# Patient Record
Sex: MALE | Race: WHITE | Employment: FULL TIME | URBAN - METROPOLITAN AREA
[De-identification: names, ages, dates, MRNs, and addresses within clinical notes are randomized per-mention and may not be internally consistent; named-entity substitution may affect disease eponyms.]

---

## 2018-09-06 ENCOUNTER — HOSPITAL ENCOUNTER (INPATIENT)
Age: 38
LOS: 18 days | Discharge: ANOTHER HOSPITAL | DRG: 871 | End: 2018-09-25
Attending: EMERGENCY MEDICINE | Admitting: INTERNAL MEDICINE
Payer: SUBSIDIZED

## 2018-09-06 ENCOUNTER — APPOINTMENT (OUTPATIENT)
Dept: GENERAL RADIOLOGY | Age: 38
DRG: 871 | End: 2018-09-06
Attending: EMERGENCY MEDICINE
Payer: SUBSIDIZED

## 2018-09-06 DIAGNOSIS — F19.10 IV DRUG ABUSE (HCC): ICD-10-CM

## 2018-09-06 DIAGNOSIS — R53.81 DEBILITY: ICD-10-CM

## 2018-09-06 DIAGNOSIS — F17.200 SMOKER: ICD-10-CM

## 2018-09-06 DIAGNOSIS — M54.6 ACUTE RIGHT-SIDED THORACIC BACK PAIN: ICD-10-CM

## 2018-09-06 DIAGNOSIS — A41.9 SEPSIS, DUE TO UNSPECIFIED ORGANISM: Primary | ICD-10-CM

## 2018-09-06 DIAGNOSIS — F11.10 HEROIN ABUSE (HCC): ICD-10-CM

## 2018-09-06 DIAGNOSIS — M54.41 ACUTE RIGHT-SIDED LOW BACK PAIN WITH RIGHT-SIDED SCIATICA: ICD-10-CM

## 2018-09-06 DIAGNOSIS — I07.9 ENDOCARDITIS OF TRICUSPID VALVE: ICD-10-CM

## 2018-09-06 LAB
ALBUMIN SERPL-MCNC: 2.6 G/DL (ref 3.5–5)
ALBUMIN/GLOB SERPL: 0.4 {RATIO} (ref 1.1–2.2)
ALP SERPL-CCNC: 149 U/L (ref 45–117)
ALT SERPL-CCNC: 42 U/L (ref 12–78)
ANION GAP SERPL CALC-SCNC: 12 MMOL/L (ref 5–15)
APPEARANCE UR: CLEAR
AST SERPL-CCNC: 34 U/L (ref 15–37)
BACTERIA URNS QL MICRO: NEGATIVE /HPF
BASOPHILS # BLD: 0 K/UL (ref 0–0.1)
BASOPHILS NFR BLD: 0 % (ref 0–1)
BILIRUB SERPL-MCNC: 0.7 MG/DL (ref 0.2–1)
BILIRUB UR QL: NEGATIVE
BUN SERPL-MCNC: 12 MG/DL (ref 6–20)
BUN/CREAT SERPL: 12 (ref 12–20)
CALCIUM SERPL-MCNC: 8.8 MG/DL (ref 8.5–10.1)
CHLORIDE SERPL-SCNC: 102 MMOL/L (ref 97–108)
CO2 SERPL-SCNC: 20 MMOL/L (ref 21–32)
COLOR UR: ABNORMAL
CREAT SERPL-MCNC: 1.02 MG/DL (ref 0.7–1.3)
CRP SERPL-MCNC: 15.5 MG/DL (ref 0–0.6)
DIFFERENTIAL METHOD BLD: ABNORMAL
EOSINOPHIL # BLD: 0 K/UL (ref 0–0.4)
EOSINOPHIL NFR BLD: 0 % (ref 0–7)
EPITH CASTS URNS QL MICRO: ABNORMAL /LPF
ERYTHROCYTE [DISTWIDTH] IN BLOOD BY AUTOMATED COUNT: 14.5 % (ref 11.5–14.5)
ERYTHROCYTE [SEDIMENTATION RATE] IN BLOOD: 129 MM/HR (ref 0–15)
GLOBULIN SER CALC-MCNC: 6.4 G/DL (ref 2–4)
GLUCOSE SERPL-MCNC: 102 MG/DL (ref 65–100)
GLUCOSE UR STRIP.AUTO-MCNC: NEGATIVE MG/DL
HCT VFR BLD AUTO: 30 % (ref 36.6–50.3)
HGB BLD-MCNC: 9.9 G/DL (ref 12.1–17)
HGB UR QL STRIP: ABNORMAL
HYALINE CASTS URNS QL MICRO: ABNORMAL /LPF (ref 0–5)
IMM GRANULOCYTES # BLD: 0.1 K/UL (ref 0–0.04)
IMM GRANULOCYTES NFR BLD AUTO: 1 % (ref 0–0.5)
KETONES UR QL STRIP.AUTO: NEGATIVE MG/DL
LACTATE SERPL-SCNC: 0.9 MMOL/L (ref 0.4–2)
LEUKOCYTE ESTERASE UR QL STRIP.AUTO: NEGATIVE
LYMPHOCYTES # BLD: 1.7 K/UL (ref 0.8–3.5)
LYMPHOCYTES NFR BLD: 11 % (ref 12–49)
MCH RBC QN AUTO: 27.3 PG (ref 26–34)
MCHC RBC AUTO-ENTMCNC: 33 G/DL (ref 30–36.5)
MCV RBC AUTO: 82.9 FL (ref 80–99)
MONOCYTES # BLD: 0.6 K/UL (ref 0–1)
MONOCYTES NFR BLD: 4 % (ref 5–13)
NEUTS SEG # BLD: 13.4 K/UL (ref 1.8–8)
NEUTS SEG NFR BLD: 85 % (ref 32–75)
NITRITE UR QL STRIP.AUTO: NEGATIVE
NRBC # BLD: 0 K/UL (ref 0–0.01)
NRBC BLD-RTO: 0 PER 100 WBC
PH UR STRIP: 6 [PH] (ref 5–8)
PLATELET # BLD AUTO: 217 K/UL (ref 150–400)
PMV BLD AUTO: 9.6 FL (ref 8.9–12.9)
POTASSIUM SERPL-SCNC: 3.6 MMOL/L (ref 3.5–5.1)
PROT SERPL-MCNC: 9 G/DL (ref 6.4–8.2)
PROT UR STRIP-MCNC: 30 MG/DL
RBC # BLD AUTO: 3.62 M/UL (ref 4.1–5.7)
RBC #/AREA URNS HPF: ABNORMAL /HPF (ref 0–5)
SODIUM SERPL-SCNC: 134 MMOL/L (ref 136–145)
SP GR UR REFRACTOMETRY: 1.01 (ref 1–1.03)
UROBILINOGEN UR QL STRIP.AUTO: 1 EU/DL (ref 0.2–1)
WBC # BLD AUTO: 15.8 K/UL (ref 4.1–11.1)
WBC URNS QL MICRO: ABNORMAL /HPF (ref 0–4)

## 2018-09-06 PROCEDURE — 74011250636 HC RX REV CODE- 250/636: Performed by: EMERGENCY MEDICINE

## 2018-09-06 PROCEDURE — 80053 COMPREHEN METABOLIC PANEL: CPT | Performed by: EMERGENCY MEDICINE

## 2018-09-06 PROCEDURE — 36415 COLL VENOUS BLD VENIPUNCTURE: CPT | Performed by: EMERGENCY MEDICINE

## 2018-09-06 PROCEDURE — 81001 URINALYSIS AUTO W/SCOPE: CPT | Performed by: EMERGENCY MEDICINE

## 2018-09-06 PROCEDURE — 87186 SC STD MICRODIL/AGAR DIL: CPT | Performed by: EMERGENCY MEDICINE

## 2018-09-06 PROCEDURE — 93005 ELECTROCARDIOGRAM TRACING: CPT

## 2018-09-06 PROCEDURE — 83605 ASSAY OF LACTIC ACID: CPT | Performed by: EMERGENCY MEDICINE

## 2018-09-06 PROCEDURE — 71045 X-RAY EXAM CHEST 1 VIEW: CPT

## 2018-09-06 PROCEDURE — 80307 DRUG TEST PRSMV CHEM ANLYZR: CPT | Performed by: EMERGENCY MEDICINE

## 2018-09-06 PROCEDURE — 85025 COMPLETE CBC W/AUTO DIFF WBC: CPT | Performed by: EMERGENCY MEDICINE

## 2018-09-06 PROCEDURE — 96375 TX/PRO/DX INJ NEW DRUG ADDON: CPT

## 2018-09-06 PROCEDURE — 96361 HYDRATE IV INFUSION ADD-ON: CPT

## 2018-09-06 PROCEDURE — 87040 BLOOD CULTURE FOR BACTERIA: CPT | Performed by: EMERGENCY MEDICINE

## 2018-09-06 PROCEDURE — 87077 CULTURE AEROBIC IDENTIFY: CPT | Performed by: EMERGENCY MEDICINE

## 2018-09-06 PROCEDURE — 86140 C-REACTIVE PROTEIN: CPT | Performed by: EMERGENCY MEDICINE

## 2018-09-06 PROCEDURE — 99285 EMERGENCY DEPT VISIT HI MDM: CPT

## 2018-09-06 PROCEDURE — 85652 RBC SED RATE AUTOMATED: CPT | Performed by: EMERGENCY MEDICINE

## 2018-09-06 RX ORDER — SODIUM CHLORIDE 0.9 % (FLUSH) 0.9 %
5-10 SYRINGE (ML) INJECTION AS NEEDED
Status: DISCONTINUED | OUTPATIENT
Start: 2018-09-06 | End: 2018-09-12

## 2018-09-06 RX ORDER — MORPHINE SULFATE 2 MG/ML
4 INJECTION, SOLUTION INTRAMUSCULAR; INTRAVENOUS
Status: COMPLETED | OUTPATIENT
Start: 2018-09-06 | End: 2018-09-06

## 2018-09-06 RX ADMIN — MORPHINE SULFATE 4 MG: 2 INJECTION, SOLUTION INTRAMUSCULAR; INTRAVENOUS at 22:23

## 2018-09-06 RX ADMIN — SODIUM CHLORIDE 1000 ML: 900 INJECTION, SOLUTION INTRAVENOUS at 22:23

## 2018-09-06 NOTE — IP AVS SNAPSHOT
[address] [facility] 
[phone] Patient: Natalie Senior MRN: [id number] DTU:9/3/6562 A check willis indicates which time of day the medication should be taken. My Medications START taking these medications Instructions Each Dose to Equal  
 Morning Noon Evening Bedtime  
 albuterol-ipratropium 2.5 mg-0.5 mg/3 ml Nebu Commonly known as:  Dedrick Floyd Your last dose was: Your next dose is:    
   
   
 3 mL by Nebulization route every four (4) hours as needed. 3 mL  
    
   
   
   
  
 bisacodyl 10 mg suppository Commonly known as:  DULCOLAX Your last dose was: Your next dose is: Insert 10 mg into rectum daily. 10 mg  
    
   
   
   
  
 cefepime 2 gram 2 g, ADDaptor 1 Device IVPB Your last dose was: Your next dose is:    
   
   
 2 g by IntraVENous route every eight (8) hours for 33 days. 2 g  
    
   
   
   
  
 celecoxib 200 mg capsule Commonly known as:  CELEBREX Your last dose was: Your next dose is: Take 1 Cap by mouth two (2) times a day for 30 days. 200 mg  
    
   
   
   
  
 famotidine 20 mg tablet Commonly known as:  PEPCID Your last dose was: Your next dose is: Take 1 Tab by mouth two (2) times a day. 20 mg  
    
   
   
   
  
 levoFLOXacin 750 mg/150 mL Pgbk Commonly known as:  Popeye Ambrose Your last dose was: Your next dose is:    
   
   
 150 mL by IntraVENous route every twenty-four (24) hours for 2 days. 750 mg  
    
   
   
   
  
 lidocaine 4 % patch Your last dose was: Your next dose is: As indicated  
     
   
   
   
  
 polyethylene glycol 17 gram packet Commonly known as:  Luan Deck Your last dose was: Your next dose is: Take 1 Packet by mouth daily as needed. 17 g tobramycin 400 mg Start taking on:  9/26/2018 Your last dose was: Your next dose is:    
   
   
 400 mg by IntraVENous route every thirty-six (36) hours for 32 days. 400 mg Where to Get Your Medications Information on where to get these meds will be given to you by the nurse or doctor. !  Ask your nurse or doctor about these medications  
  albuterol-ipratropium 2.5 mg-0.5 mg/3 ml Nebu  
 bisacodyl 10 mg suppository  
 cefepime 2 gram 2 g, ADDaptor 1 Device IVPB  
 celecoxib 200 mg capsule  
 famotidine 20 mg tablet  
 levoFLOXacin 750 mg/150 mL Pgbk  
 lidocaine 4 % patch  
 polyethylene glycol 17 gram packet  
 tobramycin 400 mg

## 2018-09-06 NOTE — IP AVS SNAPSHOT
[address] [facility] [address] [facility] 
[phone] Patient: Brenda Eller MRN: [id number] WTK:4[birth year] About your hospitalization You were admitted on:  September 7, 2018 You last received care in the:  [facility] You were discharged on:  September 25, 2018 Why you were hospitalized Your primary diagnosis was:  Not on File Your diagnoses also included:  Sepsis (Hcc), Endocarditis Of Tricuspid Valve, Tricuspid Valve Regurgitation, Infectious, Acute Septic Pulmonary Embolism Without Acute Cor Pulmonale (Hcc), Acute Right-Sided Low Back Pain With Sciatica, Heroin Abuse, Debility, Iv Drug Abuse Follow-up Information Follow up With Details Comments Contact Info Lily Beck MD Go on 10/31/2018 For new patient appointment at [facility] [address] [facility] 
[phone] None   None (395) Patient stated that they have no PCP Your Scheduled Appointments Wednesday October 31, 2018 11:30 AM EDT New Patient with Lily Beck MD  
[facility] at ED [facility] [address]) [address] [facility] [address] [facility]  
[phone] Discharge Orders None A check willis indicates which time of day the medication should be taken. My Medications START taking these medications Instructions Each Dose to Equal  
 Morning Noon Evening Bedtime  
 albuterol-ipratropium 2.5 mg-0.5 mg/3 ml Nebu Commonly known as:  Rayshawn Bence Your last dose was: Your next dose is:    
   
   
 3 mL by Nebulization route every four (4) hours as needed. 3 mL  
    
   
   
   
  
 bisacodyl 10 mg suppository Commonly known as:  DULCOLAX Your last dose was: Your next dose is: Insert 10 mg into rectum daily. 10 mg  
    
   
   
   
  
 cefepime 2 gram 2 g, ADDaptor 1 Device IVPB Your last dose was: Your next dose is:    
   
   
 2 g by IntraVENous route every eight (8) hours for 33 days. 2 g  
    
   
   
   
  
 celecoxib 200 mg capsule Commonly known as:  CELEBREX Your last dose was: Your next dose is: Take 1 Cap by mouth two (2) times a day for 30 days. 200 mg  
    
   
   
   
  
 famotidine 20 mg tablet Commonly known as:  PEPCID Your last dose was: Your next dose is: Take 1 Tab by mouth two (2) times a day. 20 mg  
    
   
   
   
  
 levoFLOXacin 750 mg/150 mL Pgbk Commonly known as:  Lamount Chalet Your last dose was: Your next dose is:    
   
   
 150 mL by IntraVENous route every twenty-four (24) hours for 2 days. 750 mg  
    
   
   
   
  
 lidocaine 4 % patch Your last dose was: Your next dose is: As indicated  
     
   
   
   
  
 polyethylene glycol 17 gram packet Commonly known as:  Desmond Rios Your last dose was: Your next dose is: Take 1 Packet by mouth daily as needed. 17 g  
    
   
   
   
  
 tobramycin 400 mg Start taking on:  9/26/2018 Your last dose was: Your next dose is:    
   
   
 400 mg by IntraVENous route every thirty-six (36) hours for 32 days. 400 mg Where to Get Your Medications Information on where to get these meds will be given to you by the nurse or doctor. ! Ask your nurse or doctor about these medications  
  albuterol-ipratropium 2.5 mg-0.5 mg/3 ml Nebu  
 bisacodyl 10 mg suppository  
 cefepime 2 gram 2 g, ADDaptor 1 Device IVPB  
 celecoxib 200 mg capsule  
 famotidine 20 mg tablet  
 levoFLOXacin 750 mg/150 mL Pgbk  
 lidocaine 4 % patch  
 polyethylene glycol 17 gram packet  
 tobramycin 400 mg Discharge Instructions None Introducing [facility]! [employer] introduces Transmensiont patient portal. Now you can access parts of your medical record, email your doctor's office, and request medication refills online. 1. In your internet browser, go to [email or web address] 2. Click on the First Time User? Click Here link in the Sign In box. You will see the New Member Sign Up page. 3. Enter your Shutl Access Code exactly as it appears below. You will not need to use this code after youve completed the sign-up process. If you do not sign up before the expiration date, you must request a new code. · Shutl Access Code: [email or web address] Expires: 12/5/2018  7:31 PM 
 
4. Enter the last four digits of your Social Security Number (xxxx) and Date of Birth (mm/dd/yyyy) as indicated and click Submit. You will be taken to the next sign-up page. 5. Create a Shutl ID. This will be your Shutl login ID and cannot be changed, so think of one that is secure and easy to remember. 6. Create a Shutl password. You can change your password at any time. 7. Enter your Password Reset Question and Answer. This can be used at a later time if you forget your password. 8. Enter your e-mail address. You will receive e-mail notification when new information is available in [address] 9. Click Sign Up. You can now view and download portions of your medical record. 10. Click the Download Summary menu link to download a portable copy of your medical information. If you have questions, please visit the Frequently Asked Questions section of the Shutl website. Remember, Shutl is NOT to be used for urgent needs. For medical emergencies, dial 911. Now available from your iPhone and Android! Introducing Sam Sen As a [employer] patient, I wanted to make you aware of our electronic visit tool called Sam Sen. [employer] 24/7 allows you to connect within minutes with a medical provider 24 hours a day, seven days a week via a mobile device or tablet or logging into a secure website from your computer. You can access Fl[employer]iar from anywhere in the [place]. A virtual visit might be right for you when you have a simple condition and feel like you just dont want to get out of bed, or cant get away from work for an appointment, when your regular [employer] provider is not available (evenings, weekends or holidays), or when youre out of town and need minor care. Electronic visits cost only $49 and if the [employer] 24/7 provider determines a prescription is needed to treat your condition, one can be electronically transmitted to a nearby pharmacy*. Please take a moment to enroll today if you have not already done so. The enrollment process is free and takes just a few minutes. To enroll, please download the [employer] 24/7 song to your tablet or phone, or visit [email or web address] to enroll on your computer. And, as an [address] [place] [address] patient with a [employer] account, the results of your visits will be scanned into your electronic medical record and your primary care provider will be able to view the scanned results. We urge you to continue to see your regular [employer] provider for your ongoing medical care. And while your primary care provider may not be the one available when you seek a [employer] virtual visit, the peace of mind you get from getting a real diagnosis real time can be priceless. For more information on Flaviar, view our Frequently Asked Questions (FAQs) at [email or web address]. Sincerely, 
 
Aranza Tijerina MD 
Chief Medical Officer [address] *:  certain medications cannot be prescribed via Flaviar Providers Seen During Your Hospitalization Provider Specialty Primary office phone Porsha Jones MD Emergency Medicine [phone] Melinda Duffy MD Emergency Medicine [phone] Sera Barnett MD Internal Medicine [phone] Deidre Melvin MD Hospitalist [phone] Alisson Choe MD Internal Medicine [phone] America Halsted, MD Internal Medicine [phone] Preethi Hathaway MD Hospitalist [phone] Immunizations Administered for This Admission Name Date Influenza Vaccine (Quad) PF  Deferred () Your Primary Care Physician (PCP) Primary Care Physician Office Phone Office Fax NONE ** None ** ** None ** You are allergic to the following No active allergies Recent Documentation Height Weight BMI Smoking Status 1.829 m 76.6 kg 22.9 kg/m2 Current Every Day Smoker Emergency Contacts Name Discharge Info Relation Home Work Mobile u.sit Patient Belongings The following personal items are in your possession at time of discharge: 
  Dental Appliances: None  Visual Aid: None      Home Medications: None   Jewelry: None  Clothing: At bedside    Other Valuables: None Please provide this summary of care documentation to your next provider. Signatures-by signing, you are acknowledging that this After Visit Summary has been reviewed with you and you have received a copy. Patient Signature:  ____________________________________________________________ Date:  ____________________________________________________________  
  
Alex Larson Provider Signature:  ____________________________________________________________ Date:  ____________________________________________________________

## 2018-09-07 ENCOUNTER — APPOINTMENT (OUTPATIENT)
Dept: MRI IMAGING | Age: 38
DRG: 871 | End: 2018-09-07
Attending: EMERGENCY MEDICINE
Payer: SUBSIDIZED

## 2018-09-07 ENCOUNTER — APPOINTMENT (OUTPATIENT)
Dept: CT IMAGING | Age: 38
DRG: 871 | End: 2018-09-07
Attending: EMERGENCY MEDICINE
Payer: SUBSIDIZED

## 2018-09-07 PROBLEM — A41.9 SEPSIS (HCC): Status: ACTIVE | Noted: 2018-09-07

## 2018-09-07 LAB
AMPHET UR QL SCN: NEGATIVE
ANION GAP SERPL CALC-SCNC: 9 MMOL/L (ref 5–15)
ATRIAL RATE: 106 BPM
BARBITURATES UR QL SCN: NEGATIVE
BASOPHILS # BLD: 0 K/UL (ref 0–0.1)
BASOPHILS NFR BLD: 0 % (ref 0–1)
BENZODIAZ UR QL: NEGATIVE
BUN SERPL-MCNC: 14 MG/DL (ref 6–20)
BUN/CREAT SERPL: 16 (ref 12–20)
CALCIUM SERPL-MCNC: 8.1 MG/DL (ref 8.5–10.1)
CALCULATED P AXIS, ECG09: 61 DEGREES
CALCULATED R AXIS, ECG10: 73 DEGREES
CALCULATED T AXIS, ECG11: 44 DEGREES
CANNABINOIDS UR QL SCN: NEGATIVE
CHLORIDE SERPL-SCNC: 106 MMOL/L (ref 97–108)
CO2 SERPL-SCNC: 23 MMOL/L (ref 21–32)
COCAINE UR QL SCN: NEGATIVE
CREAT SERPL-MCNC: 0.85 MG/DL (ref 0.7–1.3)
CRP SERPL-MCNC: 12.4 MG/DL (ref 0–0.6)
DIAGNOSIS, 93000: NORMAL
DIFFERENTIAL METHOD BLD: ABNORMAL
DRUG SCRN COMMENT,DRGCM: ABNORMAL
EOSINOPHIL # BLD: 0.1 K/UL (ref 0–0.4)
EOSINOPHIL NFR BLD: 1 % (ref 0–7)
ERYTHROCYTE [DISTWIDTH] IN BLOOD BY AUTOMATED COUNT: 14.5 % (ref 11.5–14.5)
ERYTHROCYTE [SEDIMENTATION RATE] IN BLOOD: 128 MM/HR (ref 0–15)
GLUCOSE SERPL-MCNC: 126 MG/DL (ref 65–100)
HCT VFR BLD AUTO: 25.4 % (ref 36.6–50.3)
HGB BLD-MCNC: 8.3 G/DL (ref 12.1–17)
IMM GRANULOCYTES # BLD: 0.1 K/UL (ref 0–0.04)
IMM GRANULOCYTES NFR BLD AUTO: 1 % (ref 0–0.5)
LYMPHOCYTES # BLD: 1.5 K/UL (ref 0.8–3.5)
LYMPHOCYTES NFR BLD: 14 % (ref 12–49)
MCH RBC QN AUTO: 27.5 PG (ref 26–34)
MCHC RBC AUTO-ENTMCNC: 32.7 G/DL (ref 30–36.5)
MCV RBC AUTO: 84.1 FL (ref 80–99)
METHADONE UR QL: NEGATIVE
MONOCYTES # BLD: 0.4 K/UL (ref 0–1)
MONOCYTES NFR BLD: 4 % (ref 5–13)
NEUTS SEG # BLD: 8.3 K/UL (ref 1.8–8)
NEUTS SEG NFR BLD: 80 % (ref 32–75)
NRBC # BLD: 0 K/UL (ref 0–0.01)
NRBC BLD-RTO: 0 PER 100 WBC
OPIATES UR QL: POSITIVE
P-R INTERVAL, ECG05: 138 MS
PCP UR QL: NEGATIVE
PLATELET # BLD AUTO: 205 K/UL (ref 150–400)
PMV BLD AUTO: 9.7 FL (ref 8.9–12.9)
POTASSIUM SERPL-SCNC: 3.6 MMOL/L (ref 3.5–5.1)
Q-T INTERVAL, ECG07: 348 MS
QRS DURATION, ECG06: 88 MS
QTC CALCULATION (BEZET), ECG08: 462 MS
RBC # BLD AUTO: 3.02 M/UL (ref 4.1–5.7)
SODIUM SERPL-SCNC: 138 MMOL/L (ref 136–145)
VENTRICULAR RATE, ECG03: 106 BPM
WBC # BLD AUTO: 10.5 K/UL (ref 4.1–11.1)

## 2018-09-07 PROCEDURE — 96376 TX/PRO/DX INJ SAME DRUG ADON: CPT

## 2018-09-07 PROCEDURE — 74011250636 HC RX REV CODE- 250/636: Performed by: INTERNAL MEDICINE

## 2018-09-07 PROCEDURE — 96365 THER/PROPH/DIAG IV INF INIT: CPT

## 2018-09-07 PROCEDURE — 86803 HEPATITIS C AB TEST: CPT | Performed by: INTERNAL MEDICINE

## 2018-09-07 PROCEDURE — 96375 TX/PRO/DX INJ NEW DRUG ADDON: CPT

## 2018-09-07 PROCEDURE — 74011636320 HC RX REV CODE- 636/320: Performed by: EMERGENCY MEDICINE

## 2018-09-07 PROCEDURE — 72129 CT CHEST SPINE W/DYE: CPT

## 2018-09-07 PROCEDURE — 80048 BASIC METABOLIC PNL TOTAL CA: CPT | Performed by: INTERNAL MEDICINE

## 2018-09-07 PROCEDURE — 77030018786 HC NDL GD F/USND BARD -B

## 2018-09-07 PROCEDURE — 76937 US GUIDE VASCULAR ACCESS: CPT

## 2018-09-07 PROCEDURE — 86140 C-REACTIVE PROTEIN: CPT | Performed by: INTERNAL MEDICINE

## 2018-09-07 PROCEDURE — 74011000258 HC RX REV CODE- 258: Performed by: EMERGENCY MEDICINE

## 2018-09-07 PROCEDURE — 74011250636 HC RX REV CODE- 250/636: Performed by: EMERGENCY MEDICINE

## 2018-09-07 PROCEDURE — 74011250637 HC RX REV CODE- 250/637: Performed by: INTERNAL MEDICINE

## 2018-09-07 PROCEDURE — 72148 MRI LUMBAR SPINE W/O DYE: CPT

## 2018-09-07 PROCEDURE — C1751 CATH, INF, PER/CENT/MIDLINE: HCPCS

## 2018-09-07 PROCEDURE — 74011000258 HC RX REV CODE- 258: Performed by: INTERNAL MEDICINE

## 2018-09-07 PROCEDURE — 85652 RBC SED RATE AUTOMATED: CPT | Performed by: INTERNAL MEDICINE

## 2018-09-07 PROCEDURE — 96366 THER/PROPH/DIAG IV INF ADDON: CPT

## 2018-09-07 PROCEDURE — 87389 HIV-1 AG W/HIV-1&-2 AB AG IA: CPT | Performed by: INTERNAL MEDICINE

## 2018-09-07 PROCEDURE — 65270000029 HC RM PRIVATE

## 2018-09-07 PROCEDURE — 96367 TX/PROPH/DG ADDL SEQ IV INF: CPT

## 2018-09-07 PROCEDURE — 36415 COLL VENOUS BLD VENIPUNCTURE: CPT | Performed by: INTERNAL MEDICINE

## 2018-09-07 PROCEDURE — 72132 CT LUMBAR SPINE W/DYE: CPT

## 2018-09-07 PROCEDURE — 85025 COMPLETE CBC W/AUTO DIFF WBC: CPT | Performed by: INTERNAL MEDICINE

## 2018-09-07 PROCEDURE — 74011250636 HC RX REV CODE- 250/636

## 2018-09-07 RX ORDER — MORPHINE SULFATE 2 MG/ML
6 INJECTION, SOLUTION INTRAMUSCULAR; INTRAVENOUS
Status: COMPLETED | OUTPATIENT
Start: 2018-09-07 | End: 2018-09-07

## 2018-09-07 RX ORDER — VANCOMYCIN 1.75 GRAM/500 ML IN 0.9 % SODIUM CHLORIDE INTRAVENOUS
1750
Status: COMPLETED | OUTPATIENT
Start: 2018-09-07 | End: 2018-09-07

## 2018-09-07 RX ORDER — HYDROCODONE BITARTRATE AND ACETAMINOPHEN 5; 325 MG/1; MG/1
1 TABLET ORAL
Status: DISCONTINUED | OUTPATIENT
Start: 2018-09-07 | End: 2018-09-07

## 2018-09-07 RX ORDER — IPRATROPIUM BROMIDE AND ALBUTEROL SULFATE 2.5; .5 MG/3ML; MG/3ML
3 SOLUTION RESPIRATORY (INHALATION)
Status: DISCONTINUED | OUTPATIENT
Start: 2018-09-07 | End: 2018-09-25 | Stop reason: HOSPADM

## 2018-09-07 RX ORDER — NALOXONE HYDROCHLORIDE 0.4 MG/ML
0.4 INJECTION, SOLUTION INTRAMUSCULAR; INTRAVENOUS; SUBCUTANEOUS AS NEEDED
Status: DISCONTINUED | OUTPATIENT
Start: 2018-09-07 | End: 2018-09-25 | Stop reason: HOSPADM

## 2018-09-07 RX ORDER — HYDROCODONE BITARTRATE AND ACETAMINOPHEN 5; 325 MG/1; MG/1
1 TABLET ORAL
Status: DISCONTINUED | OUTPATIENT
Start: 2018-09-07 | End: 2018-09-09

## 2018-09-07 RX ORDER — MORPHINE SULFATE 2 MG/ML
1 INJECTION, SOLUTION INTRAMUSCULAR; INTRAVENOUS
Status: DISCONTINUED | OUTPATIENT
Start: 2018-09-07 | End: 2018-09-07

## 2018-09-07 RX ORDER — HYDRALAZINE HYDROCHLORIDE 20 MG/ML
10 INJECTION INTRAMUSCULAR; INTRAVENOUS
Status: DISCONTINUED | OUTPATIENT
Start: 2018-09-07 | End: 2018-09-25 | Stop reason: HOSPADM

## 2018-09-07 RX ORDER — HYDROMORPHONE HYDROCHLORIDE 1 MG/ML
1 INJECTION, SOLUTION INTRAMUSCULAR; INTRAVENOUS; SUBCUTANEOUS
Status: COMPLETED | OUTPATIENT
Start: 2018-09-07 | End: 2018-09-07

## 2018-09-07 RX ORDER — SODIUM CHLORIDE 9 MG/ML
150 INJECTION, SOLUTION INTRAVENOUS ONCE
Status: COMPLETED | OUTPATIENT
Start: 2018-09-07 | End: 2018-09-07

## 2018-09-07 RX ORDER — POLYETHYLENE GLYCOL 3350 17 G/17G
17 POWDER, FOR SOLUTION ORAL DAILY
Status: DISCONTINUED | OUTPATIENT
Start: 2018-09-08 | End: 2018-09-18

## 2018-09-07 RX ORDER — SODIUM CHLORIDE 0.9 % (FLUSH) 0.9 %
5-10 SYRINGE (ML) INJECTION EVERY 8 HOURS
Status: DISCONTINUED | OUTPATIENT
Start: 2018-09-07 | End: 2018-09-25 | Stop reason: HOSPADM

## 2018-09-07 RX ORDER — SODIUM CHLORIDE 9 MG/ML
50 INJECTION, SOLUTION INTRAVENOUS
Status: COMPLETED | OUTPATIENT
Start: 2018-09-07 | End: 2018-09-07

## 2018-09-07 RX ORDER — ONDANSETRON 2 MG/ML
4 INJECTION INTRAMUSCULAR; INTRAVENOUS
Status: DISCONTINUED | OUTPATIENT
Start: 2018-09-07 | End: 2018-09-25 | Stop reason: HOSPADM

## 2018-09-07 RX ORDER — LORAZEPAM 2 MG/ML
2 INJECTION INTRAMUSCULAR AS NEEDED
Status: COMPLETED | OUTPATIENT
Start: 2018-09-07 | End: 2018-09-07

## 2018-09-07 RX ORDER — LORAZEPAM 2 MG/ML
2 INJECTION INTRAMUSCULAR
Status: COMPLETED | OUTPATIENT
Start: 2018-09-07 | End: 2018-09-07

## 2018-09-07 RX ORDER — ACETAMINOPHEN 500 MG
500 TABLET ORAL
Status: DISCONTINUED | OUTPATIENT
Start: 2018-09-07 | End: 2018-09-14

## 2018-09-07 RX ORDER — SODIUM CHLORIDE 0.9 % (FLUSH) 0.9 %
5-10 SYRINGE (ML) INJECTION AS NEEDED
Status: DISCONTINUED | OUTPATIENT
Start: 2018-09-07 | End: 2018-09-25 | Stop reason: HOSPADM

## 2018-09-07 RX ORDER — FACIAL-BODY WIPES
10 EACH TOPICAL DAILY PRN
Status: DISCONTINUED | OUTPATIENT
Start: 2018-09-07 | End: 2018-09-25 | Stop reason: HOSPADM

## 2018-09-07 RX ORDER — GUAIFENESIN 600 MG/1
600 TABLET, EXTENDED RELEASE ORAL EVERY 12 HOURS
Status: DISCONTINUED | OUTPATIENT
Start: 2018-09-07 | End: 2018-09-18

## 2018-09-07 RX ORDER — ACETAMINOPHEN 500 MG
500 TABLET ORAL
Status: DISCONTINUED | OUTPATIENT
Start: 2018-09-07 | End: 2018-09-07

## 2018-09-07 RX ORDER — MORPHINE SULFATE 2 MG/ML
4 INJECTION, SOLUTION INTRAMUSCULAR; INTRAVENOUS
Status: COMPLETED | OUTPATIENT
Start: 2018-09-07 | End: 2018-09-07

## 2018-09-07 RX ORDER — SODIUM CHLORIDE 0.9 % (FLUSH) 0.9 %
10 SYRINGE (ML) INJECTION
Status: COMPLETED | OUTPATIENT
Start: 2018-09-07 | End: 2018-09-07

## 2018-09-07 RX ORDER — MORPHINE SULFATE 2 MG/ML
INJECTION, SOLUTION INTRAMUSCULAR; INTRAVENOUS
Status: COMPLETED
Start: 2018-09-07 | End: 2018-09-07

## 2018-09-07 RX ORDER — MIDAZOLAM HYDROCHLORIDE 1 MG/ML
4 INJECTION, SOLUTION INTRAMUSCULAR; INTRAVENOUS
Status: COMPLETED | OUTPATIENT
Start: 2018-09-07 | End: 2018-09-07

## 2018-09-07 RX ORDER — SODIUM CHLORIDE 9 MG/ML
125 INJECTION, SOLUTION INTRAVENOUS CONTINUOUS
Status: DISCONTINUED | OUTPATIENT
Start: 2018-09-07 | End: 2018-09-07

## 2018-09-07 RX ORDER — SODIUM CHLORIDE 0.9 % (FLUSH) 0.9 %
10 SYRINGE (ML) INJECTION AS NEEDED
Status: DISCONTINUED | OUTPATIENT
Start: 2018-09-07 | End: 2018-09-25 | Stop reason: HOSPADM

## 2018-09-07 RX ORDER — HYDROMORPHONE HYDROCHLORIDE 2 MG/ML
1 INJECTION, SOLUTION INTRAMUSCULAR; INTRAVENOUS; SUBCUTANEOUS
Status: DISCONTINUED | OUTPATIENT
Start: 2018-09-07 | End: 2018-09-08

## 2018-09-07 RX ORDER — SODIUM CHLORIDE 0.9 % (FLUSH) 0.9 %
10 SYRINGE (ML) INJECTION EVERY 8 HOURS
Status: DISCONTINUED | OUTPATIENT
Start: 2018-09-07 | End: 2018-09-25 | Stop reason: HOSPADM

## 2018-09-07 RX ORDER — SODIUM CHLORIDE 9 MG/ML
100 INJECTION, SOLUTION INTRAVENOUS CONTINUOUS
Status: DISCONTINUED | OUTPATIENT
Start: 2018-09-07 | End: 2018-09-12

## 2018-09-07 RX ORDER — HYDROMORPHONE HYDROCHLORIDE 2 MG/ML
1 INJECTION, SOLUTION INTRAMUSCULAR; INTRAVENOUS; SUBCUTANEOUS
Status: DISCONTINUED | OUTPATIENT
Start: 2018-09-07 | End: 2018-09-07

## 2018-09-07 RX ADMIN — LORAZEPAM 2 MG: 2 INJECTION INTRAMUSCULAR; INTRAVENOUS at 03:00

## 2018-09-07 RX ADMIN — VANCOMYCIN HYDROCHLORIDE 1000 MG: 1 INJECTION, POWDER, LYOPHILIZED, FOR SOLUTION INTRAVENOUS at 21:08

## 2018-09-07 RX ADMIN — GUAIFENESIN 600 MG: 600 TABLET, EXTENDED RELEASE ORAL at 22:30

## 2018-09-07 RX ADMIN — CEFTRIAXONE SODIUM 2 G: 2 INJECTION, POWDER, FOR SOLUTION INTRAMUSCULAR; INTRAVENOUS at 00:45

## 2018-09-07 RX ADMIN — SODIUM CHLORIDE 125 ML/HR: 900 INJECTION, SOLUTION INTRAVENOUS at 11:10

## 2018-09-07 RX ADMIN — VANCOMYCIN HYDROCHLORIDE 1750 MG: 10 INJECTION, POWDER, LYOPHILIZED, FOR SOLUTION INTRAVENOUS at 04:37

## 2018-09-07 RX ADMIN — MORPHINE SULFATE 6 MG: 2 INJECTION, SOLUTION INTRAMUSCULAR; INTRAVENOUS at 03:00

## 2018-09-07 RX ADMIN — Medication 10 ML: at 14:34

## 2018-09-07 RX ADMIN — HYDROMORPHONE HYDROCHLORIDE 1 MG: 2 INJECTION, SOLUTION INTRAMUSCULAR; INTRAVENOUS; SUBCUTANEOUS at 22:29

## 2018-09-07 RX ADMIN — Medication 10 ML: at 05:33

## 2018-09-07 RX ADMIN — HYDROMORPHONE HYDROCHLORIDE 1 MG: 2 INJECTION, SOLUTION INTRAMUSCULAR; INTRAVENOUS; SUBCUTANEOUS at 18:35

## 2018-09-07 RX ADMIN — VANCOMYCIN HYDROCHLORIDE 1000 MG: 1 INJECTION, POWDER, LYOPHILIZED, FOR SOLUTION INTRAVENOUS at 14:34

## 2018-09-07 RX ADMIN — Medication 10 ML: at 22:29

## 2018-09-07 RX ADMIN — MIDAZOLAM HYDROCHLORIDE 4 MG: 1 INJECTION, SOLUTION INTRAMUSCULAR; INTRAVENOUS at 04:14

## 2018-09-07 RX ADMIN — HYDROCODONE BITARTRATE AND ACETAMINOPHEN 1 TABLET: 5; 325 TABLET ORAL at 20:52

## 2018-09-07 RX ADMIN — HYDROMORPHONE HYDROCHLORIDE 1 MG: 1 INJECTION, SOLUTION INTRAMUSCULAR; INTRAVENOUS; SUBCUTANEOUS at 08:54

## 2018-09-07 RX ADMIN — SODIUM CHLORIDE 150 ML/HR: 900 INJECTION, SOLUTION INTRAVENOUS at 08:55

## 2018-09-07 RX ADMIN — CEFEPIME HYDROCHLORIDE 2 G: 2 INJECTION, POWDER, FOR SOLUTION INTRAVENOUS at 18:34

## 2018-09-07 RX ADMIN — CEFEPIME HYDROCHLORIDE 2 G: 2 INJECTION, POWDER, FOR SOLUTION INTRAVENOUS at 11:35

## 2018-09-07 RX ADMIN — Medication 10 ML: at 11:44

## 2018-09-07 RX ADMIN — MORPHINE SULFATE 4 MG: 2 INJECTION, SOLUTION INTRAMUSCULAR; INTRAVENOUS at 00:46

## 2018-09-07 RX ADMIN — HYDROMORPHONE HYDROCHLORIDE 1 MG: 2 INJECTION, SOLUTION INTRAMUSCULAR; INTRAVENOUS; SUBCUTANEOUS at 14:41

## 2018-09-07 RX ADMIN — LORAZEPAM 2 MG: 2 INJECTION INTRAMUSCULAR; INTRAVENOUS at 02:35

## 2018-09-07 RX ADMIN — HYDROCODONE BITARTRATE AND ACETAMINOPHEN 1 TABLET: 5; 325 TABLET ORAL at 11:50

## 2018-09-07 RX ADMIN — IOPAMIDOL 100 ML: 755 INJECTION, SOLUTION INTRAVENOUS at 05:33

## 2018-09-07 RX ADMIN — SODIUM CHLORIDE 50 ML/HR: 900 INJECTION, SOLUTION INTRAVENOUS at 05:33

## 2018-09-07 RX ADMIN — SODIUM CHLORIDE 1000 ML: 900 INJECTION, SOLUTION INTRAVENOUS at 00:46

## 2018-09-07 NOTE — ED NOTES
TRANSFER - IN REPORT: 
 
Verbal report received from Devora Aguilar on [facility]. Report consisted of patients Situation, Background, Assessment and  
Recommendations(SBAR). Information from the following report(s) SBAR and ED Summary was reviewed with the receiving nurse. Opportunity for questions and clarification was provided. Pt resting on stretcher in POC with call bell. Pt on monitor x 2. VSS at this time. Pt requesting pain medication.   Per Daphne Mcnally, hospitalist and ER MD have been made aware of request.

## 2018-09-07 NOTE — PROGRESS NOTES
Problem: Falls - Risk of 
Goal: *Absence of Falls Document [place] Fall Risk and appropriate interventions in the flowsheet. Outcome: Progressing Towards Goal 
Fall Risk Interventions: 
Mobility Interventions: Patient to call before getting OOB Medication Interventions: Evaluate medications/consider consulting pharmacy, Patient to call before getting OOB

## 2018-09-07 NOTE — ED NOTES
TRANSFER - OUT REPORT: 
 
Verbal report given to Leon Quiroz on Mati Osier  being transferred to [facility] for routine progression of care Report consisted of patients Situation, Background, Assessment and  
Recommendations(SBAR). Information from the following report(s) SBAR, ED Summary and Recent Results was reviewed with the receiving nurse. Lines:  
Peripheral IV 09/06/18 Right Antecubital (Active) Site Assessment Clean, dry, & intact 9/7/2018  1:45 AM  
Phlebitis Assessment 0 9/7/2018  1:45 AM  
Infiltration Assessment 0 9/7/2018  1:45 AM  
Dressing Status Clean, dry, & intact 9/7/2018  1:45 AM  
Dressing Type Transparent 9/7/2018  1:45 AM  
Hub Color/Line Status Blue 9/7/2018  1:45 AM  
  
 
Opportunity for questions and clarification was provided.

## 2018-09-07 NOTE — ED PROVIDER NOTES
EMERGENCY DEPARTMENT HISTORY AND PHYSICAL EXAM 
 
 
Date: 9/6/2018 Patient Name: Lydia Naidu History of Presenting Illness Chief Complaint Patient presents with  Back Pain History Provided By: Patient HPI: Lydia Naidu, 43 y.o. male with PMHx significant for IV drug use (denies sharing needles), presents via EMS to the ED with cc of constant right sided back pain for the past 2-3 days similar to past MRSA sxs. Patient states 3 days ago he woke up with CP that subsided then 2 days ago started having back pain. He states pain worsens with movement. He also reports fever with tmax 101.3 degrees. Patient reports 3 months ago he had MRSA in his spine and was discharged on July 10th. He reports using IV heroin this AM with no relief. He denies SOB, weakness, numbness, or tingling. There are no other complaints, changes, or physical findings at this time. PCP: None Current Facility-Administered Medications Medication Dose Route Frequency Provider Last Rate Last Dose  vancomycin (VANCOCIN) 1750 mg in  ml infusion  1,750 mg IntraVENous NOW Priscilla Regalado  mL/hr at 09/07/18 0437 1,750 mg at 09/07/18 6354  sodium chloride (NS) flush 5-10 mL  5-10 mL IntraVENous PRN Suad Cabello MD      
 
 
Past History Past Medical History: 
Past Medical History:  
Diagnosis Date  Back pain, chronic Past Surgical History: 
Past Surgical History:  
Procedure Laterality Date  HX CERVICAL DISKECTOMY  HX FEMUR FRACTURE TX    
 HX ORTHOPAEDIC Back Surgery Family History: 
History reviewed. No pertinent family history. Social History: 
Social History Substance Use Topics  Smoking status: Current Every Day Smoker Packs/day: 1.00  Smokeless tobacco: Never Used  Alcohol use Yes Allergies: 
No Known Allergies Review of Systems Review of Systems Constitutional: Positive for fever. Negative for chills. HENT: Negative. Negative for congestion, rhinorrhea, sneezing and sore throat. Eyes: Negative. Negative for redness and visual disturbance. Respiratory: Negative. Negative for cough, shortness of breath and wheezing. Cardiovascular: Negative. Negative for leg swelling. Gastrointestinal: Negative. Negative for abdominal pain, diarrhea, nausea and vomiting. Genitourinary: Negative. Negative for difficulty urinating, discharge and frequency. Musculoskeletal: Positive for back pain. Negative for arthralgias, myalgias and neck stiffness. Skin: Negative. Negative for color change and rash. Neurological: Negative. Negative for dizziness, syncope, weakness, numbness and headaches. Hematological: Negative for adenopathy. Psychiatric/Behavioral: Negative. All other systems reviewed and are negative. Physical Exam  
Physical Exam  
Constitutional: He is oriented to person, place, and time. HENT:  
Head: Atraumatic. Eyes: EOM are normal.  
Cardiovascular: Normal rate, regular rhythm, normal heart sounds and intact distal pulses. Exam reveals no gallop and no friction rub. No murmur heard. Pulmonary/Chest: Effort normal and breath sounds normal. No respiratory distress. He has no wheezes. He has no rales. He exhibits no tenderness. Abdominal: Soft. Bowel sounds are normal. He exhibits no distension and no mass. There is no tenderness. There is no rebound and no guarding. Musculoskeletal: Normal range of motion. He exhibits no edema or tenderness. Neurological: He is alert and oriented to person, place, and time. Psychiatric: He has a normal mood and affect. Nursing note and vitals reviewed. Diagnostic Study Results Labs - Recent Results (from the past 12 hour(s)) LACTIC ACID Collection Time: 09/06/18  8:50 PM  
Result Value Ref Range Lactic acid 0.9 0.4 - 2.0 MMOL/L  
METABOLIC PANEL, COMPREHENSIVE  Collection Time: 09/06/18  8:50 PM  
 Result Value Ref Range Sodium 134 (L) 136 - 145 mmol/L Potassium 3.6 3.5 - 5.1 mmol/L Chloride 102 97 - 108 mmol/L  
 CO2 20 (L) 21 - 32 mmol/L Anion gap 12 5 - 15 mmol/L Glucose 102 (H) 65 - 100 mg/dL BUN 12 6 - 20 MG/DL Creatinine 1.02 0.70 - 1.30 MG/DL  
 BUN/Creatinine ratio 12 12 - 20 GFR est AA >60 >60 ml/min/1.73m2 GFR est non-AA >60 >60 ml/min/1.73m2 Calcium 8.8 8.5 - 10.1 MG/DL Bilirubin, total 0.7 0.2 - 1.0 MG/DL  
 ALT (SGPT) 42 12 - 78 U/L  
 AST (SGOT) 34 15 - 37 U/L Alk. phosphatase 149 (H) 45 - 117 U/L Protein, total 9.0 (H) 6.4 - 8.2 g/dL Albumin 2.6 (L) 3.5 - 5.0 g/dL Globulin 6.4 (H) 2.0 - 4.0 g/dL A-G Ratio 0.4 (L) 1.1 - 2.2    
CBC WITH AUTOMATED DIFF Collection Time: 09/06/18  8:50 PM  
Result Value Ref Range WBC 15.8 (H) 4.1 - 11.1 K/uL  
 RBC 3.62 (L) 4.10 - 5.70 M/uL HGB 9.9 (L) 12.1 - 17.0 g/dL HCT 30.0 (L) 36.6 - 50.3 % MCV 82.9 80.0 - 99.0 FL  
 MCH 27.3 26.0 - 34.0 PG  
 MCHC 33.0 30.0 - 36.5 g/dL  
 RDW 14.5 11.5 - 14.5 % PLATELET 287 869 - 633 K/uL MPV 9.6 8.9 - 12.9 FL  
 NRBC 0.0 0  WBC ABSOLUTE NRBC 0.00 0.00 - 0.01 K/uL NEUTROPHILS 85 (H) 32 - 75 % LYMPHOCYTES 11 (L) 12 - 49 % MONOCYTES 4 (L) 5 - 13 % EOSINOPHILS 0 0 - 7 % BASOPHILS 0 0 - 1 % IMMATURE GRANULOCYTES 1 (H) 0.0 - 0.5 % ABS. NEUTROPHILS 13.4 (H) 1.8 - 8.0 K/UL  
 ABS. LYMPHOCYTES 1.7 0.8 - 3.5 K/UL  
 ABS. MONOCYTES 0.6 0.0 - 1.0 K/UL  
 ABS. EOSINOPHILS 0.0 0.0 - 0.4 K/UL  
 ABS. BASOPHILS 0.0 0.0 - 0.1 K/UL  
 ABS. IMM. GRANS. 0.1 (H) 0.00 - 0.04 K/UL  
 DF AUTOMATED    
SED RATE (ESR) Collection Time: 09/06/18  8:50 PM  
Result Value Ref Range Sed rate, automated 129 (H) 0 - 15 mm/hr C REACTIVE PROTEIN, QT Collection Time: 09/06/18  8:50 PM  
Result Value Ref Range C-Reactive protein 15.50 (H) 0.00 - 0.60 mg/dL URINALYSIS W/ RFLX MICROSCOPIC  Collection Time: 09/06/18 10:00 PM  
 Result Value Ref Range Color YELLOW/STRAW Appearance CLEAR CLEAR Specific gravity 1.014 1.003 - 1.030    
 pH (UA) 6.0 5.0 - 8.0 Protein 30 (A) NEG mg/dL Glucose NEGATIVE  NEG mg/dL Ketone NEGATIVE  NEG mg/dL Bilirubin NEGATIVE  NEG Blood LARGE (A) NEG Urobilinogen 1.0 0.2 - 1.0 EU/dL Nitrites NEGATIVE  NEG Leukocyte Esterase NEGATIVE  NEG    
 WBC 0-4 0 - 4 /hpf  
 RBC 10-20 0 - 5 /hpf Epithelial cells FEW FEW /lpf Bacteria NEGATIVE  NEG /hpf Hyaline cast 0-2 0 - 5 /lpf DRUG SCREEN, URINE Collection Time: 09/06/18 10:00 PM  
Result Value Ref Range AMPHETAMINES NEGATIVE  NEG    
 BARBITURATES NEGATIVE  NEG BENZODIAZEPINES NEGATIVE  NEG    
 COCAINE NEGATIVE  NEG METHADONE NEGATIVE  NEG    
 OPIATES POSITIVE (A) NEG    
 PCP(PHENCYCLIDINE) NEGATIVE  NEG    
 THC (TH-CANNABINOL) NEGATIVE  NEG Drug screen comment (NOTE) Radiologic Studies -  
CT SPINE LUMB W CONT  
  
  
CT SPINE [facility] W CONT MRI LUMB SPINE WO CONT  
  
  
XR CHEST SNGL V Final Result MRI CERV SPINE W WO CONT    (Results Pending) MRI [facility] SPINE W WO CONT    (Results Pending) CT Results  (Last 48 hours) 09/07/18 0533  CT SPINE LUMB W CONT Preliminary result Narrative:  *PRELIMINARY REPORT* 1. Scattered bilateral lung opacities, some with cavitation, are concerning for  
infection/septic emboli. 2. There is no acute fracture. No gross intrathecal abnormality is demonstrated. Preliminary report was provided by Dr. Radha Potts, the on-call radiologist, on  
9/7/2018 at  hours. Final report to follow. *END PRELIMINARY REPORT*   
  
 09/07/18 0533  CT SPINE [facility] W CONT Preliminary result Narrative:  *PRELIMINARY REPORT* 1. Scattered bilateral lung opacities, some with cavitation, are concerning for  
infection/septic emboli. 2. There is no acute fracture.  No gross intrathecal abnormality is demonstrated. Preliminary report was provided by Dr. Ladonna Salazar, the on-call radiologist, on  
9/7/2018 at  hours. Final report to follow. *END PRELIMINARY REPOR* CXR Results  (Last 48 hours) 09/06/18 2313  XR CHEST SNGL V Final result Impression:  IMPRESSION:  
   
No acute process. Narrative:  EXAM:  XR CHEST SNGL V  
   
INDICATION:  Right posterior chest wall pain. COMPARISON: None TECHNIQUE: Frontal chest view FINDINGS: The cardiomediastinal and hilar contours are within normal limits. The  
pulmonary vasculature is within normal limits. The lungs and pleural spaces are clear. There is no pneumothorax. The visualized  
bones and upper abdomen are age-appropriate. Medical Decision Making I am the first provider for this patient. I reviewed the vital signs, available nursing notes, past medical history, past surgical history, family history and social history. Vital Signs-Reviewed the patient's vital signs. Patient Vitals for the past 12 hrs: 
 Temp Pulse Resp BP SpO2  
09/07/18 0530 - 94 18 133/71 98 % 09/07/18 0500 - (!) 101 17 133/76 96 % 09/07/18 0445 100.3 °F (37.9 °C) 98 18 139/82 98 % 09/06/18 1950 99.8 °F (37.7 °C) (!) 115 18 136/77 98 % Pulse Oximetry Analysis - 98% on RA Cardiac Monitor:  
Rate: 115 bpm 
Rhythm: Sinus Tachycardia EKG interpretation: (Preliminary) 2102 Rhythm: sinus tachycardia; and regular . Rate (approx.): 106; Axis: normal; WV interval: normal; QRS interval: normal ; ST/T wave: normal; Other findings: . Written by KVNG Rivera, as dictated by Chris Olvera MD. Records Reviewed: Nursing Notes and Old Medical Records Provider Notes (Medical Decision Making):  
Pt has complicated history including IVDA with resultant epidural abscess requiring prolonged hospitalization for IV antibiotics at OSH June-July 2018. Will obtain records. In interim, will work up for sepsis possibly due to recurrence. Will attempt to get MRI for further evaluation given severe back pain. ED Course:  
Initial assessment performed. The patients presenting problems have been discussed, and they are in agreement with the care plan formulated and outlined with them. I have encouraged them to ask questions as they arise throughout their visit. 12:00 AM 
Spoke with CT 
 
CONSULT NOTE:  
1:50 AM 
Tavo Martinez MD spoke with Jerardo Gomez MD  
Specialty: Hospitalist 
Discussed pt's hx, disposition, and available diagnostic and imaging results. Reviewed care plans. Consultant will evaluate pt for admission. Written by Carolyn Le ED Scribe, as dictated by Tavo Martinez MD. 
 
3:53 AM 
Patient not participating well in MRI 
 
4:00 AM 
Spoke with hospitalist. Will try giving patient 4 Versed and attempt to get thoracic non contrast MRI. If that fails resort to CT 
 
4:23 AM 
Unable to obtain MRI Outside [place] [id number]. Review 4:59 AM 
Obtained medical records from [facility]. Patient was admitted from 5/20/18 to 6/29/18 HPI per admitting MD: Rich Joshua is a 45 y/o male hx IVDA, cervical spine, surgery, MI, MITZI, smoker who presented to [facility] 5/15 with chest pain and abdominal painful paresthesias. He awakened suddenly with pleuritic chest pain 5/13. He also had painful paresthesias in a band in his bilateral upper quadrants but not his lower quadrants. B.ood cultures grew MSSA. He was admitted and Dr. Evan Kincaid (ID) started him on cefazolin 5/16. CT chest 5/16 showed left lower lobe opacity ?pneumonia. CT abdomen/pelvis 5/17 showed mild stranding near the appendix, unchanged left lower lobe opacity, and bilateral adrenal nodules (left 18mm, right 14mm). He had a murmur but KHANH showed no vegetations.  He noted chronic cervical and acute on chronic thoracic back pain so limited MRI C/T-spine 5/19 (he could not complete the exam) showed posterior T4-T7 epidural abscess. [facility] hospitalist called Dr. Akhil Salomon [facility] neurosurgery) who recommended transfer to [facility]. He denies pain radiating down his legs or leg weakness. These symptoms are sudden onset, moderate intensity, without alleviating factors. He wants to make it clear that if he wanted to get high then he would leave against medical advice but he wants to get this treated. He tells me that both his parents had cancer\" Hospital Course: #T spine epidural abscess 
-s/p T3-6 laminectomies for abscess and phlegmon evacuation on 5/20. 
 -5/20 OR Wound Cx MSSA (inducible clindamycin resistance) 
 -5/20 OR Path: thoracic phlegmon, unremarkable bone spicule and blood clot 
 -CT T-spine 5/20 (intra-op) laminectomy defects at T3, T4, T5. Prior ACDF C5-C7 with intact hardware #MSSA bacteremia 
-Blood Cx 5/13 4/4 MSSA 
 -5/18 repeat Blood Cx NGTD 
 -KHANH 5/18 negative Received 8D nafcillin through 5/21/18 Plan for 6 weeks of abx #Pain management 
-Continue PO Methadone 30 mg Daily 
-Continue PO Tylenol 
-Continue PO Dilaudid PRN. Decrease the dose to 4 mg 
-Continue Flexeril 
-Management per pain management #HTN - BP controlled with current regimen 
-On Clonidine 0.1 mg three times a day 
-Continue Hydralazine 100 mg three times a day 
-Continue Amlodipine 10 mg daily Imaging MRI Cervical spine w/o contrast  5/19/18 History: back pain with positive culture Impression: Due to patient condition there is degradation by motion MRI thoracic spine w/o contrast  5/19/18 A posterior epidural collection noted extending from the top of the T4 level through the T7 level. The collection appears slightly heterogenous. STIR sequence also demonstrates increased posterior soft tissue signal at the level of the epidural collection.  
The thoracic cord at the level of the epidural collection between T4 and T6 is mildly compress and demonstrates some increased signal on the STIR sequence most likely cord edema. Impression: there is a posterior epidural collection between T4 and T7 as well as a soft tissue edema and abnormal signal posterior to this. .. This is compatible with an epidural abscess. US Venous Duplex Doppler Leg Bilateral  5/25/18 Clinical history: leg swelling Impression: no evidence of DVT involving either lower extremity US Venous Duplex Doppler Arm Bilateral complete  5/25/18 Clinical information: arm swelling, concern for possible DVT Impression: No evidence of DVT. Thrombus within superficial veins bilaterally as described above. CT Angiogram Chest 6/6/18 Findings: no detectable pulmonary emboli. MRI Cervical Spine W WO Contrast  6/16/18 Comparison: Prior MRI dated 5/19/18 Findings: There is fluid and inflammatory changes within the widened left T5-T6 facet joint space and to a lesser extent present within the facets of T4-T5 and T5-T7 with extensive surrounding soft tissue infiltration and enhancement. Impression: Post surgical changes status post thoracic decompression There are findings suggestive of left-sided septic facet arthritis with marked surrounding inflammatory change centered at T5-T6. No abscess identified Additionally, there is right paravertebral inflammatory change measuring up to 19 mm at the T4 level extending into the epidural space. There is no epidural abscess formation nor is there mass effect upon the underlying thecal sac. Edema and enhancement involving the right sided ribs at the costovertebral junction involving T4 and T5 as well as the right transverse of T4 and T5. Discharge summary: 
Problem Lists: 
Diagnosis -Left lower lobe pneumonia 
-bacteremia due to Staphylococcus aureus 
-Heroin abuse 
-Abdominal pain 
-T4-T7 posterior epidural abscess 
-Edema of spinal cord -Shortness of breath 
-Hyperkalemia Federico Gosselin, MD 6/16/18 1:16 PM 
 MRI Thoracic spine W WO contrast 
Final Result 1. Post surgical changes s/p thoracic decompression 2. There are findings suggestive of left-sided septic face arthritis with marked surrounding inflammatory change centered at T5-T6. No abscess is identified. There is edema and enhancement involving the left transverse process and left-sided ribs at the costovertebral junction involving the left T6 and T5 levels. 3. Additionally, there is right paravertebral inflammatory change measuring up to 19 mm at the T4 level extending into the epidural space. There is no epidural abscess formation nor is there mass effect upon the underlying thecal sac. Edema and enhancement involving the right sided ribs at the costovertebral junction involving T4 and T5 as well as the right transverse of T4 and T5. 
 
CT Angiogram Chest 
Final result 1. No detectable pulmonary emboli 2. Decreased peripheral consolidation in the lower lobes. However there are multiple new nodular opacities in the right lung which are likely infectious in etiology Discharge medications 
-Amlodipine 10mg tablet 
-Clonidine 0.1 mg tablet 
-cyclobenzaprine 10 mg tablet 
-gabapentin 300 mg capsule 
-hydralazine 100 mg tablet 
-hydrochlorothiazide 12.5 mg tablet 
-hydromorphone 4 mg tablet 
-lactobacillus/streptococcus caps 
-methadone 10 mg tablet 
-naloxone 4 mg/0.1 mL nasal spray 
-nicotine 14mg/24 hour 
-ondansetron 4 mg disintegrating tablet 
-polyethylene glycol packet 
-senna-docusate 8.6-50 mg per tablet PROGRESS NOTE: 
8:12 AM 
Radiologist called, who is over-reading the night reads and thinks pt has septic arthritis in the L3-4 joint causes psoas muscle inflammation. Written by KVNG Hall, as dictated by Naveed Sal MD. 
 
PROGRESS NOTE: 
9:50 AM 
Dr. Gracia Nyhan, IR, has reviewed pt's imaging. He states he does not believe there is drainable fluid in the joint but rather muscle inflammation. Written by Pepper Willis ED Scribe, as dictated by Andrea Adams MD. 
 
CRITICAL CARE NOTE : 
 
3:32 AM 
 
 
IMPENDING DETERIORATION -Cardiovascular and Metabolic ASSOCIATED RISK FACTORS - Hypotension, Dehydration and CNS Decompensation MANAGEMENT- Bedside Assessment and Supervision of Care INTERPRETATION -  Xrays, CT Scan, ECG and Blood Pressure INTERVENTIONS - hemodynamic mngmt CASE REVIEW - Hospitalist and Nursing TREATMENT RESPONSE -Stable PERFORMED BY - Self NOTES   : 
 
 
I have spent 130 minutes of critical care time involved in lab review, consultations with specialist, family decision- making, bedside attention and documentation. During this entire length of time I was immediately available to the patient . Janice López MD 
 
Disposition: 
Admit Note: 
1:50 AM 
Pt is being admitted by Jonna Feldman MD. The results of their tests and reason(s) for their admission have been discussed with pt and/or available family. They convey agreement and understanding for the need to be admitted and for admission diagnosis. PLAN: 
1. Admit to hospitalist  
 
Diagnosis Clinical Impression: 1. Sepsis, due to unspecified organism ([address] 2. IV drug abuse 3. Smoker 4. Acute right-sided thoracic back pain Attestations: This note is prepared by Say Alex, acting as Scribe for MD Janice Truong MD: The scribe's documentation has been prepared under my direction and personally reviewed by me in its entirety. I confirm that the note above accurately reflects all work, treatment, procedures, and medical decision making performed by me.

## 2018-09-07 NOTE — CONSULTS
Infectious Disease Consult  Myron Lemus MD [facility]    Date of Consultation:  September 7, 2018    Referring Physician: Dr Dallas Giles:     Patient is a 43 y.o. male who is being seen for \" IVDU , septic joint\"    IMPRESSION:   · History of MRSA bacteremia & \"OM of spine \"per pt ( pt very drowsy , unable to answer questions)  · Evidence of cervical fusion C5-7 & T4/5 laminectomy on CT scan  · MRI shows- fluid / edema L3/4 posterior joint facet suspicious for septic arthritis  · Pulmonary nodulescavitary ,ground glass opacities, ground glass opacities on  CT thorax suggestive of septic emboli  · IVDU on going  · UDS + for opiates  · Smoker , alcohol consumption +       PLAN:      · BC x 2 with fever   · ECHO cardiogram / KHANH  · Reports from [facility]- MRI, ECHO , culture reports    · If MRSA + pt would need to resume Vancomycin/ Daptomycin ( depending on KG ) & continue IV treatment for septic arthritis/ discitis. If different organism on current [facility] SYSTEM , treat with appropriate antbiotic. Pt cannot recall what antibiotic he was on .   · HIV, Hep B, C testing  · Plan of care d/w pt . Pt appears very drowsy, not interacting at this [address] is a  43 y.o. male with PMH significant for IV drug use ( as per ED note  denies sharing needles),who  Presented to the ED with complaints  of  right sided back pain for the past 2-3 days. Pt had stated that the pain was   similar to symptoms he had when he had  MRSA infection. . As per ED note patient stated 3 days ago he woke up with  Chest pain. Chest pain had  Subsided,  then 2 days back pain started . Pain worse with movement. Pt also reported fever with tmax 101.3 degrees. Patient reports 3 months ago he had MRSA in his\" spine \"and was on IV antibiotics . Pt does not recall name of antibiotic . He had taken it for 7 weeks. Pt was discharged on July 10. In the ED he reported using IV heroin on morning prior to admission with no relief.  Pt had gone to ED as he had no improvement in symptoms. temporarily in ED had been 99.8, thereafter 100.3 . WBC count was 15.8  Pt had CT of spine. Report is as follows:  CT Thoracic lumbar spine  IMPRESSION:  1. Inflammation surrounds the right L3-4 posterior facet joint on the prior MRI  suspicious for infection. There is no abnormality on CT however. 2. Multiple pulmonary nodules, several of which are cavitary and several  groundglass opacities and areas of consolidation. The overall extent is  relatively mild, but worrisome for infectious disease. Portions of the lungs are  incompletely seen and significant respiratory motion is present. Recommend  dedicated chest CT versus follow-up in several weeks after therapy. 3. Status post anterior cervical fusion from C5 to C7.  4. Status post T4 and T5 laminectomies      MRI lumbar spine was also done   IMPRESSION:    1. Increased fluid in the right L3-4 posterior facet joint with surrounding  edema extending into the paraspinous musculature. Findings are suspicious for  septic arthritis. 2. No significant spinal stenosis or neural foraminal narrowing.     BC also were done . So far no growth . UA unremarkable. Pt has been admitted and started on Vancomycin , Cefepime. Pt seen today . He appears very drowsy & is falling asleep while talking . Pt has no fever. He complains of back pain & neck pain . \"When is my next pain shot? \"    Patient Active Problem List   Diagnosis Code    Sepsis ([facility] [address] A41.9     Past Medical History:   Diagnosis Date    Back pain, chronic       History reviewed. No pertinent family history.    Social History   Substance Use Topics    Smoking status: Current Every Day Smoker     Packs/day: 1.00    Smokeless tobacco: Never Used    Alcohol use Yes     Past Surgical History:   Procedure Laterality Date    HX CERVICAL DISKECTOMY      HX FEMUR FRACTURE TX      HX ORTHOPAEDIC      Back Surgery      Prior to Admission medications    Not on File     No Known Allergies     Review of Systems:  A comprehensive review of systems was negative except for that written in the History of Present Illness. Objective:   Blood pressure 116/72, pulse 81, temperature 97.8 °F (36.6 °C), resp. rate 18, height 6' (1.829 m), weight 190 lb (86.2 kg), SpO2 99 %. Temp (24hrs), Av.2 °F (37.3 °C), Min:97.8 °F (36.6 °C), Max:100.4 °F (38 °C)    Current Facility-Administered Medications   Medication Dose Route Frequency    cefepime (MAXIPIME) 2 g in 0.9% sodium chloride (MBP/ADV) 100 mL  2 g IntraVENous Q8H    sodium chloride (NS) flush 5-10 mL  5-10 mL IntraVENous Q8H    sodium chloride (NS) flush 5-10 mL  5-10 mL IntraVENous PRN    naloxone (NARCAN) injection 0.4 mg  0.4 mg IntraVENous PRN    ondansetron (ZOFRAN) injection 4 mg  4 mg IntraVENous Q4H PRN    bisacodyl (DULCOLAX) suppository 10 mg  10 mg Rectal DAILY PRN    acetaminophen (TYLENOL) tablet 500 mg  500 mg Oral Q6H PRN    vancomycin (VANCOCIN) 1,000 mg in 0.9% sodium chloride (MBP/ADV) 250 mL  1,000 mg IntraVENous Q8H    hydrALAZINE (APRESOLINE) 20 mg/mL injection 10 mg  10 mg IntraVENous Q6H PRN    albuterol-ipratropium (DUO-NEB) 2.5 MG-0.5 MG/3 ML  3 mL Nebulization Q4H PRN    guaiFENesin ER (MUCINEX) tablet 600 mg  600 mg Oral Q12H    0.9% sodium chloride infusion  100 mL/hr IntraVENous CONTINUOUS    HYDROmorphone (PF) (DILAUDID) injection 1 mg  1 mg IntraVENous Q4H PRN    HYDROcodone-acetaminophen (NORCO) 5-325 mg per tablet 1 Tab  1 Tab Oral Q8H PRN    [START ON 2018] VANCOMYCIN TROUGH LEVEL REMINDER   Other Rx Dosing/Monitoring    [START ON 2018] polyethylene glycol (MIRALAX) packet 17 g  17 g Oral DAILY    sodium chloride (NS) flush 5-10 mL  5-10 mL IntraVENous PRN        Exam:    General:  Drowsy , arousable   Eyes:  Sclera anicteric. Pupils equally round and reactive to light.    Mouth/Throat: Mucous membranes normal, oral pharynx clear   Neck: Stiff , ROM limited   Lungs:   Reduced auscultation basest   CV:  Regular rate and rhythm,no murmur,    Abdomen:   Soft, non-tender. bowel sounds +. non-distended   Extremities: No  edema   Skin: Skin color, texture, turgor normal. no acute rash , multiple tattos   Lymph nodes: Cervical and supraclavicular normal   Lines/Devices:  Intact, no erythema, drainage or tenderness       Data Review:   CBC:   Recent Labs      09/06/18 2050   WBC  15.8*   RBC  3.62*   HGB  9.9*   HCT  30.0*   PLT  217   GRANS  85*   LYMPH  11*   EOS  0     CMP:   Recent Labs      09/06/18 2050   GLU  102*   NA  134*   K  3.6   CL  102   CO2  20*   BUN  12   CREA  1.02   CA  8.8   AGAP  12   BUCR  12   AP  149*   TP  9.0*   ALB  2.6*   GLOB  6.4*   AGRAT  0.4*       Lab Results   Component Value Date/Time    Culture result:  09/06/2018 08:50 PM     SINGLE BOTTLE DRAWN HAS BEEN FLAGGED POSITIVE BY INSTRUMENTATION. BOTTLE HAS BEEN SENT TO [facility]ORATORY FOR DETERMINATION OF POSSIBLE GROWTH    Culture result:  09/06/2018 08:35 PM     SINGLE BOTTLE DRAWN HAS BEEN FLAGGED POSITIVE BY INSTRUMENTATION. BOTTLE HAS BEEN SENT TO [facility]ORATORY FOR DETERMINATION OF POSSIBLE GROWTH          XR Results (most recent):    Results from Hospital Encounter encounter on 09/06/18   XR CHEST SNGL V   Narrative EXAM:  XR CHEST SNGL V    INDICATION:  Right posterior chest wall pain. COMPARISON: None    TECHNIQUE: Frontal chest view    FINDINGS: The cardiomediastinal and hilar contours are within normal limits. The  pulmonary vasculature is within normal limits. The lungs and pleural spaces are clear. There is no pneumothorax. The visualized  bones and upper abdomen are age-appropriate. Impression IMPRESSION:    No acute process. I have discussed the diagnosis with the patient and the intended plan as seen in the above orders. I have discussed medication side effects and warnings with the patient as well.     Reviewed test results  with patient    Signed By: Jesús Maharaj Staci Terry MD FACP    September 7, 2018           Hector Sierra MD FACP

## 2018-09-07 NOTE — PROGRESS NOTES
TRANSFER - IN REPORT: 
 
Verbal report received from [address] (name) on Yina Barrow  being received from ER (unit) for routine progression of care Report consisted of patients Situation, Background, Assessment and  
Recommendations(SBAR). Information from the following report(s) SBAR, Kardex, [facility] and Recent Results was reviewed with the receiving nurse. Opportunity for questions and clarification was provided. Assessment completed upon patients arrival to unit and care assumed.

## 2018-09-07 NOTE — ROUTINE PROCESS
Called and spoke to Nurse Haseeb Swift to see if they still wanted to do MRI of C/T Spine. Patient was unable to complete scan due to pain and to much motion. Nurse said to put on hold till Sat

## 2018-09-07 NOTE — ED TRIAGE NOTES
Pt states he has had low right sided back pain that radiates into his buttock for the past week. States the pain has gotten progressively worse and now he is unable to stand because of the pain. Pt admits to being an IV heroin user, states his last time using was this AM. 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

## 2018-09-07 NOTE — CONSULTS
Called to determine if patient needs any neurosurgical intervention. Briefly, 43yo with history of IV drug use and past MRSA infections,  completed 3 months treatment for MRSA in his spine July 10th. He  presented to ED with back pain. MRI, poor quality and was performed without IV contrast, however it does suggest inflammation in paraspinal muscle and adjacent facet joint likely representing persistent infection. No evidence of epidural abscess or neural impingement. No role for surgical intervention. Recommend IR if you want biopsy. Thank you.

## 2018-09-07 NOTE — ROUTINE PROCESS
Bedside, Verbal and Written shift change report given to Remedios Irizarry RN  (oncoming nurse) by Meredith Infante RN  (offgoing nurse). Report included the following information SBAR, Kardex, MAR and Recent Results.

## 2018-09-07 NOTE — ED NOTES
Pt received into room 22 and is sleeping but aroused with light shaking. Per report, pt could not tolerate MRI.

## 2018-09-07 NOTE — ROUTINE PROCESS
Bedside report provided by Meg Boyer RN. SBAR report, lab results, Kardex, MAR, and pt general condition reviewed. MRI technician called to see if pt needs to complete MRI studies; per report it may not be necessary, but will follow up with on call MD. Pt noted pain and claustrophobia limiting tolerance of MRI--requested study be done in sectioned/limited time intervals to improve tolerance. Pt in the middle of having Midline placed.

## 2018-09-07 NOTE — PROGRESS NOTES
Pharmacy Automatic Renal Dosing Protocol - Antimicrobials Indication for Antimicrobials: Septic Arthritis Current Regimen of Each Antimicrobial: 
Vancomycin consult - started  days 1 Cefepime 1gm IV q8h - started  day 1 Previous Antimicrobial Therapy: - 
Vancomycin trough goal level:  15-20 Significant Cultures:  
 BCx - ng - pending  BCx - ng - pending Radiology / Imaging results: (X-ray, CT scan or MRI):  
 CT spine and lumb - 1. Inflammation surrounds the right L3-4 posterior facet joint on the prior MRI 
suspicious for infection. There is no abnormality on CT however. 2. Multiple pulmonary nodules, several of which are cavitary and several 
groundglass opacities and areas of consolidation. The overall extent is 
relatively mild, but worrisome for infectious disease. Portions of the lungs are 
incompletely seen and significant respiratory motion is present  MRI lumb spine - Findings are suspicious for 
septic arthritis. Paralysis, amputations, malnutrition: none Labs: 
Recent Labs  
   18 CREA  1.02  
BUN  12 WBC  15.8* Temp (24hrs), Av.8 °F (37.7 °C), Min:98.5 °F (36.9 °C), Max:100.4 °F (38 °C) Creatinine Clearance (mL/min) or Dialysis:  
Estimated Creatinine Clearance: 106.7 mL/min (based on Cr of 1.02). Estimated Creatinine Clearance (using IBW):106.7 mL/min Impression/Plan: · Vancomycin 1750mg IV previously given  @4am, 1000mg IV q8h for a projected trough at Css of 16.1 · Adjusted Cefepime to 2gm IV q8h for severe infection given septic arthritis indication (SSIT uncomplicated dosing noted as Cefepime 2gm IV q12h) ·  
· Antimicrobial stop date - pending Pharmacy will follow daily and adjust medications as appropriate for renal function and/or serum levels.  
 
Thank you, 
Miky Hawkins, [facility]

## 2018-09-07 NOTE — PROGRESS NOTES
Pharmacy Medication Reconciliation Pharmacy was consulted to clarify PTA methadone dose. The patient was interviewed regarding current PTA medication list, use and drug allergies; no visitors were present in the room. The patient was questioned regarding use of any other inhalers, topical products, over the counter medications, herbal medications, vitamin products or ophthalmic/nasal/otic medication use. Allergy Update: none Recommendations/Findings: The following amendments were made to the patient's active medication list on file at [facility]:  
 
1) Additions: none 2) Deletions: none 3) Changes: none 
 
-Clarified PTA med list with patient and patient wasn't on any medications (prescription or over-the-counter) PTA. Patient reported that he is an IV heroin user. During his last hospital admission in July, patient reported receiving methadone in hospital to prevent withdrawal.  
 
Thank you, Saul Murguia, PHARMD

## 2018-09-07 NOTE — ED NOTES
Pt requesting pain medication. Notified Dr Lui Side. Also requested Dr Joey Villarreal to come see pt.

## 2018-09-07 NOTE — CONSULTS
Ortho -      Pt hx and test results reviewed prior to exam, reviewed with Dr. Olu Delarosa and he is deferring to neurosurgery. ELENA Duarte

## 2018-09-07 NOTE — PROGRESS NOTES
MIDLINE Insertion Procedure  Note:  
 
Procedure explained to  pt  along with risks and benefits. Procedure teaching completed. Pre procedure assessment done. Maximum sterile barrier precautions observed throughout procedure. Lidocaine 1 %  3.0   ml sc injected to site prior to access the vein . Cannulated   brachial   vein using ultrasound guidance. Inserted  4   Fr. Single   lumen midline to   left    arm. Blood return verified and  flushed with 20ml normal saline  to  port. Sterile dressing applied with biopatch, statLock and occlusive dressing as per protocol. Curos cap applied to port. Patient tolerated procedure well with minimal blood loss. Reason for access : Reliable IV Access / limited vascular access Complications related to insertion : None This midline to be removed on or before  10/6/2018 See nursing message  for midline reminders Inserted by : Enio Eid RN. MAGALY. SHERI. PICC Nurse Assisted by : Ofelia Hudson RN PICC Nurse Total Length : 12  cm External Length :  1     cm Arm circumference :    26   cm Catheter occupies   12   % of vein. Type of Midline:  B[employer] Midline Ref#:  [id number] Lot#:   [id number] Expiration Date:    2019-12-31 Enio Eid RN. MAGALY. SHERI. PICC nurse.  Vascular Access Team

## 2018-09-07 NOTE — PROGRESS NOTES
Dr. Subhash Chow called because MRI technician inquiring if pt could or even needed to return to MRI to complete testing; pt was unable to stay still for an extended period of time 2/2 pain and claustrophobia. AM RN noted difficulty arousing pt after patient provided medications in E. D. with first attempt. AM RN also noted primary team and neurosurgery aware of limited study and yet may have seen was they needed. Dr. Subhash Chow will defer to primary team to follow up. MRI technicians informed.

## 2018-09-08 LAB
APPEARANCE UR: CLEAR
BACTERIA URNS QL MICRO: NEGATIVE /HPF
BASOPHILS # BLD: 0 K/UL (ref 0–0.1)
BASOPHILS NFR BLD: 0 % (ref 0–1)
BILIRUB UR QL: NEGATIVE
COLOR UR: ABNORMAL
DATE LAST DOSE: ABNORMAL
DIFFERENTIAL METHOD BLD: ABNORMAL
EOSINOPHIL # BLD: 0.1 K/UL (ref 0–0.4)
EOSINOPHIL NFR BLD: 1 % (ref 0–7)
EPITH CASTS URNS QL MICRO: ABNORMAL /LPF
ERYTHROCYTE [DISTWIDTH] IN BLOOD BY AUTOMATED COUNT: 14.3 % (ref 11.5–14.5)
GLUCOSE UR STRIP.AUTO-MCNC: NEGATIVE MG/DL
HCT VFR BLD AUTO: 23.9 % (ref 36.6–50.3)
HCV AB SERPL QL IA: NONREACTIVE
HCV COMMENT,HCGAC: NORMAL
HGB BLD-MCNC: 7.7 G/DL (ref 12.1–17)
HGB UR QL STRIP: ABNORMAL
HIV 1+2 AB+HIV1 P24 AG SERPL QL IA: NONREACTIVE
HIV12 RESULT COMMENT, HHIVC: NORMAL
HYALINE CASTS URNS QL MICRO: ABNORMAL /LPF (ref 0–5)
IMM GRANULOCYTES # BLD: 0.1 K/UL (ref 0–0.04)
IMM GRANULOCYTES NFR BLD AUTO: 1 % (ref 0–0.5)
KETONES UR QL STRIP.AUTO: NEGATIVE MG/DL
LEUKOCYTE ESTERASE UR QL STRIP.AUTO: NEGATIVE
LYMPHOCYTES # BLD: 1.6 K/UL (ref 0.8–3.5)
LYMPHOCYTES NFR BLD: 13 % (ref 12–49)
MCH RBC QN AUTO: 26.8 PG (ref 26–34)
MCHC RBC AUTO-ENTMCNC: 32.2 G/DL (ref 30–36.5)
MCV RBC AUTO: 83.3 FL (ref 80–99)
MONOCYTES # BLD: 0.6 K/UL (ref 0–1)
MONOCYTES NFR BLD: 5 % (ref 5–13)
NEUTS SEG # BLD: 9.7 K/UL (ref 1.8–8)
NEUTS SEG NFR BLD: 80 % (ref 32–75)
NITRITE UR QL STRIP.AUTO: NEGATIVE
NRBC # BLD: 0 K/UL (ref 0–0.01)
NRBC BLD-RTO: 0 PER 100 WBC
PH UR STRIP: 6.5 [PH] (ref 5–8)
PLATELET # BLD AUTO: 205 K/UL (ref 150–400)
PMV BLD AUTO: 9.7 FL (ref 8.9–12.9)
PROT UR STRIP-MCNC: NEGATIVE MG/DL
RBC # BLD AUTO: 2.87 M/UL (ref 4.1–5.7)
RBC #/AREA URNS HPF: ABNORMAL /HPF (ref 0–5)
REPORTED DOSE,DOSE: ABNORMAL UNITS
REPORTED DOSE/TIME,TMG: 600
SP GR UR REFRACTOMETRY: <1.005 (ref 1–1.03)
UA: UC IF INDICATED,UAUC: ABNORMAL
UROBILINOGEN UR QL STRIP.AUTO: 0.2 EU/DL (ref 0.2–1)
VANCOMYCIN TROUGH SERPL-MCNC: 15.7 UG/ML (ref 5–10)
WBC # BLD AUTO: 12.1 K/UL (ref 4.1–11.1)
WBC URNS QL MICRO: ABNORMAL /HPF (ref 0–4)

## 2018-09-08 PROCEDURE — 36415 COLL VENOUS BLD VENIPUNCTURE: CPT | Performed by: INTERNAL MEDICINE

## 2018-09-08 PROCEDURE — 74011250637 HC RX REV CODE- 250/637: Performed by: INTERNAL MEDICINE

## 2018-09-08 PROCEDURE — 81001 URINALYSIS AUTO W/SCOPE: CPT | Performed by: EMERGENCY MEDICINE

## 2018-09-08 PROCEDURE — 74011250636 HC RX REV CODE- 250/636: Performed by: INTERNAL MEDICINE

## 2018-09-08 PROCEDURE — 87040 BLOOD CULTURE FOR BACTERIA: CPT | Performed by: INTERNAL MEDICINE

## 2018-09-08 PROCEDURE — 80202 ASSAY OF VANCOMYCIN: CPT | Performed by: INTERNAL MEDICINE

## 2018-09-08 PROCEDURE — 74011250636 HC RX REV CODE- 250/636: Performed by: EMERGENCY MEDICINE

## 2018-09-08 PROCEDURE — 65270000029 HC RM PRIVATE

## 2018-09-08 PROCEDURE — 93306 TTE W/DOPPLER COMPLETE: CPT

## 2018-09-08 PROCEDURE — 77030032490 HC SLV COMPR SCD KNE COVD -B

## 2018-09-08 PROCEDURE — 74011000258 HC RX REV CODE- 258: Performed by: INTERNAL MEDICINE

## 2018-09-08 PROCEDURE — 74011000250 HC RX REV CODE- 250: Performed by: INTERNAL MEDICINE

## 2018-09-08 PROCEDURE — 85025 COMPLETE CBC W/AUTO DIFF WBC: CPT | Performed by: INTERNAL MEDICINE

## 2018-09-08 RX ORDER — HYDROMORPHONE HYDROCHLORIDE 2 MG/ML
1 INJECTION, SOLUTION INTRAMUSCULAR; INTRAVENOUS; SUBCUTANEOUS
Status: DISCONTINUED | OUTPATIENT
Start: 2018-09-08 | End: 2018-09-12

## 2018-09-08 RX ADMIN — CEFEPIME HYDROCHLORIDE 2 G: 2 INJECTION, POWDER, FOR SOLUTION INTRAVENOUS at 10:45

## 2018-09-08 RX ADMIN — Medication 10 ML: at 14:00

## 2018-09-08 RX ADMIN — HYDROCODONE BITARTRATE AND ACETAMINOPHEN 1 TABLET: 5; 325 TABLET ORAL at 16:52

## 2018-09-08 RX ADMIN — VANCOMYCIN HYDROCHLORIDE 1000 MG: 1 INJECTION, POWDER, LYOPHILIZED, FOR SOLUTION INTRAVENOUS at 23:06

## 2018-09-08 RX ADMIN — CEFEPIME HYDROCHLORIDE 2 G: 2 INJECTION, POWDER, FOR SOLUTION INTRAVENOUS at 01:56

## 2018-09-08 RX ADMIN — GUAIFENESIN 600 MG: 600 TABLET, EXTENDED RELEASE ORAL at 21:29

## 2018-09-08 RX ADMIN — ACETAMINOPHEN 500 MG: 500 TABLET ORAL at 08:01

## 2018-09-08 RX ADMIN — HYDROCODONE BITARTRATE AND ACETAMINOPHEN 1 TABLET: 5; 325 TABLET ORAL at 05:24

## 2018-09-08 RX ADMIN — Medication 10 ML: at 18:06

## 2018-09-08 RX ADMIN — HYDROMORPHONE HYDROCHLORIDE 1 MG: 2 INJECTION, SOLUTION INTRAMUSCULAR; INTRAVENOUS; SUBCUTANEOUS at 17:52

## 2018-09-08 RX ADMIN — HYDROMORPHONE HYDROCHLORIDE 1 MG: 2 INJECTION, SOLUTION INTRAMUSCULAR; INTRAVENOUS; SUBCUTANEOUS at 21:29

## 2018-09-08 RX ADMIN — HYDROMORPHONE HYDROCHLORIDE 1 MG: 2 INJECTION, SOLUTION INTRAMUSCULAR; INTRAVENOUS; SUBCUTANEOUS at 03:00

## 2018-09-08 RX ADMIN — HYDROMORPHONE HYDROCHLORIDE 1 MG: 2 INJECTION, SOLUTION INTRAMUSCULAR; INTRAVENOUS; SUBCUTANEOUS at 10:25

## 2018-09-08 RX ADMIN — Medication 10 ML: at 21:29

## 2018-09-08 RX ADMIN — CEFEPIME HYDROCHLORIDE 2 G: 2 INJECTION, POWDER, FOR SOLUTION INTRAVENOUS at 17:48

## 2018-09-08 RX ADMIN — VANCOMYCIN HYDROCHLORIDE 1000 MG: 1 INJECTION, POWDER, LYOPHILIZED, FOR SOLUTION INTRAVENOUS at 05:24

## 2018-09-08 RX ADMIN — HYDROMORPHONE HYDROCHLORIDE 1 MG: 2 INJECTION, SOLUTION INTRAMUSCULAR; INTRAVENOUS; SUBCUTANEOUS at 06:36

## 2018-09-08 RX ADMIN — GUAIFENESIN 600 MG: 600 TABLET, EXTENDED RELEASE ORAL at 10:46

## 2018-09-08 RX ADMIN — Medication 10 ML: at 06:38

## 2018-09-08 RX ADMIN — HYDROMORPHONE HYDROCHLORIDE 1 MG: 2 INJECTION, SOLUTION INTRAMUSCULAR; INTRAVENOUS; SUBCUTANEOUS at 14:06

## 2018-09-08 RX ADMIN — VANCOMYCIN HYDROCHLORIDE 1000 MG: 1 INJECTION, POWDER, LYOPHILIZED, FOR SOLUTION INTRAVENOUS at 12:38

## 2018-09-08 RX ADMIN — POLYETHYLENE GLYCOL 3350 17 G: 17 POWDER, FOR SOLUTION ORAL at 10:46

## 2018-09-08 RX ADMIN — Medication 10 ML: at 14:07

## 2018-09-08 NOTE — PROGRESS NOTES
Hospitalist Progress Note NAME: Tita Delgado :  1980 MRN:  [id number] Assessment / Plan: 
Back spine concerning for septic arthritis/ spine abscess Hx of MSSA Current IVDU Sepsis, POA Pseudomonas bacteremia 
-MRI L spine showed increased fluid in the right L3-4 posterior facet joint with surrounding edema extending into the paraspinous musculature. Findings are suspicious for septic arthritis. No significant spinal stenosis or neural foraminal narrowing. 
-he had a KHANH on  that was negative. Received nafcillin through 18. Completed Cefazolin on  inpt. -ESR 12, CRP 15.5, lactate 0.9, UA neg 
-follow Bcx 
-received IVF per sepsis protocol. stopped IVF  
-cont cefepime and vancomycin 
-consulted ortho, IR. -Appreciate ID consult  
  
HTN 
-BP stable now. Will need med rec 
-hydralazine prn 
  
Chronic anemia -no evidence of bleeding 
-monitor CBC 
  
PNA 
-CT showed multiple pulmonary nodules, several of which are cavitary and several groundglass opacities and areas of consolidation 
- He is currently not hypoxic. Has intermittent nonproductive cough 
-start nebs prn. on abx as above 
-needs f/u with CT outpt after abx therapy 
  
Code Status: Full Surrogate Decision Maker: he did not wish to list anyone DVT Prophylaxis: SCDs for now GI Prophylaxis: not indicated Baseline: indedepent Subjective: Chief Complaint / Reason for Physician Visit \"fever\". Discussed with RN events overnight. Review of Systems: 
Symptom Y/N Comments  Symptom Y/N Comments Fever/Chills y   Chest Pain n   
Poor Appetite    Edema n   
Cough y   Abdominal Pain n   
Sputum n   Joint Pain SOB/CUEVA n   Pruritis/Rash Nausea/vomit n   Tolerating PT/OT Diarrhea n   Tolerating Diet Constipation    Other Could NOT obtain due to:   
 
Objective: VITALS:  
Last 24hrs VS reviewed since prior progress note. Most recent are: 
Patient Vitals for the past 24 hrs: Temp Pulse Resp BP SpO2  
09/08/18 0731 100.2 °F (37.9 °C) (!) 101 20 131/80 100 % 09/08/18 0322 (!) 100.6 °F (38.1 °C) 97 20 (!) 119/93 98 % 09/07/18 2306 98.6 °F (37 °C) 95 20 118/81 97 % 09/07/18 2006 98.2 °F (36.8 °C) 93 20 142/82 -  
09/07/18 1520 97.8 °F (36.6 °C) 81 18 116/72 99 % 09/07/18 1058 98.4 °F (36.9 °C) 86 16 129/83 98 % Intake/Output Summary (Last 24 hours) at 09/08/18 9366 Last data filed at 09/08/18 8323 Gross per 24 hour Intake             2040 ml Output             1550 ml Net              490 ml PHYSICAL EXAM: 
General: WD, WN. Alert, cooperative, no acute distress   
EENT:  EOMI. Anicteric sclerae. MMM Resp:  CTA bilaterally, no wheezing or rales. No accessory muscle use CV:  Regular  rhythm,  No edema GI:  Soft, Non distended, Non tender.  +Bowel sounds Neurologic:  Alert and oriented X 3, normal speech, no numbness/weakness, no incontinence Psych:   Good insight. Not anxious nor agitated Skin:  No rashes. No jaundice Reviewed most current lab test results and cultures  YES Reviewed most current radiology test results   YES Review and summation of old records today    NO Reviewed patient's current orders and MAR    YES 
PMH/ reviewed - no change compared to H&P 
________________________________________________________________________ Care Plan discussed with: 
  Comments Patient x Family RN x Care Manager Consultant Multidiciplinary team rounds were held today with , nursing, pharmacist and clinical coordinator. Patient's plan of care was discussed; medications were reviewed and discharge planning was addressed. ________________________________________________________________________ Total NON critical care TIME:  25   Minutes Total CRITICAL CARE TIME Spent:   Minutes non procedure based Comments >50% of visit spent in counseling and coordination of care ________________________________________________________________________ Rosales Soler MD  
 
Procedures: see electronic medical records for all procedures/Xrays and details which were not copied into this note but were reviewed prior to creation of Plan. LABS: 
I reviewed today's most current labs and imaging studies. Pertinent labs include: 
Recent Labs  
   09/08/18 
 0401  09/07/18 1940 09/06/18 2050 WBC  12.1*  10.5  15.8* HGB  7.7*  8.3*  9.9*  
HCT  23.9*  25.4*  30.0*  
PLT  205  205  217 Recent Labs  
   09/07/18 1940 09/06/18 2050 NA  138  134* K  3.6  3.6 CL  106  102 CO2  23  20* GLU  126*  102* BUN  14  12 CREA  0.85  1.02  
CA  8.1*  8.8 ALB   --   2.6* TBILI   --   0.7 SGOT   --   34 ALT   --   42 Signed: Rosales Soler MD

## 2018-09-08 NOTE — PROGRESS NOTES
Problem: Falls - Risk of 
Goal: *Absence of Falls Document Danni Getting Fall Risk and appropriate interventions in the flowsheet. Outcome: Progressing Towards Goal 
Fall Risk Interventions: 
Mobility Interventions: Communicate number of staff needed for ambulation/transfer, Patient to call before getting OOB Medication Interventions: Evaluate medications/consider consulting pharmacy Problem: Pain Goal: *Control of Pain Outcome: Progressing Towards Goal 
Patient able to have periods of sleep/restfulness, but reports short lived relief from pain. \"I want to talk to the doctor. . I'd rather have a bullet to my head rather than go through this. \"  
 
Problem: Risk for Spread of Infection Goal: Prevent transmission of infectious organism to others Prevent the transmission of infectious organisms to other patients, staff members, and visitors. Outcome: Progressing Towards Goal 
Contact isolation for MRSA maintained.  MRSA nares swab sent to lab this am.

## 2018-09-08 NOTE — PROGRESS NOTES
ID on call Chart review IVDA Past h/o MRSA bacteremia and Spine infection Admitted with fever and chest pain Pseudomonas in blood culture-already on cefepime await susceptibility Continue vanc until [facility] finalizes completely As CT showed b/l pulmonary lesions with cavitation- will need to r/o septic emboli to the lungs from heart- will need 2 d echo/KHANH as recommended by

## 2018-09-08 NOTE — PROGRESS NOTES
RN Agustin Meeks at bedside for assessment. Pt c/o pain from lumbar jacob up through right side of cervical spine exacerbated by movement, coughing, sneezing, etc. Tylenol administered for temp of 100.2.

## 2018-09-08 NOTE — PROGRESS NOTES
Pharmacy Automatic Renal Dosing Protocol - Antimicrobials Indication for Antimicrobials: Septic Arthritis Current Regimen of Each Antimicrobial: 
Vancomycin consult - started  days 2 Cefepime 1gm IV q8h - started  day 2 Previous Antimicrobial Therapy: 
Ceftriaxone x1  am 
 
Vancomycin trough goal level:  15-20 Date Dose & Interval Measured (mcg/mL) Extrapolated (mcg/mL)  
18 1g IV q8h 15.7 13.2 Significant Cultures:  
 PBC - ng - pseudomonas 1/2- prelim  BCx- NG- pending  MRSA Radiology / Imaging results: (X-ray, CT scan or MRI):  
 CT spine and lumb - 1. Inflammation surrounds the right L3-4 posterior facet joint on the prior MRI 
suspicious for infection. There is no abnormality on CT however. 2. Multiple pulmonary nodules, several of which are cavitary and several 
groundglass opacities and areas of consolidation. The overall extent is 
relatively mild, but worrisome for infectious disease. Portions of the lungs are 
incompletely seen and significant respiratory motion is present  MRI lumb spine - Findings are suspicious for 
septic arthritis. Paralysis, amputations, malnutrition: none Labs: 
Recent Labs  
   18 
 0401  18 
 1940  18 CREA   --   0.85  1.02  
BUN   --   14  12 WBC  12.1*  10.5  15.8* Temp (24hrs), Av.8 °F (37.1 °C), Min:97.9 °F (36.6 °C), Max:100.6 °F (38.1 °C) Creatinine Clearance (mL/min) or Dialysis:  
Estimated Creatinine Clearance: 128.1 mL/min (based on Cr of 0.85). Estimated Creatinine Clearance (using IBW):128.1 mL/min Impression/Plan: · Vancomycin 1750mg IV previously given  @4am, 1000mg IV q8h for a projected trough at Css of 16.1 · Adjusted Cefepime to 2gm IV q8h for severe infection given septic arthritis indication (SSIT uncomplicated dosing noted as Cefepime 2gm IV q12h) · Received requested trough= 15.7 (true trough 13.2), has only received #4 doses prior to level- will expect accumulation over course of therapy · Follow- up level after another 2-3 days of therapy · Antimicrobial stop date - pending Pharmacy will follow daily and adjust medications as appropriate for renal function and/or serum levels. Recommended duration of therapy 
http://[facility]/[place]/[facility]/Pharmacy/Clinical%20Companion/Duration%20of%[email or web address]. docx Renal Dosing 
[email or web address]/[place]/[facility]/Pharmacy/Clinical%20Companion/Renal%[id number]. pdf

## 2018-09-09 LAB
ANION GAP SERPL CALC-SCNC: 8 MMOL/L (ref 5–15)
BACTERIA SPEC CULT: ABNORMAL
BACTERIA SPEC CULT: ABNORMAL
BACTERIA SPEC CULT: NORMAL
BACTERIA SPEC CULT: NORMAL
BUN SERPL-MCNC: 11 MG/DL (ref 6–20)
BUN/CREAT SERPL: 14 (ref 12–20)
CALCIUM SERPL-MCNC: 8.5 MG/DL (ref 8.5–10.1)
CHLORIDE SERPL-SCNC: 105 MMOL/L (ref 97–108)
CO2 SERPL-SCNC: 23 MMOL/L (ref 21–32)
CREAT SERPL-MCNC: 0.78 MG/DL (ref 0.7–1.3)
ERYTHROCYTE [DISTWIDTH] IN BLOOD BY AUTOMATED COUNT: 14.6 % (ref 11.5–14.5)
GLUCOSE SERPL-MCNC: 86 MG/DL (ref 65–100)
HCT VFR BLD AUTO: 32.2 % (ref 36.6–50.3)
HGB BLD-MCNC: 10.3 G/DL (ref 12.1–17)
MCH RBC QN AUTO: 27.4 PG (ref 26–34)
MCHC RBC AUTO-ENTMCNC: 32 G/DL (ref 30–36.5)
MCV RBC AUTO: 85.6 FL (ref 80–99)
NRBC # BLD: 0 K/UL (ref 0–0.01)
NRBC BLD-RTO: 0 PER 100 WBC
PLATELET # BLD AUTO: 166 K/UL (ref 150–400)
PMV BLD AUTO: 9.7 FL (ref 8.9–12.9)
POTASSIUM SERPL-SCNC: 4.1 MMOL/L (ref 3.5–5.1)
RBC # BLD AUTO: 3.76 M/UL (ref 4.1–5.7)
SERVICE CMNT-IMP: ABNORMAL
SERVICE CMNT-IMP: NORMAL
SODIUM SERPL-SCNC: 136 MMOL/L (ref 136–145)
WBC # BLD AUTO: 11.6 K/UL (ref 4.1–11.1)

## 2018-09-09 PROCEDURE — 74011250636 HC RX REV CODE- 250/636: Performed by: INTERNAL MEDICINE

## 2018-09-09 PROCEDURE — 74011000250 HC RX REV CODE- 250: Performed by: INTERNAL MEDICINE

## 2018-09-09 PROCEDURE — 77030018719 HC DRSG PTCH ANTIMIC J&J -A

## 2018-09-09 PROCEDURE — 85027 COMPLETE CBC AUTOMATED: CPT | Performed by: INTERNAL MEDICINE

## 2018-09-09 PROCEDURE — 65270000029 HC RM PRIVATE

## 2018-09-09 PROCEDURE — 74011250637 HC RX REV CODE- 250/637: Performed by: INTERNAL MEDICINE

## 2018-09-09 PROCEDURE — 36415 COLL VENOUS BLD VENIPUNCTURE: CPT | Performed by: INTERNAL MEDICINE

## 2018-09-09 PROCEDURE — 74011000258 HC RX REV CODE- 258: Performed by: INTERNAL MEDICINE

## 2018-09-09 PROCEDURE — 74011250636 HC RX REV CODE- 250/636: Performed by: EMERGENCY MEDICINE

## 2018-09-09 PROCEDURE — 80048 BASIC METABOLIC PNL TOTAL CA: CPT | Performed by: INTERNAL MEDICINE

## 2018-09-09 RX ORDER — GABAPENTIN 100 MG/1
200 CAPSULE ORAL 2 TIMES DAILY
Status: DISCONTINUED | OUTPATIENT
Start: 2018-09-09 | End: 2018-09-18

## 2018-09-09 RX ORDER — HYDROCODONE BITARTRATE AND ACETAMINOPHEN 5; 325 MG/1; MG/1
1 TABLET ORAL
Status: DISCONTINUED | OUTPATIENT
Start: 2018-09-09 | End: 2018-09-10

## 2018-09-09 RX ADMIN — CEFEPIME HYDROCHLORIDE 2 G: 2 INJECTION, POWDER, FOR SOLUTION INTRAVENOUS at 01:06

## 2018-09-09 RX ADMIN — CEFEPIME HYDROCHLORIDE 2 G: 2 INJECTION, POWDER, FOR SOLUTION INTRAVENOUS at 12:29

## 2018-09-09 RX ADMIN — Medication 10 ML: at 21:57

## 2018-09-09 RX ADMIN — POLYETHYLENE GLYCOL 3350 17 G: 17 POWDER, FOR SOLUTION ORAL at 12:30

## 2018-09-09 RX ADMIN — HYDROMORPHONE HYDROCHLORIDE 1 MG: 2 INJECTION, SOLUTION INTRAMUSCULAR; INTRAVENOUS; SUBCUTANEOUS at 04:47

## 2018-09-09 RX ADMIN — HYDROMORPHONE HYDROCHLORIDE 1 MG: 2 INJECTION, SOLUTION INTRAMUSCULAR; INTRAVENOUS; SUBCUTANEOUS at 12:39

## 2018-09-09 RX ADMIN — GUAIFENESIN 600 MG: 600 TABLET, EXTENDED RELEASE ORAL at 21:55

## 2018-09-09 RX ADMIN — Medication 10 ML: at 03:49

## 2018-09-09 RX ADMIN — HYDROMORPHONE HYDROCHLORIDE 1 MG: 2 INJECTION, SOLUTION INTRAMUSCULAR; INTRAVENOUS; SUBCUTANEOUS at 09:01

## 2018-09-09 RX ADMIN — Medication 10 ML: at 18:29

## 2018-09-09 RX ADMIN — HYDROMORPHONE HYDROCHLORIDE 1 MG: 2 INJECTION, SOLUTION INTRAMUSCULAR; INTRAVENOUS; SUBCUTANEOUS at 01:00

## 2018-09-09 RX ADMIN — HYDROCODONE BITARTRATE AND ACETAMINOPHEN 1 TABLET: 5; 325 TABLET ORAL at 10:16

## 2018-09-09 RX ADMIN — HYDROCODONE BITARTRATE AND ACETAMINOPHEN 1 TABLET: 5; 325 TABLET ORAL at 21:55

## 2018-09-09 RX ADMIN — VANCOMYCIN HYDROCHLORIDE 1000 MG: 1 INJECTION, POWDER, LYOPHILIZED, FOR SOLUTION INTRAVENOUS at 20:24

## 2018-09-09 RX ADMIN — GABAPENTIN 200 MG: 100 CAPSULE ORAL at 15:03

## 2018-09-09 RX ADMIN — VANCOMYCIN HYDROCHLORIDE 1000 MG: 1 INJECTION, POWDER, LYOPHILIZED, FOR SOLUTION INTRAVENOUS at 13:50

## 2018-09-09 RX ADMIN — HYDROMORPHONE HYDROCHLORIDE 1 MG: 2 INJECTION, SOLUTION INTRAMUSCULAR; INTRAVENOUS; SUBCUTANEOUS at 20:50

## 2018-09-09 RX ADMIN — Medication 10 ML: at 01:07

## 2018-09-09 RX ADMIN — GUAIFENESIN 600 MG: 600 TABLET, EXTENDED RELEASE ORAL at 12:29

## 2018-09-09 RX ADMIN — HYDROCODONE BITARTRATE AND ACETAMINOPHEN 1 TABLET: 5; 325 TABLET ORAL at 03:46

## 2018-09-09 RX ADMIN — VANCOMYCIN HYDROCHLORIDE 1000 MG: 1 INJECTION, POWDER, LYOPHILIZED, FOR SOLUTION INTRAVENOUS at 03:46

## 2018-09-09 RX ADMIN — HYDROMORPHONE HYDROCHLORIDE 1 MG: 2 INJECTION, SOLUTION INTRAMUSCULAR; INTRAVENOUS; SUBCUTANEOUS at 01:07

## 2018-09-09 RX ADMIN — CEFEPIME HYDROCHLORIDE 2 G: 2 INJECTION, POWDER, FOR SOLUTION INTRAVENOUS at 18:28

## 2018-09-09 NOTE — PROGRESS NOTES
Flushed the mid line and pt stated \" it was painful\". There was no blood return. Removed the midline. ED tech came and put in new IV. 20 guage Right AC.

## 2018-09-09 NOTE — PROGRESS NOTES
Problem: Falls - Risk of 
Goal: *Absence of Falls Document Jey Rahman Fall Risk and appropriate interventions in the flowsheet. Outcome: Progressing Towards Goal 
Fall Risk Interventions: 
Mobility Interventions: Communicate number of staff needed for ambulation/transfer, Patient to call before getting OOB, PT Consult for mobility concerns, PT Consult for assist device competence Medication Interventions: Teach patient to arise slowly, Patient to call before getting OOB

## 2018-09-09 NOTE — ROUTINE PROCESS
Bedside shift change report given to Doctor Jack Davis (oncoming nurse) by Sangita Hill RN (offgoing nurse). Report included the following information SBAR, Kardex and Recent Results.

## 2018-09-09 NOTE — ROUTINE PROCESS
Bedside shift change report given to Dolly RN (oncoming nurse) by Des Rdz RN (offgoing nurse). Report included the following information SBAR, Kardex and Cardiac Rhythm NSR.

## 2018-09-09 NOTE — PROGRESS NOTES
Hospitalist Progress Note NAME: Boris Casey :  1980 MRN:  [id number] Assessment / Plan: 
Back spine concerning for septic arthritis/ spine abscess Hx of MSSA Current IVDU Sepsis, POA Pseudomonas bacteremia 
-MRI L spine showed increased fluid in the right L3-4 posterior facet joint with surrounding edema extending into the paraspinous musculature. Findings are suspicious for septic arthritis. No significant spinal stenosis or neural foraminal narrowing. 
-he had a KHANH on  that was negative. Received nafcillin through 18. Completed Cefazolin on  inpt. -ESR 12, CRP 15.5, lactate 0.9, UA neg 
-follow Bcx, + psuedomonas 
-received IVF per sepsis protocol. stopped IVF  
-cont cefepime and vancomycin 
-consulted ortho, IR. -Appreciate ID consult  
-Pain meds adjusted 
- consult cardiology for KHANH 
 
 
HTN 
-BP stable now. Will need med rec 
-hydralazine prn 
  
Chronic anemia -no evidence of bleeding 
-monitor CBC 
  
PNA 
-CT showed multiple pulmonary nodules, several of which are cavitary and several groundglass opacities and areas of consolidation 
- He is currently not hypoxic. Has intermittent nonproductive cough 
-start nebs prn. on abx as above 
-needs f/u with CT outpt after abx therapy 
  
Code Status: Full Surrogate Decision Maker: he did not wish to list anyone DVT Prophylaxis: SCDs for now GI Prophylaxis: not indicated Baseline: indedepent Subjective: Chief Complaint / Reason for Physician Visit \"fever\". Discussed with RN events overnight. Review of Systems: 
Symptom Y/N Comments  Symptom Y/N Comments Fever/Chills n   Chest Pain n   
Poor Appetite    Edema n   
Cough n   Abdominal Pain n   
Sputum n   Joint Pain SOB/CUEVA n   Pruritis/Rash Nausea/vomit n   Tolerating PT/OT Diarrhea n   Tolerating Diet Constipation    Other Could NOT obtain due to:   
 
Objective: VITALS:  
 Last 24hrs VS reviewed since prior progress note. Most recent are: 
Patient Vitals for the past 24 hrs: 
 Temp Pulse Resp BP SpO2  
09/09/18 0743 97.9 °F (36.6 °C) 87 16 (!) 138/95 98 % 09/09/18 0331 97.8 °F (36.6 °C) 98 20 143/87 98 % 09/08/18 2352 98.8 °F (37.1 °C) 84 18 133/90 98 % 09/08/18 2104 97.6 °F (36.4 °C) 84 18 131/90 100 % 09/08/18 1557 98 °F (36.7 °C) 88 18 133/85 99 % 09/08/18 1134 98 °F (36.7 °C) 90 18 132/78 99 % Intake/Output Summary (Last 24 hours) at 09/09/18 1125 Last data filed at 09/09/18 1393 Gross per 24 hour Intake             1920 ml Output             3350 ml Net            -1430 ml PHYSICAL EXAM: 
General: WD, WN. Alert, cooperative, no acute distress   
EENT:  EOMI. Anicteric sclerae. MMM Resp:  CTA bilaterally, no wheezing or rales. No accessory muscle use CV:  Regular  rhythm,  No edema GI:  Soft, Non distended, Non tender.  +Bowel sounds Neurologic:  Alert and oriented X 3, normal speech, no numbness/weakness, no incontinence Psych:   Good insight. Not anxious nor agitated Skin:  No rashes. No jaundice Reviewed most current lab test results and cultures  YES Reviewed most current radiology test results   YES Review and summation of old records today    NO Reviewed patient's current orders and MAR    YES 
PMH/SH reviewed - no change compared to H&P 
________________________________________________________________________ Care Plan discussed with: 
  Comments Patient x Family RN x Care Manager Consultant Multidiciplinary team rounds were held today with , nursing, pharmacist and clinical coordinator. Patient's plan of care was discussed; medications were reviewed and discharge planning was addressed. ________________________________________________________________________ Total NON critical care TIME:  25   Minutes Total CRITICAL CARE TIME Spent:   Minutes non procedure based Comments >50% of visit spent in counseling and coordination of care    
________________________________________________________________________ Dalton Castaneda MD  
 
Procedures: see electronic medical records for all procedures/Xrays and details which were not copied into this note but were reviewed prior to creation of Plan. LABS: 
I reviewed today's most current labs and imaging studies. Pertinent labs include: 
Recent Labs  
   09/09/18 
 0114  09/08/18 
 0401  09/07/18 1940 WBC  11.6*  12.1*  10.5 HGB  10.3*  7.7*  8.3* HCT  32.2*  23.9*  25.4*  
PLT  166  205  205 Recent Labs  
   09/09/18 
 0114  09/07/18 
 1940 09/06/18 2050 NA  136  138  134* K  4.1  3.6  3.6 CL  105  106  102 CO2  23  23  20* GLU  86  126*  102* BUN  11  14  12 CREA  0.78  0.85  1.02  
CA  8.5  8.1*  8.8 ALB   --    --   2.6* TBILI   --    --   0.7 SGOT   --    --   34 ALT   --    --   42 Signed: Dalton Castaneda MD

## 2018-09-10 LAB
ANION GAP SERPL CALC-SCNC: 7 MMOL/L (ref 5–15)
BUN SERPL-MCNC: 10 MG/DL (ref 6–20)
BUN/CREAT SERPL: 15 (ref 12–20)
CALCIUM SERPL-MCNC: 8.5 MG/DL (ref 8.5–10.1)
CHLORIDE SERPL-SCNC: 109 MMOL/L (ref 97–108)
CO2 SERPL-SCNC: 22 MMOL/L (ref 21–32)
CREAT SERPL-MCNC: 0.68 MG/DL (ref 0.7–1.3)
ERYTHROCYTE [DISTWIDTH] IN BLOOD BY AUTOMATED COUNT: 14.5 % (ref 11.5–14.5)
ERYTHROCYTE [DISTWIDTH] IN BLOOD BY AUTOMATED COUNT: 14.6 % (ref 11.5–14.5)
GLUCOSE SERPL-MCNC: 88 MG/DL (ref 65–100)
HCT VFR BLD AUTO: 26.4 % (ref 36.6–50.3)
HCT VFR BLD AUTO: 30 % (ref 36.6–50.3)
HGB BLD-MCNC: 8.5 G/DL (ref 12.1–17)
HGB BLD-MCNC: 9.6 G/DL (ref 12.1–17)
MCH RBC QN AUTO: 27.1 PG (ref 26–34)
MCH RBC QN AUTO: 27.2 PG (ref 26–34)
MCHC RBC AUTO-ENTMCNC: 32 G/DL (ref 30–36.5)
MCHC RBC AUTO-ENTMCNC: 32.2 G/DL (ref 30–36.5)
MCV RBC AUTO: 84.3 FL (ref 80–99)
MCV RBC AUTO: 84.7 FL (ref 80–99)
NRBC # BLD: 0 K/UL (ref 0–0.01)
NRBC # BLD: 0 K/UL (ref 0–0.01)
NRBC BLD-RTO: 0 PER 100 WBC
NRBC BLD-RTO: 0 PER 100 WBC
PLATELET # BLD AUTO: 297 K/UL (ref 150–400)
PLATELET # BLD AUTO: 356 K/UL (ref 150–400)
PMV BLD AUTO: 9 FL (ref 8.9–12.9)
PMV BLD AUTO: 9.2 FL (ref 8.9–12.9)
POTASSIUM SERPL-SCNC: 4.6 MMOL/L (ref 3.5–5.1)
RBC # BLD AUTO: 3.13 M/UL (ref 4.1–5.7)
RBC # BLD AUTO: 3.54 M/UL (ref 4.1–5.7)
SODIUM SERPL-SCNC: 138 MMOL/L (ref 136–145)
WBC # BLD AUTO: 10.6 K/UL (ref 4.1–11.1)
WBC # BLD AUTO: 11.7 K/UL (ref 4.1–11.1)

## 2018-09-10 PROCEDURE — 74011250636 HC RX REV CODE- 250/636: Performed by: EMERGENCY MEDICINE

## 2018-09-10 PROCEDURE — 74011250637 HC RX REV CODE- 250/637: Performed by: INTERNAL MEDICINE

## 2018-09-10 PROCEDURE — 74011250636 HC RX REV CODE- 250/636: Performed by: INTERNAL MEDICINE

## 2018-09-10 PROCEDURE — 65270000029 HC RM PRIVATE

## 2018-09-10 PROCEDURE — 36415 COLL VENOUS BLD VENIPUNCTURE: CPT | Performed by: INTERNAL MEDICINE

## 2018-09-10 PROCEDURE — 85027 COMPLETE CBC AUTOMATED: CPT | Performed by: INTERNAL MEDICINE

## 2018-09-10 PROCEDURE — 80048 BASIC METABOLIC PNL TOTAL CA: CPT | Performed by: INTERNAL MEDICINE

## 2018-09-10 PROCEDURE — 74011000258 HC RX REV CODE- 258: Performed by: INTERNAL MEDICINE

## 2018-09-10 RX ORDER — HYDROCODONE BITARTRATE AND ACETAMINOPHEN 5; 325 MG/1; MG/1
2 TABLET ORAL
Status: DISCONTINUED | OUTPATIENT
Start: 2018-09-10 | End: 2018-09-13

## 2018-09-10 RX ORDER — SODIUM CHLORIDE 9 MG/ML
100 INJECTION, SOLUTION INTRAVENOUS CONTINUOUS
Status: DISCONTINUED | OUTPATIENT
Start: 2018-09-11 | End: 2018-09-10

## 2018-09-10 RX ADMIN — HYDROMORPHONE HYDROCHLORIDE 1 MG: 2 INJECTION, SOLUTION INTRAMUSCULAR; INTRAVENOUS; SUBCUTANEOUS at 01:25

## 2018-09-10 RX ADMIN — HYDROMORPHONE HYDROCHLORIDE 1 MG: 2 INJECTION, SOLUTION INTRAMUSCULAR; INTRAVENOUS; SUBCUTANEOUS at 20:30

## 2018-09-10 RX ADMIN — GABAPENTIN 200 MG: 100 CAPSULE ORAL at 18:54

## 2018-09-10 RX ADMIN — HYDROMORPHONE HYDROCHLORIDE 1 MG: 2 INJECTION, SOLUTION INTRAMUSCULAR; INTRAVENOUS; SUBCUTANEOUS at 09:09

## 2018-09-10 RX ADMIN — GUAIFENESIN 600 MG: 600 TABLET, EXTENDED RELEASE ORAL at 07:29

## 2018-09-10 RX ADMIN — HYDROMORPHONE HYDROCHLORIDE 1 MG: 2 INJECTION, SOLUTION INTRAMUSCULAR; INTRAVENOUS; SUBCUTANEOUS at 16:50

## 2018-09-10 RX ADMIN — HYDROMORPHONE HYDROCHLORIDE 1 MG: 2 INJECTION, SOLUTION INTRAMUSCULAR; INTRAVENOUS; SUBCUTANEOUS at 12:46

## 2018-09-10 RX ADMIN — HYDROMORPHONE HYDROCHLORIDE 1 MG: 2 INJECTION, SOLUTION INTRAMUSCULAR; INTRAVENOUS; SUBCUTANEOUS at 16:42

## 2018-09-10 RX ADMIN — CEFEPIME HYDROCHLORIDE 2 G: 2 INJECTION, POWDER, FOR SOLUTION INTRAVENOUS at 09:10

## 2018-09-10 RX ADMIN — CEFEPIME HYDROCHLORIDE 2 G: 2 INJECTION, POWDER, FOR SOLUTION INTRAVENOUS at 18:54

## 2018-09-10 RX ADMIN — HYDROMORPHONE HYDROCHLORIDE 1 MG: 2 INJECTION, SOLUTION INTRAMUSCULAR; INTRAVENOUS; SUBCUTANEOUS at 05:02

## 2018-09-10 RX ADMIN — HYDROCODONE BITARTRATE AND ACETAMINOPHEN 2 TABLET: 5; 325 TABLET ORAL at 15:53

## 2018-09-10 RX ADMIN — HYDROCODONE BITARTRATE AND ACETAMINOPHEN 1 TABLET: 5; 325 TABLET ORAL at 07:28

## 2018-09-10 RX ADMIN — VANCOMYCIN HYDROCHLORIDE 1000 MG: 1 INJECTION, POWDER, LYOPHILIZED, FOR SOLUTION INTRAVENOUS at 04:38

## 2018-09-10 RX ADMIN — VANCOMYCIN HYDROCHLORIDE 1000 MG: 1 INJECTION, POWDER, LYOPHILIZED, FOR SOLUTION INTRAVENOUS at 20:32

## 2018-09-10 RX ADMIN — VANCOMYCIN HYDROCHLORIDE 1000 MG: 1 INJECTION, POWDER, LYOPHILIZED, FOR SOLUTION INTRAVENOUS at 13:28

## 2018-09-10 RX ADMIN — CEFEPIME HYDROCHLORIDE 2 G: 2 INJECTION, POWDER, FOR SOLUTION INTRAVENOUS at 01:27

## 2018-09-10 RX ADMIN — GABAPENTIN 200 MG: 100 CAPSULE ORAL at 07:28

## 2018-09-10 NOTE — ROUTINE PROCESS
Bedside shift change report given to Liana/Manjeet RNs (oncoming nurse) by John Hernandez RN (offgoing nurse). Report included the following information SBAR, Kardex, Intake/Output and Recent Results. Patient has not gotten out of bed since admit date due to excruciating pain in the back and weakness in legs. Blood cultures pending from 9/8/2018 have gram negatives in one bottle Dr Мария Worthy is aware.

## 2018-09-10 NOTE — PROGRESS NOTES
Reason for Admission:   sepsis RRAT Score:   4 Plan for utilizing home health:    Possible home health at d/c. Likelihood of Readmission:  LOW Transition of Care Plan:                   
Pt is an 43 y.o.  male, admitted for sepsis. Pt was alert and oriented, upon CM room visit. Pt reported that he currently rents a room from family (one story-5 steps into main entrance). Pt reported that he is independent with ADLs, and he drives. Pt reported that he does not have a PCP: Pt disclosed that he is not interested in [facility] PCP list.  Pt reported that that he uses [facility]. Pt reported no DME, HH,SNF. Pt denies needing additional assistance at d/c.  Pt disclosed that he has received Care Card application. Pt plans to complete application at d/c Care Management Interventions PCP Verified by CM: Yes Mode of Transport at Discharge: Other (see comment) Transition of Care Consult (CM Consult): Discharge Planning Discharge Durable Medical Equipment: No 
Physical Therapy Consult: No 
Occupational Therapy Consult: No 
Speech Therapy Consult: No 
Current Support Network: Other, Relative's Home Confirm Follow Up Transport: Family Plan discussed with Pt/Family/Caregiver: Yes Discharge Location Discharge Placement: Home [facility]  
[phone]

## 2018-09-10 NOTE — PROGRESS NOTES
Hospitalist Progress Note NAME: Danielle Velásquez :  1980 MRN:  [id number] Assessment / Plan: 
Back spine concerning for septic arthritis/ spine abscess Hx of MSSA Current IVDU Sepsis, POA Pseudomonas bacteremia 
-MRI L spine showed increased fluid in the right L3-4 posterior facet joint with surrounding edema extending into the paraspinous musculature. Findings are suspicious for septic arthritis. No significant spinal stenosis or neural foraminal narrowing. 
-he had a KHANH on  that was negative. Received nafcillin through 18. Completed Cefazolin on  inpt. -ESR 12, CRP 15.5, lactate 0.9, UA neg 
-follow Bcx, + psuedomonas 
-received IVF per sepsis protocol. stopped IVF  
-cont cefepime and vancomycin 
-consulted ortho, IR. -Appreciate ID consult  
-Pain meds adjusted 9/10 
- Appreciate cardiology consult for KHANH  HTN 
-BP stable now. Will need med rec 
-hydralazine prn 
  
Chronic anemia -no evidence of bleeding 
-monitor CBC 
  
PNA, septic emboli 
-CT showed multiple pulmonary nodules, several of which are cavitary and several groundglass opacities and areas of consolidation 
-not hypoxic. Has intermittent nonproductive cough 
- nebs prn. on abx as above 
-needs f/u with CT outpt after abx therapy For KHANH 
 
  
Code Status: Full Surrogate Decision Maker: he did not wish to list anyone DVT Prophylaxis: SCDs for now GI Prophylaxis: not indicated Baseline: indedepent Subjective: Chief Complaint / Reason for Physician Visit \"pain\". Discussed with RN events overnight. Review of Systems: 
Symptom Y/N Comments  Symptom Y/N Comments Fever/Chills n   Chest Pain n   
Poor Appetite    Edema n   
Cough n   Abdominal Pain n   
Sputum n   Joint Pain y   
SOB/CUEVA n   Pruritis/Rash Nausea/vomit n   Tolerating PT/OT Diarrhea n   Tolerating Diet Constipation    Other Could NOT obtain due to:   
 
Objective: VITALS:  
 Last 24hrs VS reviewed since prior progress note. Most recent are: 
Patient Vitals for the past 24 hrs: 
 Temp Pulse Resp BP SpO2  
09/10/18 0723 98 °F (36.7 °C) 77 18 133/84 99 % 09/10/18 0500 - 80 18 140/88 98 % 09/10/18 0122 98.8 °F (37.1 °C) 81 19 127/74 98 % 09/09/18 1919 97.4 °F (36.3 °C) 85 18 141/80 97 % 09/09/18 1516 98.9 °F (37.2 °C) 77 18 133/85 100 % 09/09/18 1146 97.6 °F (36.4 °C) 81 18 135/90 100 % Intake/Output Summary (Last 24 hours) at 09/10/18 1045 Last data filed at 09/10/18 3905 Gross per 24 hour Intake             1670 ml Output             2450 ml Net             -780 ml PHYSICAL EXAM: 
General: WD, WN. Alert, cooperative, no acute distress   
EENT:  EOMI. Anicteric sclerae. MMM Resp:  CTA bilaterally, no wheezing or rales. No accessory muscle use CV:  Regular  rhythm,  No edema GI:  Soft, Non distended, Non tender.  +Bowel sounds Neurologic:  Alert and oriented X 3, normal speech, no numbness/weakness, no incontinence Psych:   Good insight. Not anxious nor agitated Skin:  No rashes. No jaundice Reviewed most current lab test results and cultures  YES Reviewed most current radiology test results   YES Review and summation of old records today    NO Reviewed patient's current orders and MAR    YES 
PMH/ reviewed - no change compared to H&P 
________________________________________________________________________ Care Plan discussed with: 
  Comments Patient x Family RN x Care Manager Consultant Multidiciplinary team rounds were held today with , nursing, pharmacist and clinical coordinator. Patient's plan of care was discussed; medications were reviewed and discharge planning was addressed. ________________________________________________________________________ Total NON critical care TIME:  25   Minutes Total CRITICAL CARE TIME Spent:   Minutes non procedure based Comments >50% of visit spent in counseling and coordination of care    
________________________________________________________________________ Rebel Jacobsen MD  
 
Procedures: see electronic medical records for all procedures/Xrays and details which were not copied into this note but were reviewed prior to creation of Plan. LABS: 
I reviewed today's most current labs and imaging studies. Pertinent labs include: 
Recent Labs  
   09/10/18 
 0514  09/09/18 
 0114  09/08/18 
 0401 WBC  10.6  11.6*  12.1* HGB  8.5*  10.3*  7.7* HCT  26.4*  32.2*  23.9*  
PLT  297  166  205 Recent Labs  
   09/10/18 
 0514  09/09/18 
 0114  09/07/18 
 1940 NA  138  136  138  
K  4.6  4.1  3.6 CL  109*  105  106 CO2  22  23  23 GLU  88  86  126* BUN  10  11  14 CREA  0.68*  0.78  0.85 CA  8.5  8.5  8.1* Signed: Rebel Jacobsen MD

## 2018-09-10 NOTE — CONSULTS
Consult    NAME: Yina Barrow   :  1980   MRN:  [id number]     Date/Time:  9/10/2018 10:26 AM    Patient PCP: None  ________________________________________________________________________     Assessment:     1. Pseudomonas Bacteremia in patient with IVDU, request for KHANH  2. Echo 2018 EF 55%, no valve disease  3. 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 Sinus  5. Anemia  6. Active IV drug use including Heroin  7. Active tobacco, Etoh          Plan:     Blood cultures positive for pseudomonas. TTE negative. Request for KHANH by ID. No issues with prior KHANH. Plan for KHANH, , all risk/benefits/alternatives discussed and agrees to proceed. Hydration while NPO tomorrow. [x]        High complexity decision making was performed        Subjective:   CHIEF COMPLAINT: Fever    HISTORY OF PRESENT ILLNESS:       Yina Barrow is a  43 y. o. male with PMH significant for IV drug use ( as per ED note  denies sharing needles),who  Presented to the ED with complaints  of  right sided back pain for the past 2-3 days. Pt had stated that the pain was   similar to symptoms he had when he had  MRSA infection. . As per ED note patient stated 3 days ago he woke up with  Chest pain. Chest pain had  Subsided,  then 2 days back pain started . Pain worse with movement. Pt also reported fever with tmax 101.3 degrees. Patient reports 3 months ago he had MRSA in his\" spine \"and was on IV antibiotics . Pt does not recall name of antibiotic . He had taken it for 7 weeks. Pt was discharged on July 10. In the ED he reported using IV heroin on morning prior to admission with no relief. Pt had gone to ED as he had no improvement in symptoms. temporarily in ED had been 99.8, thereafter 100.3 . WBC count was 15.8  Pt had CT of spine. Report is as follows:  CT Thoracic lumbar spine  IMPRESSION:  1.  Inflammation surrounds the right L3-4 posterior facet joint on the prior MRI  suspicious for infection. There is no abnormality on CT however. 2. Multiple pulmonary nodules, several of which are cavitary and several  groundglass opacities and areas of consolidation. The overall extent is  relatively mild, but worrisome for infectious disease. Portions of the lungs are  incompletely seen and significant respiratory motion is present. Recommend  dedicated chest CT versus follow-up in several weeks after therapy. 3. Status post anterior cervical fusion from C5 to C7.  4. Status post T4 and T5 laminectomies        MRI lumbar spine was also done   IMPRESSION:    1. Increased fluid in the right L3-4 posterior facet joint with surrounding  edema extending into the paraspinous musculature. Findings are suspicious for  septic arthritis. 2. No significant spinal stenosis or neural foraminal narrowing.      BC also were done . So far no growth . UA unremarkable. Pt has been admitted and started on Vancomycin , Cefepime. Pt seen today . He appears very drowsy & is falling asleep while talking . Pt has no fever. He complains of back pain & neck pain . \"When is my next pain shot? \"    Currently feeling well, without complaint. No chest pain, no dyspnea. We were asked to consult for work up and evaluation of the above problems. Past Medical History:   Diagnosis Date    Back pain, chronic       Past Surgical History:   Procedure Laterality Date    HX CERVICAL DISKECTOMY      HX FEMUR FRACTURE TX      HX ORTHOPAEDIC      Back Surgery     No Known Allergies   Meds:  See below  Social History   Substance Use Topics    Smoking status: Current Every Day Smoker     Packs/day: 1.00    Smokeless tobacco: Never Used    Alcohol use Yes      History reviewed. No pertinent family history. REVIEW OF SYSTEMS:     []         Unable to obtain  ROS due to ---   [x]         Total of 12 systems reviewed as follows:     Total of 12 systems reviewed as follows:       POSITIVE= Bold text  Negative = normal text  General:  fever, chills, sweats, generalized weakness, weight loss/gain,      loss of appetite   Eyes:    blurred vision, eye pain, loss of vision, double vision  ENT:    rhinorrhea, pharyngitis   Respiratory:   cough, sputum production, SOB, CUEVA, wheezing, pleuritic pain   Cardiology:   chest pain, palpitations, orthopnea, PND, edema, syncope   Gastrointestinal:  abdominal pain , N/V, diarrhea, dysphagia, constipation, bleeding   Genitourinary:  frequency, urgency, dysuria, hematuria, incontinence   Muskuloskeletal :  arthralgia, myalgia, back pain  Hematology:  easy bruising, nose or gum bleeding, lymphadenopathy   Dermatological: rash, ulceration, pruritis, color change / jaundice  Endocrine:   hot flashes or polydipsia   Neurological:  headache, dizziness, confusion, focal weakness, paresthesia,     Speech difficulties, memory loss, gait difficulty  Psychological: Feelings of anxiety, depression, agitation    Objective:      Physical Exam:    Last 24hrs VS reviewed since prior progress note. Most recent are:    Visit Vitals    /84 (BP 1 Location: Left arm, BP Patient Position: At rest)    Pulse 77    Temp 98 °F (36.7 °C)    Resp 18    Ht 6' (1.829 m)    Wt 86.2 kg (190 lb)    SpO2 99%    BMI 25.77 kg/m2       Intake/Output Summary (Last 24 hours) at 09/10/18 1026  Last data filed at 09/10/18 0831   Gross per 24 hour   Intake             1670 ml   Output             2450 ml   Net             -780 ml        General Appearance: Well developed, well nourished, alert & oriented x 3,    no acute distress. Ears/Nose/Mouth/Throat: Pupils equal and round, Hearing grossly normal.  Neck: Supple. JVP within normal limits. Carotids good upstrokes, with no bruit. Chest: Lungs clear to auscultation bilaterally. Cardiovascular: Regular rate and rhythm, S1S2 normal, no murmur, rubs, gallops. Abdomen: Soft, non-tender, bowel sounds are active. No organomegaly. Extremities: No edema bilaterally.  Femoral pulses +2, Distal Pulses +1. Skin: Warm and dry. Neuro: CN II-XII grossly intact, Strength and sensation grossly intact.     Data:      Prior to Admission medications    Not on File       Recent Results (from the past 24 hour(s))   CBC W/O DIFF    Collection Time: 09/10/18  5:14 AM   Result Value Ref Range    WBC 10.6 4.1 - 11.1 K/uL    RBC 3.13 (L) 4.10 - 5.70 M/uL    HGB 8.5 (L) 12.1 - 17.0 g/dL    HCT 26.4 (L) 36.6 - 50.3 %    MCV 84.3 80.0 - 99.0 FL    MCH 27.2 26.0 - 34.0 PG    MCHC 32.2 30.0 - 36.5 g/dL    RDW 14.5 11.5 - 14.5 %    PLATELET 654 404 - 970 K/uL    MPV 9.2 8.9 - 12.9 FL    NRBC 0.0 0  WBC    ABSOLUTE NRBC 0.00 0.00 - 0.69 K/uL   METABOLIC PANEL, BASIC    Collection Time: 09/10/18  5:14 AM   Result Value Ref Range    Sodium 138 136 - 145 mmol/L    Potassium 4.6 3.5 - 5.1 mmol/L    Chloride 109 (H) 97 - 108 mmol/L    CO2 22 21 - 32 mmol/L    Anion gap 7 5 - 15 mmol/L    Glucose 88 65 - 100 mg/dL    BUN 10 6 - 20 MG/DL    Creatinine 0.68 (L) 0.70 - 1.30 MG/DL    BUN/Creatinine ratio 15 12 - 20      GFR est AA >60 >60 ml/min/1.73m2    GFR est non-AA >60 >60 ml/min/1.73m2    Calcium 8.5 8.5 - 10.1 MG/DL

## 2018-09-10 NOTE — PROGRESS NOTES
CT staff notified of order from Dr. Kerri Gamez for IR biopsy. 1320 call back from CT an per Dr. Javier Huff, there is nothing that can be drained or biopsied.

## 2018-09-11 ENCOUNTER — ANESTHESIA (OUTPATIENT)
Dept: NON INVASIVE DIAGNOSTICS | Age: 38
DRG: 871 | End: 2018-09-11
Payer: SUBSIDIZED

## 2018-09-11 ENCOUNTER — ANESTHESIA EVENT (OUTPATIENT)
Dept: NON INVASIVE DIAGNOSTICS | Age: 38
DRG: 871 | End: 2018-09-11
Payer: SUBSIDIZED

## 2018-09-11 LAB
ANION GAP SERPL CALC-SCNC: 7 MMOL/L (ref 5–15)
BACTERIA SPEC CULT: ABNORMAL
BACTERIA SPEC CULT: ABNORMAL
BUN SERPL-MCNC: 12 MG/DL (ref 6–20)
BUN/CREAT SERPL: 16 (ref 12–20)
CALCIUM SERPL-MCNC: 8.8 MG/DL (ref 8.5–10.1)
CHLORIDE SERPL-SCNC: 105 MMOL/L (ref 97–108)
CO2 SERPL-SCNC: 27 MMOL/L (ref 21–32)
CREAT SERPL-MCNC: 0.77 MG/DL (ref 0.7–1.3)
GLUCOSE SERPL-MCNC: 102 MG/DL (ref 65–100)
POTASSIUM SERPL-SCNC: 3.9 MMOL/L (ref 3.5–5.1)
SERVICE CMNT-IMP: ABNORMAL
SODIUM SERPL-SCNC: 139 MMOL/L (ref 136–145)

## 2018-09-11 PROCEDURE — 74011250636 HC RX REV CODE- 250/636

## 2018-09-11 PROCEDURE — 65270000029 HC RM PRIVATE

## 2018-09-11 PROCEDURE — 93312 ECHO TRANSESOPHAGEAL: CPT

## 2018-09-11 PROCEDURE — 36415 COLL VENOUS BLD VENIPUNCTURE: CPT | Performed by: INTERNAL MEDICINE

## 2018-09-11 PROCEDURE — 74011000250 HC RX REV CODE- 250: Performed by: INTERNAL MEDICINE

## 2018-09-11 PROCEDURE — 87040 BLOOD CULTURE FOR BACTERIA: CPT | Performed by: INTERNAL MEDICINE

## 2018-09-11 PROCEDURE — 87077 CULTURE AEROBIC IDENTIFY: CPT | Performed by: INTERNAL MEDICINE

## 2018-09-11 PROCEDURE — 74011250637 HC RX REV CODE- 250/637: Performed by: INTERNAL MEDICINE

## 2018-09-11 PROCEDURE — 74011250636 HC RX REV CODE- 250/636: Performed by: INTERNAL MEDICINE

## 2018-09-11 PROCEDURE — B24BZZ4 ULTRASONOGRAPHY OF HEART WITH AORTA, TRANSESOPHAGEAL: ICD-10-PCS | Performed by: INTERNAL MEDICINE

## 2018-09-11 PROCEDURE — 74011250636 HC RX REV CODE- 250/636: Performed by: EMERGENCY MEDICINE

## 2018-09-11 PROCEDURE — 74011000258 HC RX REV CODE- 258: Performed by: INTERNAL MEDICINE

## 2018-09-11 PROCEDURE — 74011000250 HC RX REV CODE- 250

## 2018-09-11 PROCEDURE — 87186 SC STD MICRODIL/AGAR DIL: CPT | Performed by: INTERNAL MEDICINE

## 2018-09-11 RX ORDER — PROPOFOL 10 MG/ML
INJECTION, EMULSION INTRAVENOUS AS NEEDED
Status: DISCONTINUED | OUTPATIENT
Start: 2018-09-11 | End: 2018-09-11 | Stop reason: HOSPADM

## 2018-09-11 RX ORDER — FENTANYL CITRATE 50 UG/ML
25-50 INJECTION, SOLUTION INTRAMUSCULAR; INTRAVENOUS
Status: DISCONTINUED | OUTPATIENT
Start: 2018-09-11 | End: 2018-09-11 | Stop reason: HOSPADM

## 2018-09-11 RX ORDER — FENTANYL CITRATE 50 UG/ML
INJECTION, SOLUTION INTRAMUSCULAR; INTRAVENOUS
Status: COMPLETED
Start: 2018-09-11 | End: 2018-09-11

## 2018-09-11 RX ORDER — LIDOCAINE HYDROCHLORIDE 20 MG/ML
15 SOLUTION OROPHARYNGEAL ONCE
Status: COMPLETED | OUTPATIENT
Start: 2018-09-11 | End: 2018-09-11

## 2018-09-11 RX ORDER — SODIUM CHLORIDE 9 MG/ML
INJECTION, SOLUTION INTRAVENOUS
Status: DISCONTINUED | OUTPATIENT
Start: 2018-09-11 | End: 2018-09-11 | Stop reason: HOSPADM

## 2018-09-11 RX ORDER — MIDAZOLAM HYDROCHLORIDE 1 MG/ML
INJECTION, SOLUTION INTRAMUSCULAR; INTRAVENOUS
Status: COMPLETED
Start: 2018-09-11 | End: 2018-09-11

## 2018-09-11 RX ORDER — MIDAZOLAM HYDROCHLORIDE 1 MG/ML
.5-2 INJECTION, SOLUTION INTRAMUSCULAR; INTRAVENOUS
Status: DISCONTINUED | OUTPATIENT
Start: 2018-09-11 | End: 2018-09-11 | Stop reason: HOSPADM

## 2018-09-11 RX ORDER — LIDOCAINE HYDROCHLORIDE 20 MG/ML
SOLUTION OROPHARYNGEAL
Status: COMPLETED
Start: 2018-09-11 | End: 2018-09-11

## 2018-09-11 RX ADMIN — MIDAZOLAM HYDROCHLORIDE 2 MG: 1 INJECTION, SOLUTION INTRAMUSCULAR; INTRAVENOUS at 14:04

## 2018-09-11 RX ADMIN — MIDAZOLAM HYDROCHLORIDE 2 MG: 1 INJECTION, SOLUTION INTRAMUSCULAR; INTRAVENOUS at 14:16

## 2018-09-11 RX ADMIN — PROPOFOL 30 MG: 10 INJECTION, EMULSION INTRAVENOUS at 14:35

## 2018-09-11 RX ADMIN — CEFEPIME HYDROCHLORIDE 2 G: 2 INJECTION, POWDER, FOR SOLUTION INTRAVENOUS at 02:03

## 2018-09-11 RX ADMIN — HYDROMORPHONE HYDROCHLORIDE 1 MG: 2 INJECTION, SOLUTION INTRAMUSCULAR; INTRAVENOUS; SUBCUTANEOUS at 16:31

## 2018-09-11 RX ADMIN — MIDAZOLAM HYDROCHLORIDE 2 MG: 1 INJECTION, SOLUTION INTRAMUSCULAR; INTRAVENOUS at 14:06

## 2018-09-11 RX ADMIN — VANCOMYCIN HYDROCHLORIDE 1000 MG: 1 INJECTION, POWDER, LYOPHILIZED, FOR SOLUTION INTRAVENOUS at 15:36

## 2018-09-11 RX ADMIN — PROPOFOL 80 MG: 10 INJECTION, EMULSION INTRAVENOUS at 14:34

## 2018-09-11 RX ADMIN — Medication 10 ML: at 16:35

## 2018-09-11 RX ADMIN — PROPOFOL 30 MG: 10 INJECTION, EMULSION INTRAVENOUS at 14:41

## 2018-09-11 RX ADMIN — PROPOFOL 30 MG: 10 INJECTION, EMULSION INTRAVENOUS at 14:39

## 2018-09-11 RX ADMIN — PROPOFOL 30 MG: 10 INJECTION, EMULSION INTRAVENOUS at 14:37

## 2018-09-11 RX ADMIN — Medication 10 ML: at 09:04

## 2018-09-11 RX ADMIN — HYDROCODONE BITARTRATE AND ACETAMINOPHEN 2 TABLET: 5; 325 TABLET ORAL at 06:04

## 2018-09-11 RX ADMIN — HYDROMORPHONE HYDROCHLORIDE 1 MG: 2 INJECTION, SOLUTION INTRAMUSCULAR; INTRAVENOUS; SUBCUTANEOUS at 08:34

## 2018-09-11 RX ADMIN — FENTANYL CITRATE 50 MCG: 50 INJECTION, SOLUTION INTRAMUSCULAR; INTRAVENOUS at 14:03

## 2018-09-11 RX ADMIN — LIDOCAINE HYDROCHLORIDE 15 ML: 20 SOLUTION ORAL; TOPICAL at 14:07

## 2018-09-11 RX ADMIN — SODIUM CHLORIDE: 9 INJECTION, SOLUTION INTRAVENOUS at 14:31

## 2018-09-11 RX ADMIN — BENZOCAINE, BUTAMBEN, AND TETRACAINE HYDROCHLORIDE 1 SPRAY: .028; .004; .004 AEROSOL, SPRAY TOPICAL at 14:11

## 2018-09-11 RX ADMIN — FENTANYL CITRATE 50 MCG: 50 INJECTION, SOLUTION INTRAMUSCULAR; INTRAVENOUS at 14:11

## 2018-09-11 RX ADMIN — Medication 10 ML: at 16:34

## 2018-09-11 RX ADMIN — HYDROMORPHONE HYDROCHLORIDE 1 MG: 2 INJECTION, SOLUTION INTRAMUSCULAR; INTRAVENOUS; SUBCUTANEOUS at 12:52

## 2018-09-11 RX ADMIN — HYDROMORPHONE HYDROCHLORIDE 1 MG: 2 INJECTION, SOLUTION INTRAMUSCULAR; INTRAVENOUS; SUBCUTANEOUS at 04:05

## 2018-09-11 RX ADMIN — Medication 10 ML: at 02:04

## 2018-09-11 RX ADMIN — GABAPENTIN 200 MG: 100 CAPSULE ORAL at 18:27

## 2018-09-11 RX ADMIN — CEFEPIME HYDROCHLORIDE 2 G: 2 INJECTION, POWDER, FOR SOLUTION INTRAVENOUS at 10:36

## 2018-09-11 RX ADMIN — CEFEPIME HYDROCHLORIDE 2 G: 2 INJECTION, POWDER, FOR SOLUTION INTRAVENOUS at 18:26

## 2018-09-11 RX ADMIN — HYDROMORPHONE HYDROCHLORIDE 1 MG: 2 INJECTION, SOLUTION INTRAMUSCULAR; INTRAVENOUS; SUBCUTANEOUS at 20:17

## 2018-09-11 RX ADMIN — HYDROMORPHONE HYDROCHLORIDE 1 MG: 2 INJECTION, SOLUTION INTRAMUSCULAR; INTRAVENOUS; SUBCUTANEOUS at 00:11

## 2018-09-11 RX ADMIN — MIDAZOLAM HYDROCHLORIDE 2 MG: 1 INJECTION, SOLUTION INTRAMUSCULAR; INTRAVENOUS at 14:10

## 2018-09-11 RX ADMIN — LIDOCAINE HYDROCHLORIDE 15 ML: 20 SOLUTION OROPHARYNGEAL at 14:07

## 2018-09-11 RX ADMIN — Medication 10 ML: at 00:11

## 2018-09-11 NOTE — ANESTHESIA POSTPROCEDURE EVALUATION
Post-Anesthesia Evaluation and Assessment Patient: Milton Wong MRN: [id number]  SSN: [id number] YOB: 1980  Age: 43 y.o. Sex: male Cardiovascular Function/Vital Signs Visit Vitals  /78  Pulse 84  Temp 36.7 °C (98 °F)  Resp 18  Ht 6' (1.829 m)  Wt 86.2 kg (190 lb)  SpO2 97%  BMI 25.77 kg/m2 Patient is status post total IV anesthesia, MAC anesthesia for * No procedures listed *. Nausea/Vomiting: None Postoperative hydration reviewed and adequate. Pain: 
Pain Scale 1: Numeric (0 - 10) (09/11/18 1251) Pain Intensity 1: 7 (09/11/18 1251) Managed Neurological Status: At baseline Mental Status and Level of Consciousness: Arousable Pulmonary Status:  
O2 Device: Room air (09/11/18 1500) Adequate oxygenation and airway patent Complications related to anesthesia: None Post-anesthesia assessment completed. No concerns Signed By: Debi Ribeiro MD   
 September 11, 2018

## 2018-09-11 NOTE — PROGRESS NOTES
Spiritual Care Assessment/Progress Note [facility] 
 
 
NAME: Efrain Lizama      MRN: [id number] AGE: 43 y.o. SEX: male Yazidi Affiliation: No preference Language: [place] 9/11/2018     Total Time (in minutes): 29 Spiritual Assessment begun in [facility] 2 GENERAL SURGERY through conversation with: 
  
    [x]Patient        [] Family    [] Friend(s) Reason for Consult: Request by staff Spiritual beliefs: (Please include comment if needed) 
   [] Identifies with a shailesh tradition:     
   [] Supported by a shailesh community:        
   [x] Claims no spiritual orientation:       
   [] Seeking spiritual identity:            
   [] Adheres to an individual form of spirituality:       
   [] Not able to assess:                   
 
    
Identified resources for coping:  
   [] Prayer                           
   [] Music                  [] Guided Imagery [x] Family/friends                 [] Pet visits [] Devotional reading                         [] Unknown 
   [] Other:                                       
 
 
Interventions offered during this visit: (See comments for more details) Patient Interventions: Yazidi beliefs/image of God discussed, Life review/legacy, Initial/Spiritual assessment, patient floor, Coping skills reviewed/reinforced, Affirmation of emotions/emotional suffering, Bridging, Normalization of emotional/spiritual concerns, Reframing Plan of Care: 
 
 [] Support spiritual and/or cultural needs  
 [] Support AMD and/or advance care planning process    
 [] Support grieving process 
 [] Coordinate Rites and/or Rituals  
 [] Coordination with community clergy [] No spiritual needs identified at this time 
 [] Detailed Plan of Care below (See Comments)  [] Make referral to Music Therapy 
[] Make referral to Pet Therapy    
[] Make referral to Addiction services 
[] Make referral to [facility] [] Make referral to Spiritual Care Partner 
[] No future visits requested       
[x] Follow up visits as needed Responded to Spiritual Care Consult in 2121. Pt laying in bed awake and alert watching television.  introduced self and role. PT indicated that he wasn't interested in anything having to do with God or Faith.  proceeded to reassure pt that visit would not be attempt to prosyletize. Explored how pt was doing and coping. Pt opened up about reasons for hospitalization and off and on 20 year miles with addiction. Pt stated that his most recent relapse began as a response to grieving loss of wife which happened two years ago and pt is having a hard time dealing with. Provided empathic and active listening. Pt went on to share that his four children are his reason for living. Explored pt's sense of purpose and opportunities that pt could connect and reach out to others with similar struggles as his after pt indicated he didn't see a future for himself. Provided words of affirmation and motivation. Pt appeared to listen and expressed understanding of point  was making. Was able to have a pretty good and lengthy discussion before extending hand and thanking  for visit. Advised of availability before reaching out to RN as pt had stated he was in need of his pain meds. Will follow up as able/needed. LIZABETH Gil. Celia Henry

## 2018-09-11 NOTE — PROGRESS NOTES
Infectious Disease Progress Note IMPRESSION:  
· Persistent Pseudomonas aeruginosa bacteremia 2/2 ( 9/6), repeat BC also+ 1/1 (9/7) Pt admits to washing needles with water off faucet. Denies sharing needles · KHANH + for mobile mass on atrial side of tricuspid valve with possible perforation, concerning for endocarditis · History of MRSA bacteremia & \"OM of  Lumbar spine \"per pt · Evidence of cervical fusion C5-7 & T4/5 laminectomy on CT scan · MRI shows- fluid / edema L3/4 posterior joint facet suspicious for septic arthritis · Pulmonary nodules,cavitary ,ground glass opacities, on  CT thorax suggestive of septic emboli 
·  ECHO cardiogram negative for vegetations · IVDU on going upto time of admission · UDS + for opiates · Smoker , alcohol consumption + ·  S/p IR evaluation , fluid in spine not accessible for aspiration per IR 
·  HIV, Hep C negative  
  
  
PLAN:  
   
· BC x 2 until negative cultures obtained · Continue Cefepime IV · Reports from [address], ECHO , culture reports · Cardio thoracic surgery consult · Plan of care d/w pt . PSEUDOMONAS SPECIES GROWING IN THIS SINGLE BOTTLE DRAWN (L AC SITE) REFER TO [facility] [id number] FOR ID AND SENSITIVITIES (A) Culture result:    Final  
PRELIMINARY REPORT OF GRAM NEGATIVE RODS GROWING IN THIS SINGLE BOTTLE DRAWN CALLED TO AND READ BACK BY Haydee Camargo RN ON 9/7/18 AT [address] [facility], [facility] 9613). MS (A)  
 
 
KHANH (9/11) TRICUSPID VALVE: There was moderate thickening. There was normal leaflet 
separation. There was a mobile mass on the atrial side of the tricuspid 
valve with possible perforation concerning for endocarditis. DOPPLER: 
There was moderate to severe regurgitation. PSEUDOMONAS AERUGINOSA GROWING IN THIS SINGLE BOTTLE DRAWN SITE=LAC (A) Culture result:    Preliminary PRELIMINARY REPORT OF GRAM NEGATIVE RODS GROWING IN THIS SINGLE BOTTLE DRAWN CALLED TO AND READ BACK BY Haydee Camargo RN ON 9/17/18 AT 18[address] [facility], [facility] ). MS (A) Subjective:  
 
Pt seen . \" My back hurts \" Denies chest pain Review of Systems:  A comprehensive review of systems was negative except for that written in the History of Present Illness. Objective:  
 
Blood pressure 132/78, pulse 84, temperature 98 °F (36.7 °C), resp. rate 18, height 6' (1.829 m), weight 190 lb (86.2 kg), SpO2 97 %. Temp (24hrs), Av.1 °F (36.7 °C), Min:98 °F (36.7 °C), Max:98.3 °F (36.8 °C) Patient Vitals for the past 24 hrs: 
 Temp Pulse Resp BP SpO2  
18 1500 - 84 18 132/78 97 % 18 1452 - 88 24 117/75 100 % 18 1449 - 85 22 117/75 98 % 18 1445 - 85 16 126/74 97 % 18 1435 - 77 20 125/78 98 % 18 1412 - 88 16 137/81 95 % 18 1355 - 82 14 130/80 96 % 18 1051 98 °F (36.7 °C) 69 18 128/76 100 % 18 0721 98 °F (36.7 °C) 82 16 135/80 98 % 18 0233 98.3 °F (36.8 °C) 77 16 127/72 100 % 09/10/18 2251 98 °F (36.7 °C) 84 16 124/72 100 % 09/10/18 1922 98.1 °F (36.7 °C) 81 16 120/68 100 % Lines:  Peripheral IV:   
   
 
 
Physical Exam:  
General:  Alert, cooperative, Eyes:  Sclera anicteric. Pupils equally round and reactive to light. Mouth/Throat: Mucous membranes normal, oral pharynx clear Neck: Supple Lungs:   Clear to auscultation bilaterally, good effort CV:  Regular rate and rhythm,no murmur, Abdomen:   Soft, non-tender. bowel sounds normal. non-distended, Lumbartender in lower back Extremities: No  Edema, tattoo + Lines/Devices:  Intact, no erythema, drainage or tenderness Data Review: CBC:  
Recent Labs  
   09/10/18 
 2347  09/10/18 
 0514  18 
 0114 WBC  11.7*  10.6  11.6*  
RBC  3.54*  3.13*  3.76* HGB  9.6*  8.5*  10.3* HCT  30.0*  26.4*  32.2*  
PLT  356  297  166 CMP:  
Recent Labs  
   09/10/18 
 2347  09/10/18 
 0514  18 
 6704 GLU  102*  88  86 NA  139  138  136  
K  3.9  4.6  4.1 CL  105  109*  105 CO2  27  22  23 BUN  12  10  11 CREA  0.77  0.68*  0.78  
CA  8.8  8.5  8.5 AGAP  7  7  8 BUCR  16  15  14 Studies:     
Lab Results Component Value Date/Time Culture result: (A) 09/08/2018 04:01 AM  
  PSEUDOMONAS AERUGINOSA GROWING IN THIS SINGLE BOTTLE DRAWN (L MIDLINE SITE) PLEASE REFER TO PREVIOUS BLOOD CULTURE [id number], COLLECTED 9/6/18 FOR SENSITIVITIES Culture result: (A) 09/08/2018 04:01 AM  
  PRELIMINARY REPORT OF GRAM NEGATIVE RODS GROWING IN THIS SINGLE BOTTLE DRAWN CALLED TO AND READ BACK BY Gaby Finney, GISELLE ON 9/10/18 AT 18[address] [facility], [facility] 2121). MS  
 Culture result: MRSA NOT PRESENT 09/08/2018 04:01 AM  
 Culture result:  09/08/2018 04:01 AM  
      Screening of patient nares for MRSA is for surveillance purposes and, if positive, to facilitate isolation considerations in high risk settings. It is not intended for automatic decolonization interventions per se as regimens are not sufficiently effective to warrant routine use. Culture result: (A) 09/06/2018 08:50 PM  
  PSEUDOMONAS SPECIES GROWING IN THIS SINGLE BOTTLE DRAWN (L AC SITE) REFER TO [facility] [id number] FOR ID AND SENSITIVITIES Culture result: (A) 09/06/2018 08:50 PM  
  PRELIMINARY REPORT OF GRAM NEGATIVE RODS GROWING IN THIS SINGLE BOTTLE DRAWN CALLED TO AND READ BACK BY Mercy Chaparro RN ON 9/7/18 AT 1815 [facility], [facility] 2121). MS  
  
 
XR Results (most recent): 
 
Results from Hospital Encounter encounter on 09/06/18 XR CHEST SNGL V 
 Narrative EXAM:  XR CHEST SNGL V 
 
INDICATION:  Right posterior chest wall pain. COMPARISON: None TECHNIQUE: Frontal chest view FINDINGS: The cardiomediastinal and hilar contours are within normal limits. The 
pulmonary vasculature is within normal limits. The lungs and pleural spaces are clear. There is no pneumothorax. The visualized 
bones and upper abdomen are age-appropriate. Impression IMPRESSION: 
 
No acute process.  
   
 
 
Patient Active Problem List  
 Diagnosis Code  Sepsis ([facility] A41.9 I have discussed the diagnosis with the patient and the intended plan as seen in the above orders. I have discussed medication side effects and warnings with the patient as well. Reviewed test results at length with patient Anti-infectives:  
 
 Vancomycin iv- 9/7- 9/11- DC Cefepime iv- 9/7  Long Buitrago MD FACP

## 2018-09-11 NOTE — PROGRESS NOTES
Progress Note 9/11/2018 2:51 PM 
NAME: Dustin Haro MRN:  [id number] Admit Diagnosis: Sepsis ([address] Assessment:             
  
1. Pseudomonas Bacteremia in patient with IVDU, found to have tricuspid va 2. KHANH 9/2018 with mobile mass on tricuspid valve with possible perforation with moderate to severe tricuspid regurgitation. 3. Hx MRSA in spine  With Hx of KHANH approx 5/2018 reportedly ok 4. Sinus 5. Anemia 6. Active IV drug use including Heroin 7. Active tobacco, Etoh 
   
  
                      Plan:              
  
Blood cultures positive for pseudomonas. TTE negative. KHANH with mobile mass on tricuspid valve with likely perforation resulting in moderate to severe tricuspid regurgitation. Consistent with endocarditis. No clear surgical indication at this time. Abx and duration per ID specialist. 
 
Discussed with Hospitalist. 
  
  
 [x]        High complexity decision making was performed 
  
 
 
Subjective:  
 
Dustin Haro denies chest pain, dyspnea. Discussed with RN events overnight. Review of Systems: 
 
Symptom Y/N Comments  Symptom Y/N Comments Fever/Chills N   Chest Pain N Poor Appetite N   Edema N   
Cough N   Abdominal Pain N Sputum N   Joint Pain N   
SOB/CUEVA N   Pruritis/Rash N   
Nausea/vomit N   Tolerating PT/OT Diarrhea N   Tolerating Diet Y Constipation N   Other Could NOT obtain due to:   
 
Objective:  
  
Physical Exam: 
 
Last 24hrs VS reviewed since prior progress note. Most recent are: 
 
Visit Vitals  /75  Pulse 85  Temp 98 °F (36.7 °C)  Resp 22  
 Ht 6' (1.829 m)  Wt 86.2 kg (190 lb)  SpO2 98%  BMI 25.77 kg/m2 Intake/Output Summary (Last 24 hours) at 09/11/18 1451 Last data filed at 09/11/18 1448 Gross per 24 hour Intake              600 ml Output             1451 ml Net             -851 ml  
  
 
 General Appearance: Well developed, well nourished, alert & oriented x 3,  
 no acute distress. Ears/Nose/Mouth/Throat: Hearing grossly normal. 
Neck: Supple. Chest: Lungs clear to auscultation bilaterally. Cardiovascular: Regular rate and rhythm, S1S2 normal, no murmur. Abdomen: Soft, non-tender, bowel sounds are active. Extremities: No edema bilaterally. Skin: Warm and dry. PMH/SH reviewed - no change compared to H&P Data Review Telemetry: normal sinus rhythm Lab Data Personally Reviewed: 
 
Recent Labs  
   09/10/18 
 2347  09/10/18 
 6934 WBC  11.7*  10.6 HGB  9.6*  8.5* HCT  30.0*  26.4*  
PLT  356  297 No results for input(s): INR, PTP, APTT in the last 72 hours. No lab exists for component: INREXT Recent Labs  
   09/10/18 
 2347  09/10/18 
 0514  09/09/18 
 0427 NA  139  138  136  
K  3.9  4.6  4.1 CL  105  109*  105 CO2  27  22  23 BUN  12  10  11 CREA  0.77  0.68*  0.78 GLU  102*  88  86  
CA  8.8  8.5  8.5 No results for input(s): CPK, CKNDX, TROIQ in the last 72 hours. No lab exists for component: CPKMB No results found for: CHOL, CHOLX, CHLST, CHOLV, HDL, LDL, LDLC, DLDLP, TGLX, TRIGL, TRIGP, CHHD, CHHDX No results for input(s): SGOT, GPT, AP, TBIL, TP, ALB, GLOB, GGT, AML, LPSE in the last 72 hours. No lab exists for component: AMYP, HLPSE No results for input(s): PH, PCO2, PO2 in the last 72 hours. Medications Personally Reviewed: 
 
Current Facility-Administered Medications Medication Dose Route Frequency  HYDROcodone-acetaminophen (NORCO) 5-325 mg per tablet 2 Tab  2 Tab Oral Q6H PRN  
 gabapentin (NEURONTIN) capsule 200 mg  200 mg Oral BID  
 HYDROmorphone (PF) (DILAUDID) injection 1 mg  1 mg IntraVENous Q4H PRN  
 cefepime (MAXIPIME) 2 g in 0.9% sodium chloride (MBP/ADV) 100 mL  2 g IntraVENous Q8H  
 sodium chloride (NS) flush 5-10 mL  5-10 mL IntraVENous Q8H  
 sodium chloride (NS) flush 5-10 mL  5-10 mL IntraVENous PRN  
  naloxone (NARCAN) injection 0.4 mg  0.4 mg IntraVENous PRN  
 ondansetron (ZOFRAN) injection 4 mg  4 mg IntraVENous Q4H PRN  
 bisacodyl (DULCOLAX) suppository 10 mg  10 mg Rectal DAILY PRN  
 acetaminophen (TYLENOL) tablet 500 mg  500 mg Oral Q6H PRN  
 vancomycin (VANCOCIN) 1,000 mg in 0.9% sodium chloride (MBP/ADV) 250 mL  1,000 mg IntraVENous Q8H  
 hydrALAZINE (APRESOLINE) 20 mg/mL injection 10 mg  10 mg IntraVENous Q6H PRN  
 albuterol-ipratropium (DUO-NEB) 2.5 MG-0.5 MG/3 ML  3 mL Nebulization Q4H PRN  
 guaiFENesin ER (MUCINEX) tablet 600 mg  600 mg Oral Q12H  polyethylene glycol (MIRALAX) packet 17 g  17 g Oral DAILY  saline peripheral flush soln 10 mL  10 mL InterCATHeter PRN  
 SALINE PERIPHERAL FLUSH Q8H soln 10 mL  10 mL InterCATHeter Q8H  
 sodium chloride (NS) flush 5-10 mL  5-10 mL IntraVENous PRN Poppy Zavala MD

## 2018-09-11 NOTE — ANESTHESIA PREPROCEDURE EVALUATION
Anesthetic History No history of anesthetic complications Review of Systems / Medical History Patient summary reviewed, nursing notes reviewed and pertinent labs reviewed Pulmonary Smoker Neuro/Psych Within defined limits Cardiovascular Within defined limits Exercise tolerance: >4 METS 
  
GI/Hepatic/Renal 
Within defined limits Endo/Other Anemia Other Findings Comments: Possible spinal abcess Pseudomonas bacteremia Polysubstance abuse hx Physical Exam 
 
Airway Mallampati: II 
TM Distance: 4 - 6 cm Neck ROM: normal range of motion Mouth opening: Normal 
 
 Cardiovascular Regular rate and rhythm,  S1 and S2 normal,  no murmur, click, rub, or gallop Dental 
No notable dental hx Pulmonary Breath sounds clear to auscultation Abdominal 
GI exam deferred Other Findings Anesthetic Plan ASA: 3 Anesthesia type: total IV anesthesia and MAC Induction: Intravenous Anesthetic plan and risks discussed with: Patient

## 2018-09-11 NOTE — PROGRESS NOTES
Pt sedated with 100 mcg IV Fentanyl and 8 mg IV Versed for KHANH. Pt still wide awake. Anesthesia called to assist with sedation. Pt given 200 mg IV Propofol by anesthesia for KHANH. Pt tolerated well.

## 2018-09-11 NOTE — PROGRESS NOTES
7:14 PM Charge RN, Cory Odom, called cardio thoracic consult; Spoke to Dr. Patricia Santana; MD made aware of patient location and reason for consult Valdez Phillips RN 
09/11/18 
7:17 PM

## 2018-09-11 NOTE — PROGRESS NOTES
Pt arrived to Non-Invasive Lab. Pt identified with 2 patient identifiers. KHANH procedure reviewed with patient and questions answered.   ASA score 2  Mallampati score 2  per MD.

## 2018-09-11 NOTE — PROGRESS NOTES
Uneventful shift. Pt reported severe pain and frequently requested pain medication. New IV inserted because pt stated \"he could feel it going\". Otherwise uneventful shift.

## 2018-09-11 NOTE — PROGRESS NOTES
Hospitalist Progress Note NAME: Arnie Mena :  1980 MRN:  [id number] Assessment / Plan: 
Back spine concerning for septic arthritis/ spine abscess Hx of MSSA Current IVDU Sepsis, POA Pseudomonas bacteremia ,  and  
-MRI L spine showed increased fluid in the right L3-4 posterior facet joint with surrounding edema extending into the paraspinous musculature. Findings are suspicious for septic arthritis. No significant spinal stenosis or neural foraminal narrowing. 
-he had a KHANH on  that was negative. Received nafcillin through 18. Completed Cefazolin on  inpt. -ESR 12, CRP 15.5, lactate 0.9, UA neg 
-follow Bcx, + psuedomonas 
-received IVF per sepsis protocol. stopped IVF  
-cont cefepime and vancomycin 
-consulted ortho, IR. Per Dr. Keller Patch nothing that can be drained or biopsied. -Appreciate ID consult  
-Pain meds adjusted 9/10 
- Appreciate cardiology consult, for KHANH   
-Repeat BCx HTN 
-BP stable now. 
-hydralazine prn 
  
Chronic anemia -no evidence of bleeding 
-monitor CBC 
  
PNA, ? septic emboli 
-CT showed multiple pulmonary nodules, several of which are cavitary and several groundglass opacities and areas of consolidation 
-not hypoxic. Has intermittent nonproductive cough 
- nebs prn. on abx as above 
-needs f/u with CT outpt after abx therapy For KHANH 
 
  
Code Status: Full Surrogate Decision Maker: he did not wish to list anyone DVT Prophylaxis: SCDs for now GI Prophylaxis: not indicated Baseline: indedepent Subjective: Chief Complaint / Reason for Physician Visit \"pain is controlled\". Discussed with RN events overnight. Review of Systems: 
Symptom Y/N Comments  Symptom Y/N Comments Fever/Chills n   Chest Pain n   
Poor Appetite    Edema n   
Cough n   Abdominal Pain n   
Sputum n   Joint Pain y   
SOB/CUEVA n   Pruritis/Rash Nausea/vomit n   Tolerating PT/OT Diarrhea n   Tolerating Diet Constipation    Other Could NOT obtain due to:   
 
Objective: VITALS:  
Last 24hrs VS reviewed since prior progress note. Most recent are: 
Patient Vitals for the past 24 hrs: 
 Temp Pulse Resp BP SpO2  
09/11/18 0721 98 °F (36.7 °C) 82 16 135/80 98 % 09/11/18 0233 98.3 °F (36.8 °C) 77 16 127/72 100 % 09/10/18 2251 98 °F (36.7 °C) 84 16 124/72 100 % 09/10/18 1922 98.1 °F (36.7 °C) 81 16 120/68 100 % 09/10/18 1507 98.2 °F (36.8 °C) 73 18 127/70 99 % 09/10/18 1119 98.3 °F (36.8 °C) 80 18 121/70 100 % Intake/Output Summary (Last 24 hours) at 09/11/18 7367 Last data filed at 09/11/18 6726 Gross per 24 hour Intake              450 ml Output             2050 ml Net            -1600 ml PHYSICAL EXAM: 
General: WD, WN. Alert, cooperative, no acute distress   
EENT:  EOMI. Anicteric sclerae. MMM Resp:  CTA bilaterally, no wheezing or rales. No accessory muscle use CV:  Regular  rhythm,  No edema GI:  Soft, Non distended, Non tender.  +Bowel sounds Neurologic:  Alert and oriented X 3, normal speech, no numbness/weakness, no incontinence Psych:   Good insight. Not anxious nor agitated Skin:  No rashes. No jaundice Reviewed most current lab test results and cultures  YES Reviewed most current radiology test results   YES Review and summation of old records today    NO Reviewed patient's current orders and MAR    YES 
PMH/SH reviewed - no change compared to H&P 
________________________________________________________________________ Care Plan discussed with: 
  Comments Patient x Family RN x Care Manager Consultant Multidiciplinary team rounds were held today with , nursing, pharmacist and clinical coordinator. Patient's plan of care was discussed; medications were reviewed and discharge planning was addressed. ________________________________________________________________________ Total NON critical care TIME:  25   Minutes Total CRITICAL CARE TIME Spent:   Minutes non procedure based Comments >50% of visit spent in counseling and coordination of care    
________________________________________________________________________ Lisa Gonsales MD  
 
Procedures: see electronic medical records for all procedures/Xrays and details which were not copied into this note but were reviewed prior to creation of Plan. LABS: 
I reviewed today's most current labs and imaging studies. Pertinent labs include: 
Recent Labs  
   09/10/18 
 2347  09/10/18 
 0514  09/09/18 
 0114 WBC  11.7*  10.6  11.6* HGB  9.6*  8.5*  10.3* HCT  30.0*  26.4*  32.2*  
PLT  356  297  166 Recent Labs  
   09/10/18 
 2347  09/10/18 
 0514  09/09/18 
 6172 NA  139  138  136  
K  3.9  4.6  4.1 CL  105  109*  105 CO2  27  22  23 GLU  102*  88  86 BUN  12  10  11 CREA  0.77  0.68*  0.78  
CA  8.8  8.5  8.5 Signed: Lisa Gonsales MD

## 2018-09-11 NOTE — PROGRESS NOTES
PICC order acknowledged. Pt currently has positive blood cultures, being seen by Dr Virginia Aguilar who has ordered more blood cultures. Spoke with Dr Virginia Aguilar who would like PICC placed when blood cultures are negative as long as patient has working PIV. Discussed with Dr Shana Sanchez. Will cancel PICC for now. Pt has working PIV. Joao Velasquez RN, BSN [facility].

## 2018-09-12 PROBLEM — B99.9 TRICUSPID VALVE REGURGITATION, INFECTIOUS: Status: ACTIVE | Noted: 2018-09-12

## 2018-09-12 PROBLEM — I26.90 ACUTE SEPTIC PULMONARY EMBOLISM WITHOUT ACUTE COR PULMONALE (HCC): Status: ACTIVE | Noted: 2018-09-12

## 2018-09-12 PROBLEM — I07.1 TRICUSPID VALVE REGURGITATION, INFECTIOUS: Status: ACTIVE | Noted: 2018-09-12

## 2018-09-12 PROBLEM — I07.9 ENDOCARDITIS OF TRICUSPID VALVE: Status: ACTIVE | Noted: 2018-09-12

## 2018-09-12 LAB
ANION GAP SERPL CALC-SCNC: 5 MMOL/L (ref 5–15)
BUN SERPL-MCNC: 12 MG/DL (ref 6–20)
BUN/CREAT SERPL: 18 (ref 12–20)
CALCIUM SERPL-MCNC: 8.4 MG/DL (ref 8.5–10.1)
CHLORIDE SERPL-SCNC: 106 MMOL/L (ref 97–108)
CO2 SERPL-SCNC: 26 MMOL/L (ref 21–32)
CREAT SERPL-MCNC: 0.67 MG/DL (ref 0.7–1.3)
GLUCOSE SERPL-MCNC: 94 MG/DL (ref 65–100)
POTASSIUM SERPL-SCNC: 4.1 MMOL/L (ref 3.5–5.1)
SODIUM SERPL-SCNC: 137 MMOL/L (ref 136–145)

## 2018-09-12 PROCEDURE — 74011000258 HC RX REV CODE- 258: Performed by: INTERNAL MEDICINE

## 2018-09-12 PROCEDURE — 74011250636 HC RX REV CODE- 250/636: Performed by: EMERGENCY MEDICINE

## 2018-09-12 PROCEDURE — 74011250636 HC RX REV CODE- 250/636: Performed by: INTERNAL MEDICINE

## 2018-09-12 PROCEDURE — 36415 COLL VENOUS BLD VENIPUNCTURE: CPT | Performed by: INTERNAL MEDICINE

## 2018-09-12 PROCEDURE — 74011250637 HC RX REV CODE- 250/637: Performed by: INTERNAL MEDICINE

## 2018-09-12 PROCEDURE — 65270000029 HC RM PRIVATE

## 2018-09-12 PROCEDURE — 80048 BASIC METABOLIC PNL TOTAL CA: CPT | Performed by: INTERNAL MEDICINE

## 2018-09-12 RX ORDER — HYDROMORPHONE HYDROCHLORIDE 2 MG/1
4 TABLET ORAL
Status: DISCONTINUED | OUTPATIENT
Start: 2018-09-12 | End: 2018-09-13

## 2018-09-12 RX ADMIN — HYDROMORPHONE HYDROCHLORIDE 1 MG: 2 INJECTION, SOLUTION INTRAMUSCULAR; INTRAVENOUS; SUBCUTANEOUS at 12:11

## 2018-09-12 RX ADMIN — HYDROMORPHONE HYDROCHLORIDE 4 MG: 2 TABLET ORAL at 15:33

## 2018-09-12 RX ADMIN — Medication 10 ML: at 06:06

## 2018-09-12 RX ADMIN — HYDROCODONE BITARTRATE AND ACETAMINOPHEN 2 TABLET: 5; 325 TABLET ORAL at 02:12

## 2018-09-12 RX ADMIN — GUAIFENESIN 600 MG: 600 TABLET, EXTENDED RELEASE ORAL at 08:12

## 2018-09-12 RX ADMIN — GABAPENTIN 200 MG: 100 CAPSULE ORAL at 18:08

## 2018-09-12 RX ADMIN — HYDROMORPHONE HYDROCHLORIDE 1 MG: 2 INJECTION, SOLUTION INTRAMUSCULAR; INTRAVENOUS; SUBCUTANEOUS at 03:46

## 2018-09-12 RX ADMIN — Medication 10 ML: at 18:08

## 2018-09-12 RX ADMIN — HYDROMORPHONE HYDROCHLORIDE 1 MG: 2 INJECTION, SOLUTION INTRAMUSCULAR; INTRAVENOUS; SUBCUTANEOUS at 08:12

## 2018-09-12 RX ADMIN — Medication 10 ML: at 00:02

## 2018-09-12 RX ADMIN — CEFEPIME HYDROCHLORIDE 2 G: 2 INJECTION, POWDER, FOR SOLUTION INTRAVENOUS at 12:12

## 2018-09-12 RX ADMIN — CEFEPIME HYDROCHLORIDE 2 G: 2 INJECTION, POWDER, FOR SOLUTION INTRAVENOUS at 02:07

## 2018-09-12 RX ADMIN — Medication 10 ML: at 22:00

## 2018-09-12 RX ADMIN — HYDROMORPHONE HYDROCHLORIDE 1 MG: 2 INJECTION, SOLUTION INTRAMUSCULAR; INTRAVENOUS; SUBCUTANEOUS at 00:01

## 2018-09-12 RX ADMIN — CEFEPIME HYDROCHLORIDE 2 G: 2 INJECTION, POWDER, FOR SOLUTION INTRAVENOUS at 18:08

## 2018-09-12 RX ADMIN — GABAPENTIN 200 MG: 100 CAPSULE ORAL at 08:12

## 2018-09-12 RX ADMIN — HYDROMORPHONE HYDROCHLORIDE 4 MG: 2 TABLET ORAL at 18:07

## 2018-09-12 RX ADMIN — HYDROMORPHONE HYDROCHLORIDE 4 MG: 2 TABLET ORAL at 21:10

## 2018-09-12 NOTE — PROGRESS NOTES
Medical records requested from [address]; Request faxed at [address] to [phone]; Fax was received; To inquire about when records will be faxed to our facility call [phone].  
 
Christoph Zurita RN 
09/11/18 
8:35 PM

## 2018-09-12 NOTE — PROGRESS NOTES
Report given to Jessie. Pt continued to report pain and discomfort and regularly asked for pain meds. Tolerating diet.   
Ana Lilia Zuniga RN

## 2018-09-12 NOTE — ROUTINE PROCESS
Hospitalist paged as pt irrate about pain meds being changed from IV Dialudid to PO. Dr. Tom Daniel called back and talked to patient. Instructed to give PO Dilaudid as ordered.

## 2018-09-12 NOTE — ROUTINE PROCESS
Bedside shift change report given to Yogi Arreaga (oncoming nurse) by Joan Mendiola (offgoing nurse). Report included the following information SBAR, Kardex, Procedure Summary, Intake/Output, MAR and Recent Results Pt c/o pain several times during shift. States the pain doesn't go away but makes him comfortable. KHANH done; see results. PICC tea called to do blood cultures. PICC ordered but canceled upon results of blood culture.

## 2018-09-12 NOTE — PROGRESS NOTES
Progress Note 9/12/2018 2:51 PM 
NAME: Boris Casey MRN:  [id number] Admit Diagnosis: Sepsis ([facility] [address] Assessment:             
  
1. 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 KHANH 9/2018 with mobile mass on tricuspid valve with possible perforation with moderate to severe tricuspid regurgitation. 3. Hx MSSA in thoracic spine,  With Hx of  KHANH approx 5/2018 was ok 4. Sinus 5. Anemia 6. Cervical surgery with implant, thoracic lamenectomy 7. Active IV drug use including Heroin 8. Active tobacco, Etoh 
   
  
                      Plan:              
  
Blood cultures positive for pseudomonas. TTE negative. KHANH with mobile mass on tricuspid valve with likely perforation resulting in moderate to severe tricuspid regurgitation. Consistent with endocarditis. Evaluated by cardiac surgery, No clear surgical indication at this time. Abx and duration per ID specialist. 
 
Discussed with Hospitalist/ID/Cardiac surgery. 
  
  
 [x]        High complexity decision making was performed 
  
 
 
Subjective:  
 
Boris Casey denies chest pain, dyspnea. Discussed with RN events overnight. Review of Systems: 
 
Symptom Y/N Comments  Symptom Y/N Comments Fever/Chills N   Chest Pain N Poor Appetite N   Edema N   
Cough N   Abdominal Pain N Sputum N   Joint Pain N   
SOB/CUEVA N   Pruritis/Rash N   
Nausea/vomit N   Tolerating PT/OT Diarrhea N   Tolerating Diet Y Constipation N   Other Could NOT obtain due to:   
 
Objective:  
  
Physical Exam: 
 
Last 24hrs VS reviewed since prior progress note. Most recent are: 
 
Visit Vitals  /85 (BP 1 Location: Right arm, BP Patient Position: At rest)  Pulse 75  Temp 97.6 °F (36.4 °C)  Resp 18  Ht 6' (1.829 m)  Wt 86.2 kg (190 lb)  SpO2 100%  BMI 25.77 kg/m2 Intake/Output Summary (Last 24 hours) at 09/12/18 4043 Last data filed at 09/12/18 5424 Gross per 24 hour Intake              450 ml Output              875 ml Net             -425 ml General Appearance: Well developed, well nourished, alert & oriented x 3,  
 no acute distress. Ears/Nose/Mouth/Throat: Hearing grossly normal. 
Neck: Supple. Chest: Lungs clear to auscultation bilaterally. Cardiovascular: Regular rate and rhythm, S1S2 normal, no murmur. Abdomen: Soft, non-tender, bowel sounds are active. Extremities: No edema bilaterally. Skin: Warm and dry. PMH/SH reviewed - no change compared to H&P Data Review Telemetry: normal sinus rhythm Lab Data Personally Reviewed: 
 
Recent Labs  
   09/10/18 
 2347  09/10/18 
 0868 WBC  11.7*  10.6 HGB  9.6*  8.5* HCT  30.0*  26.4*  
PLT  356  297 No results for input(s): INR, PTP, APTT in the last 72 hours. No lab exists for component: INREXT, INREXT Recent Labs  
   09/12/18 
 0344  09/10/18 
 2347  09/10/18 
 0698 NA  137  139  138  
K  4.1  3.9  4.6 CL  106  105  109* CO2  26  27  22 BUN  12  12  10 CREA  0.67*  0.77  0.68* GLU  94  102*  88  
CA  8.4*  8.8  8.5 No results for input(s): CPK, CKNDX, TROIQ in the last 72 hours. No lab exists for component: CPKMB No results found for: CHOL, CHOLX, CHLST, CHOLV, HDL, LDL, LDLC, DLDLP, TGLX, TRIGL, TRIGP, CHHD, CHHDX No results for input(s): SGOT, GPT, AP, TBIL, TP, ALB, GLOB, GGT, AML, LPSE in the last 72 hours. No lab exists for component: AMYP, HLPSE No results for input(s): PH, PCO2, PO2 in the last 72 hours. Medications Personally Reviewed: 
 
Current Facility-Administered Medications Medication Dose Route Frequency  HYDROcodone-acetaminophen (NORCO) 5-325 mg per tablet 2 Tab  2 Tab Oral Q6H PRN  
 gabapentin (NEURONTIN) capsule 200 mg  200 mg Oral BID  
 HYDROmorphone (PF) (DILAUDID) injection 1 mg  1 mg IntraVENous Q4H PRN  
  cefepime (MAXIPIME) 2 g in 0.9% sodium chloride (MBP/ADV) 100 mL  2 g IntraVENous Q8H  
 sodium chloride (NS) flush 5-10 mL  5-10 mL IntraVENous Q8H  
 sodium chloride (NS) flush 5-10 mL  5-10 mL IntraVENous PRN  
 naloxone (NARCAN) injection 0.4 mg  0.4 mg IntraVENous PRN  
 ondansetron (ZOFRAN) injection 4 mg  4 mg IntraVENous Q4H PRN  
 bisacodyl (DULCOLAX) suppository 10 mg  10 mg Rectal DAILY PRN  
 acetaminophen (TYLENOL) tablet 500 mg  500 mg Oral Q6H PRN  
 hydrALAZINE (APRESOLINE) 20 mg/mL injection 10 mg  10 mg IntraVENous Q6H PRN  
 albuterol-ipratropium (DUO-NEB) 2.5 MG-0.5 MG/3 ML  3 mL Nebulization Q4H PRN  
 guaiFENesin ER (MUCINEX) tablet 600 mg  600 mg Oral Q12H  polyethylene glycol (MIRALAX) packet 17 g  17 g Oral DAILY  saline peripheral flush soln 10 mL  10 mL InterCATHeter PRN  
 SALINE PERIPHERAL FLUSH Q8H soln 10 mL  10 mL InterCATHeter Q8H  
 sodium chloride (NS) flush 5-10 mL  5-10 mL IntraVENous PRN Tariq Nails MD

## 2018-09-12 NOTE — CONSULTS
Cardiothoracic Surgery Consult      Subjective:      Chief Complaint: back pain    Adrián Stover is a 43 y.o. hypertensive, non diabetic, cigarette smoking, cau male who was referred for opinion and advise regarding tricuspid regurgitation with vegetation by Dr. Wills Speaks V / None. Patient uses heroin daily. Patient presented to the ER with severe back pain with past history of cervical fusion, thoracic spine abscess with evacuation in May at [facility], with resultant loss of sensation in his lower extremities. Patient was treated for endocarditis at [facility]. MRSA involving lumbar spine. Patient denies SOB, CUEVA, PND and edema. BC: Pseudomonas Aeruginosa    CT:  1. Inflammation surrounds the right L3-4 posterior facet joint on the prior MRI  suspicious for infection. There is no abnormality on CT however. 2. Multiple pulmonary nodules, several of which are cavitary and several  groundglass opacities and areas of consolidation. The overall extent is  relatively mild, but worrisome for infectious disease. Portions of the lungs are  incompletely seen and significant respiratory motion is present. Recommend  dedicated chest CT versus follow-up in several weeks after therapy. 3. Status post anterior cervical fusion from C5 to C7.  4. Status post T4 and T5 laminectomies. MRI:     1. Increased fluid in the right L3-4 posterior facet joint with surrounding  edema extending into the paraspinous musculature. Findings are suspicious for  septic arthritis. 2. No significant spinal stenosis or neural foraminal narrowing. Cardiac Testing:     Echocardiogram KHANH Dr. Theresa Munroe  Left ventricle: Systolic function was normal. Ejection fraction was  estimated in the range of 55 % to 60 %. There were no regional wall motion  abnormalities. Atrial septum: Negative bubble study. Mitral valve: No obvious mass, vegetation or thrombus noted.     Aortic valve: No obvious mass, vegetation or thrombus noted.    Tricuspid valve: There was moderate thickening. There was moderate to  severe regurgitation. There was a mobile mass on the atrial side of the  tricuspid valve with possible perforation concerning for endocarditis. Past Medical History:   Diagnosis Date    Back pain, chronic      Past Surgical History:   Procedure Laterality Date    HX CERVICAL DISKECTOMY      HX FEMUR FRACTURE TX      HX ORTHOPAEDIC      Back Surgery      Social History   Substance Use Topics    Smoking status: Current Every Day Smoker     Packs/day: 1.00    Smokeless tobacco: Never Used    Alcohol use Yes      History reviewed. No pertinent family history. Prior to Admission medications    Not on File       No Known Allergies    Recreational drug use: IV heroin    Suleman status or cause of death in parents and siblings if  Heart problems, stroke, or vascular disease in family members : NO    HISTORY OF NON COMPLIANCE WITH MEDICINES:yes    Prior to Admission medications    Not on File       REVIEW OF SYSTEMS:     [] Unable to obtain  ROS due to  []mental status change  []sedated   []intubated   [x]Total of 13 systems reviewed as follows:     Review of Systems:   Consititutional: + fever or chills. Eyes:  Denies use of glasses or vision problems(cataracts). ENT:  Denies hearing or swallowing difficulty. CV: Denies CP, claudication, HTN. Resp: Denies dyspnea, productive cough. : Denies dialysis or kidney problems. GI: Denies ulcers, esophageal strictures, liver problems. M/S: +severe disabling back pain. Skin: Denies varicose veins, edema. Neuro: Denies strokes, or TIAs. Psych: Denies anxiety or depression. Endocrine: Denies thyroid problems or diabetes. Heme/Lymphatic: Denies easy bruising or lymphedema.      Objective:     Visit Vitals    /85 (BP 1 Location: Right arm, BP Patient Position: At rest)    Pulse 75    Temp 97.6 °F (36.4 °C)    Resp 18    Ht 6' (1.829 m)    Wt 190 lb (86.2 kg)    SpO2 100%  BMI 25.77 kg/m2       EXAM:  General:  Alert, cooperative, no distress, +tattoos   Mouth/Throat: Teeth and gums normal.   Neck: Supple, symmetrical, trachea midline, no adenopathy, thyroid: no enlargement/tenderness/nodules, no carotid bruit and no JVD. Lungs:   Clear to auscultation bilaterally. Heart:  Regular rate and rhythm, S1, S2 normal, 1/6 murmur, click, rub or gallop. Abdomen:   Soft, non-tender. Bowel sounds normal. No masses,  No organomegaly. Extremities: Extremities normal, atraumatic, no cyanosis or edema. Pulses: 2+ and symmetric all extremities. Skin:  No rashes or lesions       Neurologic:  Gross motor and sensory apparatus intact. 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 Considering past history and continued use of IV heroin, antibiotic therapy is preferred treatment. If patient is rehabilitated and regurgitation remains significant, then primary repair/replacement would be recommended.     Signed By: JESÚS Pride     September 12, 2018       angely

## 2018-09-12 NOTE — PROGRESS NOTES
Infectious Disease Progress Note IMPRESSION:  
· Persistent Pseudomonas aeruginosa bacteremia 2/2 ( 9/6), repeat BC also+ 1/1 (9/7)BC from 9/11- no growth. Pt admits to washing needles with water off faucet. Denies sharing needles · Tricuspid valve endocarditis- mobile mass on atrial side of tricuspid valve with possible perforation, concerning for endocarditis ·  Evidence of cervical fusion C5-7 & T4/5 laminectomy on CT scan · MRI shows- fluid / edema L3/4 posterior joint facet suspicious for septic arthritis · Pulmonary nodules,cavitary ,ground glass opacities, on  CT thorax suggestive of septic emboli ·  Reports from [facility] reviewed. Pt had MSSA bacteremia in May 2018. ( not MRSA ) Treated with Nafcillin & thereafter Cefazolin 5/15 to 6/28. · MRI of spine showed inflammatory changes in T5/6 suggestive of septic arthritis. TTE(5/16)  suggestive of vegetation , KHANH done (5/18), no evidence of vegetation as per D/c summary from [facility] · IVDU on going upto time of admission · UDS + for opiates · Smoker , alcohol consumption + · S/p IR evaluation - fluid in spine not accessible for aspiration per IR, S/p Ortho consult - defer to Neurosurgery. · S/p Cardiothoracic surgery evaluation - no surgery at this time. ·  HIV, Hep C negative  
  
  
PLAN:  
   
· BC x 2 until negative cultures obtained · Continue Cefepime IV · For  ECHO KHANH reports from [facility] · Plan of care d/w pt . PSEUDOMONAS SPECIES GROWING IN THIS SINGLE BOTTLE DRAWN ([facility] SITE) REFER TO [facility] [id number] FOR ID AND SENSITIVITIES (A) Culture result:    Final  
PRELIMINARY REPORT OF GRAM NEGATIVE RODS GROWING IN THIS SINGLE BOTTLE DRAWN CALLED TO AND READ BACK BY Satnam López RN ON 9/7/18 AT 1815 [facility] 0489). MS (A)  
 
 
KHANH (9/11) TRICUSPID VALVE: There was moderate thickening. There was normal leaflet 
separation. There was a mobile mass on the atrial side of the tricuspid valve with possible perforation concerning for endocarditis. DOPPLER: 
There was moderate to severe regurgitation. PSEUDOMONAS AERUGINOSA GROWING IN THIS SINGLE BOTTLE DRAWN SITE=LAC (A) Culture result:    Preliminary PRELIMINARY REPORT OF GRAM NEGATIVE RODS GROWING IN THIS SINGLE BOTTLE DRAWN CALLED TO AND READ BACK BY Carmen Kumar RN ON 18 AT 1815 [facility] 2121). MS (A) Subjective:  
 
Pt seen . \" I am ok\" Denies chest pain. Discussed KHANH findings with pt once again Review of Systems:  A comprehensive review of systems was negative except for that written in the History of Present Illness. Objective:  
 
Blood pressure 130/85, pulse 75, temperature 97.6 °F (36.4 °C), resp. rate 18, height 6' (1.829 m), weight 190 lb (86.2 kg), SpO2 100 %. Temp (24hrs), Av °F (36.7 °C), Min:97.6 °F (36.4 °C), Max:98.2 °F (36.8 °C) Patient Vitals for the past 24 hrs: 
 Temp Pulse Resp BP SpO2  
18 0742 97.6 °F (36.4 °C) 75 18 130/85 100 % 18 0429 98.1 °F (36.7 °C) 75 16 129/61 99 % 18 2214 98.2 °F (36.8 °C) 81 16 135/74 99 % 18 2004 98 °F (36.7 °C) 94 16 142/78 99 % 18 1500 - 84 18 132/78 97 % 18 1452 - 88 24 117/75 100 % 18 1449 - 85 22 117/75 98 % 18 1445 - 85 16 126/74 97 % 18 1435 - 77 20 125/78 98 % 18 1412 - 88 16 137/81 95 % 18 1355 - 82 14 130/80 96 % Lines:  Peripheral IV:   
   
 
 
Physical Exam:  
General:  Alert, cooperative, Eyes:  Sclera anicteric. Pupils equally round and reactive to light. Mouth/Throat: Mucous membranes normal, oral pharynx clear Neck: Supple Lungs:   Clear to auscultation bilaterally, good effort CV:  Regular rate and rhythm,no murmur, Abdomen:   Soft, non-tender. bowel sounds normal. non-distended, Lumbar tender in lower back Extremities: No  Edema, tattoo + Lines/Devices:  Intact, no erythema, drainage or tenderness Data Review: CBC:  
 Recent Labs  
   09/10/18 
 2347  09/10/18 
 4114 WBC  11.7*  10.6 RBC  3.54*  3.13* HGB  9.6*  8.5* HCT  30.0*  26.4*  
PLT  356  297 CMP:  
Recent Labs  
   09/12/18 
 0344  09/10/18 
 2347  09/10/18 
 0728 GLU  94  102*  88  
NA  137  139  138  
K  4.1  3.9  4.6 CL  106  105  109* CO2  26  27  22 BUN  12  12  10 CREA  0.67*  0.77  0.68* CA  8.4*  8.8  8.5 AGAP  5  7  7 BUCR  18  16  15 Studies:     
Lab Results Component Value Date/Time Culture result: NO GROWTH AFTER 12 HOURS 09/11/2018 07:03 PM  
 Culture result: NO GROWTH AFTER 12 HOURS 09/11/2018 07:03 PM  
 Culture result: (A) 09/08/2018 04:01 AM  
  PSEUDOMONAS AERUGINOSA GROWING IN THIS SINGLE BOTTLE DRAWN (L MIDLINE SITE) PLEASE REFER TO PREVIOUS BLOOD CULTURE [id number], COLLECTED 9/6/18 FOR SENSITIVITIES Culture result: (A) 09/08/2018 04:01 AM  
  PRELIMINARY REPORT OF GRAM NEGATIVE RODS GROWING IN THIS SINGLE BOTTLE DRAWN CALLED TO AND READ BACK BY Aracelis Lopez RN ON 9/10/18 AT 1817 [facility] 2121). MS  
 Culture result: MRSA NOT PRESENT 09/08/2018 04:01 AM  
 Culture result:  09/08/2018 04:01 AM  
      Screening of patient nares for MRSA is for surveillance purposes and, if positive, to facilitate isolation considerations in high risk settings. It is not intended for automatic decolonization interventions per se as regimens are not sufficiently effective to warrant routine use. XR Results (most recent): 
 
Results from Hospital Encounter encounter on 09/06/18 XR CHEST SNGL V 
 Narrative EXAM:  XR CHEST SNGL V 
 
INDICATION:  Right posterior chest wall pain. COMPARISON: None TECHNIQUE: Frontal chest view FINDINGS: The cardiomediastinal and hilar contours are within normal limits. The 
pulmonary vasculature is within normal limits. The lungs and pleural spaces are clear. There is no pneumothorax. The visualized 
bones and upper abdomen are age-appropriate.  
   
 
 Impression IMPRESSION: 
 
 No acute process. Patient Active Problem List  
Diagnosis Code  Sepsis ([facility] [address] A41.9  Endocarditis of tricuspid valve I36.8  Tricuspid valve regurgitation, infectious I07.1  Acute septic pulmonary embolism without acute cor pulmonale (HCC) I26.90 I have discussed the diagnosis with the patient and the intended plan as seen in the above orders. I have discussed medication side effects and warnings with the patient as well. Reviewed test results at length with patient Anti-infectives:  
 
 Vancomycin iv- 9/7- 9/11- DC Cefepime iv- 9/7  Quinton Martinez MD FACP

## 2018-09-12 NOTE — PROGRESS NOTES
Hospitalist Progress Note NAME: Lydia Naidu :  1980 MRN:  [id number] Assessment / Plan: 
44 yo male with pmh of IVDA and prior history of MSSA bacteremia with negative KHANH in May 2018 and completed CEfazolin from  thru . Now, he presents with back pain. Sepsis, POA Back spine concerning for septic arthritis/ spine abscess Hx of MSSA Current IVDU Pseudomonas bacteremia ,  and  
TR infective Endocarditis Pulmonary septic emboli MRI L spine showed increased fluid in the right L3-4 posterior facet joint with surrounding edema extending into the paraspinous musculature. Findings are suspicious for septic arthritis. No significant spinal stenosis or neural foraminal narrowing. CT showed multiple pulmonary nodules, several of which are cavitary and several groundglass opacities and areas of consolidation Not hypoxic. 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 ESR 12, CRP 15.5, lactate 0.9, UA neg Blood culture  no growth to date On cefepime and vancomycin ID, ortho, IR and Cardiothoracic surgery teams are following. No surgical intervention at this time. Back pain Patient on IV dilaudid and good oral intake. He states he likes his IV dilaudid because his pain is relieved fast. I explained to patient that oral dilaudid has the same effect. Change to oral dilaudid every 3 hours. Consult pain management. HTN 
-BP stable now. 
-hydralazine prn 
  
Chronic anemia -no evidence of bleeding 
-monitor CBC 
  
 
Code Status: Full Surrogate Decision Maker: he did not wish to list anyone DVT Prophylaxis: SCDs for now GI Prophylaxis: not indicated Baseline: indedepent Subjective: Chief Complaint / Reason for Physician Visit \"patient reports his pain is severe, medication only last 3 hours.  He states last doctor told him that new doctor will change pain meds again today. He wants his pain meds to be changed every 3 hours. \". Discussed with RN events overnight. Review of Systems: 
Symptom Y/N Comments  Symptom Y/N Comments Fever/Chills n   Chest Pain n   
Poor Appetite    Edema n   
Cough n   Abdominal Pain n   
Sputum n   Joint Pain y   
SOB/CUEVA n   Pruritis/Rash Nausea/vomit n   Tolerating PT/OT Diarrhea n   Tolerating Diet Constipation    Other Could NOT obtain due to:   
 
Objective: VITALS:  
Last 24hrs VS reviewed since prior progress note. Most recent are: 
Patient Vitals for the past 24 hrs: 
 Temp Pulse Resp BP SpO2  
09/12/18 0742 97.6 °F (36.4 °C) 75 18 130/85 100 % 09/12/18 0429 98.1 °F (36.7 °C) 75 16 129/61 99 % 09/11/18 2214 98.2 °F (36.8 °C) 81 16 135/74 99 % 09/11/18 2004 98 °F (36.7 °C) 94 16 142/78 99 % 09/11/18 1500 - 84 18 132/78 97 % 09/11/18 1452 - 88 24 117/75 100 % 09/11/18 1449 - 85 22 117/75 98 % 09/11/18 1445 - 85 16 126/74 97 % 09/11/18 1435 - 77 20 125/78 98 % 09/11/18 1412 - 88 16 137/81 95 % 09/11/18 1355 - 82 14 130/80 96 % Intake/Output Summary (Last 24 hours) at 09/12/18 1108 Last data filed at 09/12/18 0829 Gross per 24 hour Intake              450 ml Output              875 ml Net             -425 ml PHYSICAL EXAM: 
General: Alert, cooperative, no acute distress   
EENT:  Anicteric sclerae. Resp:  CTA bilaterally, no wheezing or rales. No accessory muscle use CV:  Regular rate, S1, S2. No LE edema GI:  Soft, Non distended, Non tender.  +Bowel sounds Neurologic:  Alert and oriented X 3, normal speech, no numbness/weakness, no incontinence Psych:   Good insight. Not anxious nor agitated Skin:  No rashes. No jaundice Reviewed most current lab test results and cultures  YES Reviewed most current radiology test results   YES Review and summation of old records today    NO Reviewed patient's current orders and MAR    YES 
 PM/ reviewed - no change compared to H&P 
________________________________________________________________________ 
________________________________________________________________________ Franklin Johnson MD  
 
Procedures: see electronic medical records for all procedures/Xrays and details which were not copied into this note but were reviewed prior to creation of Plan. LABS: 
I reviewed today's most current labs and imaging studies. Pertinent labs include: 
Recent Labs  
   09/10/18 
 2347  09/10/18 
 9936 WBC  11.7*  10.6 HGB  9.6*  8.5* HCT  30.0*  26.4*  
PLT  356  297 Recent Labs  
   09/12/18 
 0344  09/10/18 
 2347  09/10/18 
 2733 NA  137  139  138  
K  4.1  3.9  4.6 CL  106  105  109* CO2  26  27  22 GLU  94  102*  88 BUN  12  12  10 CREA  0.67*  0.77  0.68* CA  8.4*  8.8  8.5 Signed: Franklin Johnson MD

## 2018-09-13 PROBLEM — M54.40 ACUTE RIGHT-SIDED LOW BACK PAIN WITH SCIATICA: Status: ACTIVE | Noted: 2018-09-13

## 2018-09-13 PROBLEM — R53.81 DEBILITY: Status: ACTIVE | Noted: 2018-09-13

## 2018-09-13 PROBLEM — F11.10 HEROIN ABUSE (HCC): Status: ACTIVE | Noted: 2018-09-13

## 2018-09-13 LAB
BACTERIA SPEC CULT: ABNORMAL
SERVICE CMNT-IMP: ABNORMAL

## 2018-09-13 PROCEDURE — 65270000029 HC RM PRIVATE

## 2018-09-13 PROCEDURE — 74011250637 HC RX REV CODE- 250/637: Performed by: INTERNAL MEDICINE

## 2018-09-13 PROCEDURE — 74011250636 HC RX REV CODE- 250/636: Performed by: INTERNAL MEDICINE

## 2018-09-13 PROCEDURE — 74011000258 HC RX REV CODE- 258: Performed by: INTERNAL MEDICINE

## 2018-09-13 PROCEDURE — 74011250636 HC RX REV CODE- 250/636: Performed by: NURSE PRACTITIONER

## 2018-09-13 RX ORDER — HYDROMORPHONE HYDROCHLORIDE 2 MG/ML
1 INJECTION, SOLUTION INTRAMUSCULAR; INTRAVENOUS; SUBCUTANEOUS
Status: DISCONTINUED | OUTPATIENT
Start: 2018-09-13 | End: 2018-09-14

## 2018-09-13 RX ORDER — HYDROMORPHONE HYDROCHLORIDE 2 MG/1
8 TABLET ORAL
Status: DISCONTINUED | OUTPATIENT
Start: 2018-09-13 | End: 2018-09-13

## 2018-09-13 RX ORDER — HYDROMORPHONE HYDROCHLORIDE 2 MG/1
4 TABLET ORAL
Status: DISCONTINUED | OUTPATIENT
Start: 2018-09-13 | End: 2018-09-13

## 2018-09-13 RX ADMIN — HYDROMORPHONE HYDROCHLORIDE 1 MG: 2 INJECTION INTRAMUSCULAR; INTRAVENOUS; SUBCUTANEOUS at 22:09

## 2018-09-13 RX ADMIN — GABAPENTIN 200 MG: 100 CAPSULE ORAL at 09:42

## 2018-09-13 RX ADMIN — GABAPENTIN 200 MG: 100 CAPSULE ORAL at 17:15

## 2018-09-13 RX ADMIN — CEFEPIME HYDROCHLORIDE 2 G: 2 INJECTION, POWDER, FOR SOLUTION INTRAVENOUS at 17:14

## 2018-09-13 RX ADMIN — Medication 10 ML: at 13:58

## 2018-09-13 RX ADMIN — Medication 10 ML: at 22:09

## 2018-09-13 RX ADMIN — HYDROMORPHONE HYDROCHLORIDE 4 MG: 2 TABLET ORAL at 03:20

## 2018-09-13 RX ADMIN — HYDROMORPHONE HYDROCHLORIDE 4 MG: 2 TABLET ORAL at 12:20

## 2018-09-13 RX ADMIN — HYDROMORPHONE HYDROCHLORIDE 4 MG: 2 TABLET ORAL at 09:42

## 2018-09-13 RX ADMIN — CEFEPIME HYDROCHLORIDE 2 G: 2 INJECTION, POWDER, FOR SOLUTION INTRAVENOUS at 11:17

## 2018-09-13 RX ADMIN — HYDROMORPHONE HYDROCHLORIDE 1 MG: 2 INJECTION INTRAMUSCULAR; INTRAVENOUS; SUBCUTANEOUS at 19:03

## 2018-09-13 RX ADMIN — Medication 10 ML: at 06:24

## 2018-09-13 RX ADMIN — HYDROMORPHONE HYDROCHLORIDE 4 MG: 2 TABLET ORAL at 15:44

## 2018-09-13 RX ADMIN — HYDROMORPHONE HYDROCHLORIDE 4 MG: 2 TABLET ORAL at 00:06

## 2018-09-13 RX ADMIN — GUAIFENESIN 600 MG: 600 TABLET, EXTENDED RELEASE ORAL at 22:08

## 2018-09-13 RX ADMIN — HYDROMORPHONE HYDROCHLORIDE 4 MG: 2 TABLET ORAL at 06:24

## 2018-09-13 NOTE — PROGRESS NOTES
Progress Note 9/13/2018 2:51 PM 
NAME: Ferrell Soulier MRN:  [id number] Admit Diagnosis: Sepsis ([facility] [address] Assessment:             
  
1. 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 KHANH 9/2018 with mobile mass on tricuspid valve with possible perforation with moderate to severe tricuspid regurgitation. 3. Hx MSSA in thoracic spine,  With Hx of  KHANH approx 5/2018 was ok 4. Sinus 5. Anemia 6. Cervical surgery with implant, thoracic lamenectomy 7. Active IV drug use including Heroin 8. Active tobacco, Etoh 
   
  
                      Plan:              
  
Blood cultures positive for pseudomonas. Repeat cultures negative so far. TTE negative. KHANH with mobile mass on tricuspid valve with likely perforation resulting in moderate to severe tricuspid regurgitation. Consistent with endocarditis. Evaluated by cardiac surgery, No clear surgical indication at this time. Abx and duration per ID specialist. 
 
Discussed with Hospitalist/ID/Cardiac surgery. Will see periodically, call with cardiac issues. 
  
  
 [x]        High complexity decision making was performed 
  
 
 
Subjective:  
 
Ferrell Soulier denies chest pain, dyspnea. Discussed with RN events overnight. Review of Systems: 
 
Symptom Y/N Comments  Symptom Y/N Comments Fever/Chills N   Chest Pain N Poor Appetite N   Edema N   
Cough N   Abdominal Pain N Sputum N   Joint Pain N   
SOB/CUEVA N   Pruritis/Rash N   
Nausea/vomit N   Tolerating PT/OT Diarrhea N   Tolerating Diet Y Constipation N   Other Could NOT obtain due to:   
 
Objective:  
  
Physical Exam: 
 
Last 24hrs VS reviewed since prior progress note. Most recent are: 
 
Visit Vitals  /76  Pulse 78  Temp 97.8 °F (36.6 °C)  Resp 20  
 Ht 6' (1.829 m)  Wt 86.2 kg (190 lb)  SpO2 98%  BMI 25.77 kg/m2 Intake/Output Summary (Last 24 hours) at 09/13/18 1548 Last data filed at 09/13/18 2356 Gross per 24 hour Intake              240 ml Output             2125 ml Net            -1885 ml General Appearance: Well developed, well nourished, alert & oriented x 3,  
 no acute distress. Ears/Nose/Mouth/Throat: Hearing grossly normal. 
Neck: Supple. Chest: Lungs clear to auscultation bilaterally. Cardiovascular: Regular rate and rhythm, S1S2 normal, no murmur. Abdomen: Soft, non-tender, bowel sounds are active. Extremities: No edema bilaterally. Skin: Warm and dry. PMH/SH reviewed - no change compared to H&P Data Review Telemetry: normal sinus rhythm Lab Data Personally Reviewed: 
 
Recent Labs  
   09/10/18 
 2347 WBC  11.7* HGB  9.6* HCT  30.0*  
PLT  356 No results for input(s): INR, PTP, APTT in the last 72 hours. No lab exists for component: INREXT, INREXT Recent Labs  
   09/12/18 
 0344  09/10/18 
 2347 NA  137  139  
K  4.1  3.9 CL  106  105 CO2  26  27 BUN  12  12 CREA  0.67*  0.77 GLU  94  102* CA  8.4*  8.8 No results for input(s): CPK, CKNDX, TROIQ in the last 72 hours. No lab exists for component: CPKMB No results found for: CHOL, CHOLX, CHLST, CHOLV, HDL, LDL, LDLC, DLDLP, TGLX, TRIGL, TRIGP, CHHD, CHHDX No results for input(s): SGOT, GPT, AP, TBIL, TP, ALB, GLOB, GGT, AML, LPSE in the last 72 hours. No lab exists for component: AMYP, HLPSE No results for input(s): PH, PCO2, PO2 in the last 72 hours. Medications Personally Reviewed: 
 
Current Facility-Administered Medications Medication Dose Route Frequency  HYDROmorphone (DILAUDID) tablet 4 mg  4 mg Oral Q3H PRN  
 HYDROcodone-acetaminophen (NORCO) 5-325 mg per tablet 2 Tab  2 Tab Oral Q6H PRN  
 gabapentin (NEURONTIN) capsule 200 mg  200 mg Oral BID  cefepime (MAXIPIME) 2 g in 0.9% sodium chloride (MBP/ADV) 100 mL  2 g IntraVENous Q8H  
 sodium chloride (NS) flush 5-10 mL  5-10 mL IntraVENous Q8H  
  sodium chloride (NS) flush 5-10 mL  5-10 mL IntraVENous PRN  
 naloxone (NARCAN) injection 0.4 mg  0.4 mg IntraVENous PRN  
 ondansetron (ZOFRAN) injection 4 mg  4 mg IntraVENous Q4H PRN  
 bisacodyl (DULCOLAX) suppository 10 mg  10 mg Rectal DAILY PRN  
 acetaminophen (TYLENOL) tablet 500 mg  500 mg Oral Q6H PRN  
 hydrALAZINE (APRESOLINE) 20 mg/mL injection 10 mg  10 mg IntraVENous Q6H PRN  
 albuterol-ipratropium (DUO-NEB) 2.5 MG-0.5 MG/3 ML  3 mL Nebulization Q4H PRN  
 guaiFENesin ER (MUCINEX) tablet 600 mg  600 mg Oral Q12H  polyethylene glycol (MIRALAX) packet 17 g  17 g Oral DAILY  saline peripheral flush soln 10 mL  10 mL InterCATHeter PRN  
 SALINE PERIPHERAL FLUSH Q8H soln 10 mL  10 mL InterCATHeter Q8H Edison Marino MD

## 2018-09-13 NOTE — PROGRESS NOTES
General Surgery End of Shift Nursing Note Bedside shift change report given to Jeevan (oncoming nurse) by Jorge Morrison (offgoing nurse). Report included the following information SBAR, Kardex, Intake/Output, MAR and Accordion. Shift worked:   7 am - 7pm  
Summary of shift:    Pt's pain treated with meds. Tolerated diet and IV antibiotics well Issues for physician to address:   none Number times ambulated in hallway past shift: 0 Number of times OOB to chair past shift: 0 Pain Management: 
Current medication: See [facility] Patient states pain is manageable on current pain medication: YES 
 
GI: 
 
Current diet:  DIET REGULAR Tolerating current diet: YES Passing flatus: YES Last Bowel Movement: yesterday Appearance:  
 
Respiratory: 
 
Incentive Spirometer at bedside: YES Patient instructed on use: YES Patient Safety: 
 
Falls Score: 1 Bed Alarm On? Not applicable Sitter? Not applicable [facility] [employer] \

## 2018-09-13 NOTE — PROGRESS NOTES
Infectious Disease Progress Note IMPRESSION:  
· Persistent Pseudomonas aeruginosa bacteremia 2/2 ( 9/6), repeat BC also+ 1/1 (9/7)BC from 9/11- no growth. Pt admits to washing needles with water off faucet. 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 Pt had MSSA bacteremia in May 2018. ( not MRSA ) Treated with Nafcillin & thereafter Cefazolin 5/15 to 6/28. · MRI of spine showed inflammatory changes in T5/6 suggestive of septic arthritis. TTE(5/16)  suggestive of vegetation , KHANH done (5/18), no evidence of vegetation as per D/c summary from [facility] · IVDU on going upto time of admission · UDS + for opiates · Smoker , alcohol consumption + · S/p IR evaluation - fluid in spine not accessible for aspiration per IR, S/p Ortho consult - defer to Neurosurgery. · S/p Cardiothoracic surgery evaluation - no surgery at this time. -  
· HIV, Hep C negative  
  
  
PLAN:  
   
· Continue Cefepime IV ·  Plan for PICC tomorrow if [facility] remain negative ·  ECHO KHANH reports from [facility] · Plan of care d/w pt . PSEUDOMONAS SPECIES GROWING IN THIS SINGLE BOTTLE DRAWN ([facility] SITE) REFER TO [facility] [id number] FOR ID AND SENSITIVITIES (A) Culture result:    Final  
PRELIMINARY REPORT OF GRAM NEGATIVE RODS GROWING IN THIS SINGLE BOTTLE DRAWN CALLED TO AND READ BACK BY Mel Dozier RN ON 9/7/18 AT 1815 [facility]). MS (A)  
 
 
KHANH (9/11) TRICUSPID VALVE: There was moderate thickening. There was normal leaflet 
separation. There was a mobile mass on the atrial side of the tricuspid valve with possible perforation concerning for endocarditis. DOPPLER: 
There was moderate to severe regurgitation. PSEUDOMONAS AERUGINOSA GROWING IN THIS SINGLE BOTTLE DRAWN SITE=LAC (A) Culture result:    Preliminary PRELIMINARY REPORT OF GRAM NEGATIVE RODS GROWING IN THIS SINGLE BOTTLE DRAWN CALLED TO AND READ BACK BY Deo Maciel RN ON 18 AT 1815 [facility]). MS (A) Subjective:  
 
Pt seen . \"My back hurts really bad\" Denies chest pain./ SOB Review of Systems:  A comprehensive review of systems was negative except for that written in the History of Present Illness. Objective:  
 
Blood pressure 125/76, pulse 78, temperature 97.8 °F (36.6 °C), resp. rate 20, height 6' (1.829 m), weight 190 lb (86.2 kg), SpO2 98 %. Temp (24hrs), Av.4 °F (36.9 °C), Min:97.8 °F (36.6 °C), Max:98.9 °F (37.2 °C) Patient Vitals for the past 24 hrs: 
 Temp Pulse Resp BP SpO2  
18 0819 97.8 °F (36.6 °C) 78 20 125/76 98 % 18 0335 98.7 °F (37.1 °C) 95 18 124/80 99 % 18 0006 98.4 °F (36.9 °C) 92 18 131/81 98 % 18 2030 98.9 °F (37.2 °C) 94 18 (!) 135/91 99 % 18 1457 98.3 °F (36.8 °C) 92 18 139/80 97 % Lines:  Peripheral IV:   
   
 
 
Physical Exam:  
General:  Alert, cooperative, Eyes:  Sclera anicteric. Pupils equally round and reactive to light. Mouth/Throat: Mucous membranes normal, oral pharynx clear Neck: Supple Lungs:   Clear to auscultation bilaterally, good effort CV:  Regular rate and rhythm,no murmur, Abdomen:   Soft, non-tender. bowel sounds normal. non-distended, Lumbar tender in lower back Extremities: No  Edema, tattoo + Lines/Devices:  Intact, no erythema, drainage or tenderness Data Review: CBC:  
Recent Labs  
   09/10/18 
 2347 WBC  11.7*  
RBC  3.54* HGB  9.6* HCT  30.0*  
PLT  356 CMP:  
Recent Labs  
   18 
 0344  09/10/18 
 2347 GLU  94  102* NA  137  139  
K  4.1  3.9 CL  106  105 CO2  26  27 BUN  12  12 CREA  0.67*  0.77 CA  8.4*  8.8 AGAP  5  7 BUCR  18  16 Studies:     
Lab Results Component Value Date/Time Culture result: NO GROWTH 2 DAYS 09/11/2018 07:03 PM  
 Culture result: NO GROWTH 2 DAYS 09/11/2018 07:03 PM  
 Culture result: (A) 09/08/2018 04:01 AM  
  PSEUDOMONAS AERUGINOSA GROWING IN THIS SINGLE BOTTLE DRAWN (L MIDLINE SITE) PLEASE REFER TO PREVIOUS BLOOD CULTURE [id number], COLLECTED 9/6/18 FOR SENSITIVITIES Culture result: (A) 09/08/2018 04:01 AM  
  PRELIMINARY REPORT OF GRAM NEGATIVE RODS GROWING IN THIS SINGLE BOTTLE DRAWN CALLED TO AND READ BACK BY Grant Garcia RN ON 9/10/18 AT 1817 [facility] [address]). MS  
 Culture result: MRSA NOT PRESENT 09/08/2018 04:01 AM  
 Culture result:  09/08/2018 04:01 AM  
      Screening of patient nares for MRSA is for surveillance purposes and, if positive, to facilitate isolation considerations in high risk settings. It is not intended for automatic decolonization interventions per se as regimens are not sufficiently effective to warrant routine use. XR Results (most recent): 
 
Results from Hospital Encounter encounter on 09/06/18 XR CHEST SNGL V 
 Narrative EXAM:  XR CHEST SNGL V 
 
INDICATION:  Right posterior chest wall pain. COMPARISON: None TECHNIQUE: Frontal chest view FINDINGS: The cardiomediastinal and hilar contours are within normal limits. The 
pulmonary vasculature is within normal limits. The lungs and pleural spaces are clear. There is no pneumothorax. The visualized 
bones and upper abdomen are age-appropriate. Impression IMPRESSION: 
 
No acute process. Patient Active Problem List  
Diagnosis Code  Sepsis ([address] A41.9  Endocarditis of tricuspid valve I36.8  Tricuspid valve regurgitation, infectious I07.1  Acute septic pulmonary embolism without acute cor pulmonale (HCC) I26.90 I have discussed the diagnosis with the patient and the intended plan as seen in the above orders. I have discussed medication side effects and warnings with the patient as well. Reviewed test results at length with patient Anti-infectives:  
 
 Vancomycin iv- 9/7- 9/11- DC Cefepime iv- 9/7  Lore Ruby MD FACP

## 2018-09-13 NOTE — PROGRESS NOTES
Paged MD Lona Kapadia to ask if pt will still need a PICC line since cultures from 9/6 shows gram negative rods and cultures from 9/11 show no growth in 2 days. MD stated to call PICC team and ask if they can put in a midline

## 2018-09-13 NOTE — CONSULTS
Palliative Medicine Consult  [place]: [phone] ([phone])    Patient Name: Heriberto Hudson  YOB: 1980    Date of Initial Consult: 18  Reason for Consult: low back pain due to septic arthritis  Requesting Provider: Daisy Donis  Primary Care Physician: None     SUMMARY:   Heriberto Hudson is a 43 y.o. with a past history of IVDA, cervical spine, surgery, MI, MITZI, smoker who , who was admitted on 2018 from home with a diagnosis of septic arthritis. Current medical issues leading to Palliative Medicine involvement include: pain management complicated by heroin use. Per : only prescriptions in the past 12 months:  1) Narcan Nasal Spray and Gabapentin 300mg #90/15 day supply by Dr. Donna Luna in [place]. Psychosocial: wife  2 years ago suddenly, leaving a 1month old, and 2 other children. Pt has been struggling with drug addiction for 16 years, in-laws realized this on the same day their daughter . They are raising pt's children to which he admits is for the best given his addiction. Pt moved to [place] after his wife , hooking up with friends that were drug users, until after his admission to [facility] this spring; following that admission, he moved down to [facility] to try and get clean. He has weaned back from using 1.5grams heroin/d to 0.5 grams/d (this is not an exact dose as he never knows what exactly the strength or how much of the drug is cut). He is not using it for the \"high\" rather to be able to function. He works daily in [employer], paid cash, no health insurance. He has never been to a drug treatment program, will consider it on discharge. PALLIATIVE DIAGNOSES:   1. Fever   2. Back pain  3. Heroin abuse (IV)  4. Debility   5. Endocarditis  6. Septic arthritis T5/6  7. Septic emboli        PLAN:   1.  PAIN:  Met with pt, obtained history, discussed pain management:  Pt is honest about his drug use, states that if he wanted to get high he could leave AMA or get someone to bring it in, however, he is not seeking a \"high\", he wants to get better, both medically and for his drug addiction, however, he also does not want to suffer. He is willing to work together, understands that there will be limits placed at times. We discussed focusing on adequate pain relief with minimal drug use. He reports that when he was getting 1mg IV Dilaudid it was controlling his pain much better than 4mg oral dilaudid, although it only lasted about 2-2.5 hours, he was able to use the walker and get to the bathroom as opposed to now, where the pain is too severe and he has to use the urinal.  His pain is 8/10 on oral Dilaudid, 3/10 on IV (although only for ~2-2.5 hours). 2. OPIOID USE: 9/11: 6mg IV Dilaudid. 9/12: 12mg PO Dilaudid +4mg IV dilaudid =7mg IV dilaudid. 9/13: 24mg PO dilaudid=6mg IV Dilaudid so far today with now an increase to 8mg q. 3 hours. If pt takes 8mg q. 3 hours =64mg PO dilaudid/d,or, 16mg IV dilaudid/d. Bone pain is difficult to treat and extremely painful. Most patients, not just those with addiction, report poor pain management with oral forms of opioids, therefore, IV tends to do a better job. That said, we do need to take into consideration pt's addiction, but this should not prevent him from receiving reasonable pain management. The fact that he was using less IV (6-7mg IV Dilaudid) vs oral (6mg IV equivalent with plan to double this) is evident. Additionally, given his heroin use PTA, his dose needs will be higher than expected for a non-addicted patient. Per RN, when on IV Dilaudid, pt did not ask for it unless due, now on oral, asking even when not due. Did not sleep last night 2/2 pain. 1. Discontinue oral dilaudid. 2. IV dilaudid 1mg q. 3 hours prn pain for 2 days only, then will resume q. 4 hours prn.  I counseled pt that we will be titrating down with the goal of his being off completely upon discharge as he will not be provided with an opiate prescription upon discharge. 3. Narcan already on order. 4. Bowel regimen Miralax on order. 3. Reassess tomorrow. 4. Initial consult note routed to primary continuity provider  5. Communicated plan of care with: Palliative IDT, RN       GOALS OF CARE / TREATMENT PREFERENCES:     GOALS OF CARE:  Patient/Health Care Proxy Stated Goals: Prolong lifesafe pain management      TREATMENT PREFERENCES:   Code Status: Full Code    Advance Care Planning:  Advance Care Planning 9/7/2018   Patient's Healthcare Decision Maker is: Legal Next of Kin   Confirm Advance Directive None       Medical Interventions: Full interventions   Other Instructions: Other:    As far as possible, the palliative care team has discussed with patient / health care proxy about goals of care / treatment preferences for patient. HISTORY:     History obtained from: chart, patient    CHIEF COMPLAINT: severe right sided back pain    HPI/SUBJECTIVE:    The patient is:   [x] Verbal and participatory  [] Non-participatory due to:     9/6:  BIBA with c/o constant right sided back pain that has progressively worsened and is now unable to stand, with associated fever of 101.3 for the week. Sxs are  similar to when he was treated for MRSA infection in his spine 3 months ago until July 10, 2018.   Pt admits to using IV heroin this am.      ED W/U:  EXAM: negative  LAB:  Alk phos 149, protein 9, albumin 2.6, wbc 15.8, h/h 9.9/30.0, sed rate 129, CRP 15.50, UDS pos for opiates  IMAGING:  L-SPINE CT: scattered bilat lung opacities, some cavitation, concerning for infection/septic emboli    HOSPITAL COURSE: admitted to      Clinical Pain Assessment (nonverbal scale for severity on nonverbal patients):   Clinical Pain Assessment  Severity: 8  Location: right low spine shoots down right buttock to mid thigh  Character: shooting feels like bone on bone  Duration: weeks  Effect: cannot bare weight  Frequency: constant but worse with mmt          Duration: for how long has pt been experiencing pain (e.g., 2 days, 1 month, years)  Frequency: how often pain is an issue (e.g., several times per day, once every few days, constant)     FUNCTIONAL ASSESSMENT:     Palliative Performance Scale (PPS):  PPS: 40       PSYCHOSOCIAL/SPIRITUAL SCREENING:     Palliative IDT has assessed this patient for cultural preferences / practices and a referral made as appropriate to needs (Cultural Services, Patient Advocacy, Ethics, etc.)    Advance Care Planning:  Advance Care Planning 9/7/2018   Patient's Healthcare Decision Maker is: Legal Next of Kin   Confirm Advance Directive None       Any spiritual / Baptism concerns:  [] Yes /  [x] No    Caregiver Burnout:  [] Yes /  [x] No /  [] No Caregiver Present      Anticipatory grief assessment:   [x] Normal  / [] Maladaptive       ESAS Anxiety: Anxiety: 0    ESAS Depression: Depression: 0        REVIEW OF SYSTEMS:     Positive and pertinent negative findings in ROS are noted above in HPI. The following systems were [x] reviewed / [] unable to be reviewed as noted in HPI  Other findings are noted below. Systems: constitutional, ears/nose/mouth/throat, respiratory, gastrointestinal, genitourinary, musculoskeletal, integumentary, neurologic, psychiatric, endocrine. Positive findings noted below. Modified ESAS Completed by: provider   Fatigue: 0 Drowsiness: 0   Depression: 0 Pain: 8   Anxiety: 0 Nausea: 0   Anorexia: 0 Dyspnea: 0     Constipation: No     Stool Occurrence(s): 0        PHYSICAL EXAM:     From RN flowsheet:  Wt Readings from Last 3 Encounters:   09/07/18 190 lb (86.2 kg)     Blood pressure 129/87, pulse 92, temperature 98.5 °F (36.9 °C), resp. rate 16, height 6' (1.829 m), weight 190 lb (86.2 kg), SpO2 97 %.     Pain Scale 1: Numeric (0 - 10)  Pain Intensity 1: 8  Pain Onset 1: chronic  Pain Location 1: Back  Pain Orientation 1: Lower  Pain Description 1: Constant, Aching  Pain Intervention(s) 1: Medication (see MAR)  Last bowel movement, if known:     Constitutional: WD, WN, NAD, pleasant and cooperative, AAOx3  Eyes: pupils equal, anicteric, good eye contact  ENMT: no nasal discharge, moist mucous membranes  Cardiovascular: regular rhythm, distal pulses intact  Respiratory: breathing not labored, symmetric  Gastrointestinal: soft non-tender, +bowel sounds  Musculoskeletal: right low spine/hips/buttocks hip roll causes pain  Skin: warm, dry  Neurologic: following commands, moving all extremities  Psychiatric: full affect, no hallucinations          HISTORY:     Active Problems:    Sepsis ([facility] [address] (9/7/2018)      Endocarditis of tricuspid valve (9/12/2018)      Tricuspid valve regurgitation, infectious (9/12/2018)      Acute septic pulmonary embolism without acute cor pulmonale ([facility] [address] (9/12/2018)      Past Medical History:   Diagnosis Date    Back pain, chronic       Past Surgical History:   Procedure Laterality Date    HX CERVICAL DISKECTOMY      HX FEMUR FRACTURE TX      HX ORTHOPAEDIC      Back Surgery      History reviewed. No pertinent family history. History reviewed, no pertinent family history.   Social History   Substance Use Topics    Smoking status: Current Every Day Smoker     Packs/day: 1.00    Smokeless tobacco: Never Used    Alcohol use Yes     No Known Allergies   Current Facility-Administered Medications   Medication Dose Route Frequency    HYDROmorphone (DILAUDID) injection 1 mg  1 mg IntraVENous Q3H PRN    gabapentin (NEURONTIN) capsule 200 mg  200 mg Oral BID    cefepime (MAXIPIME) 2 g in 0.9% sodium chloride (MBP/ADV) 100 mL  2 g IntraVENous Q8H    sodium chloride (NS) flush 5-10 mL  5-10 mL IntraVENous Q8H    sodium chloride (NS) flush 5-10 mL  5-10 mL IntraVENous PRN    naloxone (NARCAN) injection 0.4 mg  0.4 mg IntraVENous PRN    ondansetron (ZOFRAN) injection 4 mg  4 mg IntraVENous Q4H PRN    bisacodyl (DULCOLAX) suppository 10 mg  10 mg Rectal DAILY PRN    acetaminophen (TYLENOL) tablet 500 mg  500 mg Oral Q6H PRN    hydrALAZINE (APRESOLINE) 20 mg/mL injection 10 mg  10 mg IntraVENous Q6H PRN    albuterol-ipratropium (DUO-NEB) 2.5 MG-0.5 MG/3 ML  3 mL Nebulization Q4H PRN    guaiFENesin ER (MUCINEX) tablet 600 mg  600 mg Oral Q12H    polyethylene glycol (MIRALAX) packet 17 g  17 g Oral DAILY    saline peripheral flush soln 10 mL  10 mL InterCATHeter PRN    SALINE PERIPHERAL FLUSH Q8H soln 10 mL  10 mL InterCATHeter Q8H          LAB AND IMAGING FINDINGS:     Lab Results   Component Value Date/Time    WBC 11.7 (H) 09/10/2018 11:47 PM    HGB 9.6 (L) 09/10/2018 11:47 PM    PLATELET 631 17/18/5211 11:47 PM     Lab Results   Component Value Date/Time    Sodium 137 09/12/2018 03:44 AM    Potassium 4.1 09/12/2018 03:44 AM    Chloride 106 09/12/2018 03:44 AM    CO2 26 09/12/2018 03:44 AM    BUN 12 09/12/2018 03:44 AM    Creatinine 0.67 (L) 09/12/2018 03:44 AM    Calcium 8.4 (L) 09/12/2018 03:44 AM      Lab Results   Component Value Date/Time    AST (SGOT) 34 09/06/2018 08:50 PM    Alk. phosphatase 149 (H) 09/06/2018 08:50 PM    Protein, total 9.0 (H) 09/06/2018 08:50 PM    Albumin 2.6 (L) 09/06/2018 08:50 PM    Globulin 6.4 (H) 09/06/2018 08:50 PM     No results found for: INR, PTMR, PTP, PT1, PT2, APTT   No results found for: IRON, FE, TIBC, IBCT, PSAT, FERR   No results found for: PH, PCO2, PO2  No components found for: GLPOC   No results found for: CPK, CKMB             Total time: 120  Counseling / coordination time, spent as noted above: 100  > 50% counseling / coordination?: y    Prolonged service was provided for  []30 min   []75 min in face to face time in the presence of the patient, spent as noted above. Time Start:   Time End:   Note: this can only be billed with 01291 (initial) or 34586 (follow up). If multiple start / stop times, list each separately.

## 2018-09-13 NOTE — PROGRESS NOTES
Acknowledged PICC order. Discussed with ID Dr. Deejay Haddad and she wanted wait one more day to place PICC line. Plan for PICC tomorrow if [facility] remain negative. Pt needed PICC line for  6 weeks IV antibiotic treatment. PIV inserted by PICC team. 
Stephany Paz RN CRMISSAEL CMSRN. Vascular access team. PICC nurse.

## 2018-09-13 NOTE — PROGRESS NOTES
Hospitalist Progress Note NAME: Yina Barrow :  1980 MRN:  [id number] Assessment / Plan: 
42 yo male with pmh of IVDA and prior history of MSSA bacteremia with negative KHANH in May 2018 and completed CEfazolin from  thru . Now, he presents with back pain. Sepsis, POA Back spine concerning for septic arthritis/ spine abscess Hx of MSSA Current IVDU Pseudomonas bacteremia ,  and  
TR infective Endocarditis Pulmonary septic emboli MRI L spine showed increased fluid in the right L3-4 posterior facet joint with surrounding edema extending into the paraspinous musculature. Findings are suspicious for septic arthritis. No significant spinal stenosis or neural foraminal narrowing. CT showed multiple pulmonary nodules, several of which are cavitary and several groundglass opacities and areas of consolidation Not hypoxic. 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 ESR 12, CRP 15.5, lactate 0.9, UA neg Blood culture  no growth to date On cefepime and vancomycin ID, ortho, IR and Cardiothoracic surgery teams are following. No surgical intervention at this time. Back pain Increase oral dilaudid to 8 mg po q3 hrs. Palliative team consulted. HTN 
BP is controlled 
hydralazine prn 
  
Chronic anemia 
no evidence of bleeding 
monitor CBC 
  
 
Code Status: Full Surrogate Decision Maker: he did not wish to list anyone DVT Prophylaxis: SCDs for now GI Prophylaxis: not indicated Baseline: indedepent Subjective: Chief Complaint / Reason for Physician Visit \"patient with still severe back pain. He took dilaudid 4 mg q3hr and reports his pain is not controlled. Oxycodone never worked for him before. \". Discussed with RN events overnight. Review of Systems: 
Symptom Y/N Comments  Symptom Y/N Comments Fever/Chills n   Chest Pain n   
Poor Appetite    Edema n Cough n   Abdominal Pain n   
Sputum n   Joint Pain y   
SOB/CUEVA n   Pruritis/Rash Nausea/vomit n   Tolerating PT/OT Diarrhea n   Tolerating Diet Constipation    Other Could NOT obtain due to:   
 
Objective: VITALS:  
Last 24hrs VS reviewed since prior progress note. Most recent are: 
Patient Vitals for the past 24 hrs: 
 Temp Pulse Resp BP SpO2  
09/13/18 1531 98.5 °F (36.9 °C) 92 16 129/87 97 % 09/13/18 1301 98 °F (36.7 °C) 94 16 145/79 100 % 09/13/18 0819 97.8 °F (36.6 °C) 78 20 125/76 98 % 09/13/18 0335 98.7 °F (37.1 °C) 95 18 124/80 99 % 09/13/18 0006 98.4 °F (36.9 °C) 92 18 131/81 98 % 09/12/18 2030 98.9 °F (37.2 °C) 94 18 (!) 135/91 99 % Intake/Output Summary (Last 24 hours) at 09/13/18 1756 Last data filed at 09/13/18 1721 Gross per 24 hour Intake              540 ml Output             2575 ml Net            -2035 ml PHYSICAL EXAM: 
General: Alert, cooperative, no acute distress   
EENT:  Anicteric sclerae. Resp:  CTA bilaterally, no wheezing or rales. No accessory muscle use CV:  Regular rate, S1, S2. No LE edema GI:  Soft, Non distended, Non tender.  +Bowel sounds Neurologic:  Alert and oriented X 3, normal speech, no numbness/weakness, no incontinence Psych:   Good insight. Not anxious nor agitated Skin:  No rashes. No jaundice Reviewed most current lab test results and cultures  YES Reviewed most current radiology test results   YES Review and summation of old records today    NO Reviewed patient's current orders and MAR    YES 
PMH/SH reviewed - no change compared to H&P 
________________________________________________________________________ 
________________________________________________________________________ Sudhakar Cantu MD  
 
Procedures: see electronic medical records for all procedures/Xrays and details which were not copied into this note but were reviewed prior to creation of Plan.    
 
LABS: 
 I reviewed today's most current labs and imaging studies. Pertinent labs include: 
Recent Labs  
   09/10/18 
 2347 WBC  11.7* HGB  9.6* HCT  30.0*  
PLT  356 Recent Labs  
   09/12/18 
 0344  09/10/18 
 2347 NA  137  139  
K  4.1  3.9 CL  106  105 CO2  26  27 GLU  94  102* BUN  12  12 CREA  0.67*  0.77 CA  8.4*  8.8 Signed: Joan Vázquez MD

## 2018-09-14 ENCOUNTER — APPOINTMENT (OUTPATIENT)
Dept: GENERAL RADIOLOGY | Age: 38
DRG: 871 | End: 2018-09-14
Attending: EMERGENCY MEDICINE
Payer: SUBSIDIZED

## 2018-09-14 ENCOUNTER — TELEPHONE (OUTPATIENT)
Dept: FAMILY MEDICINE CLINIC | Age: 38
End: 2018-09-14

## 2018-09-14 ENCOUNTER — APPOINTMENT (OUTPATIENT)
Dept: CT IMAGING | Age: 38
DRG: 871 | End: 2018-09-14
Attending: HOSPITALIST
Payer: SUBSIDIZED

## 2018-09-14 LAB
ALBUMIN SERPL-MCNC: 2.4 G/DL (ref 3.5–5)
ALBUMIN/GLOB SERPL: 0.4 {RATIO} (ref 1.1–2.2)
ALP SERPL-CCNC: 144 U/L (ref 45–117)
ALT SERPL-CCNC: 71 U/L (ref 12–78)
ANION GAP SERPL CALC-SCNC: 8 MMOL/L (ref 5–15)
AST SERPL-CCNC: 37 U/L (ref 15–37)
ATRIAL RATE: 99 BPM
BILIRUB DIRECT SERPL-MCNC: <0.1 MG/DL (ref 0–0.2)
BILIRUB SERPL-MCNC: 0.3 MG/DL (ref 0.2–1)
BUN SERPL-MCNC: 17 MG/DL (ref 6–20)
BUN/CREAT SERPL: 22 (ref 12–20)
CALCIUM SERPL-MCNC: 9.7 MG/DL (ref 8.5–10.1)
CALCULATED P AXIS, ECG09: 70 DEGREES
CALCULATED R AXIS, ECG10: 70 DEGREES
CALCULATED T AXIS, ECG11: 45 DEGREES
CHLORIDE SERPL-SCNC: 101 MMOL/L (ref 97–108)
CO2 SERPL-SCNC: 24 MMOL/L (ref 21–32)
CREAT SERPL-MCNC: 0.79 MG/DL (ref 0.7–1.3)
DIAGNOSIS, 93000: NORMAL
GLOBULIN SER CALC-MCNC: 6.6 G/DL (ref 2–4)
GLUCOSE SERPL-MCNC: 75 MG/DL (ref 65–100)
P-R INTERVAL, ECG05: 128 MS
POTASSIUM SERPL-SCNC: 5.1 MMOL/L (ref 3.5–5.1)
PROT SERPL-MCNC: 9 G/DL (ref 6.4–8.2)
Q-T INTERVAL, ECG07: 352 MS
QRS DURATION, ECG06: 82 MS
QTC CALCULATION (BEZET), ECG08: 451 MS
SODIUM SERPL-SCNC: 133 MMOL/L (ref 136–145)
VENTRICULAR RATE, ECG03: 99 BPM

## 2018-09-14 PROCEDURE — 80076 HEPATIC FUNCTION PANEL: CPT | Performed by: INTERNAL MEDICINE

## 2018-09-14 PROCEDURE — 36415 COLL VENOUS BLD VENIPUNCTURE: CPT | Performed by: INTERNAL MEDICINE

## 2018-09-14 PROCEDURE — 74011000258 HC RX REV CODE- 258: Performed by: INTERNAL MEDICINE

## 2018-09-14 PROCEDURE — 74011250636 HC RX REV CODE- 250/636: Performed by: NURSE PRACTITIONER

## 2018-09-14 PROCEDURE — 74011250637 HC RX REV CODE- 250/637: Performed by: INTERNAL MEDICINE

## 2018-09-14 PROCEDURE — 74011250636 HC RX REV CODE- 250/636: Performed by: INTERNAL MEDICINE

## 2018-09-14 PROCEDURE — 71045 X-RAY EXAM CHEST 1 VIEW: CPT

## 2018-09-14 PROCEDURE — 65270000029 HC RM PRIVATE

## 2018-09-14 PROCEDURE — 74011250637 HC RX REV CODE- 250/637: Performed by: NURSE PRACTITIONER

## 2018-09-14 PROCEDURE — 74011636320 HC RX REV CODE- 636/320: Performed by: INTERNAL MEDICINE

## 2018-09-14 PROCEDURE — 71275 CT ANGIOGRAPHY CHEST: CPT

## 2018-09-14 PROCEDURE — 74011000250 HC RX REV CODE- 250: Performed by: NURSE PRACTITIONER

## 2018-09-14 PROCEDURE — 74011250636 HC RX REV CODE- 250/636: Performed by: HOSPITALIST

## 2018-09-14 PROCEDURE — 80048 BASIC METABOLIC PNL TOTAL CA: CPT | Performed by: INTERNAL MEDICINE

## 2018-09-14 PROCEDURE — 76450000000

## 2018-09-14 PROCEDURE — 77030018846 HC SOL IRR STRL H20 ICUM -A

## 2018-09-14 PROCEDURE — 93005 ELECTROCARDIOGRAM TRACING: CPT

## 2018-09-14 RX ORDER — KETOROLAC TROMETHAMINE 30 MG/ML
15 INJECTION, SOLUTION INTRAMUSCULAR; INTRAVENOUS EVERY 6 HOURS
Status: DISCONTINUED | OUTPATIENT
Start: 2018-09-14 | End: 2018-09-17

## 2018-09-14 RX ORDER — SODIUM CHLORIDE 9 MG/ML
50 INJECTION, SOLUTION INTRAVENOUS
Status: COMPLETED | OUTPATIENT
Start: 2018-09-14 | End: 2018-09-14

## 2018-09-14 RX ORDER — LEVOFLOXACIN 5 MG/ML
750 INJECTION, SOLUTION INTRAVENOUS EVERY 24 HOURS
Status: COMPLETED | OUTPATIENT
Start: 2018-09-14 | End: 2018-09-20

## 2018-09-14 RX ORDER — HYDROMORPHONE HYDROCHLORIDE 2 MG/ML
2 INJECTION, SOLUTION INTRAMUSCULAR; INTRAVENOUS; SUBCUTANEOUS
Status: COMPLETED | OUTPATIENT
Start: 2018-09-14 | End: 2018-09-14

## 2018-09-14 RX ORDER — ACETAMINOPHEN 500 MG
500 TABLET ORAL 3 TIMES DAILY
Status: DISCONTINUED | OUTPATIENT
Start: 2018-09-14 | End: 2018-09-25 | Stop reason: HOSPADM

## 2018-09-14 RX ORDER — KETOROLAC TROMETHAMINE 30 MG/ML
15 INJECTION, SOLUTION INTRAMUSCULAR; INTRAVENOUS
Status: DISCONTINUED | OUTPATIENT
Start: 2018-09-14 | End: 2018-09-14

## 2018-09-14 RX ORDER — ENOXAPARIN SODIUM 100 MG/ML
40 INJECTION SUBCUTANEOUS
Status: DISCONTINUED | OUTPATIENT
Start: 2018-09-15 | End: 2018-09-25 | Stop reason: HOSPADM

## 2018-09-14 RX ORDER — SODIUM CHLORIDE 0.9 % (FLUSH) 0.9 %
10 SYRINGE (ML) INJECTION
Status: COMPLETED | OUTPATIENT
Start: 2018-09-14 | End: 2018-09-14

## 2018-09-14 RX ORDER — LIDOCAINE 4 G/100G
1 PATCH TOPICAL EVERY 24 HOURS
Status: DISCONTINUED | OUTPATIENT
Start: 2018-09-14 | End: 2018-09-25 | Stop reason: HOSPADM

## 2018-09-14 RX ORDER — ENOXAPARIN SODIUM 100 MG/ML
1 INJECTION SUBCUTANEOUS EVERY 12 HOURS
Status: DISCONTINUED | OUTPATIENT
Start: 2018-09-14 | End: 2018-09-14

## 2018-09-14 RX ORDER — HYDROMORPHONE HYDROCHLORIDE 2 MG/ML
2 INJECTION, SOLUTION INTRAMUSCULAR; INTRAVENOUS; SUBCUTANEOUS
Status: DISCONTINUED | OUTPATIENT
Start: 2018-09-14 | End: 2018-09-14

## 2018-09-14 RX ORDER — FAMOTIDINE 20 MG/1
20 TABLET, FILM COATED ORAL 2 TIMES DAILY
Status: DISCONTINUED | OUTPATIENT
Start: 2018-09-14 | End: 2018-09-25 | Stop reason: HOSPADM

## 2018-09-14 RX ORDER — HYDROMORPHONE HYDROCHLORIDE 2 MG/1
8 TABLET ORAL
Status: DISCONTINUED | OUTPATIENT
Start: 2018-09-14 | End: 2018-09-14

## 2018-09-14 RX ORDER — HYDROMORPHONE HYDROCHLORIDE 2 MG/ML
1 INJECTION, SOLUTION INTRAMUSCULAR; INTRAVENOUS; SUBCUTANEOUS
Status: DISCONTINUED | OUTPATIENT
Start: 2018-09-14 | End: 2018-09-19

## 2018-09-14 RX ADMIN — Medication 10 ML: at 20:57

## 2018-09-14 RX ADMIN — Medication 10 ML: at 20:56

## 2018-09-14 RX ADMIN — HYDROMORPHONE HYDROCHLORIDE 1 MG: 2 INJECTION INTRAMUSCULAR; INTRAVENOUS; SUBCUTANEOUS at 08:40

## 2018-09-14 RX ADMIN — HYDROMORPHONE HYDROCHLORIDE 1 MG: 2 INJECTION INTRAMUSCULAR; INTRAVENOUS; SUBCUTANEOUS at 11:56

## 2018-09-14 RX ADMIN — IOPAMIDOL 130 ML: 755 INJECTION, SOLUTION INTRAVENOUS at 13:45

## 2018-09-14 RX ADMIN — CEFEPIME HYDROCHLORIDE 2 G: 2 INJECTION, POWDER, FOR SOLUTION INTRAVENOUS at 17:31

## 2018-09-14 RX ADMIN — GENTAMICIN SULFATE 400 MG: 40 INJECTION, SOLUTION INTRAMUSCULAR; INTRAVENOUS at 11:49

## 2018-09-14 RX ADMIN — HYDROMORPHONE HYDROCHLORIDE 1 MG: 2 INJECTION INTRAMUSCULAR; INTRAVENOUS; SUBCUTANEOUS at 00:33

## 2018-09-14 RX ADMIN — LEVOFLOXACIN 750 MG: 5 INJECTION, SOLUTION INTRAVENOUS at 14:38

## 2018-09-14 RX ADMIN — HYDROMORPHONE HYDROCHLORIDE 1 MG: 2 INJECTION INTRAMUSCULAR; INTRAVENOUS; SUBCUTANEOUS at 23:57

## 2018-09-14 RX ADMIN — KETOROLAC TROMETHAMINE 15 MG: 30 INJECTION, SOLUTION INTRAMUSCULAR at 23:57

## 2018-09-14 RX ADMIN — Medication 10 ML: at 03:50

## 2018-09-14 RX ADMIN — GUAIFENESIN 600 MG: 600 TABLET, EXTENDED RELEASE ORAL at 20:57

## 2018-09-14 RX ADMIN — HYDROMORPHONE HYDROCHLORIDE 2 MG: 2 INJECTION, SOLUTION INTRAMUSCULAR; INTRAVENOUS; SUBCUTANEOUS at 05:30

## 2018-09-14 RX ADMIN — SODIUM CHLORIDE 50 ML/HR: 900 INJECTION, SOLUTION INTRAVENOUS at 13:46

## 2018-09-14 RX ADMIN — HYDROMORPHONE HYDROCHLORIDE 1 MG: 2 INJECTION INTRAMUSCULAR; INTRAVENOUS; SUBCUTANEOUS at 20:57

## 2018-09-14 RX ADMIN — HYDROMORPHONE HYDROCHLORIDE 1 MG: 2 INJECTION INTRAMUSCULAR; INTRAVENOUS; SUBCUTANEOUS at 03:50

## 2018-09-14 RX ADMIN — GABAPENTIN 200 MG: 100 CAPSULE ORAL at 17:30

## 2018-09-14 RX ADMIN — FAMOTIDINE 20 MG: 20 TABLET ORAL at 17:30

## 2018-09-14 RX ADMIN — Medication 10 ML: at 13:46

## 2018-09-14 RX ADMIN — KETOROLAC TROMETHAMINE 15 MG: 30 INJECTION, SOLUTION INTRAMUSCULAR at 17:23

## 2018-09-14 RX ADMIN — HYDROMORPHONE HYDROCHLORIDE 1 MG: 2 INJECTION INTRAMUSCULAR; INTRAVENOUS; SUBCUTANEOUS at 17:18

## 2018-09-14 RX ADMIN — Medication 10 ML: at 00:48

## 2018-09-14 RX ADMIN — Medication 10 ML: at 14:14

## 2018-09-14 RX ADMIN — ENOXAPARIN SODIUM 90 MG: 100 INJECTION SUBCUTANEOUS at 06:59

## 2018-09-14 RX ADMIN — HYDROMORPHONE HYDROCHLORIDE 1 MG: 2 INJECTION INTRAMUSCULAR; INTRAVENOUS; SUBCUTANEOUS at 14:13

## 2018-09-14 RX ADMIN — ACETAMINOPHEN 500 MG: 500 TABLET ORAL at 20:56

## 2018-09-14 RX ADMIN — CEFEPIME HYDROCHLORIDE 2 G: 2 INJECTION, POWDER, FOR SOLUTION INTRAVENOUS at 10:07

## 2018-09-14 RX ADMIN — CEFEPIME HYDROCHLORIDE 2 G: 2 INJECTION, POWDER, FOR SOLUTION INTRAVENOUS at 00:47

## 2018-09-14 RX ADMIN — GABAPENTIN 200 MG: 100 CAPSULE ORAL at 10:07

## 2018-09-14 NOTE — PROGRESS NOTES
Problem: Falls - Risk of 
Goal: *Absence of Falls Document Gem Latin Fall Risk and appropriate interventions in the flowsheet. Outcome: Progressing Towards Goal 
Fall Risk Interventions: 
Mobility Interventions: Communicate number of staff needed for ambulation/transfer Medication Interventions: Teach patient to arise slowly

## 2018-09-14 NOTE — PROGRESS NOTES
Hospitalist cross cover Called by RN that pt c/o R sided ( mid side) CP, pleuritic in nature. Pt has known pulmonary septic emboli Not hypoxic / Vs stable  
cxray ordered Very poor peripheral access, will get PICC today Plan: need dedicated CTA chest to evaluate cavitary lesions and r/o PE Empiric therapeutic lovenox pending CTA Increasing Pain meds to 2 mg dilaudid q 3 hr as needed Tanna Slaughter MD

## 2018-09-14 NOTE — PROGRESS NOTES
1900--bedside report received from giselle buckley. Pt resting in bed oriented x 4, no complaints at this time, call bell in reach assessment as noted 0100--prn diludad given per prn orders for c/o back pain, watching movie at this time 0525--pt with sudden onset right lateral mid \"lung\" pain, pt reports \" it hurts more when I breath in and out, I felt it coming on gradually, but then it just hit me, its no where near my chest, its way over here on the side, almost like a sharp cramping. Pt is slightly decreased when lift my hands above my head. EKG done, dr. Rosario Pizano notified stat chest xray and stat dose of 2mg diludad ordered. Xray department notified of stat chest order. 0545--stats remain 99% /77, pt reports pain 7/10, still over right lateral leigha. 0555--chest xray complete,  
 
0600--dr. ibanez notified of above and that 2mg dilaudad brought pain down to \"7 or 8\", new orders pending, will get CTA today after PICC placed, pt appears more comfortable than previous, pt undated with plan of care and agrees,  
 
 
0700--bedside report given to giselle buckley who is assuming  Care of pt 
 
834 Eliana Gerardo notified of overnight events, new orders pending, daytime GISELLE buckley notified

## 2018-09-14 NOTE — PROGRESS NOTES
General Surgery End of Shift Nursing Note Bedside shift change report given to [facility]. (oncoming nurse) by Lucas Lee (offgoing nurse). Report included the following information SBAR, Kardex, Intake/Output, MAR and Accordion. Shift worked:   7 am - 7pm  
Summary of shift:    Pt tolerating meds and diet well. Additional 20G IV placed for CT scan today by PICC team. Pain treated with pain meds. Issues for physician to address:   none Number times ambulated in hallway past shift: 0 Number of times OOB to chair past shift: 0 Pain Management: 
Current medication: See [facility] Patient states pain is manageable on current pain medication: YES 
 
GI: 
 
Current diet:  DIET REGULAR Tolerating current diet: YES Passing flatus: YES Last Bowel Movement: several days ago Appearance:  
 
Respiratory: 
 
Incentive Spirometer at bedside: YES Patient instructed on use: YES Patient Safety: 
 
Falls Score: 1 Bed Alarm On? Not applicable Sitter? Not applicable Dee Henriquez

## 2018-09-14 NOTE — PROGRESS NOTES
Progress Note 9/14/2018 2:51 PM 
NAME: Milton Wong MRN:  [id number] Admit Diagnosis: Sepsis ([facility] [address] Assessment:             
  
1. 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 KHANH 9/2018 with mobile mass on tricuspid valve with possible perforation with moderate to severe tricuspid regurgitation. 3. Hx MSSA in thoracic spine,  With Hx of  KHANH approx 5/2018 was ok 4. Sinus 5. Anemia 6. Cervical surgery with implant, thoracic lamenectomy 7. Active IV drug use including Heroin 8. Active tobacco, Etoh 
   
  
                      Plan:              
  
Blood cultures positive for pseudomonas. Repeat cultures negative so far. TTE negative. KHANH with mobile mass on tricuspid valve with likely perforation resulting in moderate to severe tricuspid regurgitation. Consistent with endocarditis. Evaluated by cardiac surgery, repeat cultures on 9/11 +. Abx and duration per ID specialist. 
 
Right sided chest pain yesterday, could be related to Tricuspid valve endocarditis. Staff to contact ID, likely needs repeat cultures, would be hesitant to place PICC until cultures clear. 
 
  
  
 [x]        High complexity decision making was performed 
  
 
 
Subjective:  
 
Milton Wong denies chest pain, dyspnea. Discussed with RN events overnight. Review of Systems: 
 
Symptom Y/N Comments  Symptom Y/N Comments Fever/Chills N   Chest Pain N Poor Appetite N   Edema N   
Cough N   Abdominal Pain N Sputum N   Joint Pain N   
SOB/CUEVA N   Pruritis/Rash N   
Nausea/vomit N   Tolerating PT/OT Diarrhea N   Tolerating Diet Y Constipation N   Other Could NOT obtain due to:   
 
Objective:  
  
Physical Exam: 
 
Last 24hrs VS reviewed since prior progress note. Most recent are: 
 
Visit Vitals  /61  Pulse 93  Temp 98.6 °F (37 °C)  Resp 16  
 Ht 6' (1.829 m)  Wt 86.2 kg (190 lb)  SpO2 97%  BMI 25.77 kg/m2 Intake/Output Summary (Last 24 hours) at 09/14/18 1008 Last data filed at 09/14/18 2081 Gross per 24 hour Intake             2130 ml Output             1885 ml Net              245 ml General Appearance: Well developed, well nourished, alert & oriented x 3,  
 no acute distress. Ears/Nose/Mouth/Throat: Hearing grossly normal. 
Neck: Supple. Chest: Lungs clear to auscultation bilaterally. Cardiovascular: Regular rate and rhythm, S1S2 normal, no murmur. Abdomen: Soft, non-tender, bowel sounds are active. Extremities: No edema bilaterally. Skin: Warm and dry. PMH/ reviewed - no change compared to H&P Data Review Telemetry: normal sinus rhythm Lab Data Personally Reviewed: 
 
No results for input(s): WBC, HGB, HCT, PLT, HGBEXT, HCTEXT, PLTEXT, HGBEXT, HCTEXT, PLTEXT in the last 72 hours. No results for input(s): INR, PTP, APTT in the last 72 hours. No lab exists for component: INREXT, INREXT Recent Labs  
   09/14/18 
 0353  09/12/18 
 0344 NA  133*  137  
K  5.1  4.1 CL  101  106 CO2  24  26 BUN  17  12 CREA  0.79  0.67* GLU  75  94 CA  9.7  8.4* No results for input(s): CPK, CKNDX, TROIQ in the last 72 hours. No lab exists for component: CPKMB No results found for: CHOL, CHOLX, CHLST, CHOLV, HDL, LDL, LDLC, DLDLP, TGLX, TRIGL, TRIGP, CHHD, CHHDX Recent Labs  
   09/14/18 
 5007 SGOT  37 AP  144* TP  9.0* ALB  2.4*  
GLOB  6.6* No results for input(s): PH, PCO2, PO2 in the last 72 hours. Medications Personally Reviewed: 
 
Current Facility-Administered Medications Medication Dose Route Frequency  enoxaparin (LOVENOX) injection 90 mg  1 mg/kg SubCUTAneous Q12H  
 ketorolac (TORADOL) injection 15 mg  15 mg IntraVENous Q8H PRN  
 HYDROmorphone (DILAUDID) injection 1 mg  1 mg IntraVENous Q3H PRN  
 0.9% sodium chloride infusion  50 mL/hr IntraVENous RAD ONCE  
  sodium chloride (NS) flush 10 mL  10 mL IntraVENous RAD ONCE  
 iopamidol (ISOVUE-370) 76 % injection 100 mL  100 mL IntraVENous RAD ONCE  
 gentamicin (GARAMYCIN) in 0.9% sodium chloride 100 mL infusion   IntraVENous Q12H  
 gabapentin (NEURONTIN) capsule 200 mg  200 mg Oral BID  cefepime (MAXIPIME) 2 g in 0.9% sodium chloride (MBP/ADV) 100 mL  2 g IntraVENous Q8H  
 sodium chloride (NS) flush 5-10 mL  5-10 mL IntraVENous Q8H  
 sodium chloride (NS) flush 5-10 mL  5-10 mL IntraVENous PRN  
 naloxone (NARCAN) injection 0.4 mg  0.4 mg IntraVENous PRN  
 ondansetron (ZOFRAN) injection 4 mg  4 mg IntraVENous Q4H PRN  
 bisacodyl (DULCOLAX) suppository 10 mg  10 mg Rectal DAILY PRN  
 acetaminophen (TYLENOL) tablet 500 mg  500 mg Oral Q6H PRN  
 hydrALAZINE (APRESOLINE) 20 mg/mL injection 10 mg  10 mg IntraVENous Q6H PRN  
 albuterol-ipratropium (DUO-NEB) 2.5 MG-0.5 MG/3 ML  3 mL Nebulization Q4H PRN  
 guaiFENesin ER (MUCINEX) tablet 600 mg  600 mg Oral Q12H  polyethylene glycol (MIRALAX) packet 17 g  17 g Oral DAILY  saline peripheral flush soln 10 mL  10 mL InterCATHeter PRN  
 SALINE PERIPHERAL FLUSH Q8H soln 10 mL  10 mL InterCATHeter Q8H Matt Khanna MD

## 2018-09-14 NOTE — CONSULTS
Palliative Medicine Consult  [place]: [phone] ([phone])    Patient Name: Lakesha Levin  YOB: 1980    Date of Initial Consult: 18  Reason for Consult: low back pain due to septic arthritis  Requesting Provider: Maynor Shook  Primary Care Physician: None     SUMMARY:   Lakesha Levin is a 43 y.o. with a past history of IVDA, cervical spine, surgery, MI, MITZI, smoker who , who was admitted on 2018 from home with a diagnosis of septic arthritis. Current medical issues leading to Palliative Medicine involvement include: pain management complicated by heroin use. TO BE CLEAR, PALLIATIVE MEDICINE WILL ASSIST WITH ACUTE PAIN MANAGEMENT DURING THIS ADMISSION, HOWEVER, WE ARE NOT ADDICTION SPECIALISTS THUS CANNOT TREAT THIS PATIENT FOR ADDICTION, NOR WILL WE MANAGE THIS PATIENT'S PAIN NEEDS AFTER ADMISSION. Per : only prescriptions in the past 12 months:  1) Narcan Nasal Spray and Gabapentin 300mg #90/15 day supply by Dr. Nathan Grijalva in [place]. Psychosocial: wife  2 years ago suddenly, leaving a 1month old, and 2 other children. Pt has been struggling with drug addiction for 16 years, in-laws realized this on the same day their daughter . They are raising pt's children to which he admits is for the best given his addiction. Pt moved to [place] after his wife , hooking up with friends that were drug users, until after his admission to [facility] this spring; following that admission, he moved down to [place] to try and get clean. He lives in a room that he rents from family. He has weaned back from using 1.5grams heroin/d to 0.5 grams/d (this is not an exact dose as he never knows what exactly the strength or how much of the drug is cut). He is not using it for the \"high\" rather to be able to function. He works daily in [employer], paid cash, no health insurance. He has never been to a drug treatment program, will consider it on discharge.     24 HOURS OPIOID USE:  Today (so far):IV Dilaudid 7mg (2mg of the 7mg was a one time dose for sudden worsening pain), gabapentin 200mg  9/13: IV Dilaudid 2mg, PO Dilaudid 36mg, Gabapentin 400mg  9/12: IV Dilaudid 4mg, Norco 10mg, PO Dilaudid 12mg, Gabapentin 400mg  9/11: IV Dilaudid 6mg, Norco 10mg, Versed 8mg, Fentanyl 100mcg, Gabapentin 200mg  9/10: IV Dilaudid 7mg, Norco 15mg, Gabapentin 400mg  9/9:   IV Dilaudid 6mg, Norco 10mg, Gabapentin 200mg   PALLIATIVE DIAGNOSES:   1. Fever   2. Back pain: h/o MMSA in thoracic spine. per Neurosurgery, MRI poor quality and was performed without contrast, however, it does suggest inflammation  in paraspinal muscle and adjacent facet joint likely representing persistent infection. No evidence of epidural abscess or neural impingement. No role for  surgical intervention. 3. Heroin abuse (IV)  4. Debility   5. 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 6. Septic arthritis T5/6  7. Septic emboli   8. Pseudomonas bacteremia: pt admits to washing needles at the facet     PLAN:   1. PAIN:  Goal for this patient is to help with his acute pain. I had a long discussion with him about the plan of treating his acute pain with minimal amounts of opiates, and titrating him off before discharge to minimize withdrawal.  I assured him that no one will be providing him with prescriptions for opiates when he will be discharged. 0600 this am, pt reported sudden right sided pleuritic chest pain, Hospitalist increased Dilaudid to 2mg, pt received one dose then dose was lowered back to 1mg. Chest CTA negative for PE.   1. IV dilaudid 1mg q. 3 hours prn pain: will continue this dose over the weekend, reassess on Monday for change to q. 4 hours.  I counseled pt that we will be titrating down with the goal of his being off completely upon discharge as he will not be provided with an opiate prescription upon discharge. 2. Toradol 15mg IV q. 6 hours x 4 days, will reassess on Monday. (reviewed with pharmacist, creatinine 0.79, no h/o bleeding)  3. Pepcid 20mg bid while taking Toradol. 4. Tylenol 500mg tid. 5. Ice pack to right groin and/or back. 6. Heating pad to low back. 7. RN CAN CALL ME OVER THE WEEKEND IF PAIN MEDS NEED ADJUSTING [phone]. Discussed with bedside RN. 2. HEROIN ADDICTION: Counseled pt that for his safety, to which pt agreed, during this admission:  1. His door to the hallway must remain opened when he has visitors. 2. He cannot leave the floor, where his RN can see him, without an escort by a hospital staff member. 3. If at any time there is suspicion of drug abuse during this admission, we will drug test him. 3. Reassess tomorrow. 4. Initial consult note routed to primary continuity provider  5. Communicated plan of care with: Palliative IDT, RN     GOALS OF CARE / TREATMENT PREFERENCES:     GOALS OF CARE:  Patient/Health Care Proxy Stated Goals: Prolong life     safe pain management      TREATMENT PREFERENCES:   Code Status: Full Code    Advance Care Planning:  Advance Care Planning 9/7/2018   Patient's Healthcare Decision Maker is: Legal Next of Kin   Confirm Advance Directive None       Medical Interventions: Full interventions   Other Instructions: Other:    As far as possible, the palliative care team has discussed with patient / health care proxy about goals of care / treatment preferences for patient. HISTORY:     History obtained from: chart, patient    CHIEF COMPLAINT: severe right sided back pain    HPI/SUBJECTIVE:    The patient is:   [x] Verbal and participatory  [] Non-participatory due to:     9/6:  BIBA with c/o constant right sided back pain that has progressively worsened and is now unable to stand, with associated fever of 101.3 for the week.  Sxs are  similar to when he was treated for MRSA infection in his spine 3 months ago until July 10, 2018.  Pt admits to using IV heroin this am.    Pt reports \"on Monday experienced sudden severe sharp chest pain\" that eventually resolved on its own, this has happened before. On Tuesday he had sudden sharp low back pain that shoots down his right buttocks \"if you alban a straight line from my spine, over to the right a few inches, then straight down my back, buttocks, to the top of my right thigh\"; he managed to work the day (in [employer]). On Wed woke up with same pain, barely able to get through work, Thursday took the day off hoping that by resting pain would get better. Friday pain still there, now included right hip, deep in the groin, could not even bare weight so he came to the ED. ED W/U:  EXAM: negative  LAB:  Alk phos 149, protein 9, albumin 2.6, wbc 15.8, h/h 9.9/30.0, sed rate 129, CRP 15.50, UDS pos for opiates  IMAGING:  L-SPINE CT: scattered bilat lung opacities, some cavitation, concerning for infection/septic emboli     HOSPITAL COURSE: admitted to [facility] for routine progression of care. 9/7: midline placed. MRI of poor quality due to pt's inability to lie still due to pain. 9/8: pain is still uncontrolled, pt told RN \"I'd rather have a bullet to my head rather than go through this. \" pain exacerbated with movement, cough, sneezing. 9/10: CT reports there is nothing to drain or biopsy. 9/11: KHANH revealed mobile mass on tricuspid valve with possible perforation with moderate to severe tricuspid regurgitation. (pt was given 100mcg IV Fentanyl and 8mg IV Versed for KHANH and was wide awake, anesthesia gave 200mg IV Propofol). 9/12: pt asked for either higher dose of Dilaudid or more frequent dosing, dilaudid was changed to 4mg PO at which pt got very upset. 9/13: RN reports pt asking frequently for pain mmt, unable to tolerate pain under current dilaudid dose and dosing intervals.  Pt reports he fell using the walker; was turning to back up to the bed and the walker caught on the floor and he when his right foot hit the floor the pain was so bad he went down, pulling the IV on top of him. Pt was tearful when talking about his pain. 9/14: sudden onset pleuritic pain early this am, CT scan neg for changes. Pain exacerbated with breathing. Clinical Pain Assessment (nonverbal scale for severity on nonverbal patients):   Clinical Pain Assessment  Severity: 8  Location: right low spine shoots down right buttock to mid thigh  Character: shooting feels like bone on bone  Duration: weeks  Effect: cannot bare weight  Frequency: constant but worse with mmt          Duration: for how long has pt been experiencing pain (e.g., 2 days, 1 month, years)  Frequency: how often pain is an issue (e.g., several times per day, once every few days, constant)     FUNCTIONAL ASSESSMENT:     Palliative Performance Scale (PPS):  PPS: 40       PSYCHOSOCIAL/SPIRITUAL SCREENING:     Palliative IDT has assessed this patient for cultural preferences / practices and a referral made as appropriate to needs (Cultural Services, Patient Advocacy, Ethics, etc.)    Advance Care Planning:  Advance Care Planning 9/7/2018   Patient's Healthcare Decision Maker is: Legal Next of Kin   Confirm Advance Directive None       Any spiritual / Uatsdin concerns:  [] Yes /  [x] No    Caregiver Burnout:  [] Yes /  [x] No /  [] No Caregiver Present      Anticipatory grief assessment:   [x] Normal  / [] Maladaptive       ESAS Anxiety: Anxiety: 0    ESAS Depression: Depression: 0        REVIEW OF SYSTEMS:     Positive and pertinent negative findings in ROS are noted above in HPI. The following systems were [x] reviewed / [] unable to be reviewed as noted in HPI  Other findings are noted below. Systems: constitutional, ears/nose/mouth/throat, respiratory, gastrointestinal, genitourinary, musculoskeletal, integumentary, neurologic, psychiatric, endocrine. Positive findings noted below.   Modified ESAS Completed by: provider   Fatigue: 0 Drowsiness: 0 Depression: 0 Pain: 8   Anxiety: 0 Nausea: 0   Anorexia: 0 Dyspnea: 0     Constipation: No     Stool Occurrence(s): 0        PHYSICAL EXAM:     From RN flowsheet:  Wt Readings from Last 3 Encounters:   09/07/18 190 lb (86.2 kg)     Blood pressure 122/61, pulse 93, temperature 98.6 °F (37 °C), resp. rate 16, height 6' (1.829 m), weight 190 lb (86.2 kg), SpO2 97 %. Pain Scale 1: Numeric (0 - 10)  Pain Intensity 1: 8  Pain Onset 1: chronic  Pain Location 1: Rib cage  Pain Orientation 1: Right  Pain Description 1: Aching  Pain Intervention(s) 1: Medication (see MAR)  Last bowel movement, if known:     Constitutional: WD, WN, NAD, pleasant and cooperative, AAOx3  Eyes: pupils equal, anicteric, good eye contact  ENMT: no nasal discharge, moist mucous membranes  Cardiovascular: regular rhythm, distal pulses intact  Respiratory: breathing not labored, symmetric  Gastrointestinal: soft non-tender, +bowel sounds  Musculoskeletal: right low spine/hips/buttocks hip roll causes pain  Skin: warm, dry  Neurologic: following commands, moving all extremities  Psychiatric: full affect, no hallucinations          HISTORY:     Active Problems:    Sepsis ([address] (9/7/2018)      Endocarditis of tricuspid valve (9/12/2018)      Tricuspid valve regurgitation, infectious (9/12/2018)      Acute septic pulmonary embolism without acute cor pulmonale ([address] (9/12/2018)      Acute right-sided low back pain with sciatica (9/13/2018)      Heroin abuse (9/13/2018)      Debility (9/13/2018)      Past Medical History:   Diagnosis Date    Back pain, chronic       Past Surgical History:   Procedure Laterality Date    HX CERVICAL DISKECTOMY      HX FEMUR FRACTURE TX      HX ORTHOPAEDIC      Back Surgery      History reviewed. No pertinent family history. History reviewed, no pertinent family history.   Social History   Substance Use Topics    Smoking status: Current Every Day Smoker     Packs/day: 1.00    Smokeless tobacco: Never Used   [address] [place] [address] Zach Alcohol use Yes     No Known Allergies   Current Facility-Administered Medications   Medication Dose Route Frequency    enoxaparin (LOVENOX) injection 90 mg  1 mg/kg SubCUTAneous Q12H    ketorolac (TORADOL) injection 15 mg  15 mg IntraVENous Q8H PRN    HYDROmorphone (DILAUDID) injection 1 mg  1 mg IntraVENous Q3H PRN    0.9% sodium chloride infusion  50 mL/hr IntraVENous RAD ONCE    sodium chloride (NS) flush 10 mL  10 mL IntraVENous RAD ONCE    iopamidol (ISOVUE-370) 76 % injection 100 mL  100 mL IntraVENous RAD ONCE    gentamicin (GARAMYCIN) in 0.9% sodium chloride 100 mL infusion   IntraVENous Q12H    gabapentin (NEURONTIN) capsule 200 mg  200 mg Oral BID    cefepime (MAXIPIME) 2 g in 0.9% sodium chloride (MBP/ADV) 100 mL  2 g IntraVENous Q8H    sodium chloride (NS) flush 5-10 mL  5-10 mL IntraVENous Q8H    sodium chloride (NS) flush 5-10 mL  5-10 mL IntraVENous PRN    naloxone (NARCAN) injection 0.4 mg  0.4 mg IntraVENous PRN    ondansetron (ZOFRAN) injection 4 mg  4 mg IntraVENous Q4H PRN    bisacodyl (DULCOLAX) suppository 10 mg  10 mg Rectal DAILY PRN    acetaminophen (TYLENOL) tablet 500 mg  500 mg Oral Q6H PRN    hydrALAZINE (APRESOLINE) 20 mg/mL injection 10 mg  10 mg IntraVENous Q6H PRN    albuterol-ipratropium (DUO-NEB) 2.5 MG-0.5 MG/3 ML  3 mL Nebulization Q4H PRN    guaiFENesin ER (MUCINEX) tablet 600 mg  600 mg Oral Q12H    polyethylene glycol (MIRALAX) packet 17 g  17 g Oral DAILY    saline peripheral flush soln 10 mL  10 mL InterCATHeter PRN    SALINE PERIPHERAL FLUSH Q8H soln 10 mL  10 mL InterCATHeter Q8H          LAB AND IMAGING FINDINGS:     Lab Results   Component Value Date/Time    WBC 11.7 (H) 09/10/2018 11:47 PM    HGB 9.6 (L) 09/10/2018 11:47 PM    PLATELET 951 81/69/4156 11:47 PM     Lab Results   Component Value Date/Time    Sodium 133 (L) 09/14/2018 03:53 AM    Potassium 5.1 09/14/2018 03:53 AM    Chloride 101 09/14/2018 03:53 AM    CO2 24 09/14/2018 03:53 AM    BUN 17 09/14/2018 03:53 AM    Creatinine 0.79 09/14/2018 03:53 AM    Calcium 9.7 09/14/2018 03:53 AM      Lab Results   Component Value Date/Time    AST (SGOT) 37 09/14/2018 03:53 AM    Alk. phosphatase 144 (H) 09/14/2018 03:53 AM    Protein, total 9.0 (H) 09/14/2018 03:53 AM    Albumin 2.4 (L) 09/14/2018 03:53 AM    Globulin 6.6 (H) 09/14/2018 03:53 AM     No results found for: INR, PTMR, PTP, PT1, PT2, APTT   No results found for: IRON, FE, TIBC, IBCT, PSAT, FERR   No results found for: PH, PCO2, PO2  No components found for: GLPOC   No results found for: CPK, CKMB             Total time: 45  Counseling / coordination time, spent as noted above: 35  > 50% counseling / coordination?: y    Prolonged service was provided for  []30 min   []75 min in face to face time in the presence of the patient, spent as noted above. Time Start:   Time End:   Note: this can only be billed with 31156 (initial) or 52211 (follow up). If multiple start / stop times, list each separately.

## 2018-09-14 NOTE — PROGRESS NOTES
Initial Nutrition Assessment: 
 
INTERVENTIONS/RECOMMENDATIONS:  
· Meals/Snacks: General/healthful diet:  Continue regular diet. Enc po/select meals. ASSESSMENT:  
9/14:  Chart reviewed; med noted for sepsis on admission. H/O if heroin abuse. LOS screen. Tolerating a regular diet with PO >75% of meals. Labs stable. Diet Order: Regular 
% Eaten:  Patient Vitals for the past 72 hrs: 
 % Diet Eaten 09/14/18 1442 10 % 09/14/18 0850 100 % 09/14/18 0838 100 % 09/13/18 1801 100 % 09/13/18 0944 100 % 09/12/18 1212 100 % 09/11/18 1826 100 % Pertinent Medications: [x]Reviewed []Other: lovenoa Pertinent Labs: [x]Reviewed []Other Food Allergies: [x]None []Other Last BM:    [x]Active     []Hyperactive  []Hypoactive       [] Absent BS Skin:    [x] Intact   [] Incision  [] Breakdown  [] Other: Anthropometrics:  
Height: 6' (182.9 cm) Weight:  (unable to get weight) IBW (%IBW):   ( ) UBW (%UBW):   (  %) Last Weight Metrics: 
Weight Loss Metrics 9/7/2018 9/6/2018 Today's Wt 190 lb -  
BMI - 25.77 kg/m2 BMI: Body mass index is 25.77 kg/(m^2). This BMI is indicative of: 
 []Underweight    []Normal    [x]Overweight    [] Obesity   [] Extreme Obesity (BMI>40) Estimated Nutrition Needs (Based on):  
2363 Kcals/day (BMR (1818) x 1. 3AF) , 86 g (1.0 g/kg bw) Protein Carbohydrate: At Least 130 g/day  Fluids:2400 mL/day (1ml/kcal) NUTRITION DIAGNOSES:  
Problem:  No nutritional diagnosis at this time Etiology: related to Signs/Symptoms: as evidenced by NUTRITION INTERVENTIONS: 
Meals/Snacks: General/healthful diet GOAL:  
PO intake remain >75% of meals next 5-7 days LEARNING NEEDS (Diet, Food/Nutrient-Drug Interaction):  
 [x] None Identified 
 [] Identified and Education Provided/Documented 
 [] Identified and Pt declined/was not appropriate Cultureal, Congregation, OR Ethnic Dietary Needs:  
 [x] None Identified [] Identified and Addressed 
 
 [x] Interdisciplinary Care Plan Reviewed/Documented  
 [x] Discharge Planning:   Continue regular diet MONITORING /EVALUATION:  
Food/Nutrient Intake Outcomes: Total energy intake Physical Signs/Symptoms Outcomes: Weight/weight change NUTRITION RISK:  
 [] Patient At Nutritional Risk  
 [x] Patient Not At Nutritional Risk PT SEEN FOR:  
 []  MD Consult: []Calorie Count []Diabetic Diet Education []Diet Education []Electrolyte Management []General Nutrition Management and Supplements []Management of Tube Feeding []TPN Recommendations []  RN Referral:  []MST score >=2 
   []Enteral/Parenteral Nutrition PTA []Pregnant: Gestational DM or Multigestation 
   []Pressure Ulcer/Wound Care needs 
     
[]  Low BMI [x]  SAVANAH Foss RD Pager [phone] Weekend Pager [phone]

## 2018-09-14 NOTE — PROGRESS NOTES
Hospitalist Progress Note NAME: Va Castro :  1980 MRN:  [id number] Assessment / Plan: 
44 yo male with pmh of IVDA and prior history of MSSA bacteremia with negative KHANH in May 2018 and completed CEfazolin from  thru . Now, he presents with back pain. Sepsis, POA Back spine concerning for septic arthritis/ spine abscess Pseudomonas bacteremia , ,  and  TR infective Endocarditis Pulmonary septic emboli MRI L spine showed increased fluid in the right L3-4 posterior facet joint with surrounding edema extending into the paraspinous musculature. Findings are suspicious for septic arthritis. No significant spinal stenosis or neural foraminal narrowing. CT thoracic spine showed multiple pulmonary nodules, several of which are cavitary and several groundglass opacities and areas of consolidation. 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 ESR 12, CRP 15.5, lactate 0.9, UA neg Blood culture  growing GNR in 1/2 bottles ID, ortho, IR and Cardiothoracic surgery teams are following. No surgical intervention at this time. On cefepime and vancomycin. Gentamycin added given still positive blood cultures. Right-sided pleuritic chest pain Right-sided pleural effusion CTA chest with no PE, right basilar opacification with associated right-sided pleural effusion. Back pain Pain team is managing pain regimen. HTN 
BP is controlled 
hydralazine prn 
  
Chronic anemia 
no evidence of bleeding Stable H/H. Code Status: Full Surrogate Decision Maker: he did not wish to list anyone DVT Prophylaxis: SCDs for now GI Prophylaxis: not indicated Baseline: indedepent Subjective: Chief Complaint / Reason for Physician Visit \"Patient developed sudden onset of right sided chest that is worse during inspiration. CXR negative. Received IV dilaudid. \".   Discussed with RN events overnight. Review of Systems: 
Symptom Y/N Comments  Symptom Y/N Comments Fever/Chills n   Chest Pain y  right-sided Poor Appetite    Edema n   
Cough n   Abdominal Pain n   
Sputum n   Joint Pain y  back pain SOB/CUEVA n   Pruritis/Rash Nausea/vomit n   Tolerating PT/OT Diarrhea n   Tolerating Diet Constipation    Other Could NOT obtain due to:   
 
Objective: VITALS:  
Last 24hrs VS reviewed since prior progress note. Most recent are: 
Patient Vitals for the past 24 hrs: 
 Temp Pulse Resp BP SpO2  
09/14/18 0516 - 88 22 104/76 99 % 09/14/18 0021 99.3 °F (37.4 °C) 86 18 128/79 98 % 09/13/18 1957 98.6 °F (37 °C) 94 18 133/85 97 % 09/13/18 1531 98.5 °F (36.9 °C) 92 16 129/87 97 % 09/13/18 1301 98 °F (36.7 °C) 94 16 145/79 100 % 09/13/18 0819 97.8 °F (36.6 °C) 78 20 125/76 98 % Intake/Output Summary (Last 24 hours) at 09/14/18 0740 Last data filed at 09/14/18 5524 Gross per 24 hour Intake             1640 ml Output             1525 ml Net              115 ml PHYSICAL EXAM: 
General: Alert, cooperative, develops pleuritic chest pain during chest examination EENT:  Anicteric sclerae. Resp:  Decreased air entry in right lower lung base. no wheezing or rales. No accessory muscle use CV:  Regular rate, S1, S2. No LE edema GI:  Soft, Non distended, Non tender.  +Bowel sounds Neurologic:  Alert and oriented X 3, normal speech, no numbness/weakness, no incontinence Skin:  No jaundice Reviewed most current lab test results and cultures  YES Reviewed most current radiology test results   YES Review and summation of old records today    NO Reviewed patient's current orders and MAR    YES 
PMH/SH reviewed - no change compared to H&P 
________________________________________________________________________ 
________________________________________________________________________ Alpha Islas, MD  
 
 Procedures: see electronic medical records for all procedures/Xrays and details which were not copied into this note but were reviewed prior to creation of Plan. LABS: 
I reviewed today's most current labs and imaging studies. Pertinent labs include: No results for input(s): WBC, HGB, HCT, PLT, HGBEXT, HCTEXT, PLTEXT, HGBEXT, HCTEXT, PLTEXT in the last 72 hours. Recent Labs  
   09/14/18 
 0353  09/12/18 
 0344 NA  133*  137  
K  5.1  4.1 CL  101  106 CO2  24  26 GLU  75  94 BUN  17  12 CREA  0.79  0.67* CA  9.7  8.4* ALB  2.4*   --   
TBILI  0.3   --   
SGOT  37   --   
ALT  71   --   
 
 
Signed: Zo Chavarria MD

## 2018-09-14 NOTE — PROGRESS NOTES
Pharmacy Automatic Renal Dosing Protocol - Antimicrobials Indication for Antimicrobials: endocarditis Current Regimen of Each Antimicrobial: 
Cefepime 2 g IV every 8 hours (Start Date  day 7) Gentamicin - pharmacy dosing (, day 1) Previous Antimicrobial Therapy: 
Ceftriaxone x1  am 
Vancomycin 1000 mg q8h - started  - Significant Cultures:  
 PBC -  - pseudomonas A - Pan sensitive  BCx- Pseudomonas A - Final  
 MRSA - negative in nares  blood: GNRs in - pending Radiology / Imaging results: (X-ray, CT scan or MRI):  
 + endocarditis of tricuspid valve Paralysis, amputations, malnutrition: none Labs: 
Recent Labs  
   18 
 0353  18 
 0344 CREA  0.79  0.67* BUN  17  12 Temp (24hrs), Av.9 °F (37.2 °C), Min:98.5 °F (36.9 °C), Max:99.3 °F (37.4 °C) Creatinine Clearance (mL/min) or Dialysis: > 100 mL/min Impression/Plan: · Will continue cefepime, dosing appropriate for CrCl > 60 · Blood cultures continue to result positive. Gentamicin ~5mg/kg every 24 hours. Post dose random level ordered · I did not do synergy dosing as this is not a gram positive organism. · Antimicrobial stop date - pending, ~ 6 weeks Pharmacy will follow daily and adjust medications as appropriate for renal function and/or serum levels. Peg Councilman Helton, PHARMD 
 
 
 
Recommended duration of therapy 
http://[facility]/Pharmacy/Clinical%20Companion/Duration%20of%20ABX%20therapy. docx Renal Dosing 
http://[facility]/[place]/[facility]/Pharmacy/Clinical%20Companion/Renal%[email or web address]. pdf

## 2018-09-14 NOTE — PROGRESS NOTES
PICC order acknowledged. Pt has positive blood culture with one out of 4 bottles drawn on 9/11/18 positive for Gm negative rods. Will place a PIV for CTA. Discussed with Dr Linda Guzman and will hold PICC until cultures are negative. Loretta Sinha RN, BSN, [facility] Vascular Access Team

## 2018-09-14 NOTE — PROGRESS NOTES
Spoke with PICC team nurse and she is aware of the order. Informed that MD ordered CTA. She stated that the team will be there to place PICC line so that pt can have CTA.

## 2018-09-15 LAB
ANION GAP SERPL CALC-SCNC: 9 MMOL/L (ref 5–15)
BASOPHILS # BLD: 0.1 K/UL (ref 0–0.1)
BASOPHILS NFR BLD: 0 % (ref 0–1)
BUN SERPL-MCNC: 21 MG/DL (ref 6–20)
BUN/CREAT SERPL: 22 (ref 12–20)
CALCIUM SERPL-MCNC: 8.9 MG/DL (ref 8.5–10.1)
CHLORIDE SERPL-SCNC: 102 MMOL/L (ref 97–108)
CO2 SERPL-SCNC: 25 MMOL/L (ref 21–32)
CREAT SERPL-MCNC: 0.95 MG/DL (ref 0.7–1.3)
DATE LAST DOSE: NORMAL
DIFFERENTIAL METHOD BLD: ABNORMAL
EOSINOPHIL # BLD: 0.2 K/UL (ref 0–0.4)
EOSINOPHIL NFR BLD: 1 % (ref 0–7)
ERYTHROCYTE [DISTWIDTH] IN BLOOD BY AUTOMATED COUNT: 15.2 % (ref 11.5–14.5)
GENTAMICIN SERPL-MCNC: 0.6 UG/ML
GLUCOSE SERPL-MCNC: 93 MG/DL (ref 65–100)
HCT VFR BLD AUTO: 25.5 % (ref 36.6–50.3)
HGB BLD-MCNC: 8.2 G/DL (ref 12.1–17)
IMM GRANULOCYTES # BLD: 0.2 K/UL (ref 0–0.04)
IMM GRANULOCYTES NFR BLD AUTO: 2 % (ref 0–0.5)
LYMPHOCYTES # BLD: 2 K/UL (ref 0.8–3.5)
LYMPHOCYTES NFR BLD: 14 % (ref 12–49)
MCH RBC QN AUTO: 27.5 PG (ref 26–34)
MCHC RBC AUTO-ENTMCNC: 32.2 G/DL (ref 30–36.5)
MCV RBC AUTO: 85.6 FL (ref 80–99)
MONOCYTES # BLD: 0.9 K/UL (ref 0–1)
MONOCYTES NFR BLD: 6 % (ref 5–13)
NEUTS SEG # BLD: 10.6 K/UL (ref 1.8–8)
NEUTS SEG NFR BLD: 76 % (ref 32–75)
NRBC # BLD: 0 K/UL (ref 0–0.01)
NRBC BLD-RTO: 0 PER 100 WBC
PLATELET # BLD AUTO: 298 K/UL (ref 150–400)
PMV BLD AUTO: 8.6 FL (ref 8.9–12.9)
POTASSIUM SERPL-SCNC: 4.3 MMOL/L (ref 3.5–5.1)
RBC # BLD AUTO: 2.98 M/UL (ref 4.1–5.7)
REPORTED DOSE,DOSE: NORMAL UNITS
REPORTED DOSE/TIME,TMG: NORMAL
SODIUM SERPL-SCNC: 136 MMOL/L (ref 136–145)
WBC # BLD AUTO: 13.9 K/UL (ref 4.1–11.1)

## 2018-09-15 PROCEDURE — 74011000258 HC RX REV CODE- 258: Performed by: INTERNAL MEDICINE

## 2018-09-15 PROCEDURE — 80048 BASIC METABOLIC PNL TOTAL CA: CPT | Performed by: INTERNAL MEDICINE

## 2018-09-15 PROCEDURE — 74011250636 HC RX REV CODE- 250/636: Performed by: INTERNAL MEDICINE

## 2018-09-15 PROCEDURE — 36415 COLL VENOUS BLD VENIPUNCTURE: CPT | Performed by: INTERNAL MEDICINE

## 2018-09-15 PROCEDURE — 74011250637 HC RX REV CODE- 250/637: Performed by: INTERNAL MEDICINE

## 2018-09-15 PROCEDURE — 74011250636 HC RX REV CODE- 250/636: Performed by: NURSE PRACTITIONER

## 2018-09-15 PROCEDURE — 74011250637 HC RX REV CODE- 250/637: Performed by: NURSE PRACTITIONER

## 2018-09-15 PROCEDURE — 85025 COMPLETE CBC W/AUTO DIFF WBC: CPT | Performed by: INTERNAL MEDICINE

## 2018-09-15 PROCEDURE — 80170 ASSAY OF GENTAMICIN: CPT | Performed by: INTERNAL MEDICINE

## 2018-09-15 PROCEDURE — 74011000250 HC RX REV CODE- 250: Performed by: NURSE PRACTITIONER

## 2018-09-15 PROCEDURE — 74011000250 HC RX REV CODE- 250: Performed by: INTERNAL MEDICINE

## 2018-09-15 PROCEDURE — 65270000029 HC RM PRIVATE

## 2018-09-15 RX ADMIN — ACETAMINOPHEN 500 MG: 500 TABLET ORAL at 21:20

## 2018-09-15 RX ADMIN — ACETAMINOPHEN 500 MG: 500 TABLET ORAL at 08:45

## 2018-09-15 RX ADMIN — GABAPENTIN 200 MG: 100 CAPSULE ORAL at 08:45

## 2018-09-15 RX ADMIN — CEFEPIME HYDROCHLORIDE 2 G: 2 INJECTION, POWDER, FOR SOLUTION INTRAVENOUS at 17:26

## 2018-09-15 RX ADMIN — Medication 10 ML: at 21:21

## 2018-09-15 RX ADMIN — Medication 10 ML: at 11:57

## 2018-09-15 RX ADMIN — ACETAMINOPHEN 500 MG: 500 TABLET ORAL at 16:25

## 2018-09-15 RX ADMIN — Medication 10 ML: at 21:20

## 2018-09-15 RX ADMIN — CEFEPIME HYDROCHLORIDE 2 G: 2 INJECTION, POWDER, FOR SOLUTION INTRAVENOUS at 09:41

## 2018-09-15 RX ADMIN — KETOROLAC TROMETHAMINE 15 MG: 30 INJECTION, SOLUTION INTRAMUSCULAR at 17:27

## 2018-09-15 RX ADMIN — ENOXAPARIN SODIUM 40 MG: 40 INJECTION, SOLUTION INTRAVENOUS; SUBCUTANEOUS at 07:46

## 2018-09-15 RX ADMIN — HYDROMORPHONE HYDROCHLORIDE 1 MG: 2 INJECTION INTRAMUSCULAR; INTRAVENOUS; SUBCUTANEOUS at 13:21

## 2018-09-15 RX ADMIN — FAMOTIDINE 20 MG: 20 TABLET ORAL at 08:45

## 2018-09-15 RX ADMIN — Medication 10 ML: at 19:10

## 2018-09-15 RX ADMIN — GUAIFENESIN 600 MG: 600 TABLET, EXTENDED RELEASE ORAL at 08:45

## 2018-09-15 RX ADMIN — LEVOFLOXACIN 750 MG: 5 INJECTION, SOLUTION INTRAVENOUS at 14:13

## 2018-09-15 RX ADMIN — HYDROMORPHONE HYDROCHLORIDE 1 MG: 2 INJECTION INTRAMUSCULAR; INTRAVENOUS; SUBCUTANEOUS at 09:37

## 2018-09-15 RX ADMIN — GUAIFENESIN 600 MG: 600 TABLET, EXTENDED RELEASE ORAL at 21:20

## 2018-09-15 RX ADMIN — HYDROMORPHONE HYDROCHLORIDE 1 MG: 2 INJECTION INTRAMUSCULAR; INTRAVENOUS; SUBCUTANEOUS at 03:25

## 2018-09-15 RX ADMIN — FAMOTIDINE 20 MG: 20 TABLET ORAL at 17:27

## 2018-09-15 RX ADMIN — Medication 10 ML: at 07:46

## 2018-09-15 RX ADMIN — KETOROLAC TROMETHAMINE 15 MG: 30 INJECTION, SOLUTION INTRAMUSCULAR at 11:56

## 2018-09-15 RX ADMIN — CEFEPIME HYDROCHLORIDE 2 G: 2 INJECTION, POWDER, FOR SOLUTION INTRAVENOUS at 03:24

## 2018-09-15 RX ADMIN — GABAPENTIN 200 MG: 100 CAPSULE ORAL at 17:27

## 2018-09-15 RX ADMIN — KETOROLAC TROMETHAMINE 15 MG: 30 INJECTION, SOLUTION INTRAMUSCULAR at 06:00

## 2018-09-15 RX ADMIN — GENTAMICIN SULFATE 400 MG: 40 INJECTION, SOLUTION INTRAMUSCULAR; INTRAVENOUS at 11:56

## 2018-09-15 RX ADMIN — Medication 10 ML: at 13:21

## 2018-09-15 RX ADMIN — HYDROMORPHONE HYDROCHLORIDE 1 MG: 2 INJECTION INTRAMUSCULAR; INTRAVENOUS; SUBCUTANEOUS at 22:03

## 2018-09-15 RX ADMIN — HYDROMORPHONE HYDROCHLORIDE 1 MG: 2 INJECTION INTRAMUSCULAR; INTRAVENOUS; SUBCUTANEOUS at 19:10

## 2018-09-15 RX ADMIN — Medication 10 ML: at 06:29

## 2018-09-15 RX ADMIN — Medication 10 ML: at 17:29

## 2018-09-15 RX ADMIN — HYDROMORPHONE HYDROCHLORIDE 1 MG: 2 INJECTION INTRAMUSCULAR; INTRAVENOUS; SUBCUTANEOUS at 16:22

## 2018-09-15 RX ADMIN — Medication 10 ML: at 16:22

## 2018-09-15 NOTE — PROGRESS NOTES
Pharmacy Automatic Renal Dosing Protocol - Antimicrobials/Aminoglycosides ++ID FOLLOWING++ Indication for Antimicrobials:  
? Pseudomonas bacteremia 
? tricuspid valve endocarditis ? pneumonia Current Regimen of Each Antimicrobial: 
Levofloxacin 750 every 24 hours (, day 2 of ) Cefepime 2 g IV every 8 hours (Start Date  day 9) Gentamicin -pulse dosing (, day 2) Previous Antimicrobial Therapy: 
Ceftriaxone x1  am 
Vancomycin 1000 mg q8h - started  - Significant Cultures:  
 PBC -  - pseudomonas A - Pan sensitive  BCx- Pseudomonas A - Final  
 MRSA - negative in nares  blood x 2: possible PSE in - pending Gentamicin Levels: 
Goal Level: PULSE DOSING GENTAMICIN Peak: 15 - 20 mcg/mL Trough: < or = 0.3 mcg/mL Date Dose & Interval Measured (mcg/mL) Extrapolated (mcg/mL) 9/15/18 400 mg IV (~4.6 mg/kg) q 24 hours 0.6 mcg/mL (15 h level) N/A Radiology / Imaging results: (X-ray, CT scan or MRI):  
 + endocarditis of tricuspid valve Paralysis, amputations, malnutrition: none Labs: 
Recent Labs  
   09/15/18 
 0326  18 
 0353 CREA  0.95  0.79 BUN  21*  17 WBC  13.9*   -- Temp (24hrs), Av °F (37.2 °C), Min:98.4 °F (36.9 °C), Max:99.6 °F (37.6 °C) Creatinine Clearance (mL/min) or Dialysis: > 100 mL/min Impression/Plan: · Scr slight increase from  · Levofloxacin and cefepime dosing appropriate per protocol for indication and renal function · 15 hour gentamicin level of 0.6 mcg/mL appropriate to support con't of current dosing per pulse dose protocol · Unsure why we are covering patient with triple pseudomonas coverage; consider de-escalating to two agents as blood pseudomonas isolate is car S, if appropraite · Daily BMP ordered per protocol · Antimicrobial stop date: pending, ~ 6 weeks Pharmacy will follow daily and adjust medications as appropriate for renal function and/or serum levels. Irish Mott, PharmD, BCPS Clinical Pharmacy Specialist

## 2018-09-15 NOTE — PROGRESS NOTES
Hospitalist Progress NoteNAME: Patrick Wallis :  1980 MRN:  [id number] Interim Hospital Summary: 43 y.o. male whom presented on 2018 with Assessment / Plan: 1. 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 No surgical intervention at this time. On cefepime, Levaquin,  Gentamycin and vancomycin. WBC still trending up. Further recs per ID MRI L spine showed increased fluid in the right L3-4 posterior facet joint with surrounding edema extending into the paraspinous musculature. Findings are suspicious for septic arthritis.  No significant spinal stenosis or neural foraminal narrowing. CT thoracic spine showed multiple pulmonary nodules, several of which are cavitary and several groundglass opacities and areas of consolidation. 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 ESR 12, CRP 15.5, lactate 0.9, UA neg 2. Sepsis, POA; due to above. 3. Right-sided pleuritic chest pain:  CTA chest with no PE, right basilar opacification with associated right-sided pleural effusion. 4. Back pain: being followed by palliative medicine for pain management. On Iv dilaudid as needed. 5. HTN: controlled. On hydralazine prn 
6. Chronic anemia 
   
  
Code Status: Full Surrogate Decision Maker: he did not wish to list anyone DVT Prophylaxis: SCDs for now GI Prophylaxis: not indicated Baseline: indedepent Subjective: Chief Complaint / Reason for Physician Visit No c/o SOB/CP, says pain is somewhat controlled. .   
Discussed with RN events overnight. Review of Systems: 
Symptom Y/N Comments  Symptom Y/N Comments Fever/Chills n   Chest Pain n   
Poor Appetite n   Edema n Cough n   Abdominal Pain n   
Sputum    Joint Pain SOB/CUEVA n   Pruritis/Rash Nausea/vomit n   Tolerating PT/OT Diarrhea    Tolerating Diet Constipation    Other Objective: VITALS:  
Last 24hrs VS reviewed since prior progress note. Most recent are: 
Patient Vitals for the past 24 hrs: 
 Temp Pulse Resp BP SpO2  
09/15/18 1234 98 °F (36.7 °C) 90 16 131/68 97 % 09/15/18 0748 98.4 °F (36.9 °C) 88 16 121/80 98 % 09/14/18 2258 99.6 °F (37.6 °C) 85 17 116/66 98 % 09/14/18 1912 99.3 °F (37.4 °C) 93 16 122/63 99 % 09/14/18 1541 98.7 °F (37.1 °C) 97 16 132/70 96 % Intake/Output Summary (Last 24 hours) at 09/15/18 1337 Last data filed at 09/15/18 1200 Gross per 24 hour Intake             2630 ml Output             1600 ml Net             1030 ml PHYSICAL EXAM: 
General: WD, WN. Alert, cooperative, no acute distress   
EENT:  EOMI. Anicteric sclerae. MMM Resp:  CTA bilaterally, no wheezing or rales. No accessory muscle use CV:  Regular  rhythm,  No edema GI:  Soft, Non distended, Non tender.  +Bowel sounds Neurologic:  Alert and oriented X 3, normal speech, Psych:    Not anxious nor agitated Skin:  no rashes or ulcers. No jaundice Reviewed most current lab test results and cultures  YES Reviewed most current radiology test results   YES Review and summation of old records today    NO Reviewed patient's current orders and MAR    YES 
PMH/SH reviewed - no change compared to H&P 
________________________________________________________________________ Care Plan discussed with: 
  Comments Patient x Family RN x Care Manager Consultant:     
________________________________________________________________________ Total NON critical care TIME: 25  Minutes Total CRITICAL CARE TIME Spent:   Minutes non procedure based Comments >50% of visit spent in counseling and coordination of care ________________________________________________________________________ Aletha Morales MD  
 
Procedures: see electronic medical records for all procedures/Xrays and details which were not copied into this note but were reviewed prior to creation of Plan. LABS: 
I reviewed today's most current labs and imaging studies. Pertinent labs include: 
Recent Labs  
   09/15/18 
 0326 WBC  13.9* HGB  8.2* HCT  25.5*  
PLT  298 Recent Labs  
   09/15/18 
 0326  09/14/18 
 0353 NA  136  133* K  4.3  5.1 CL  102  101 CO2  25  24 GLU  93  75 BUN  21*  17  
CREA  0.95  0.79 CA  8.9  9.7 ALB   --   2.4* TBILI   --   0.3 SGOT   --   37 ALT   --   71

## 2018-09-15 NOTE — PROGRESS NOTES
General Surgery End of Shift Nursing Note Bedside shift change report given to Almita Hernandez RN (oncoming nurse) by Elizabeth Reynolds RN (offgoing nurse). Report included the following information SBAR, Kardex, Intake/Output, MAR and Accordion. Shift worked:   7 am - 7pm  
Summary of shift:    Pt tolerating meds and diet well. Pain treated with pain meds. Not getting out of bed due to chronic back pain, encouraged to at least sit in chair to no avail. Issues for physician to address:   none Number times ambulated in hallway past shift: 0 Number of times OOB to chair past shift: 0 Pain Management: 
Current medication: See [facility] Patient states pain is manageable on current pain medication: YES 
 
GI: 
 
Current diet:  DIET REGULAR Tolerating current diet: YES Passing flatus: YES Last Bowel Movement: 9/13/18 Appearance:  
 
Respiratory: 
 
Incentive Spirometer at bedside: YES Patient instructed on use: YES Patient Safety: 
 
Falls Score: 1 Bed Alarm On? Not applicable Sitter? Not applicable Andriy Urrutia RN

## 2018-09-15 NOTE — PROGRESS NOTES
Infectious Disease Progress Note IMPRESSION:  
· Persistent Pseudomonas aeruginosa bacteremia 2/2 ( 9/6), repeat BC also+ 1/1 (9/7)BC  9/11- 1/4. Pt admits to washing needles with water off faucet. Denies sharing needles · Tricuspid valve endocarditis- mobile mass on atrial side of tricuspid valve with possible perforation, concerning for endocarditis · R/basilar airspace disease on CXR ·  Evidence of cervical fusion C5-7 & T4/5 laminectomy on CT scan · MRI shows- fluid / edema L3/4 posterior joint facet suspicious for septic arthritis. · Pulmonary nodules,cavitary ,ground glass opacities, on  CT thorax suggestive of septic emboli ·  Reports from [facility] reviewed. Pt had MSSA bacteremia in May 2018. ( not MRSA ) Treated with Nafcillin & thereafter Cefazolin 5/15 to 6/28. · MRI of spine showed inflammatory changes in T5/6 suggestive of septic arthritis. TTE(5/16)  suggestive of vegetation , KHANH done (5/18), no evidence of vegetation as per D/c summary from [facility] · IVDU on going upto time of admission · UDS + for opiates · Smoker , alcohol consumption + · S/p IR evaluation - fluid in spine not accessible for aspiration per IR, S/p Ortho consult - defer to Neurosurgery. · S/p Cardiothoracic surgery evaluation - no surgery at this time. -  
· HIV, Hep C negative  
  
  
PLAN:  
   
· Continue Cefepime IV, add gentamicin , levaquin for new infiltrate ·  IS   
·  ECHO KHANH reports from [facility] · Plan of care d/w pt . PSEUDOMONAS SPECIES GROWING IN THIS SINGLE BOTTLE DRAWN ([facility] SITE) REFER TO [facility] [id number] FOR ID AND SENSITIVITIES (A) Culture result:    Final  
PRELIMINARY REPORT OF GRAM NEGATIVE RODS GROWING IN THIS SINGLE BOTTLE DRAWN CALLED TO AND READ BACK BY Lucero Montoya RN ON 9/7/18 AT 1815 [facility]). MS (A)  
 
 
KHANH (9/11) TRICUSPID VALVE: There was moderate thickening. There was normal leaflet 
separation. There was a mobile mass on the atrial side of the tricuspid valve with possible perforation concerning for endocarditis. DOPPLER: 
There was moderate to severe regurgitation. PSEUDOMONAS AERUGINOSA GROWING IN THIS SINGLE BOTTLE DRAWN SITE=LAC (A) Culture result:    Preliminary PRELIMINARY REPORT OF GRAM NEGATIVE RODS GROWING IN THIS SINGLE BOTTLE DRAWN CALLED TO AND READ BACK BY Lucero Montoya RN ON 18 AT 1815 [facility] 2121). MS (A) Subjective:  
 
Pt seen . \"My chest hurts\" Pt pointing to R/side of chest . Denies cough Review of Systems:  A comprehensive review of systems was negative except for that written in the History of Present Illness. Objective:  
 
Blood pressure 116/66, pulse 85, temperature 99.6 °F (37.6 °C), resp. rate 17, height 6' (1.829 m), weight 190 lb (86.2 kg), SpO2 98 %. Temp (24hrs), Av.1 °F (37.3 °C), Min:98.6 °F (37 °C), Max:99.6 °F (37.6 °C) Patient Vitals for the past 24 hrs: 
 Temp Pulse Resp BP SpO2  
18 2258 99.6 °F (37.6 °C) 85 17 116/66 98 % 18 1912 99.3 °F (37.4 °C) 93 16 122/63 99 % 18 1541 98.7 °F (37.1 °C) 97 16 132/70 96 % 18 1130 99.3 °F (37.4 °C) 96 16 127/71 96 % 18 0846 98.6 °F (37 °C) 93 16 122/61 97 % Lines:  Peripheral IV:   
   
 
 
Physical Exam:  
General:  Alert, cooperative, Eyes:  Sclera anicteric. Pupils equally round and reactive to light. Mouth/Throat: Mucous membranes normal, oral pharynx clear Neck: Supple Lungs:   Clear to auscultation bilaterally, good effort CV:  Regular rate and rhythm,no murmur, Abdomen:   Soft, non-tender. bowel sounds normal. non-distended, Lumbar tender in lower back Extremities: No  Edema, tattoo + Lines/Devices:  Intact, no erythema, drainage or tenderness Data Review: CBC:  
Recent Labs  
   09/15/18 
 0326 WBC  13.9*  
RBC  2.98* HGB  8.2* HCT  25.5*  
PLT  298 GRANS  76* LYMPH  14 EOS  1 CMP:  
Recent Labs  
   09/15/18 
 0326  18 
 0353 GLU  93  75 NA  136  133* K  4.3  5.1 CL  102  101 CO2  25  24 BUN  21*  17  
CREA  0.95  0.79 CA  8.9  9.7 AGAP  9  8 BUCR  22*  22* AP   --   144* TP   --   9.0* ALB   --   2.4*  
GLOB   --   6.6* AGRAT   --   0.4* Studies:     
Lab Results Component Value Date/Time Culture result: NO GROWTH 3 DAYS 09/11/2018 07:03 PM  
 Culture result: (A) 09/11/2018 07:03 PM  
  GRAM NEGATIVE RODS GROWING IN 1 OF 2 BOTTLES DRAWN (SITE= L UPPER ARM) Culture result: REMAINING BOTTLE(S) HAS/HAVE NO GROWTH SO FAR 09/11/2018 07:03 PM  
 Culture result: (A) 09/08/2018 04:01 AM  
  PSEUDOMONAS AERUGINOSA GROWING IN THIS SINGLE BOTTLE DRAWN (L MIDLINE SITE) PLEASE REFER TO PREVIOUS BLOOD CULTURE [id number], COLLECTED 9/6/18 FOR SENSITIVITIES Culture result: (A) 09/08/2018 04:01 AM  
  PRELIMINARY REPORT OF GRAM NEGATIVE RODS GROWING IN THIS SINGLE BOTTLE DRAWN CALLED TO AND READ BACK BY Aracelis Lopez RN ON 9/10/18 AT 1817 [facility] 2121). MS  
 Culture result: MRSA NOT PRESENT 09/08/2018 04:01 AM  
 Culture result:  09/08/2018 04:01 AM  
      Screening of patient nares for MRSA is for surveillance purposes and, if positive, to facilitate isolation considerations in high risk settings. It is not intended for automatic decolonization interventions per se as regimens are not sufficiently effective to warrant routine use. XR Results (most recent): 
 
Results from Hospital Encounter encounter on 09/06/18 XR CHEST PORT Narrative INDICATION:   Chest Pain, back pain EXAM:  AP CHEST RADIOGRAPH 
 
COMPARISON: September 6, 2018 FINDINGS: 
 
AP portable view of the chest demonstrates a normal cardiomediastinal 
silhouette. The lungs are adequately expanded. There is right basilar airspace 
disease. No pulmonary edema, pleural effusion, or pneumothorax. Postsurgical 
changes cervical spine partly visualized. Slight dextroconvex curvature of the 
thoracic spine.  
   
 
 Impression IMPRESSION: 
 Right basilar airspace disease. Patient Active Problem List  
Diagnosis Code  Sepsis ([facility] [address] A41.9  Endocarditis of tricuspid valve I36.8  Tricuspid valve regurgitation, infectious I07.1  Acute septic pulmonary embolism without acute cor pulmonale (HCC) I26.90  
 Acute right-sided low back pain with sciatica M54.40  Heroin abuse F11.10  Debility R53.81 I have discussed the diagnosis with the patient and the intended plan as seen in the above orders. I have discussed medication side effects and warnings with the patient as well. Reviewed test results at length with patient Anti-infectives:  
 
 Vancomycin iv- 9/7- 9/11- DC Cefepime iv- 9/7  Giancarlo Greene MD FACP

## 2018-09-15 NOTE — PROGRESS NOTES
1900--bedside report received from oliver luque. Pt resting in bed oriented x 4, drowsy, easy to arouse. Call bell in reach. Assessment as noted 2100--pt requesting dilaudid, (MAR), per prn orders 0012--pt more awake alert now, eating crackers watching tv call bell in reach 
 
0300--am labs drawn and sent to lab per orders 0700--bedside report given to Chet Walker rn who is assuming care of pt

## 2018-09-15 NOTE — PROGRESS NOTES
Problem: Falls - Risk of 
Goal: *Absence of Falls Document Raul Chang Fall Risk and appropriate interventions in the flowsheet. Outcome: Progressing Towards Goal 
Fall Risk Interventions: 
Mobility Interventions: Patient to call before getting OOB Medication Interventions: Patient to call before getting OOB History of Falls Interventions: Room close to nurse's station

## 2018-09-15 NOTE — PROGRESS NOTES
Follow up visit in 2121 with Palliative and LOS pt. Led pt in processing, pt appeared to be calm and comfortable. Pt shared that he will probably be at hospital through October. Plans to watch a lot of sports as a coping method. Pt went on to talk about several different topics with  including growing up [facility] and having many questions about things to do with the Bible. Shared anger he had felt towards God after loss of his wife, also shared disappointment in losing his father 6 months after finally beginning to have a positive relationship with his father. Provided empathic and active listening throughout conversation. Pt became tearful as he talked about how his children will grow up without a mother, focusing on pt's obvious care for his children asked pt to consider how children would feel growing up without either parents as pt doesn't think he will have a long life span do to life choices. Pt acknowledged the power of choice and indicated that he has almost gotten to a point of not caring due to the disappointments of life. Attempted to provide positive perspectives and words of encouraging focusing on pt's skills and attributes and the ability to make change. Pt jovial and reflective throughout most of conversation. Concluded visit to attend to overhead prayer. Pt expressed appreciation for talk and sharing. Extended best wishes. Will follow up as able/needed. LIZABETH Flanagan. Celia Henry

## 2018-09-16 ENCOUNTER — APPOINTMENT (OUTPATIENT)
Dept: GENERAL RADIOLOGY | Age: 38
DRG: 871 | End: 2018-09-16
Attending: INTERNAL MEDICINE
Payer: SUBSIDIZED

## 2018-09-16 ENCOUNTER — APPOINTMENT (OUTPATIENT)
Dept: GENERAL RADIOLOGY | Age: 38
DRG: 871 | End: 2018-09-16
Attending: ANESTHESIOLOGY
Payer: SUBSIDIZED

## 2018-09-16 LAB
ANION GAP SERPL CALC-SCNC: 7 MMOL/L (ref 5–15)
BUN SERPL-MCNC: 23 MG/DL (ref 6–20)
BUN/CREAT SERPL: 21 (ref 12–20)
CALCIUM SERPL-MCNC: 8.9 MG/DL (ref 8.5–10.1)
CHLORIDE SERPL-SCNC: 100 MMOL/L (ref 97–108)
CO2 SERPL-SCNC: 24 MMOL/L (ref 21–32)
CREAT SERPL-MCNC: 1.07 MG/DL (ref 0.7–1.3)
ERYTHROCYTE [DISTWIDTH] IN BLOOD BY AUTOMATED COUNT: 15.3 % (ref 11.5–14.5)
GLUCOSE BLD STRIP.AUTO-MCNC: 98 MG/DL (ref 65–100)
GLUCOSE SERPL-MCNC: 93 MG/DL (ref 65–100)
HCT VFR BLD AUTO: 25.6 % (ref 36.6–50.3)
HGB BLD-MCNC: 8.2 G/DL (ref 12.1–17)
LACTATE SERPL-SCNC: 0.8 MMOL/L (ref 0.4–2)
MCH RBC QN AUTO: 27.2 PG (ref 26–34)
MCHC RBC AUTO-ENTMCNC: 32 G/DL (ref 30–36.5)
MCV RBC AUTO: 85 FL (ref 80–99)
NRBC # BLD: 0 K/UL (ref 0–0.01)
NRBC BLD-RTO: 0 PER 100 WBC
PLATELET # BLD AUTO: 297 K/UL (ref 150–400)
PMV BLD AUTO: 8.5 FL (ref 8.9–12.9)
POTASSIUM SERPL-SCNC: 4.8 MMOL/L (ref 3.5–5.1)
RBC # BLD AUTO: 3.01 M/UL (ref 4.1–5.7)
SERVICE CMNT-IMP: NORMAL
SODIUM SERPL-SCNC: 131 MMOL/L (ref 136–145)
WBC # BLD AUTO: 16.7 K/UL (ref 4.1–11.1)

## 2018-09-16 PROCEDURE — 65660000000 HC RM CCU STEPDOWN

## 2018-09-16 PROCEDURE — 74011250636 HC RX REV CODE- 250/636: Performed by: INTERNAL MEDICINE

## 2018-09-16 PROCEDURE — 85027 COMPLETE CBC AUTOMATED: CPT | Performed by: FAMILY MEDICINE

## 2018-09-16 PROCEDURE — C1751 CATH, INF, PER/CENT/MIDLINE: HCPCS

## 2018-09-16 PROCEDURE — 82962 GLUCOSE BLOOD TEST: CPT

## 2018-09-16 PROCEDURE — 76010000093 HC SPECIAL PROCEDURE

## 2018-09-16 PROCEDURE — 83605 ASSAY OF LACTIC ACID: CPT | Performed by: FAMILY MEDICINE

## 2018-09-16 PROCEDURE — 74011250637 HC RX REV CODE- 250/637: Performed by: INTERNAL MEDICINE

## 2018-09-16 PROCEDURE — 87040 BLOOD CULTURE FOR BACTERIA: CPT | Performed by: FAMILY MEDICINE

## 2018-09-16 PROCEDURE — 74011250636 HC RX REV CODE- 250/636: Performed by: NURSE PRACTITIONER

## 2018-09-16 PROCEDURE — 71045 X-RAY EXAM CHEST 1 VIEW: CPT

## 2018-09-16 PROCEDURE — 74011250636 HC RX REV CODE- 250/636: Performed by: FAMILY MEDICINE

## 2018-09-16 PROCEDURE — 80048 BASIC METABOLIC PNL TOTAL CA: CPT | Performed by: INTERNAL MEDICINE

## 2018-09-16 PROCEDURE — 36415 COLL VENOUS BLD VENIPUNCTURE: CPT | Performed by: INTERNAL MEDICINE

## 2018-09-16 PROCEDURE — 74011250637 HC RX REV CODE- 250/637: Performed by: NURSE PRACTITIONER

## 2018-09-16 PROCEDURE — 74011000258 HC RX REV CODE- 258: Performed by: INTERNAL MEDICINE

## 2018-09-16 PROCEDURE — 74011000250 HC RX REV CODE- 250: Performed by: NURSE PRACTITIONER

## 2018-09-16 PROCEDURE — 02HV33Z INSERTION OF INFUSION DEVICE INTO SUPERIOR VENA CAVA, PERCUTANEOUS APPROACH: ICD-10-PCS | Performed by: ANESTHESIOLOGY

## 2018-09-16 RX ORDER — HYDROMORPHONE HYDROCHLORIDE 2 MG/ML
2 INJECTION, SOLUTION INTRAMUSCULAR; INTRAVENOUS; SUBCUTANEOUS ONCE
Status: COMPLETED | OUTPATIENT
Start: 2018-09-16 | End: 2018-09-16

## 2018-09-16 RX ORDER — SODIUM CHLORIDE 9 MG/ML
150 INJECTION, SOLUTION INTRAVENOUS CONTINUOUS
Status: DISCONTINUED | OUTPATIENT
Start: 2018-09-16 | End: 2018-09-18

## 2018-09-16 RX ADMIN — HYDROMORPHONE HYDROCHLORIDE 2 MG: 2 INJECTION INTRAMUSCULAR; INTRAVENOUS; SUBCUTANEOUS at 14:54

## 2018-09-16 RX ADMIN — Medication 10 ML: at 05:59

## 2018-09-16 RX ADMIN — KETOROLAC TROMETHAMINE 15 MG: 30 INJECTION, SOLUTION INTRAMUSCULAR at 17:07

## 2018-09-16 RX ADMIN — GUAIFENESIN 600 MG: 600 TABLET, EXTENDED RELEASE ORAL at 08:40

## 2018-09-16 RX ADMIN — Medication 10 ML: at 22:03

## 2018-09-16 RX ADMIN — FAMOTIDINE 20 MG: 20 TABLET ORAL at 08:40

## 2018-09-16 RX ADMIN — GENTAMICIN SULFATE 400 MG: 40 INJECTION, SOLUTION INTRAMUSCULAR; INTRAVENOUS at 12:04

## 2018-09-16 RX ADMIN — KETOROLAC TROMETHAMINE 15 MG: 30 INJECTION, SOLUTION INTRAMUSCULAR at 11:57

## 2018-09-16 RX ADMIN — HYDROMORPHONE HYDROCHLORIDE 1 MG: 2 INJECTION INTRAMUSCULAR; INTRAVENOUS; SUBCUTANEOUS at 23:15

## 2018-09-16 RX ADMIN — CEFEPIME HYDROCHLORIDE 2 G: 2 INJECTION, POWDER, FOR SOLUTION INTRAVENOUS at 10:06

## 2018-09-16 RX ADMIN — Medication 10 ML: at 16:40

## 2018-09-16 RX ADMIN — KETOROLAC TROMETHAMINE 15 MG: 30 INJECTION, SOLUTION INTRAMUSCULAR at 05:39

## 2018-09-16 RX ADMIN — ACETAMINOPHEN 500 MG: 500 TABLET ORAL at 08:40

## 2018-09-16 RX ADMIN — ACETAMINOPHEN 500 MG: 500 TABLET ORAL at 22:03

## 2018-09-16 RX ADMIN — HYDROMORPHONE HYDROCHLORIDE 1 MG: 2 INJECTION INTRAMUSCULAR; INTRAVENOUS; SUBCUTANEOUS at 16:40

## 2018-09-16 RX ADMIN — HYDROMORPHONE HYDROCHLORIDE 1 MG: 2 INJECTION INTRAMUSCULAR; INTRAVENOUS; SUBCUTANEOUS at 01:00

## 2018-09-16 RX ADMIN — HYDROMORPHONE HYDROCHLORIDE 1 MG: 2 INJECTION INTRAMUSCULAR; INTRAVENOUS; SUBCUTANEOUS at 06:56

## 2018-09-16 RX ADMIN — SODIUM CHLORIDE 125 ML/HR: 900 INJECTION, SOLUTION INTRAVENOUS at 17:04

## 2018-09-16 RX ADMIN — HYDROMORPHONE HYDROCHLORIDE 1 MG: 2 INJECTION INTRAMUSCULAR; INTRAVENOUS; SUBCUTANEOUS at 20:04

## 2018-09-16 RX ADMIN — HYDROMORPHONE HYDROCHLORIDE 1 MG: 2 INJECTION INTRAMUSCULAR; INTRAVENOUS; SUBCUTANEOUS at 13:33

## 2018-09-16 RX ADMIN — HYDROMORPHONE HYDROCHLORIDE 1 MG: 2 INJECTION INTRAMUSCULAR; INTRAVENOUS; SUBCUTANEOUS at 04:09

## 2018-09-16 RX ADMIN — LEVOFLOXACIN 750 MG: 5 INJECTION, SOLUTION INTRAVENOUS at 14:56

## 2018-09-16 RX ADMIN — KETOROLAC TROMETHAMINE 15 MG: 30 INJECTION, SOLUTION INTRAMUSCULAR at 00:58

## 2018-09-16 RX ADMIN — Medication 10 ML: at 10:11

## 2018-09-16 RX ADMIN — KETOROLAC TROMETHAMINE 15 MG: 30 INJECTION, SOLUTION INTRAMUSCULAR at 23:15

## 2018-09-16 RX ADMIN — HYDROMORPHONE HYDROCHLORIDE 1 MG: 2 INJECTION INTRAMUSCULAR; INTRAVENOUS; SUBCUTANEOUS at 10:11

## 2018-09-16 RX ADMIN — ENOXAPARIN SODIUM 40 MG: 40 INJECTION, SOLUTION INTRAVENOUS; SUBCUTANEOUS at 06:00

## 2018-09-16 RX ADMIN — Medication 10 ML: at 22:04

## 2018-09-16 RX ADMIN — CEFEPIME HYDROCHLORIDE 2 G: 2 INJECTION, POWDER, FOR SOLUTION INTRAVENOUS at 01:03

## 2018-09-16 RX ADMIN — Medication 10 ML: at 13:33

## 2018-09-16 RX ADMIN — GABAPENTIN 200 MG: 100 CAPSULE ORAL at 17:10

## 2018-09-16 RX ADMIN — ACETAMINOPHEN 500 MG: 500 TABLET ORAL at 16:43

## 2018-09-16 RX ADMIN — FAMOTIDINE 20 MG: 20 TABLET ORAL at 17:10

## 2018-09-16 RX ADMIN — GUAIFENESIN 600 MG: 600 TABLET, EXTENDED RELEASE ORAL at 22:03

## 2018-09-16 RX ADMIN — GABAPENTIN 200 MG: 100 CAPSULE ORAL at 08:40

## 2018-09-16 RX ADMIN — Medication 10 ML: at 14:53

## 2018-09-16 RX ADMIN — Medication 10 ML: at 11:57

## 2018-09-16 RX ADMIN — CEFEPIME HYDROCHLORIDE 2 G: 2 INJECTION, POWDER, FOR SOLUTION INTRAVENOUS at 17:05

## 2018-09-16 NOTE — PROGRESS NOTES
I had attempted to obtain a.m. Blood sample from the # 20 site to left A/C w/o success. I attempted to stick the patient where he would allow me in his left forearm w/o success. He would not let me attempt again but agreed to let another nurse try. Mady attempted to get a sample w/o success. Hw refused to let us look further. We will pass this on to the day shift for assistance.

## 2018-09-16 NOTE — PROGRESS NOTES
TRANSFER - OUT REPORT: 
 
Telephone report given to Leonidas Sal RN on  PCU(name) on Lydia Naidu  being transferred to PCU(unit) for urgent transfer Report consisted of patients Situation, Background, Assessment and  
Recommendations(SBAR). Information from the following report(s) SBAR, Kardex, Intake/Output and MAR was reviewed with the receiving nurse. Lines:  
Triple Lumen 09/16/18 Right Internal jugular (Active) Central Line Being Utilized No 9/16/2018  7:11 PM  
Criteria for Appropriate Use Limited/no vessel suitable for conventional peripheral access 9/16/2018  7:11 PM  
Site Assessment Clean, dry, & intact 9/16/2018  7:11 PM  
Infiltration Assessment 0 9/16/2018  7:11 PM  
Affected Extremity/Extremities Color distal to insertion site pink (or appropriate for race); Pulses palpable 9/16/2018  7:11 PM  
Dressing Status Clean, dry, & intact 9/16/2018  7:11 PM  
Dressing Type Bacteriocidal 9/16/2018  7:11 PM  
Proximal Hub Color/Line Status White;Flushed;Patent 9/16/2018  7:11 PM  
Positive Blood Return (Medial Site) Yes 9/16/2018  7:11 PM  
Medial Hub Color/Line Status Blue;Flushed;Patent 9/16/2018  7:11 PM  
Positive Blood Return (Lateral Site) Yes 9/16/2018  7:11 PM  
Distal Hub Color/Line Status Brown;Flushed;Patent 9/16/2018  7:11 PM  
Positive Blood Return (Site #3) Yes 9/16/2018  7:11 PM  
  
 
Opportunity for questions and clarification was provided. Patient transported with: 
 Registered Nurse  From PACU after central line placement

## 2018-09-16 NOTE — PROGRESS NOTES
Bedside shift change report given to Chalo Bradford (oncoming nurse) by Artemio Nice RN (offgoing nurse). Report included the following information SBAR, Kardex, Procedure Summary, Intake/Output, MAR, Accordion, Recent Results and Med Rec Status. And lab specimen need.

## 2018-09-16 NOTE — PROGRESS NOTES
Call placed to Dr. Isrrael Hinojosa re:  Pt has shooting excruciating pain in right hip. Dilaudid IV given at 1333, Toradol given as scheduled at 1200. Heating pad in place with minimal relief.

## 2018-09-16 NOTE — PROGRESS NOTES
MEWS score 5 due to elevated heart rate, elevated temp. Call placed to Dr. Jamie Banuelos and Dr. Fan Finney

## 2018-09-16 NOTE — PROGRESS NOTES
Left arm IV that was placed by ED is clotted off. ED charge nurse made aware,  Suggested attending be called and maybe patient needs a central line. Called Dr. Arnold Nicely to make her aware of above.

## 2018-09-16 NOTE — PROGRESS NOTES
Hospitalist Progress NoteNAME: Quirino Burr :  1980 MRN:  [id number] Interim Hospital Summary: 43 y.o. male whom presented on 2018 with Assessment / Plan: 1. 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 No surgical intervention at this time. On cefepime, Levaquin,  Gentamycin and vancomycin. WBC still trending up. No CBC today, further recs per ID MRI L spine showed increased fluid in the right L3-4 posterior facet joint with surrounding edema extending into the paraspinous musculature. Findings are suspicious for septic arthritis.  No significant spinal stenosis or neural foraminal narrowing. CT thoracic spine showed multiple pulmonary nodules, several of which are cavitary and several groundglass opacities and areas of consolidation. KHNAH on  revealed mobile mass on the atrial side of the tricuspid valve with possible perforation, mod to severe TR. ESR 12, CRP 15.5, lactate 0.9, UA neg 2. Sepsis, POA; due to above. 3. Right-sided pleuritic chest pain:  CTA chest with no PE, right basilar opacification with associated right-sided pleural effusion. 4. Back pain: being followed by palliative medicine for pain management. On Iv dilaudid as needed. 5. HTN: controlled. On hydralazine prn 
6. Chronic anemia Addendum: RRT called in evening for MEWS of 5 due to fever and tachycardia. Pt with no dyspnea or CP symptoms. Discussed with nursing to start IVF, get stat CBC, Blood cultures, lactate and place pt on telemetry. Already on broad spectrum abx per ID recs. Spoke with GERARD Shin but she is not covering for Dr Yessy Brewer. Tried to call her but unable to reach or leave message.  Again discussed with nursing to repeat vitals and if continues to have higher MEW scores may need transfer to stepdown for closer monitoring. Reviewed labs, WBC elevated, lactate wnl, Na lower and could be from dehydration, cr trending up, will increase IVF to 150 ml/hr. Pt has lost his peripheral access and will need central line for IVF and IV abx. Order placed. Code Status: Full Surrogate Decision Maker: he did not wish to list anyone DVT Prophylaxis: Lovenox GI Prophylaxis: not indicated Baseline: indedepent Subjective: Chief Complaint / Reason for Physician Visit No c/o SOB/CP. Unable to get blood draw this am   
Discussed with RN events overnight. Review of Systems: 
Symptom Y/N Comments  Symptom Y/N Comments Fever/Chills n   Chest Pain n   
Poor Appetite n   Edema n   
Cough n   Abdominal Pain n   
Sputum    Joint Pain SOB/CUEVA n   Pruritis/Rash Nausea/vomit n   Tolerating PT/OT Diarrhea    Tolerating Diet Constipation    Other Objective: VITALS:  
Last 24hrs VS reviewed since prior progress note. Most recent are: 
Patient Vitals for the past 24 hrs: 
 Temp Pulse Resp BP SpO2  
09/16/18 0829 99.5 °F (37.5 °C) 91 16 126/82 97 % 09/16/18 0400 99 °F (37.2 °C) 99 18 125/69 97 % 09/16/18 0103 99 °F (37.2 °C) - - - -  
09/15/18 2354 98.9 °F (37.2 °C) 97 16 124/66 99 % 09/15/18 2026 99 °F (37.2 °C) (!) 101 16 123/69 98 % 09/15/18 1446 98.6 °F (37 °C) 95 16 130/72 97 % 09/15/18 1234 98 °F (36.7 °C) 90 16 131/68 97 % Intake/Output Summary (Last 24 hours) at 09/16/18 1156 Last data filed at 09/16/18 1014 Gross per 24 hour Intake             2228 ml Output             1625 ml Net              603 ml PHYSICAL EXAM: 
General: WD, WN. Alert, cooperative, no acute distress   
EENT:  EOMI. Anicteric sclerae. MMM Resp:  CTA bilaterally, no wheezing or rales. No accessory muscle use CV:  Regular  rhythm,  No edema GI:  Soft, Non distended, Non tender.  +Bowel sounds Neurologic:  Alert and oriented X 3, normal speech, Psych:    Not anxious nor agitated Skin:  no rashes or ulcers. No jaundice Reviewed most current lab test results and cultures  YES Reviewed most current radiology test results   YES Review and summation of old records today    NO Reviewed patient's current orders and MAR    YES 
PMH/SH reviewed - no change compared to H&P 
________________________________________________________________________ Care Plan discussed with: 
  Comments Patient x Family RN x Care Manager Consultant:     
________________________________________________________________________ Total NON critical care TIME: 25  Minutes Total CRITICAL CARE TIME Spent:   Minutes non procedure based Comments >50% of visit spent in counseling and coordination of care    
________________________________________________________________________ Sandra Miller MD  
 
Procedures: see electronic medical records for all procedures/Xrays and details which were not copied into this note but were reviewed prior to creation of Plan. LABS: 
I reviewed today's most current labs and imaging studies. Pertinent labs include: 
Recent Labs  
   09/15/18 
 0326 WBC  13.9* HGB  8.2* HCT  25.5*  
PLT  298 Recent Labs  
   09/15/18 
 0326  09/14/18 
 0353 NA  136  133* K  4.3  5.1 CL  102  101 CO2  25  24 GLU  93  75 BUN  21*  17  
CREA  0.95  0.79 CA  8.9  9.7 ALB   --   2.4* TBILI   --   0.3 SGOT   --   37 ALT   --   71

## 2018-09-16 NOTE — PROGRESS NOTES
Hospitalist on call RRT was called for MEWS score 5 Pt seen and examined No distress/ no dyspnea Fever 102 and  RN communicated with primary attending physician and orders were received already.   
 
Marnette Moritz, MD

## 2018-09-16 NOTE — PROGRESS NOTES
#20 Fr IV catheter placed in upper left arm via US by ED charge nurse, Maliha Bonner. Jean-Pierre Ryder and alisson to lab

## 2018-09-16 NOTE — PROGRESS NOTES
Responded to RRT on Gen Surg. No visitors present. Patient was being evaluated by staff. Chaplains available as needed. Efren Nelson, MPS, [facility]  Paging Service  [phone] ([phone])

## 2018-09-16 NOTE — PROGRESS NOTES
Central Line Procedure Note Indication: Inadequate venous access Risks, benefits, alternatives explained and patient agrees to proceed. Patient positioned in Trendelenburg. 7-Step Sterility Protocol followed. (cap, mask sterile gown, sterile gloves, large sterile sheet, hand hygiene, 2% chlorhexidine for cutaneous antisepsis) 5 mL 1% Lidocaine placed at insertion site. Right internal jugular cannulated x 1 attempt(s) utilizing the Seldinger technique. Catheter secured & Biopatch applied. Sterile Tegaderm placed. CXR pending.    
Care turned over to covering Attending MD.

## 2018-09-16 NOTE — PERIOP NOTES
19:00 Consent for central line on chart. Patient placed on monitor, on 2l nasal canula. Time out performed. 19:10 central line placement completed pt tolerated without difficulty. Portable cxr to be completed on floor per Dr. Ritter Emmett 19:18 pt transferred to room 5204

## 2018-09-17 LAB
ANION GAP SERPL CALC-SCNC: 9 MMOL/L (ref 5–15)
BACTERIA SPEC CULT: ABNORMAL
BACTERIA SPEC CULT: ABNORMAL
BACTERIA SPEC CULT: NORMAL
BASOPHILS # BLD: 0 K/UL (ref 0–0.1)
BASOPHILS NFR BLD: 0 % (ref 0–1)
BUN SERPL-MCNC: 22 MG/DL (ref 6–20)
BUN/CREAT SERPL: 24 (ref 12–20)
CALCIUM SERPL-MCNC: 8.9 MG/DL (ref 8.5–10.1)
CHLORIDE SERPL-SCNC: 105 MMOL/L (ref 97–108)
CO2 SERPL-SCNC: 24 MMOL/L (ref 21–32)
CREAT SERPL-MCNC: 0.91 MG/DL (ref 0.7–1.3)
DIFFERENTIAL METHOD BLD: ABNORMAL
EOSINOPHIL # BLD: 0.2 K/UL (ref 0–0.4)
EOSINOPHIL NFR BLD: 1 % (ref 0–7)
ERYTHROCYTE [DISTWIDTH] IN BLOOD BY AUTOMATED COUNT: 15.2 % (ref 11.5–14.5)
GLUCOSE SERPL-MCNC: 90 MG/DL (ref 65–100)
HCT VFR BLD AUTO: 21.9 % (ref 36.6–50.3)
HGB BLD-MCNC: 6.9 G/DL (ref 12.1–17)
IMM GRANULOCYTES # BLD: 0.1 K/UL (ref 0–0.04)
IMM GRANULOCYTES NFR BLD AUTO: 1 % (ref 0–0.5)
LYMPHOCYTES # BLD: 1.4 K/UL (ref 0.8–3.5)
LYMPHOCYTES NFR BLD: 11 % (ref 12–49)
MCH RBC QN AUTO: 26.5 PG (ref 26–34)
MCHC RBC AUTO-ENTMCNC: 31.5 G/DL (ref 30–36.5)
MCV RBC AUTO: 84.2 FL (ref 80–99)
MONOCYTES # BLD: 0.8 K/UL (ref 0–1)
MONOCYTES NFR BLD: 6 % (ref 5–13)
NEUTS SEG # BLD: 10 K/UL (ref 1.8–8)
NEUTS SEG NFR BLD: 80 % (ref 32–75)
NRBC # BLD: 0 K/UL (ref 0–0.01)
NRBC BLD-RTO: 0 PER 100 WBC
PLATELET # BLD AUTO: 248 K/UL (ref 150–400)
PMV BLD AUTO: 8.7 FL (ref 8.9–12.9)
POTASSIUM SERPL-SCNC: 4.5 MMOL/L (ref 3.5–5.1)
RBC # BLD AUTO: 2.6 M/UL (ref 4.1–5.7)
SERVICE CMNT-IMP: ABNORMAL
SERVICE CMNT-IMP: NORMAL
SODIUM SERPL-SCNC: 138 MMOL/L (ref 136–145)
WBC # BLD AUTO: 12.4 K/UL (ref 4.1–11.1)

## 2018-09-17 PROCEDURE — 74011250637 HC RX REV CODE- 250/637: Performed by: INTERNAL MEDICINE

## 2018-09-17 PROCEDURE — 36415 COLL VENOUS BLD VENIPUNCTURE: CPT | Performed by: INTERNAL MEDICINE

## 2018-09-17 PROCEDURE — 93308 TTE F-UP OR LMTD: CPT

## 2018-09-17 PROCEDURE — 74011250636 HC RX REV CODE- 250/636: Performed by: INTERNAL MEDICINE

## 2018-09-17 PROCEDURE — 80048 BASIC METABOLIC PNL TOTAL CA: CPT | Performed by: INTERNAL MEDICINE

## 2018-09-17 PROCEDURE — 74011250636 HC RX REV CODE- 250/636: Performed by: NURSE PRACTITIONER

## 2018-09-17 PROCEDURE — 74011000258 HC RX REV CODE- 258: Performed by: INTERNAL MEDICINE

## 2018-09-17 PROCEDURE — 74011250637 HC RX REV CODE- 250/637: Performed by: NURSE PRACTITIONER

## 2018-09-17 PROCEDURE — 65660000000 HC RM CCU STEPDOWN

## 2018-09-17 PROCEDURE — 74011250636 HC RX REV CODE- 250/636: Performed by: FAMILY MEDICINE

## 2018-09-17 PROCEDURE — 85025 COMPLETE CBC W/AUTO DIFF WBC: CPT | Performed by: INTERNAL MEDICINE

## 2018-09-17 PROCEDURE — 74011000250 HC RX REV CODE- 250: Performed by: NURSE PRACTITIONER

## 2018-09-17 RX ORDER — KETOROLAC TROMETHAMINE 30 MG/ML
30 INJECTION, SOLUTION INTRAMUSCULAR; INTRAVENOUS EVERY 6 HOURS
Status: COMPLETED | OUTPATIENT
Start: 2018-09-17 | End: 2018-09-19

## 2018-09-17 RX ORDER — HYDROMORPHONE HYDROCHLORIDE 2 MG/ML
2 INJECTION, SOLUTION INTRAMUSCULAR; INTRAVENOUS; SUBCUTANEOUS
Status: DISCONTINUED | OUTPATIENT
Start: 2018-09-17 | End: 2018-09-20 | Stop reason: SDUPTHER

## 2018-09-17 RX ADMIN — Medication 10 ML: at 06:31

## 2018-09-17 RX ADMIN — ACETAMINOPHEN 500 MG: 500 TABLET ORAL at 21:58

## 2018-09-17 RX ADMIN — Medication 10 ML: at 12:37

## 2018-09-17 RX ADMIN — GENTAMICIN SULFATE 400 MG: 40 INJECTION, SOLUTION INTRAMUSCULAR; INTRAVENOUS at 12:13

## 2018-09-17 RX ADMIN — LEVOFLOXACIN 750 MG: 5 INJECTION, SOLUTION INTRAVENOUS at 15:39

## 2018-09-17 RX ADMIN — HYDROMORPHONE HYDROCHLORIDE 1 MG: 2 INJECTION INTRAMUSCULAR; INTRAVENOUS; SUBCUTANEOUS at 06:28

## 2018-09-17 RX ADMIN — GUAIFENESIN 600 MG: 600 TABLET, EXTENDED RELEASE ORAL at 21:58

## 2018-09-17 RX ADMIN — ACETAMINOPHEN 500 MG: 500 TABLET ORAL at 15:40

## 2018-09-17 RX ADMIN — Medication 10 ML: at 21:59

## 2018-09-17 RX ADMIN — ENOXAPARIN SODIUM 40 MG: 40 INJECTION, SOLUTION INTRAVENOUS; SUBCUTANEOUS at 06:29

## 2018-09-17 RX ADMIN — KETOROLAC TROMETHAMINE 30 MG: 30 INJECTION, SOLUTION INTRAMUSCULAR at 18:07

## 2018-09-17 RX ADMIN — ACETAMINOPHEN 500 MG: 500 TABLET ORAL at 08:20

## 2018-09-17 RX ADMIN — SODIUM CHLORIDE 150 ML/HR: 900 INJECTION, SOLUTION INTRAVENOUS at 04:15

## 2018-09-17 RX ADMIN — Medication 10 ML: at 15:39

## 2018-09-17 RX ADMIN — SODIUM CHLORIDE 150 ML/HR: 900 INJECTION, SOLUTION INTRAVENOUS at 12:03

## 2018-09-17 RX ADMIN — CEFEPIME HYDROCHLORIDE 2 G: 2 INJECTION, POWDER, FOR SOLUTION INTRAVENOUS at 18:07

## 2018-09-17 RX ADMIN — HYDROMORPHONE HYDROCHLORIDE 1 MG: 2 INJECTION INTRAMUSCULAR; INTRAVENOUS; SUBCUTANEOUS at 09:37

## 2018-09-17 RX ADMIN — FAMOTIDINE 20 MG: 20 TABLET ORAL at 08:20

## 2018-09-17 RX ADMIN — HYDROMORPHONE HYDROCHLORIDE 1 MG: 2 INJECTION INTRAMUSCULAR; INTRAVENOUS; SUBCUTANEOUS at 12:36

## 2018-09-17 RX ADMIN — HYDROMORPHONE HYDROCHLORIDE 1 MG: 2 INJECTION INTRAMUSCULAR; INTRAVENOUS; SUBCUTANEOUS at 15:40

## 2018-09-17 RX ADMIN — CEFEPIME HYDROCHLORIDE 2 G: 2 INJECTION, POWDER, FOR SOLUTION INTRAVENOUS at 03:25

## 2018-09-17 RX ADMIN — KETOROLAC TROMETHAMINE 15 MG: 30 INJECTION, SOLUTION INTRAMUSCULAR at 11:20

## 2018-09-17 RX ADMIN — KETOROLAC TROMETHAMINE 15 MG: 30 INJECTION, SOLUTION INTRAMUSCULAR at 06:28

## 2018-09-17 RX ADMIN — CEFEPIME HYDROCHLORIDE 2 G: 2 INJECTION, POWDER, FOR SOLUTION INTRAVENOUS at 09:40

## 2018-09-17 RX ADMIN — HYDROMORPHONE HYDROCHLORIDE 1 MG: 2 INJECTION INTRAMUSCULAR; INTRAVENOUS; SUBCUTANEOUS at 03:25

## 2018-09-17 RX ADMIN — HYDROMORPHONE HYDROCHLORIDE 1 MG: 2 INJECTION INTRAMUSCULAR; INTRAVENOUS; SUBCUTANEOUS at 22:29

## 2018-09-17 RX ADMIN — GUAIFENESIN 600 MG: 600 TABLET, EXTENDED RELEASE ORAL at 08:20

## 2018-09-17 RX ADMIN — GABAPENTIN 200 MG: 100 CAPSULE ORAL at 08:20

## 2018-09-17 RX ADMIN — HYDROMORPHONE HYDROCHLORIDE 1 MG: 2 INJECTION INTRAMUSCULAR; INTRAVENOUS; SUBCUTANEOUS at 19:46

## 2018-09-17 RX ADMIN — HYDROMORPHONE HYDROCHLORIDE 2 MG: 2 INJECTION INTRAMUSCULAR; INTRAVENOUS; SUBCUTANEOUS at 18:07
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
## 2018-09-17 NOTE — PROGRESS NOTES
PCU SHIFT NURSING NOTE Bedside and Verbal shift change report given to Williemae Bosworth, RN (oncoming nurse) by Thania Adam RN (offgoing nurse). Report included the following information SBAR, Kardex, Intake/Output, MAR, Recent Results and Cardiac Rhythm NSR/ST. Shift Summary:  
 
 
Admission Date 9/6/2018 Admission Diagnosis Sepsis ([address] 
central line placement Consults IP CONSULT TO ORTHOPEDIC SURGERY 
IP CONSULT TO INFECTIOUS DISEASES 
IP CONSULT TO INTERVENTIONAL RADIOLOGY 
IP CONSULT TO THORACIC SURGERY 
IP CONSULT TO PAIN MANAGEMENT 
IP CONSULT TO PALLIATIVE CARE - PROVIDER 
IP CONSULT TO NEUROSURGERY 
IP CONSULT TO CARDIOLOGY Consults []PT []OT []Speech  
[]Case Management  
  
[] Palliative Cardiac Monitoring Order []Yes []No  
 
IV drips []Yes Drip:                            Dose: 
Drip:                            Dose: 
Drip:                            Dose:  
[]No  
 
GI Prophylaxis []Yes []No  
 
 
 
DVT Prophylaxis SCDs:  Sequential Compression Device: Bilateral  
  Patient Refused VTE Prophylaxis: Yes Gian stockings:     
  
[] Medication []Contraindicated []None Activity Level Activity Level: Up with Assistance Activity Assistance: Partial (one person) Purposeful Rounding every 1-2 hour? []Yes Rodriguez Score  Total Score: 4 Bed Alarm (If score 3 or >) []Yes  
[] Refused (See signed refusal form in chart) Juan Score  Juan Score: 20 Juan Score (if score 14 or less) []PMT consult  
[]Wound Care consult []Specialty bed  
[] Nutrition consult Needs prior to discharge:  
Home O2 required:   
[]Yes []No  
 If yes, how much O2 required? Other:  
 Last Bowel Movement: Last Bowel Movement Date: 09/16/18 Influenza Vaccine Received Flu Vaccine for Current Season (usually Sept-March): Not Flu Season Pneumonia Vaccine Diet Active Orders Diet DIET REGULAR  
  
LDAs Triple Lumen 09/16/18 Right Internal jugular (Active) Central Line Being Utilized Yes 9/17/2018 11:01 AM  
Criteria for Appropriate Use Limited/no vessel suitable for conventional peripheral access 9/17/2018 11:01 AM  
Site Assessment Clean, dry, & intact 9/17/2018 11:01 AM  
Infiltration Assessment 0 9/17/2018 11:01 AM  
Affected Extremity/Extremities Color distal to insertion site pink (or appropriate for race) 9/17/2018  3:24 AM  
Date of Last Dressing Change 09/16/18 9/17/2018  8:00 AM  
Dressing Status Clean, dry, & intact 9/17/2018 11:01 AM  
Dressing Type Transparent 9/17/2018 11:01 AM  
Action Taken Open ports on tubing capped 9/17/2018  3:24 AM  
Proximal Hub Color/Line Status White; Infusing 9/17/2018  8:00 AM  
Positive Blood Return (Medial Site) Yes 9/17/2018  8:00 AM  
Medial Hub Color/Line Status Blue;Capped 9/17/2018  8:00 AM  
Positive Blood Return (Lateral Site) Yes 9/17/2018  8:00 AM  
Distal Hub Color/Line Status Brown;Capped 9/17/2018  8:00 AM  
Positive Blood Return (Site #3) Yes 9/17/2018  8:00 AM  
Alcohol Cap Used Yes 9/17/2018  8:00 AM  
                      
Urinary Catheter Intake & Output Date 09/16/18 0700 - 09/17/18 9747 09/17/18 0700 - 09/18/18 3928 Shift 5368-2887 9437-3082 24 Hour Total 0700-1859 3060-2364 24 Hour Total  
I 
N 
T 
A 
K 
E 
 P.O. 848  848 720  720 P. O. 886  400 115  012 I.V. 
(mL/kg/hr) 368.8 
(0.4) 1072.5 
(1) 1441.3 
(0.7) 690  690 Volume (0.9% sodium chloride infusion) 168.8 872.5 1041. 3 690  690 Volume (cefepime (MAXIPIME) 2 g in 0.9% sodium chloride (MBP/ADV) 100 mL) 100 200 300 0  0 Volume (gentamicin (GARAMYCIN) 400 mg in 0.9% sodium chloride 100 mL IVPB) 100  100 Volume (levoFLOXacin (LEVAQUIN) 750 mg in D5W IVPB) 0  0 Shift Total 
(mL/kg) 1216.8 
(14.1) 1072.5 
(12.5) 2289.3 
(26.6) 1410 
(16.4)  1410 
(16.4) O 
U T 
P 
U Maxene Harness Urine (mL/kg/hr) 400 
(0.4) 800 
(0.8) 1200 
(0.6) Urine Voided  Urine Occurrence(s)  1 x 1 x Stool 0  0 Stool 0  0 Shift Total 
(mL/kg) 400 
(4.6) 800 
(9.3) 1200 
(13.9) .8 272.5 1089. 3 1410  1410 Weight (kg) 86.1 86.1 86.1 86.1 86.1 86.1 Readmission Risk Assessment Tool Score Low Risk   
      
 7 Total Score 3 Patient Length of Stay (>5 days = 3)  
 4 Pt. Coverage (Medicare=5 , Medicaid, or Self-Pay=4) Criteria that do not apply:  
 Has Seen PCP in Last 6 Months (Yes=3, No=0) . Living with Significant Other. Assisted Living. LTAC. SNF. or  
Rehab  
 IP Visits Last 12 Months (1-3=4, 4=9, >4=11) Charlson Comorbidity Score (Age + Comorbid Conditions) Expected Length of Stay 4d 21h Actual Length of Stay 10

## 2018-09-17 NOTE — PROGRESS NOTES
TRANSFER - IN REPORT: 
 
Verbal report received from [facility]) on Niranjan Nelson  being received from  2121(unit) for change in patient condition(MEWS 5) Report consisted of patients Situation, Background, Assessment and  
Recommendations(SBAR). Information from the following report(s) SBAR, Kardex, Procedure Summary, Intake/Output, MAR, Recent Results, Med Rec Status and Cardiac Rhythm sinus tach was reviewed with the receiving nurse. Opportunity for questions and clarification was provided. Assessment completed upon patients arrival to unit and care assumed. 2003-pt given 1 mg dilaudid iv- 
 
2315- pt given 1mg iv dilaudid 4320- pt given 1mg iv dilaudid 0452- pt given 1mg iv dilaudid It is noted that patient calls every 3hrs exactly to stated his pain is an 8/10 and would like his iv dilaudid- No physical or verbal evidence of pain noted.

## 2018-09-17 NOTE — PROGRESS NOTES
Hospitalist Progress Note NAME: Milton Wong :  1980 MRN:  [id number] Assessment / Plan: 
TR infective Endocarditis/ Pseudomonas bacteremia/Pulmonary septic emboli/  septic arthritis spine 
-CT thoracic spine showed multiple pulmonary nodules, several of which are cavitary and several groundglass opacities and areas of consolidation. -KHANH on  revealed mobile mass on the atrial side of the tricuspid valve with possible perforation, mod to severe TR.  
-ID, ortho, IR and Cardiothoracic surgery on board 
-No surgical intervention at this time 
-On IV abx per ID recs - currently On cefepime, Levaquin,  Gentamycin Sepsis, POA; due to above. Right-sided pleuritic chest pain; possibly pleurisy. Now resolved. -CTA chest with no PE, right basilar opacification with associated right-sided pleural effusion Back pain 2/2 above 
-followed by palliative medicine for pain management PMH of HTN and Chronic anemia 25.0 - 29.9 Overweight / Body mass index is 25.74 kg/(m^2). Code status: Full Prophylaxis: Lovenox Recommended Disposition: Home w/Family Subjective: Chief Complaint / Reason for Physician Visit: back pain 
 
Continues to have persistent back and hip pain. Denies chills, dyspnea. Pleuritic rib pain has improved. Review of Systems: 14 pt ROS unremarkable except as stated above. Objective: VITALS:  
Last 24hrs VS reviewed since prior progress note. Most recent are: 
Patient Vitals for the past 24 hrs: 
 Temp Pulse Resp BP SpO2  
18 1550 - 91 - 125/68 -  
18 1546 98.8 °F (37.1 °C) 90 18 - 99 % 18 1420 97.1 °F (36.2 °C) 93 16 124/77 99 % 18 0724 98.5 °F (36.9 °C) 87 16 122/70 95 % 18 0324 98.2 °F (36.8 °C) 83 20 121/74 99 % 18 2321 99.4 °F (37.4 °C) (!) 102 20 123/65 96 % 18 1929 - (!) 106 - 121/62 -  
18 1926 99.1 °F (37.3 °C) (!) 105 22 121/62 96 % 18 - (!) 106 23 122/61 98 % 09/16/18 1857 - (!) 109 21 126/66 98 % 09/16/18 1807 (!) 100.6 °F (38.1 °C) (!) 116 18 127/66 96 % 09/16/18 1729 (!) 101.9 °F (38.8 °C) (!) 112 18 129/80 97 % 09/16/18 1657 - (!) 120 - 129/78 100 % 09/16/18 1646 (!) 102.4 °F (39.1 °C) (!) 118 16 128/70 96 % Intake/Output Summary (Last 24 hours) at 09/17/18 1608 Last data filed at 09/17/18 1452 Gross per 24 hour Intake          2651.25 ml Output              800 ml Net          1851.25 ml PHYSICAL EXAM: 
General: WD, WN. Alert, cooperative, no acute distress   
EENT:  EOMI. Anicteric sclerae. MMM Resp:  CTA bilaterally, no wheezing or rales. No accessory muscle use CV:  Regular  rhythm,  Normal S1 and S2, No edema GI:  Soft, Non distended, Non tender.  +Bowel sounds Neurologic:  Alert and oriented X 3, normal speech, no gross neuro deficits Skin:  Warm and dry Reviewed most current lab test results and cultures  YES Reviewed most current radiology test results   YES Review and summation of old records today    NO Reviewed patient's current orders and MAR    YES 
PMH/SH reviewed - no change compared to H&P 
 
________________________________________________________________________ Roslyn Scales MD  
 
Procedures: see electronic medical records for all procedures/Xrays and details which were not copied into this note but were reviewed prior to creation of Plan. LABS: 
I reviewed today's most current labs and imaging studies. Pertinent labs include: 
Recent Labs  
   09/17/18 
 1202  09/16/18 
 1710  09/15/18 
 0326 WBC  12.4*  16.7*  13.9* HGB  6.9*  8.2*  8.2* HCT  21.9*  25.6*  25.5*  
PLT  248  297  298 Recent Labs  
   09/17/18 
 0340  09/16/18 
 1710  09/15/18 
 0326 NA  138  131*  136  
K  4.5  4.8  4.3 CL  105  100  102 CO2  24  24  25 GLU  90  93  93 BUN  22*  23*  21* CREA  0.91  1.07  0.95  
CA  8.9  8.9  8.9 Signed: Roslyn Scales MD

## 2018-09-17 NOTE — PROGRESS NOTES
Spoke with floor nurse regarding repeat Echocardiogram order placed by Dr. Aidan Farris. Will hold off on repeat Echo until need confirmed.

## 2018-09-17 NOTE — PROGRESS NOTES
1700 Responded to Rapid response call for staff concerns for MEWS of 5. Nurse reports patient has temp of 101. Patient is awake and alert. Dr. Lona Hawk called and ordered lab work. RRT nurse and nursing supervisor attempted to start a new IV and perform lab draws but unsuccessful. ER charge nurse requested to come with ulrtasound machine to establis IV and obtain labs. Labs include blood cultures and lactic acid. Jovana.Hamman ED nurse established new IV and lab draws complete. Patient remained in room.

## 2018-09-17 NOTE — PROGRESS NOTES
Infectious Disease Progress Note IMPRESSION:  
· Persistent Pseudomonas aeruginosa bacteremia 2/2 ( 9/6), repeat BC also+ 1/1 (9/7)BC  9/11- 1/4., 9/16 - so far no growth Pt admits to washing needles with water off faucet. Denies sharing needles ·  Events of last night noted, pt s/p Rapid Response. · Tricuspid valve endocarditis- mobile mass on atrial side of tricuspid valve with possible perforation, concerning for endocarditis · R/basilar airspace disease on CXR, repeat CXR shows worsening of infiltrate, pt feels better ·  Evidence of cervical fusion C5-7 & T4/5 laminectomy on CT scan · MRI shows- fluid / edema L3/4 posterior joint facet suspicious for septic arthritis. · Pulmonary nodules,cavitary ,ground glass opacities, on  CT thorax suggestive of septic emboli ·  Reports from [facility] reviewed. Pt had MSSA bacteremia in May 2018. ( not MRSA ) Treated with Nafcillin & thereafter Cefazolin 5/15 to 6/28. · MRI of spine showed inflammatory changes in T5/6 suggestive of septic arthritis. TTE(5/16)  suggestive of vegetation , TRAE done (5/18), no evidence of vegetation as per D/c summary from [facility] · IVDU on going up to time of admission · UDS + for opiates · Smoker , alcohol consumption + · S/p IR evaluation - fluid in spine not accessible for aspiration per IR, S/p Ortho consult - defer to Neurosurgery. · S/p Cardiothoracic surgery evaluation - no surgery at this time. -  
· HIV, Hep C negative  
  
  
PLAN:  
   
· Continue Cefepime IV, add gentamicin , levaquin for new infiltrate, vancomycin also added ·  Continue IS  Every 1-2 hours , d/w pt again today ·  repeat echo cardiogram today , may require repeat trae , will d/w cardiology ·  re imaging of lumbar spine ·  ECHO TRAE reports from [facility] · Plan of care d/w pt . PSEUDOMONAS SPECIES GROWING IN THIS SINGLE BOTTLE DRAWN ( AC SITE) REFER TO [facility] [id number] FOR ID AND SENSITIVITIES (A) Culture result:    Final  
 PRELIMINARY REPORT OF GRAM NEGATIVE RODS GROWING IN THIS SINGLE BOTTLE DRAWN CALLED TO AND READ BACK BY Carlos Bashir RN ON 18 AT 1815 [facility] 2121). MS (A)  
 
 
KHANH () TRICUSPID VALVE: There was moderate thickening. There was normal leaflet 
separation. There was a mobile mass on the atrial side of the tricuspid 
valve with possible perforation concerning for endocarditis. DOPPLER: 
There was moderate to severe regurgitation. Subjective:  
 
Pt seen . Denies new complaints . R/ sided chest pain  Less. Pain in lower back , radiating down right leg = Review of Systems:  A comprehensive review of systems was negative except for that written in the History of Present Illness. Objective:  
 
Blood pressure 122/70, pulse 87, temperature 98.5 °F (36.9 °C), resp. rate 16, height 6' (1.829 m), weight 189 lb 13.1 oz (86.1 kg), SpO2 95 %. Temp (24hrs), Av.8 °F (37.7 °C), Min:98.1 °F (36.7 °C), Max:102.4 °F (39.1 °C) Patient Vitals for the past 24 hrs: 
 Temp Pulse Resp BP SpO2  
18 0724 98.5 °F (36.9 °C) 87 16 122/70 95 % 18 0324 98.2 °F (36.8 °C) 83 20 121/74 99 % 18 2321 99.4 °F (37.4 °C) (!) 102 20 123/65 96 % 18 1929 - (!) 106 - 121/62 -  
18 192 99.1 °F (37.3 °C) (!) 105 22 121/62 96 % 18 1916 - (!) 106 23 122/61 98 % 18 1857 - (!) 109 21 126/66 98 % 18 1807 (!) 100.6 °F (38.1 °C) (!) 116 18 127/66 96 % 18 1729 (!) 101.9 °F (38.8 °C) (!) 112 18 129/80 97 % 18 1657 - (!) 120 - 129/78 100 % 18 1646 (!) 102.4 °F (39.1 °C) (!) 118 16 128/70 96 % 18 1206 98.1 °F (36.7 °C) (!) 101 16 126/66 96 % Lines:  Peripheral IV:   
   
 
 
Physical Exam:  
General:  Alert, cooperative, Eyes:  Sclera anicteric. Pupils equally round and reactive to light. Mouth/Throat: Mucous membranes normal, oral pharynx clear Neck: Supple Lungs:   reduced auscultation bases CV:  Regular rate and rhythm,no murmur,   
 Abdomen:   Soft, non-tender. bowel sounds normal. non-distended, Lumbar tender in lower back Extremities: No  Edema, tattoo + Lines/Devices:  Intact, no erythema, drainage or tenderness Data Review: CBC:  
Recent Labs  
   09/16/18 
 1710  09/15/18 
 0326 WBC  16.7*  13.9*  
RBC  3.01*  2.98* HGB  8.2*  8.2* HCT  25.6*  25.5*  
PLT  297  298 GRANS   --   76* LYMPH   --   14  
EOS   --   1 CMP:  
Recent Labs  
   09/17/18 
 0340  09/16/18 
 1710  09/15/18 
 0326 GLU  90  93  93 NA  138  131*  136  
K  4.5  4.8  4.3 CL  105  100  102 CO2  24  24  25 BUN  22*  23*  21* CREA  0.91  1.07  0.95  
CA  8.9  8.9  8.9 AGAP  9  7  9 BUCR  24*  21*  22* Studies:     
Lab Results Component Value Date/Time Culture result: NO GROWTH AFTER 13 HOURS 09/16/2018 05:10 PM  
 Culture result: NO GROWTH 6 DAYS 09/11/2018 07:03 PM  
 Culture result: (A) 09/11/2018 07:03 PM  
  PSEUDOMONAS AERUGINOSA GROWING IN 1 OF 2 BOTTLES DRAWN (SITE = L UPPER ARM) Culture result: REMAINING BOTTLE(S) HAS/HAVE NO GROWTH IN 5 DAYS 09/11/2018 07:03 PM  
 Culture result: (A) 09/08/2018 04:01 AM  
  PSEUDOMONAS AERUGINOSA GROWING IN THIS SINGLE BOTTLE DRAWN (L MIDLINE SITE) PLEASE REFER TO PREVIOUS BLOOD CULTURE [id number], COLLECTED 9/6/18 FOR SENSITIVITIES Culture result: (A) 09/08/2018 04:01 AM  
  PRELIMINARY REPORT OF GRAM NEGATIVE RODS GROWING IN THIS SINGLE BOTTLE DRAWN CALLED TO AND READ BACK BY Maite Smith RN ON 9/10/18 AT 1817 [facility] 2120). MS  
 Culture result: MRSA NOT PRESENT 09/08/2018 04:01 AM  
 Culture result:  09/08/2018 04:01 AM  
      Screening of patient nares for MRSA is for surveillance purposes and, if positive, to facilitate isolation considerations in high risk settings. It is not intended for automatic decolonization interventions per se as regimens are not sufficiently effective to warrant routine use.   
  
 
XR Results (most recent): 
 
 Results from Hospital Encounter encounter on 09/06/18 XR CHEST PORT Narrative EXAM: Portable CXR.  2156 hours. COMPARISON: 1824 hours. INDICATION: central line placement Right IJ CVL has been placed with tip in the lower SVC, without pneumothorax. There is otherwise no change including right lower lobe pneumonia appearance. Impression IMPRESSION: Satisfactory CVL placement. Patient Active Problem List  
Diagnosis Code  Sepsis ([facility] [address] A41.9  Endocarditis of tricuspid valve I36.8  Tricuspid valve regurgitation, infectious I07.1  Acute septic pulmonary embolism without acute cor pulmonale (HCC) I26.90  
 Acute right-sided low back pain with sciatica M54.40  Heroin abuse F11.10  Debility R53.81 I have discussed the diagnosis with the patient and the intended plan as seen in the above orders. I have discussed medication side effects and warnings with the patient as well. Reviewed test results at length with patient Anti-infectives:  
 
 Vancomycin iv- 9/7- 9/11- DC Cefepime iv- 9/7  Sameera Singh MD FACP

## 2018-09-17 NOTE — PROGRESS NOTES
Progress Note 9/17/2018 2:51 PM 
NAME: Ferrell Soulier MRN:  [id number] Admit Diagnosis: Sepsis ([address] 
central line placement Assessment:             
  
1. 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 KHANH 9/2018 with mobile mass on tricuspid valve with possible perforation with moderate to severe tricuspid regurgitation. 3. Hx MSSA in thoracic spine,  With Hx of  KHANH approx 5/2018 was ok 4. Sinus 5. Anemia 6. Cervical surgery with implant, thoracic lamenectomy 7. Active IV drug use including Heroin 8. Active tobacco, Etoh 
   
  
                      Plan:              
  
Blood cultures positive for pseudomonas. Repeat cultures still positive. TTE negative. KHANH with mobile mass on tricuspid valve with likely perforation resulting in moderate to severe tricuspid regurgitation. Consistent with endocarditis. Evaluated by cardiac surgery, repeat cultures on 9/11 +. Culture 9/16 pending. Abx and duration per ID specialist. 
 
Currently with central line. Reviewed with ID, they are requesting TTE, currently hemodynamically stable, cardiac surgery notified patient still with positive cultures. Given active drug use, remains poor surgical candidate. 
 
  
  
 [x]        High complexity decision making was performed 
  
 
 
Subjective:  
 
Ferrell Soulier denies chest pain, dyspnea. Discussed with RN events overnight. Review of Systems: 
 
Symptom Y/N Comments  Symptom Y/N Comments Fever/Chills N   Chest Pain N Poor Appetite N   Edema N   
Cough N   Abdominal Pain N Sputum N   Joint Pain N   
SOB/CUEVA N   Pruritis/Rash N   
Nausea/vomit N   Tolerating PT/OT Diarrhea N   Tolerating Diet Y Constipation N   Other Could NOT obtain due to:   
 
Objective:  
  
Physical Exam: 
 
Last 24hrs VS reviewed since prior progress note. Most recent are: 
 
Visit Vitals  /70 (BP 1 Location: Right arm, BP Patient Position: At rest)  Pulse 87  Temp 98.5 °F (36.9 °C)  Resp 16  
 Ht 6' (1.829 m)  Wt 86.1 kg (189 lb 13.1 oz)  SpO2 95%  BMI 25.74 kg/m2 Intake/Output Summary (Last 24 hours) at 09/17/18 1119 Last data filed at 09/17/18 0800 Gross per 24 hour Intake          2371.25 ml Output              800 ml Net          1571.25 ml General Appearance: Well developed, well nourished, alert & oriented x 3,  
 no acute distress. Ears/Nose/Mouth/Throat: Hearing grossly normal. 
Neck: Supple. Chest: Lungs clear to auscultation bilaterally. Cardiovascular: Regular rate and rhythm, S1S2 normal, no murmur. Abdomen: Soft, non-tender, bowel sounds are active. Extremities: No edema bilaterally. Skin: Warm and dry. PMH/ reviewed - no change compared to H&P Data Review Telemetry: normal sinus rhythm Lab Data Personally Reviewed: 
 
Recent Labs  
   09/16/18 
 1710  09/15/18 
 0326 WBC  16.7*  13.9* HGB  8.2*  8.2* HCT  25.6*  25.5*  
PLT  297  298 No results for input(s): INR, PTP, APTT in the last 72 hours. No lab exists for component: INREXT, INREXT Recent Labs  
   09/17/18 
 0340  09/16/18 
 1710  09/15/18 
 0326 NA  138  131*  136  
K  4.5  4.8  4.3 CL  105  100  102 CO2  24  24  25 BUN  22*  23*  21* CREA  0.91  1.07  0.95 GLU  90  93  93 CA  8.9  8.9  8.9 No results for input(s): CPK, CKNDX, TROIQ in the last 72 hours. No lab exists for component: CPKMB No results found for: CHOL, CHOLX, CHLST, CHOLV, HDL, LDL, LDLC, DLDLP, TGLX, TRIGL, TRIGP, CHHD, CHHDX No results for input(s): SGOT, GPT, AP, TBIL, TP, ALB, GLOB, GGT, AML, LPSE in the last 72 hours. No lab exists for component: AMYP, HLPSE No results for input(s): PH, PCO2, PO2 in the last 72 hours. Medications Personally Reviewed: 
 
Current Facility-Administered Medications Medication Dose Route Frequency  0.9% sodium chloride infusion  150 mL/hr IntraVENous CONTINUOUS  
 HYDROmorphone (DILAUDID) injection 1 mg  1 mg IntraVENous Q3H PRN  
 gentamicin (GARAMYCIN) 400 mg in 0.9% sodium chloride 100 mL IVPB  400 mg IntraVENous Q24H  
 levoFLOXacin (LEVAQUIN) 750 mg in D5W IVPB  750 mg IntraVENous Q24H  
 enoxaparin (LOVENOX) injection 40 mg  40 mg SubCUTAneous 7am  
 acetaminophen (TYLENOL) tablet 500 mg  500 mg Oral TID  ketorolac (TORADOL) injection 15 mg  15 mg IntraVENous Q6H  
 famotidine (PEPCID) tablet 20 mg  20 mg Oral BID  lidocaine 4 % patch 1 Patch  1 Patch TransDERmal Q24H  
 gabapentin (NEURONTIN) capsule 200 mg  200 mg Oral BID  cefepime (MAXIPIME) 2 g in 0.9% sodium chloride (MBP/ADV) 100 mL  2 g IntraVENous Q8H  
 sodium chloride (NS) flush 5-10 mL  5-10 mL IntraVENous Q8H  
 sodium chloride (NS) flush 5-10 mL  5-10 mL IntraVENous PRN  
 naloxone (NARCAN) injection 0.4 mg  0.4 mg IntraVENous PRN  
 ondansetron (ZOFRAN) injection 4 mg  4 mg IntraVENous Q4H PRN  
 bisacodyl (DULCOLAX) suppository 10 mg  10 mg Rectal DAILY PRN  
 hydrALAZINE (APRESOLINE) 20 mg/mL injection 10 mg  10 mg IntraVENous Q6H PRN  
 albuterol-ipratropium (DUO-NEB) 2.5 MG-0.5 MG/3 ML  3 mL Nebulization Q4H PRN  
 guaiFENesin ER (MUCINEX) tablet 600 mg  600 mg Oral Q12H  polyethylene glycol (MIRALAX) packet 17 g  17 g Oral DAILY  saline peripheral flush soln 10 mL  10 mL InterCATHeter PRN  
 SALINE PERIPHERAL FLUSH Q8H soln 10 mL  10 mL InterCATHeter Q8H Kim Landry MD

## 2018-09-17 NOTE — PROGRESS NOTES
0800 : Report received from Alia Mcintosh RN. SBAR, Kardex, Intake/Output, MAR and Recent Results were discussed. Leyla Galvan RN 
 
7158 : Patient reported that IV dilaudid [employer] works for [employer] and pills don't work at [employer]" per patient. RN attempted to question patient further, patient became very defensive. Stated that \"I have already discussed this with the hospitalist and palliative management\". Stated then to RN, \"So you will be in at 9:30am to give me my pain medication? \". 56 : RN gave dose of 1 mg dilaudid at 0937 diluted in NS and pushed over 1-2 minutes. Patient rang call bell at 0930 to remind RN that dose was due. Questioned RN, stated he \"did not feel immediate comfort\" from dose of dilaudid. Stated he usually feels immediate relief, questioned RN why prn was diluted and pushed slowly. RN educated on side effects of flushing and CP with rapid IV push of dilaudid. Patient stated he did not feel any relief at this time, upset that RN diluted in NS. Notified charge RN Alyssa Anton. 1025 : Attempted to reassess pain, patient sleeping. 1213 : ECHO sent for patient, RN was in process of hanging IV antibiotics and drawing CBC from CL. Per transport, unable to wait 5 min for patient, will send for patient later in the day. 1217 : Patient asleep for pain reassessment. Easily arousable. 1424 : Hgb 6.9, Dr. Estela Yen ordered. No new orders. 1455 : Report given to Cristine Inman RN. SBAR, Kardex, MAR or Recent Results were discussed. Cristine Inman RN assumed care of the pt.  
 
Leyla Galvan RN

## 2018-09-18 PROBLEM — F19.10 IV DRUG ABUSE (HCC): Status: ACTIVE | Noted: 2018-09-18

## 2018-09-18 LAB
ANION GAP SERPL CALC-SCNC: 8 MMOL/L (ref 5–15)
BUN SERPL-MCNC: 25 MG/DL (ref 6–20)
BUN/CREAT SERPL: 29 (ref 12–20)
CALCIUM SERPL-MCNC: 8.8 MG/DL (ref 8.5–10.1)
CHLORIDE SERPL-SCNC: 106 MMOL/L (ref 97–108)
CO2 SERPL-SCNC: 23 MMOL/L (ref 21–32)
CREAT SERPL-MCNC: 0.85 MG/DL (ref 0.7–1.3)
ERYTHROCYTE [DISTWIDTH] IN BLOOD BY AUTOMATED COUNT: 14.8 % (ref 11.5–14.5)
GLUCOSE SERPL-MCNC: 85 MG/DL (ref 65–100)
HCT VFR BLD AUTO: 20 % (ref 36.6–50.3)
HCT VFR BLD AUTO: 23 % (ref 36.6–50.3)
HGB BLD-MCNC: 6.4 G/DL (ref 12.1–17)
HGB BLD-MCNC: 7.3 G/DL (ref 12.1–17)
IRON SATN MFR SERPL: 24 % (ref 20–50)
IRON SERPL-MCNC: 48 UG/DL (ref 35–150)
MCH RBC QN AUTO: 26.6 PG (ref 26–34)
MCHC RBC AUTO-ENTMCNC: 32 G/DL (ref 30–36.5)
MCV RBC AUTO: 83 FL (ref 80–99)
NRBC # BLD: 0 K/UL (ref 0–0.01)
NRBC BLD-RTO: 0 PER 100 WBC
PLATELET # BLD AUTO: 249 K/UL (ref 150–400)
PMV BLD AUTO: 8.7 FL (ref 8.9–12.9)
POTASSIUM SERPL-SCNC: 4.5 MMOL/L (ref 3.5–5.1)
RBC # BLD AUTO: 2.41 M/UL (ref 4.1–5.7)
RETICS # AUTO: 0.04 M/UL (ref 0.03–0.1)
RETICS/RBC NFR AUTO: 1.6 % (ref 0.7–2.1)
SODIUM SERPL-SCNC: 137 MMOL/L (ref 136–145)
TIBC SERPL-MCNC: 197 UG/DL (ref 250–450)
WBC # BLD AUTO: 11.4 K/UL (ref 4.1–11.1)

## 2018-09-18 PROCEDURE — P9016 RBC LEUKOCYTES REDUCED: HCPCS

## 2018-09-18 PROCEDURE — 85045 AUTOMATED RETICULOCYTE COUNT: CPT | Performed by: INTERNAL MEDICINE

## 2018-09-18 PROCEDURE — 36430 TRANSFUSION BLD/BLD COMPNT: CPT

## 2018-09-18 PROCEDURE — 36415 COLL VENOUS BLD VENIPUNCTURE: CPT | Performed by: INTERNAL MEDICINE

## 2018-09-18 PROCEDURE — 30233N1 TRANSFUSION OF NONAUTOLOGOUS RED BLOOD CELLS INTO PERIPHERAL VEIN, PERCUTANEOUS APPROACH: ICD-10-PCS | Performed by: INTERNAL MEDICINE

## 2018-09-18 PROCEDURE — 74011000258 HC RX REV CODE- 258: Performed by: INTERNAL MEDICINE

## 2018-09-18 PROCEDURE — 74011250636 HC RX REV CODE- 250/636: Performed by: NURSE PRACTITIONER

## 2018-09-18 PROCEDURE — 65660000000 HC RM CCU STEPDOWN

## 2018-09-18 PROCEDURE — 86923 COMPATIBILITY TEST ELECTRIC: CPT

## 2018-09-18 PROCEDURE — 74011250636 HC RX REV CODE- 250/636: Performed by: INTERNAL MEDICINE

## 2018-09-18 PROCEDURE — 85027 COMPLETE CBC AUTOMATED: CPT | Performed by: INTERNAL MEDICINE

## 2018-09-18 PROCEDURE — 77030027138 HC INCENT SPIROMETER -A

## 2018-09-18 PROCEDURE — 74011250636 HC RX REV CODE- 250/636: Performed by: FAMILY MEDICINE

## 2018-09-18 PROCEDURE — 83550 IRON BINDING TEST: CPT | Performed by: INTERNAL MEDICINE

## 2018-09-18 PROCEDURE — 86901 BLOOD TYPING SEROLOGIC RH(D): CPT

## 2018-09-18 PROCEDURE — 74011250637 HC RX REV CODE- 250/637: Performed by: NURSE PRACTITIONER

## 2018-09-18 PROCEDURE — 74011000250 HC RX REV CODE- 250: Performed by: NURSE PRACTITIONER

## 2018-09-18 PROCEDURE — 85018 HEMOGLOBIN: CPT | Performed by: INTERNAL MEDICINE

## 2018-09-18 PROCEDURE — 80048 BASIC METABOLIC PNL TOTAL CA: CPT | Performed by: INTERNAL MEDICINE

## 2018-09-18 RX ORDER — POLYETHYLENE GLYCOL 3350 17 G/17G
17 POWDER, FOR SOLUTION ORAL
Status: DISCONTINUED | OUTPATIENT
Start: 2018-09-18 | End: 2018-09-25 | Stop reason: HOSPADM

## 2018-09-18 RX ORDER — SODIUM CHLORIDE 9 MG/ML
250 INJECTION, SOLUTION INTRAVENOUS AS NEEDED
Status: DISCONTINUED | OUTPATIENT
Start: 2018-09-18 | End: 2018-09-25 | Stop reason: HOSPADM

## 2018-09-18 RX ADMIN — HYDROMORPHONE HYDROCHLORIDE 1 MG: 2 INJECTION INTRAMUSCULAR; INTRAVENOUS; SUBCUTANEOUS at 07:57

## 2018-09-18 RX ADMIN — KETOROLAC TROMETHAMINE 30 MG: 30 INJECTION, SOLUTION INTRAMUSCULAR at 23:40

## 2018-09-18 RX ADMIN — Medication 10 ML: at 15:39

## 2018-09-18 RX ADMIN — CEFEPIME HYDROCHLORIDE 2 G: 2 INJECTION, POWDER, FOR SOLUTION INTRAVENOUS at 17:07

## 2018-09-18 RX ADMIN — SODIUM CHLORIDE 150 ML/HR: 900 INJECTION, SOLUTION INTRAVENOUS at 03:30

## 2018-09-18 RX ADMIN — HYDROMORPHONE HYDROCHLORIDE 1 MG: 2 INJECTION INTRAMUSCULAR; INTRAVENOUS; SUBCUTANEOUS at 19:38

## 2018-09-18 RX ADMIN — HYDROMORPHONE HYDROCHLORIDE 1 MG: 2 INJECTION INTRAMUSCULAR; INTRAVENOUS; SUBCUTANEOUS at 22:35

## 2018-09-18 RX ADMIN — Medication 10 ML: at 22:36

## 2018-09-18 RX ADMIN — ENOXAPARIN SODIUM 40 MG: 40 INJECTION, SOLUTION INTRAVENOUS; SUBCUTANEOUS at 06:26

## 2018-09-18 RX ADMIN — HYDROMORPHONE HYDROCHLORIDE 2 MG: 2 INJECTION INTRAMUSCULAR; INTRAVENOUS; SUBCUTANEOUS at 18:07

## 2018-09-18 RX ADMIN — KETOROLAC TROMETHAMINE 30 MG: 30 INJECTION, SOLUTION INTRAMUSCULAR at 07:57

## 2018-09-18 RX ADMIN — CEFEPIME HYDROCHLORIDE 2 G: 2 INJECTION, POWDER, FOR SOLUTION INTRAVENOUS at 01:24

## 2018-09-18 RX ADMIN — HYDROMORPHONE HYDROCHLORIDE 1 MG: 2 INJECTION INTRAMUSCULAR; INTRAVENOUS; SUBCUTANEOUS at 10:47

## 2018-09-18 RX ADMIN — CEFEPIME HYDROCHLORIDE 2 G: 2 INJECTION, POWDER, FOR SOLUTION INTRAVENOUS at 10:49

## 2018-09-18 RX ADMIN — Medication 10 ML: at 06:28

## 2018-09-18 RX ADMIN — LEVOFLOXACIN 750 MG: 5 INJECTION, SOLUTION INTRAVENOUS at 15:39

## 2018-09-18 RX ADMIN — KETOROLAC TROMETHAMINE 30 MG: 30 INJECTION, SOLUTION INTRAMUSCULAR at 01:23

## 2018-09-18 RX ADMIN — HYDROMORPHONE HYDROCHLORIDE 1 MG: 2 INJECTION INTRAMUSCULAR; INTRAVENOUS; SUBCUTANEOUS at 16:06

## 2018-09-18 RX ADMIN — HYDROMORPHONE HYDROCHLORIDE 1 MG: 2 INJECTION INTRAMUSCULAR; INTRAVENOUS; SUBCUTANEOUS at 04:48

## 2018-09-18 RX ADMIN — KETOROLAC TROMETHAMINE 30 MG: 30 INJECTION, SOLUTION INTRAMUSCULAR at 12:01

## 2018-09-18 RX ADMIN — KETOROLAC TROMETHAMINE 30 MG: 30 INJECTION, SOLUTION INTRAMUSCULAR at 18:07

## 2018-09-18 RX ADMIN — HYDROMORPHONE HYDROCHLORIDE 1 MG: 2 INJECTION INTRAMUSCULAR; INTRAVENOUS; SUBCUTANEOUS at 13:50

## 2018-09-18 RX ADMIN — ACETAMINOPHEN 500 MG: 500 TABLET ORAL at 22:35

## 2018-09-18 RX ADMIN — HYDROMORPHONE HYDROCHLORIDE 2 MG: 2 INJECTION INTRAMUSCULAR; INTRAVENOUS; SUBCUTANEOUS at 12:01

## 2018-09-18 RX ADMIN — SODIUM CHLORIDE 150 ML/HR: 900 INJECTION, SOLUTION INTRAVENOUS at 10:31

## 2018-09-18 RX ADMIN — GENTAMICIN SULFATE 400 MG: 40 INJECTION, SOLUTION INTRAMUSCULAR; INTRAVENOUS at 12:44

## 2018-09-18 RX ADMIN — HYDROMORPHONE HYDROCHLORIDE 1 MG: 2 INJECTION INTRAMUSCULAR; INTRAVENOUS; SUBCUTANEOUS at 01:23

## 2018-09-18 NOTE — PROGRESS NOTES
Pharmacy Automatic Renal Dosing Protocol - Antimicrobials ++ID FOLLOWING++ Indication for Antimicrobials:  
? Pseudomonas bacteremia 
? tricuspid valve endocarditis ? pneumonia Current Regimen of Each Antimicrobial: 
Levofloxacin 750 every 24 hours (, day 5 of 7) Cefepime 2 g IV every 8 hours (Start Date  day 12 Tobramycin (was on Gentamicin 400mg X5 days and changed to Tobramycin IV q24h) 400 mg (start  day 5 Previous Antimicrobial Therapy: 
Ceftriaxone x1  am 
Vancomycin 1000 mg q8h - started  - Significant Cultures:  
 PBC -  - pseudomonas A - Pan sensitive  BCx- Pseudomonas A - Final  
 MRSA - negative in nares  blood x 2: Pseudomonas A - Final 
: Blood cx: pending Gentamicin Levels: 
Goal Level: PULSE DOSING GENTAMICIN Peak: 15 - 20 mcg/mL Trough: < or = 0.3 mcg/mL Date Dose & Interval Measured (mcg/mL) Extrapolated (mcg/mL) 9/15/18 400 mg IV (~4.6 mg/kg) q 24 hours 0.6 mcg/mL (15 h level) N/A Radiology / Imaging results: (X-ray, CT scan or MRI):  
 + endocarditis of tricuspid valve Paralysis, amputations, malnutrition: none Labs: 
Recent Labs  
   18 
 0336  18 
 1202  18 
 0340  18 
 1710 CREA  0.85   --   0.91  1.07  
BUN  25*   --   22*  23* WBC  11.4*  12.4*   --   16.7* Temp (24hrs), Av.8 °F (37.1 °C), Min:98.6 °F (37 °C), Max:99.2 °F (37.3 °C) Creatinine Clearance (mL/min) or Dialysis: > 50 Impression/Plan: · Per ID MD note \"Continue Cefepime IV, add gentamicin , levaquin for new infiltrate, vancomycin also added\", but I don't see any consult for vancomycin or a new order for it. Discussed with ID and we both agree that he is not on vancomycin. · For now will continue with Gentamicin, Levofloxacin and cefepime · Tobramycin has a better sensitivity than Gentamicin. Will change it to tobramycin 400 mg q 24 hours. · Per ID MD note S/p IR evaluation - fluid in spine not accessible for aspiration per IR, S/p Ortho consult - defer to Neurosurgery · Daily BMP ordered per protocol · Antimicrobial stop date: pending, ~ 6 weeks Pharmacy will follow daily and adjust medications as appropriate for renal function and/or serum levels.  
 
Areli Maldonado, PHARMD

## 2018-09-18 NOTE — PROGRESS NOTES
Progress Note 9/18/2018 2:51 PM 
NAME: Boris Casey MRN:  [id number] Admit Diagnosis: Sepsis ([address] 
central line placement Assessment:             
  
1. 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 KHANH 9/2018 with mobile mass on tricuspid valve with possible perforation with moderate to severe tricuspid regurgitation. 3. Hx MSSA in thoracic spine,  With Hx of  KHANH approx 5/2018 was ok 4. Sinus 5. Anemia 6. Cervical surgery with implant, thoracic lamenectomy 7. Active IV drug use including Heroin 8. Active tobacco, Etoh 
   
  
                      Plan:              
  
Blood cultures positive for pseudomonas. Repeat cultures still positive. TTE negative. KHANH with mobile mass on tricuspid valve with likely perforation resulting in moderate to severe tricuspid regurgitation. Consistent with endocarditis. Evaluated by cardiac surgery, repeat cultures on 9/11 +. Culture 9/16 pending. Abx and duration per ID specialist. 
 
Currently with central line. Repeat TTE with no change in tricuspid valve endocarditis. Culture 9/16 so far no growth, temp last 24 hours was ok. 
 
  
  
 [x]        High complexity decision making was performed 
  
 
 
Subjective:  
 
Boris Casey denies chest pain, dyspnea. Discussed with RN events overnight. Review of Systems: 
 
Symptom Y/N Comments  Symptom Y/N Comments Fever/Chills N   Chest Pain N Poor Appetite N   Edema N   
Cough N   Abdominal Pain N Sputum N   Joint Pain N   
SOB/CUEVA N   Pruritis/Rash N   
Nausea/vomit N   Tolerating PT/OT Diarrhea N   Tolerating Diet Y Constipation N   Other Could NOT obtain due to:   
 
Objective:  
  
Physical Exam: 
 
Last 24hrs VS reviewed since prior progress note. Most recent are: 
 
Visit Vitals  /77  Pulse 78  Temp 98.6 °F (37 °C)  Resp 18  Ht 6' (1.829 m)  Wt 86.1 kg (189 lb 13.1 oz)  SpO2 96%  BMI 25.74 kg/m2 Intake/Output Summary (Last 24 hours) at 09/18/18 1436 Last data filed at 09/18/18 1245 Gross per 24 hour Intake             4020 ml Output             1350 ml Net             2670 ml General Appearance: Well developed, well nourished, alert & oriented x 3,  
 no acute distress. Ears/Nose/Mouth/Throat: Hearing grossly normal. 
Neck: Supple. Chest: Lungs clear to auscultation bilaterally. Cardiovascular: Regular rate and rhythm, S1S2 normal, no murmur. Abdomen: Soft, non-tender, bowel sounds are active. Extremities: No edema bilaterally. Skin: Warm and dry. PMH/SH reviewed - no change compared to H&P Data Review Telemetry: normal sinus rhythm Lab Data Personally Reviewed: 
 
Recent Labs  
   09/18/18 
 1391  09/17/18 
 1202 WBC  11.4*  12.4* HGB  6.4*  6.9*  
HCT  20.0*  21.9*  
PLT  249  248 No results for input(s): INR, PTP, APTT in the last 72 hours. No lab exists for component: INREXT, INREXT Recent Labs  
   09/18/18 
 0336  09/17/18 
 0340  09/16/18 
 1710 NA  137  138  131* K  4.5  4.5  4.8  
CL  106  105  100 CO2  23  24  24 BUN  25*  22*  23* CREA  0.85  0.91  1.07  
GLU  85  90  93 CA  8.8  8.9  8.9 No results for input(s): CPK, CKNDX, TROIQ in the last 72 hours. No lab exists for component: CPKMB No results found for: CHOL, CHOLX, CHLST, CHOLV, HDL, LDL, LDLC, DLDLP, TGLX, TRIGL, TRIGP, CHHD, CHHDX No results for input(s): SGOT, GPT, AP, TBIL, TP, ALB, GLOB, GGT, AML, LPSE in the last 72 hours. No lab exists for component: AMYP, HLPSE No results for input(s): PH, PCO2, PO2 in the last 72 hours. Medications Personally Reviewed: 
 
Current Facility-Administered Medications Medication Dose Route Frequency  0.9% sodium chloride infusion 250 mL  250 mL IntraVENous PRN  
 influenza vaccine 2018-19 (6 mos+)(PF) (FLUARIX QUAD/FLULAVAL QUAD) injection 0.5 mL  0.5 mL IntraMUSCular ONCE  
  ketorolac (TORADOL) injection 30 mg  30 mg IntraVENous Q6H  
 HYDROmorphone (DILAUDID) injection 2 mg  2 mg IntraVENous BID PRN  
 0.9% sodium chloride infusion  150 mL/hr IntraVENous CONTINUOUS  
 HYDROmorphone (DILAUDID) injection 1 mg  1 mg IntraVENous Q3H PRN  
 gentamicin (GARAMYCIN) 400 mg in 0.9% sodium chloride 100 mL IVPB  400 mg IntraVENous Q24H  
 levoFLOXacin (LEVAQUIN) 750 mg in D5W IVPB  750 mg IntraVENous Q24H  
 enoxaparin (LOVENOX) injection 40 mg  40 mg SubCUTAneous 7am  
 acetaminophen (TYLENOL) tablet 500 mg  500 mg Oral TID  famotidine (PEPCID) tablet 20 mg  20 mg Oral BID  lidocaine 4 % patch 1 Patch  1 Patch TransDERmal Q24H  
 gabapentin (NEURONTIN) capsule 200 mg  200 mg Oral BID  cefepime (MAXIPIME) 2 g in 0.9% sodium chloride (MBP/ADV) 100 mL  2 g IntraVENous Q8H  
 sodium chloride (NS) flush 5-10 mL  5-10 mL IntraVENous Q8H  
 sodium chloride (NS) flush 5-10 mL  5-10 mL IntraVENous PRN  
 naloxone (NARCAN) injection 0.4 mg  0.4 mg IntraVENous PRN  
 ondansetron (ZOFRAN) injection 4 mg  4 mg IntraVENous Q4H PRN  
 bisacodyl (DULCOLAX) suppository 10 mg  10 mg Rectal DAILY PRN  
 hydrALAZINE (APRESOLINE) 20 mg/mL injection 10 mg  10 mg IntraVENous Q6H PRN  
 albuterol-ipratropium (DUO-NEB) 2.5 MG-0.5 MG/3 ML  3 mL Nebulization Q4H PRN  
 guaiFENesin ER (MUCINEX) tablet 600 mg  600 mg Oral Q12H  polyethylene glycol (MIRALAX) packet 17 g  17 g Oral DAILY  saline peripheral flush soln 10 mL  10 mL InterCATHeter PRN  
 SALINE PERIPHERAL FLUSH Q8H soln 10 mL  10 mL InterCATHeter Q8H Ender Galindo MD

## 2018-09-18 NOTE — PROGRESS NOTES
Palliative Medicine Consult [place]: [phone]) Patient Name: Yina Barrow YOB: 1980 Date of Initial Consult: 18 Reason for Consult: low back pain due to septic arthritis Requesting Provider: Raji Saldana Primary Care Physician: None SUMMARY:  
Yina Barrow is a 43 y.o. with a past history of IVDA, cervical spine, surgery, MI, MITZI, smoker who , who was admitted on 2018 from home with a diagnosis of septic arthritis. Current medical issues leading to Palliative Medicine involvement include: pain management complicated by heroin use. TO BE CLEAR, PALLIATIVE MEDICINE WILL ASSIST WITH ACUTE PAIN MANAGEMENT DURING THIS ADMISSION, HOWEVER, WE ARE NOT ADDICTION SPECIALISTS THUS CANNOT TREAT THIS PATIENT FOR ADDICTION, NOR WILL WE MANAGE THIS PATIENT'S PAIN NEEDS AFTER ADMISSION. : RRT call for sudden worsening right hip pain, fever 102, tachycardia. CVL placed, transferred to PCU Per : only prescriptions in the past 12 months: 
1) Narcan Nasal Spray and Gabapentin 300mg #90/15 day supply by Dr. Fortino Ramirez in [place]. Psychosocial: wife  2 years ago suddenly, leaving a 1month old, and 2 other children. Pt has been struggling with drug addiction for 16 years, in-laws realized this on the same day their daughter . They are raising pt's children to which he admits is for the best given his addiction. Pt moved to [place] after his wife , hooking up with friends that were drug users, until after his admission to [facility] this spring; following that admission, he moved down to [place] to try and get clean. He lives in a room that he rents from family. He has weaned back from using 1.5grams heroin/d to 0.5 grams/d (this is not an exact dose as he never knows what exactly the strength or how much of the drug is cut).   He is not using it for the \"high\" rather to be able to function. He works daily in [employer], paid cash, no health insurance. He has never been to a drug treatment program, will consider it on discharge. 24 HOURS OPIOID USE: 
9/17: IV Dilaudid 9mg Toradol 60mg 9/16: IV Dilaudid 10mg (2mg of the 10mg was a one time dose for sudden worsening pain), gabapentin 400mg, Toradol 75mg 9/15: IV Dilaudid 6mg, gabapentin 400mg, Toradol 45mg 9/14: IV Dilaudid 10mg (2mg of the 10mg was a one time dose for sudden worsening pain), gabapentin 400mg, Toradol 30mg 9/13: IV Dilaudid 2mg, PO Dilaudid 36mg, Gabapentin 400mg 9/12: IV Dilaudid 4mg, Norco 10mg, PO Dilaudid 12mg, Gabapentin 400mg 9/11: IV Dilaudid 6mg, Norco 10mg, Versed 8mg, Fentanyl 100mcg, Gabapentin 200mg 9/10: IV Dilaudid 7mg, Norco 15mg, Gabapentin 400mg 
9/9:   IV Dilaudid 6mg, Norco 10mg, Gabapentin 200mg PALLIATIVE DIAGNOSES:  
1. Fever 2. Back pain: h/o MMSA in thoracic spine. per Neurosurgery, MRI poor quality and was performed without contrast, however, it does suggest inflammation  in paraspinal muscle and adjacent facet joint likely representing persistent infection. No evidence of epidural abscess or neural impingement. No role for  surgical intervention. 3. Heroin abuse (IV) 4. Debility 5. 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 6. Septic arthritis T5/6 
7. Septic emboli 8. Pseudomonas bacteremia: pt admits to washing needles at the facet PLAN:  
1. PAIN:  9/16 sudden episode of severe sharp pain (feels like the pain he gets with movement but without moving) yesterday around 2:15pm better as long as he doesn't move. Right pleuritic CP continues causing pt to shallow breathe and weak cough. Pt feels that the Toradol is making a big difference. Discussed options for better pain control and together we came up with: 
1. Continue IV dilaudid 1mg q. 3 hours prn pain. 2. Continue  Dilaudid 2mg IV bid prn for breakthrough pain. 3. Continue Toradol 15mg IV q. 6 hours to 30mg for 1 day. 4. Discontinue Gabapentin and Mucinex(pt refusing). 5. Will make Miralax prn as pt insists he does not need it. 6. Tylenol 500mg tid. 7. Ice pack to right groin and/or back. 8. Heating pad to low back. 9. RN CAN CALL ME IF PAIN MEDS NEED ADJUSTING [phone]. Discussed with bedside RN. 2. HEROIN ADDICTION: Counseled pt that for his safety, to which pt agreed, during this admission: 1. His door to the hallway must remain opened when he has visitors. 2. He cannot leave the floor, where his RN can see him, without an escort by a hospital staff member. 3. If at any time there is suspicion of drug abuse during this admission, we will drug test him. 3. Reassess tomorrow. 4. Initial consult note routed to primary continuity provider 5. Communicated plan of care with: Palliative IDT RN 
 
 GOALS OF CARE / TREATMENT PREFERENCES:  
 
GOALS OF CARE: 
Patient/Health Care Proxy Stated Goals: Prolong life  
 
safe pain management TREATMENT PREFERENCES:  
Code Status: Full Code Advance Care Planning: 
Advance Care Planning 9/17/2018 Patient's Healthcare Decision Maker is: Verbal statement (Legal Next of Kin remains as decision maker) Primary Decision Maker Name Luis Fernando Reynolds Confirm Advance Directive - Medical Interventions: Full interventions Other Instructions: Other: As far as possible, the palliative care team has discussed with patient / health care proxy about goals of care / treatment preferences for patient. HISTORY:  
 
History obtained from: chart, patient CHIEF COMPLAINT: severe right sided back pain HPI/SUBJECTIVE: The patient is:  
[x] Verbal and participatory [] Non-participatory due to:  
 
9/6:  BIBA with c/o constant right sided back pain that has progressively worsened and is now unable to stand, with associated fever of 101.3 for the week. Sxs are  similar to when he was treated for MRSA infection in his spine 3 months ago until July 10, 2018. Pt admits to using IV heroin this am.   
Pt reports \"on Monday experienced sudden severe sharp chest pain\" that eventually resolved on its own, this has happened before. On Tuesday he had sudden sharp low back pain that shoots down his right buttocks \"if you alban a straight line from my spine, over to the right a few inches, then straight down my back, buttocks, to the top of my right thigh\"; he managed to work the day (in [employer]). On Wed woke up with same pain, barely able to get through work, Thursday took the day off hoping that by resting pain would get better. Friday pain still there, now included right hip, deep in the groin, could not even bare weight so he came to the ED. ED W/U: 
EXAM: negative LAB:  Alk phos 149, protein 9, albumin 2.6, wbc 15.8, h/h 9.9/30.0, sed rate 129, CRP 15.50, UDS pos for opiates IMAGING:  L-SPINE CT: scattered bilat lung opacities, some cavitation, concerning for infection/septic emboli HOSPITAL COURSE: admitted to [facility] for routine progression of care. 9/7: midline placed. MRI of poor quality due to pt's inability to lie still due to pain. 9/8: pain is still uncontrolled, pt told RN \"I'd rather have a bullet to my head rather than go through this. \" pain exacerbated with movement, cough, sneezing. 9/10: CT reports there is nothing to drain or biopsy. 9/11: KHANH revealed mobile mass on tricuspid valve with possible perforation with moderate to severe tricuspid regurgitation. (pt was given 100mcg IV Fentanyl and 8mg IV Versed for KHANH and was wide awake, anesthesia gave 200mg IV Propofol). 9/12: pt asked for either higher dose of Dilaudid or more frequent dosing, dilaudid was changed to 4mg PO at which pt got very upset.  9/13: RN reports pt asking frequently for pain mmt, unable to tolerate pain under current dilaudid dose and dosing intervals. Pt reports he fell using the walker; was turning to back up to the bed and the walker caught on the floor and he when his right foot hit the floor the pain was so bad he went down, pulling the IV on top of him. Pt was tearful when talking about his pain. 9/14: sudden onset pleuritic pain early this am, CT scan neg for changes. Pain exacerbated with breathing. 9/17: pain in right hip a little better to which he believes is from the Toradol. Also reports sharp pleuritic pain in right chest. 9/18: pain is much better with Dilaudid 2mg and Toradol together, was able to move his upper leg and hip joint without severe pain interfering with mmt. Clinical Pain Assessment (nonverbal scale for severity on nonverbal patients):  
Clinical Pain Assessment Severity: 7 Location: right low spine shoots down right buttock to mid thigh Character: shooting feels like bone on bone Duration: weeks Effect: cannot bare weight Frequency: constant but worse with mmt Duration: for how long has pt been experiencing pain (e.g., 2 days, 1 month, years) Frequency: how often pain is an issue (e.g., several times per day, once every few days, constant) FUNCTIONAL ASSESSMENT:  
 
Palliative Performance Scale (PPS): PPS: 30 
 
 
 PSYCHOSOCIAL/SPIRITUAL SCREENING:  
 
Palliative IDT has assessed this patient for cultural preferences / practices and a referral made as appropriate to needs (Cultural Services, Patient Advocacy, Ethics, etc.) Advance Care Planning: 
Advance Care Planning 9/17/2018 Patient's Healthcare Decision Maker is: Verbal statement (Legal Next of Kin remains as decision maker) Primary Decision Maker Name Lashell Doherty Confirm Advance Directive - Any spiritual / Mosque concerns: 
[] Yes /  [x] No 
 
Caregiver Burnout: 
[] Yes /  [x] No /  [] No Caregiver Present Anticipatory grief assessment:  
[x] Normal  / [] Maladaptive ESAS Anxiety: Anxiety: 0 
 
ESAS Depression: Depression: 3 REVIEW OF SYSTEMS:  
 
Positive and pertinent negative findings in ROS are noted above in HPI. The following systems were [x] reviewed / [] unable to be reviewed as noted in HPI Other findings are noted below. Systems: constitutional, ears/nose/mouth/throat, respiratory, gastrointestinal, genitourinary, musculoskeletal, integumentary, neurologic, psychiatric, endocrine. Positive findings noted below. Modified ESAS Completed by: provider Fatigue: 3 Drowsiness: 0 Depression: 3 Pain: 7 Anxiety: 0 Nausea: 0 Anorexia: 0 Dyspnea: 0 Constipation: No  
  Stool Occurrence(s): 0 PHYSICAL EXAM:  
 
From RN flowsheet: 
Wt Readings from Last 3 Encounters:  
09/15/18 189 lb 13.1 oz (86.1 kg) Blood pressure 128/83, pulse 86, temperature 98.6 °F (37 °C), resp. rate 18, height 6' (1.829 m), weight 189 lb 13.1 oz (86.1 kg), SpO2 95 %. Pain Scale 1: Numeric (0 - 10) Pain Intensity 1: 8 Pain Onset 1: chronic Pain Location 1: Hip Pain Orientation 1: Right Pain Description 1: Aching Pain Intervention(s) 1: Medication (see MAR) Last bowel movement, if known:  
 
Constitutional: WD, WN, NAD, pleasant and cooperative, AAOx3 Eyes: pupils equal, anicteric, good eye contact ENMT: no nasal discharge, moist mucous membranes Cardiovascular: regular rhythm, distal pulses intact Respiratory: breathing not labored, shallow breathing, symmetric Gastrointestinal: soft non-tender, +bowel sounds Musculoskeletal: right low spine/hips/buttocks hip roll causes pain Skin: warm, dry Neurologic: following commands, moving all extremities Psychiatric: full affect, no hallucinations, very articulate, opened to talking about himself HISTORY:  
 
Active Problems: 
  Sepsis ([address] (9/7/2018) Endocarditis of tricuspid valve (9/12/2018) Tricuspid valve regurgitation, infectious (9/12/2018) Acute septic pulmonary embolism without acute cor pulmonale ([address] (9/12/2018) Acute right-sided low back pain with sciatica (9/13/2018) Heroin abuse (9/13/2018) Debility (9/13/2018) IV drug abuse (9/18/2018) Past Medical History:  
Diagnosis Date  Back pain, chronic Past Surgical History:  
Procedure Laterality Date  HX CERVICAL DISKECTOMY  HX FEMUR FRACTURE TX    
 HX ORTHOPAEDIC Back Surgery History reviewed. No pertinent family history. History reviewed, no pertinent family history. Social History Substance Use Topics  Smoking status: Current Every Day Smoker Packs/day: 1.00  Smokeless tobacco: Never Used  Alcohol use Yes No Known Allergies Current Facility-Administered Medications Medication Dose Route Frequency  0.9% sodium chloride infusion 250 mL  250 mL IntraVENous PRN  
 influenza vaccine 2018-19 (6 mos+)(PF) (FLUARIX QUAD/FLULAVAL QUAD) injection 0.5 mL  0.5 mL IntraMUSCular ONCE  
 [START ON 9/19/2018] tobramycin (NEBCIN) 400 mg in 0.9% sodium chloride 100 mL IVPB  400 mg IntraVENous Q24H  
 ketorolac (TORADOL) injection 30 mg  30 mg IntraVENous Q6H  
 HYDROmorphone (DILAUDID) injection 2 mg  2 mg IntraVENous BID PRN  
 HYDROmorphone (DILAUDID) injection 1 mg  1 mg IntraVENous Q3H PRN  
 levoFLOXacin (LEVAQUIN) 750 mg in D5W IVPB  750 mg IntraVENous Q24H  
 enoxaparin (LOVENOX) injection 40 mg  40 mg SubCUTAneous 7am  
 acetaminophen (TYLENOL) tablet 500 mg  500 mg Oral TID  famotidine (PEPCID) tablet 20 mg  20 mg Oral BID  lidocaine 4 % patch 1 Patch  1 Patch TransDERmal Q24H  
 gabapentin (NEURONTIN) capsule 200 mg  200 mg Oral BID  cefepime (MAXIPIME) 2 g in 0.9% sodium chloride (MBP/ADV) 100 mL  2 g IntraVENous Q8H  
 sodium chloride (NS) flush 5-10 mL  5-10 mL IntraVENous Q8H  
 sodium chloride (NS) flush 5-10 mL  5-10 mL IntraVENous PRN  
  naloxone (NARCAN) injection 0.4 mg  0.4 mg IntraVENous PRN  
 ondansetron (ZOFRAN) injection 4 mg  4 mg IntraVENous Q4H PRN  
 bisacodyl (DULCOLAX) suppository 10 mg  10 mg Rectal DAILY PRN  
 hydrALAZINE (APRESOLINE) 20 mg/mL injection 10 mg  10 mg IntraVENous Q6H PRN  
 albuterol-ipratropium (DUO-NEB) 2.5 MG-0.5 MG/3 ML  3 mL Nebulization Q4H PRN  
 guaiFENesin ER (MUCINEX) tablet 600 mg  600 mg Oral Q12H  polyethylene glycol (MIRALAX) packet 17 g  17 g Oral DAILY  saline peripheral flush soln 10 mL  10 mL InterCATHeter PRN  
 SALINE PERIPHERAL FLUSH Q8H soln 10 mL  10 mL InterCATHeter Q8H  
 
 
 
 LAB AND IMAGING FINDINGS:  
 
Lab Results Component Value Date/Time WBC 11.4 (H) 09/18/2018 03:36 AM  
 HGB 7.3 (L) 09/18/2018 02:11 PM  
 PLATELET 826 20/61/0458 03:36 AM  
 
Lab Results Component Value Date/Time Sodium 137 09/18/2018 03:36 AM  
 Potassium 4.5 09/18/2018 03:36 AM  
 Chloride 106 09/18/2018 03:36 AM  
 CO2 23 09/18/2018 03:36 AM  
 BUN 25 (H) 09/18/2018 03:36 AM  
 Creatinine 0.85 09/18/2018 03:36 AM  
 Calcium 8.8 09/18/2018 03:36 AM  
  
Lab Results Component Value Date/Time AST (SGOT) 37 09/14/2018 03:53 AM  
 Alk. phosphatase 144 (H) 09/14/2018 03:53 AM  
 Protein, total 9.0 (H) 09/14/2018 03:53 AM  
 Albumin 2.4 (L) 09/14/2018 03:53 AM  
 Globulin 6.6 (H) 09/14/2018 03:53 AM  
 
No results found for: INR, PTMR, PTP, PT1, PT2, APTT Lab Results Component Value Date/Time Iron 48 09/18/2018 02:11 PM  
 TIBC 197 (L) 09/18/2018 02:11 PM  
 Iron % saturation 24 09/18/2018 02:11 PM  
  
No results found for: PH, PCO2, PO2 No components found for: [place] No results found for: CPK, CKMB Total time: 35 
Counseling / coordination time, spent as noted above:30 
> 50% counseling / coordination?: y 
 
Prolonged service was provided for  []30 min   []75 min in face to face time in the presence of the patient, spent as noted above. Time Start:  
Time End: Note: this can only be billed with 96180 (initial) or 38806 (follow up). If multiple start / stop times, list each separately.

## 2018-09-18 NOTE — PROGRESS NOTES
Hospitalist Progress Note NAME: Milton Wong :  1980 MRN:  [id number] Assessment / Plan: 
TR infective Endocarditis/ Pseudomonas bacteremia/Pulmonary septic emboli/  septic arthritis spine 
-CT thoracic spine showed multiple pulmonary nodules, several of which are cavitary and several groundglass opacities and areas of consolidation. -KHANH on  revealed mobile mass on the atrial side of the tricuspid valve with possible perforation, mod to severe TR.  
-ID, ortho, IR and Cardiothoracic surgery on board 
-No surgical intervention at this time 
-On IV abx per ID recs - currently On cefepime, Levaquin,  Gentamycin Sepsis, POA; due to above. Acute anemia. Possibly 2/2 IE valve shearing vs hemodilution? 
-1u pRBC  
-serial H&H 
-check iron panel, FOBT, retics Right-sided pleuritic chest pain; possibly pleurisy. Now resolved. -CTA chest with no PE, right basilar opacification with associated right-sided pleural effusion Back pain 2/2 above 
-followed by palliative medicine for pain management PMH of HTN and Chronic anemia 25.0 - 29.9 Overweight / Body mass index is 25.74 kg/(m^2). Code status: Full Prophylaxis: Lovenox Recommended Disposition: Home w/Family Subjective: Chief Complaint / Reason for Physician Visit: back pain 
 
Continues to have persistent back and hip pain. No significant change. Denies chills, dyspnea. Pleuritic rib pain has resolved. Review of Systems: 14 pt ROS unremarkable except as stated above. Objective: VITALS:  
Last 24hrs VS reviewed since prior progress note. Most recent are: 
Patient Vitals for the past 24 hrs: 
 Temp Pulse Resp BP SpO2  
18 1245 98.6 °F (37 °C) 78 18 132/77 96 % 18 1030 98.6 °F (37 °C) 80 16 131/71 95 % 18 1015 98.8 °F (37.1 °C) 76 16 130/69 96 % 18 0805 99.2 °F (37.3 °C) 88 16 134/81 95 % 18 0344 98.8 °F (37.1 °C) 82 18 118/74 95 % 09/17/18 2312 98.7 °F (37.1 °C) 85 18 126/80 96 % 09/17/18 1938 98.9 °F (37.2 °C) 94 18 126/86 94 % 09/17/18 1550 - 91 - 125/68 -  
09/17/18 1546 98.8 °F (37.1 °C) 90 18 - 99 % Intake/Output Summary (Last 24 hours) at 09/18/18 1433 Last data filed at 09/18/18 1245 Gross per 24 hour Intake             4020 ml Output             1350 ml Net             2670 ml PHYSICAL EXAM: 
General: WD, WN. Alert, cooperative, no acute distress   
EENT:  EOMI. Anicteric sclerae. MMM Resp:  CTA bilaterally, no wheezing or rales. No accessory muscle use CV:  Regular  rhythm,  Normal S1 and S2, No edema GI:  Soft, Non distended, Non tender.  +Bowel sounds Neurologic:  Alert and oriented X 3, normal speech, no gross neuro deficits Skin:  Warm and dry Reviewed most current lab test results and cultures  YES Reviewed most current radiology test results   YES Review and summation of old records today    NO Reviewed patient's current orders and MAR    YES 
PMH/SH reviewed - no change compared to H&P 
 
________________________________________________________________________ Ale Hoover MD  
 
Procedures: see electronic medical records for all procedures/Xrays and details which were not copied into this note but were reviewed prior to creation of Plan. LABS: 
I reviewed today's most current labs and imaging studies. Pertinent labs include: 
Recent Labs  
   09/18/18 
 0336  09/17/18 
 1202  09/16/18 
 1710 WBC  11.4*  12.4*  16.7* HGB  6.4*  6.9*  8.2* HCT  20.0*  21.9*  25.6* PLT  249  248  297 Recent Labs  
   09/18/18 
 0336  09/17/18 
 0340  09/16/18 
 1710 NA  137  138  131* K  4.5  4.5  4.8  
CL  106  105  100 CO2  23  24  24 GLU  85  90  93 BUN  25*  22*  23* CREA  0.85  0.91  1.07  
CA  8.8  8.9  8.9 Signed: Ale Hoover MD

## 2018-09-18 NOTE — PROGRESS NOTES
PCU CM completed chart review. Per IDT Rounds Pt started IV antibiotics and will need to continue for 6 weeks. Pt has a history of IV drug abuse. Pt will need 6 weeks of IV antibiotics. Pt is Self Pay Pt so unable to go to SNF for rehab. Anticipate that Pt will probably have to stay here for IV antibiotic administration. CM notified that Pt may transfer from PCU to [address] [place] [address]. CM will continue to monitor discharge plan. Josiah Bullock

## 2018-09-18 NOTE — PROGRESS NOTES
Infectious Disease Progress Note IMPRESSION:  
· Persistent Pseudomonas aeruginosa bacteremia 2/2 ( 9/6), repeat BC also+ 1/1 (9/7)BC  9/11- 1/4., 9/16 - so far no growth Pt admits to washing needles with water off faucet. Denies sharing needles ·  Events of  night of 9/16 noted, pt s/p Rapid Response. 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 Repeat ECHO shows freely mobile mass seen on TV relatively unchanged from KHANH · R/basilar airspace disease on CXR, repeat CXR shows worsening of infiltrate, pt feels better ·  Evidence of cervical fusion C5-7 & T4/5 laminectomy on CT scan · MRI shows- fluid / edema L3/4 posterior joint facet suspicious for septic arthritis. · Pulmonary nodules,cavitary ,ground glass opacities, on  CT thorax suggestive of septic emboli ·  Reports from [facility] reviewed. Pt had MSSA bacteremia in May 2018. ( not MRSA ) Treated with Nafcillin & thereafter Cefazolin 5/15 to 6/28. · MRI of spine showed inflammatory changes in T5/6 suggestive of septic arthritis. TTE(5/16)  suggestive of vegetation , KHANH done (5/18), no evidence of vegetation as per D/c summary from [facility] · IVDU on going up to time of admission · UDS + for opiates · Smoker , alcohol consumption + · S/p IR evaluation - fluid in spine not accessible for aspiration per IR, S/p Ortho consult - defer to Neurosurgery. · S/p Cardiothoracic surgery evaluation - no surgery at this time. -  
· HIV, Hep C negative  
  
  
PLAN:  
   
· Continue Cefepime IV, add gentamicin , levaquin for new infiltrate, 
·  Continue IS  Every 1-2 hours , d/w pt again today ·  Plan of care d/w pt . PSEUDOMONAS SPECIES GROWING IN THIS SINGLE BOTTLE DRAWN ( AC SITE) REFER TO [facility] [id number] FOR ID AND SENSITIVITIES (A) Culture result:    Final  
PRELIMINARY REPORT OF GRAM NEGATIVE RODS GROWING IN THIS SINGLE BOTTLE DRAWN CALLED TO AND READ BACK BY Allen Cruz RN ON 18 AT 1815 [facility] 2121). MS (A)  
 
 
KHANH () TRICUSPID VALVE: There was moderate thickening. There was normal leaflet 
separation. There was a mobile mass on the atrial side of the tricuspid 
valve with possible perforation concerning for endocarditis. DOPPLER: 
There was moderate to severe regurgitation. Subjective:  
 
Pt seen . Denies new complaints . Pain in lower back + Review of Systems:  A comprehensive review of systems was negative except for that written in the History of Present Illness. Objective:  
 
Blood pressure 128/83, pulse 86, temperature 98.6 °F (37 °C), resp. rate 18, height 6' (1.829 m), weight 189 lb 13.1 oz (86.1 kg), SpO2 95 %. Temp (24hrs), Av.8 °F (37.1 °C), Min:98.6 °F (37 °C), Max:99.2 °F (37.3 °C) Patient Vitals for the past 24 hrs: 
 Temp Pulse Resp BP SpO2  
18 1614 98.6 °F (37 °C) 86 18 128/83 95 % 18 1245 98.6 °F (37 °C) 78 18 132/77 96 % 18 1030 98.6 °F (37 °C) 80 16 131/71 95 % 18 1015 98.8 °F (37.1 °C) 76 16 130/69 96 % 18 0805 99.2 °F (37.3 °C) 88 16 134/81 95 % 18 0344 98.8 °F (37.1 °C) 82 18 118/74 95 % 18 2312 98.7 °F (37.1 °C) 85 18 126/80 96 % 18 1938 98.9 °F (37.2 °C) 94 18 126/86 94 % Lines:  Peripheral IV:   
   
 
 
Physical Exam:  
General:  Alert, cooperative, Eyes:  Sclera anicteric. Pupils equally round and reactive to light. Mouth/Throat: Mucous membranes normal, oral pharynx clear Neck: Supple Lungs:   reduced auscultation bases CV:  Regular rate and rhythm,no murmur, Abdomen:   Soft, non-tender. bowel sounds normal. non-distended, Lumbar tender in lower back Extremities: No  Edema, tattoos + Lines/Devices:  Intact, no erythema, drainage or tenderness Data Review: CBC:  
Recent Labs  
   18 0361 [id number]  18 
 0336  18 
 1202  18 
 1710 WBC   --   11.4*  12.4*  16.7*  
 RBC   --   2.41*  2.60*  3.01* HGB  7.3*  6.4*  6.9*  8.2* HCT  23.0*  20.0*  21.9*  25.6* PLT   --   249  248  297 GRANS   --    --   80*   --   
LYMPH   --    --   11*   --   
EOS   --    --   1   --   
 
CMP:  
Recent Labs  
   09/18/18 
 0336  09/17/18 
 0340  09/16/18 
 1710 GLU  85  90  93 NA  137  138  131* K  4.5  4.5  4.8  
CL  106  105  100 CO2  23  24  24 BUN  25*  22*  23* CREA  0.85  0.91  1.07  
CA  8.8  8.9  8.9 AGAP  8  9  7 BUCR  29*  24*  21* Studies:     
Lab Results Component Value Date/Time Culture result: NO GROWTH 2 DAYS 09/16/2018 05:10 PM  
 Culture result: NO GROWTH 6 DAYS 09/11/2018 07:03 PM  
 Culture result: (A) 09/11/2018 07:03 PM  
  PSEUDOMONAS AERUGINOSA GROWING IN 1 OF 2 BOTTLES DRAWN (SITE = L UPPER ARM) Culture result: REMAINING BOTTLE(S) HAS/HAVE NO GROWTH IN 5 DAYS 09/11/2018 07:03 PM  
 Culture result: (A) 09/08/2018 04:01 AM  
  PSEUDOMONAS AERUGINOSA GROWING IN THIS SINGLE BOTTLE DRAWN (L MIDLINE SITE) PLEASE REFER TO PREVIOUS BLOOD CULTURE [id number], COLLECTED 9/6/18 FOR SENSITIVITIES Culture result: (A) 09/08/2018 04:01 AM  
  PRELIMINARY REPORT OF GRAM NEGATIVE RODS GROWING IN THIS SINGLE BOTTLE DRAWN CALLED TO AND READ BACK BY Guerrero Velez RN ON 9/10/18 AT 1817 [place] [facility] [place] [facility]6). MS  
 Culture result: MRSA NOT PRESENT 09/08/2018 04:01 AM  
 Culture result:  09/08/2018 04:01 AM  
      Screening of patient nares for MRSA is for surveillance purposes and, if positive, to facilitate isolation considerations in high risk settings. It is not intended for automatic decolonization interventions per se as regimens are not sufficiently effective to warrant routine use. XR Results (most recent): 
 
Results from Hospital Encounter encounter on 09/06/18 XR CHEST PORT Narrative EXAM: Portable CXR.  2156 hours. COMPARISON: 1824 hours. INDICATION: central line placement Right IJ CVL has been placed with tip in the lower SVC, without pneumothorax. There is otherwise no change including right lower lobe pneumonia appearance. Impression IMPRESSION: Satisfactory CVL placement. Patient Active Problem List  
Diagnosis Code  Sepsis ([facility] [address] A41.9  Endocarditis of tricuspid valve I36.8  Tricuspid valve regurgitation, infectious I07.1  Acute septic pulmonary embolism without acute cor pulmonale (HCC) I26.90  
 Acute right-sided low back pain with sciatica M54.40  Heroin abuse F11.10  Debility R53.81 I have discussed the diagnosis with the patient and the intended plan as seen in the above orders. I have discussed medication side effects and warnings with the patient as well. Reviewed test results at length with patient Anti-infectives:  
 
 Vancomycin iv- 9/7- 9/11- DC Cefepime iv- 9/7  Jd Britton MD FACP

## 2018-09-19 LAB
ABO + RH BLD: NORMAL
ANION GAP SERPL CALC-SCNC: 10 MMOL/L (ref 5–15)
BLD PROD TYP BPU: NORMAL
BLOOD GROUP ANTIBODIES SERPL: NORMAL
BPU ID: NORMAL
BUN SERPL-MCNC: 25 MG/DL (ref 6–20)
BUN/CREAT SERPL: 27 (ref 12–20)
CALCIUM SERPL-MCNC: 8.8 MG/DL (ref 8.5–10.1)
CHLORIDE SERPL-SCNC: 106 MMOL/L (ref 97–108)
CO2 SERPL-SCNC: 21 MMOL/L (ref 21–32)
CREAT SERPL-MCNC: 0.91 MG/DL (ref 0.7–1.3)
CROSSMATCH RESULT,%XM: NORMAL
ERYTHROCYTE [DISTWIDTH] IN BLOOD BY AUTOMATED COUNT: 15.4 % (ref 11.5–14.5)
GLUCOSE SERPL-MCNC: 137 MG/DL (ref 65–100)
HCT VFR BLD AUTO: 22.4 % (ref 36.6–50.3)
HGB BLD-MCNC: 7.1 G/DL (ref 12.1–17)
MCH RBC QN AUTO: 26.2 PG (ref 26–34)
MCHC RBC AUTO-ENTMCNC: 31.7 G/DL (ref 30–36.5)
MCV RBC AUTO: 82.7 FL (ref 80–99)
NRBC # BLD: 0 K/UL (ref 0–0.01)
NRBC BLD-RTO: 0 PER 100 WBC
PLATELET # BLD AUTO: 267 K/UL (ref 150–400)
PMV BLD AUTO: 8.7 FL (ref 8.9–12.9)
POTASSIUM SERPL-SCNC: 4.3 MMOL/L (ref 3.5–5.1)
RBC # BLD AUTO: 2.71 M/UL (ref 4.1–5.7)
SODIUM SERPL-SCNC: 137 MMOL/L (ref 136–145)
SPECIMEN EXP DATE BLD: NORMAL
STATUS OF UNIT,%ST: NORMAL
UNIT DIVISION, %UDIV: 0
WBC # BLD AUTO: 10.7 K/UL (ref 4.1–11.1)

## 2018-09-19 PROCEDURE — 85027 COMPLETE CBC AUTOMATED: CPT | Performed by: INTERNAL MEDICINE

## 2018-09-19 PROCEDURE — 36415 COLL VENOUS BLD VENIPUNCTURE: CPT | Performed by: INTERNAL MEDICINE

## 2018-09-19 PROCEDURE — 74011000258 HC RX REV CODE- 258: Performed by: INTERNAL MEDICINE

## 2018-09-19 PROCEDURE — 74011250637 HC RX REV CODE- 250/637: Performed by: NURSE PRACTITIONER

## 2018-09-19 PROCEDURE — 80048 BASIC METABOLIC PNL TOTAL CA: CPT | Performed by: INTERNAL MEDICINE

## 2018-09-19 PROCEDURE — 74011250636 HC RX REV CODE- 250/636: Performed by: INTERNAL MEDICINE

## 2018-09-19 PROCEDURE — 65660000000 HC RM CCU STEPDOWN

## 2018-09-19 PROCEDURE — 74011250636 HC RX REV CODE- 250/636: Performed by: NURSE PRACTITIONER

## 2018-09-19 RX ORDER — HYDROMORPHONE HYDROCHLORIDE 2 MG/ML
1 INJECTION, SOLUTION INTRAMUSCULAR; INTRAVENOUS; SUBCUTANEOUS
Status: DISCONTINUED | OUTPATIENT
Start: 2018-09-19 | End: 2018-09-21

## 2018-09-19 RX ORDER — CELECOXIB 100 MG/1
100 CAPSULE ORAL 2 TIMES DAILY
Status: DISCONTINUED | OUTPATIENT
Start: 2018-09-19 | End: 2018-09-24

## 2018-09-19 RX ADMIN — HYDROMORPHONE HYDROCHLORIDE 1 MG: 2 INJECTION INTRAMUSCULAR; INTRAVENOUS; SUBCUTANEOUS at 01:30

## 2018-09-19 RX ADMIN — HYDROMORPHONE HYDROCHLORIDE 1 MG: 2 INJECTION, SOLUTION INTRAMUSCULAR; INTRAVENOUS; SUBCUTANEOUS at 21:15

## 2018-09-19 RX ADMIN — Medication 10 ML: at 13:31

## 2018-09-19 RX ADMIN — Medication 10 ML: at 06:40

## 2018-09-19 RX ADMIN — Medication 10 ML: at 18:03

## 2018-09-19 RX ADMIN — ENOXAPARIN SODIUM 40 MG: 40 INJECTION, SOLUTION INTRAVENOUS; SUBCUTANEOUS at 07:55

## 2018-09-19 RX ADMIN — CEFEPIME HYDROCHLORIDE 2 G: 2 INJECTION, POWDER, FOR SOLUTION INTRAVENOUS at 02:38

## 2018-09-19 RX ADMIN — HYDROMORPHONE HYDROCHLORIDE 1 MG: 2 INJECTION INTRAMUSCULAR; INTRAVENOUS; SUBCUTANEOUS at 04:40

## 2018-09-19 RX ADMIN — HYDROMORPHONE HYDROCHLORIDE 1 MG: 2 INJECTION, SOLUTION INTRAMUSCULAR; INTRAVENOUS; SUBCUTANEOUS at 18:05

## 2018-09-19 RX ADMIN — FAMOTIDINE 20 MG: 20 TABLET ORAL at 18:02

## 2018-09-19 RX ADMIN — KETOROLAC TROMETHAMINE 30 MG: 30 INJECTION, SOLUTION INTRAMUSCULAR at 06:35

## 2018-09-19 RX ADMIN — ACETAMINOPHEN 500 MG: 500 TABLET ORAL at 18:02

## 2018-09-19 RX ADMIN — HYDROMORPHONE HYDROCHLORIDE 2 MG: 2 INJECTION INTRAMUSCULAR; INTRAVENOUS; SUBCUTANEOUS at 06:38

## 2018-09-19 RX ADMIN — TOBRAMYCIN SULFATE 400 MG: 40 INJECTION, SOLUTION INTRAMUSCULAR; INTRAVENOUS at 11:24

## 2018-09-19 RX ADMIN — Medication 10 ML: at 15:08

## 2018-09-19 RX ADMIN — HYDROMORPHONE HYDROCHLORIDE 1 MG: 2 INJECTION INTRAMUSCULAR; INTRAVENOUS; SUBCUTANEOUS at 11:19

## 2018-09-19 RX ADMIN — HYDROMORPHONE HYDROCHLORIDE 1 MG: 2 INJECTION INTRAMUSCULAR; INTRAVENOUS; SUBCUTANEOUS at 07:56

## 2018-09-19 RX ADMIN — HYDROMORPHONE HYDROCHLORIDE 2 MG: 2 INJECTION INTRAMUSCULAR; INTRAVENOUS; SUBCUTANEOUS at 12:48

## 2018-09-19 RX ADMIN — HYDROMORPHONE HYDROCHLORIDE 1 MG: 2 INJECTION, SOLUTION INTRAMUSCULAR; INTRAVENOUS; SUBCUTANEOUS at 15:07

## 2018-09-19 RX ADMIN — ACETAMINOPHEN 500 MG: 500 TABLET ORAL at 21:14

## 2018-09-19 RX ADMIN — CEFEPIME HYDROCHLORIDE 2 G: 2 INJECTION, POWDER, FOR SOLUTION INTRAVENOUS at 09:05

## 2018-09-19 RX ADMIN — CEFEPIME HYDROCHLORIDE 2 G: 2 INJECTION, POWDER, FOR SOLUTION INTRAVENOUS at 18:01

## 2018-09-19 RX ADMIN — CELECOXIB 100 MG: 100 CAPSULE ORAL at 18:02

## 2018-09-19 RX ADMIN — KETOROLAC TROMETHAMINE 30 MG: 30 INJECTION, SOLUTION INTRAMUSCULAR at 11:19

## 2018-09-19 RX ADMIN — LEVOFLOXACIN 750 MG: 5 INJECTION, SOLUTION INTRAVENOUS at 14:47

## 2018-09-19 RX ADMIN — Medication 10 ML: at 21:17

## 2018-09-19 NOTE — PROGRESS NOTES
Bedside shift change report given to [address] [place] [address] (oncoming nurse) by [place] (offgoing nurse). Report included the following information SBAR, Kardex, Intake/Output, MAR, Recent Results and Cardiac Rhythm NSR Pt transferred from PCU today. Pain medication given per order.

## 2018-09-19 NOTE — PROGRESS NOTES
Bedside shift change report given to KENIA Hanks RN (oncoming nurse) by Lizz RN (offgoing nurse). Report included the following information SBAR, Kardex, ED Summary, Procedure Summary, Intake/Output, MAR, Recent Results, Med Rec Status and Cardiac Rhythm NSR.

## 2018-09-19 NOTE — PROGRESS NOTES
Chart reviewed. As pt is a self pay IV drug abuser, there are limited d/c options. I've asked Mgr Ladonna if [facility] would benefit this pt for the course of IV abx.

## 2018-09-19 NOTE — PROGRESS NOTES
TRANSFER - IN REPORT: 
 
Verbal report received from Lizz (name) on Natalie Senior  being received from PCU (unit) for routine progression of care Report consisted of patients Situation, Background, Assessment and  
Recommendations(SBAR). Information from the following report(s) SBAR, Kardex, Intake/Output, MAR, Recent Results and Cardiac Rhythm NSR was reviewed with the receiving nurse. Opportunity for questions and clarification was provided. Assessment completed upon patients arrival to unit and care assumed. Primary Nurse Mimi Clarke RN and Juan Smith RN performed a dual skin assessment on this patient- blanchable redness to sacrum and red rash to back noted.   
Juan score is 21

## 2018-09-19 NOTE — PROGRESS NOTES
Hospitalist Progress Note NAME: Jasmine Vasques :  1980 MRN:  [id number] Assessment / Plan: 
TR infective Endocarditis/ Pseudomonas bacteremia/Pulmonary septic emboli/  septic arthritis spine 
-CT thoracic spine showed multiple pulmonary nodules, several of which are cavitary and several groundglass opacities and areas of consolidation. -KHANH on  revealed mobile mass on the atrial side of the tricuspid valve with possible perforation, mod to severe TR.  
-ID, ortho, IR and Cardiothoracic surgery on board 
-No surgical intervention at this time 
-On IV abx per ID recs - currently On cefepime, Levaquin,  Gentamycin Sepsis, POA; due to above. Improved. Acute anemia. Possibly 2/2 IE valve shearing vs hemodilution? 
-1u pRBC  
-iron lvls ok 
-serial H&H Right-sided pleuritic chest pain; possibly pleurisy. Now resolved. -CTA chest with no PE, right basilar opacification with associated right-sided pleural effusion Back pain 2/2 above 
-followed by palliative medicine for pain management PMH of HTN and Chronic anemia 25.0 - 29.9 Overweight / Body mass index is 25.74 kg/(m^2). Code status: Full Prophylaxis: Lovenox Recommended Disposition: Home w/Family Subjective: Chief Complaint / Reason for Physician Visit: back pain 
 
Continues to have persistent back and hip pain. No significant change. Denies chills, dyspnea. Otherwise no new acute complaints. Review of Systems: 14 pt ROS unremarkable except as stated above. Objective: VITALS:  
Last 24hrs VS reviewed since prior progress note. Most recent are: 
Patient Vitals for the past 24 hrs: 
 Temp Pulse Resp BP SpO2  
18 1327 98.2 °F (36.8 °C) 79 18 123/69 98 % 18 0800 98 °F (36.7 °C) 74 18 132/85 94 % 18 0448 97.9 °F (36.6 °C) 81 18 138/86 93 % 18 2343 99 °F (37.2 °C) 81 18 129/75 97 % 18 1926 98.6 °F (37 °C) 83 18 121/75 96 % 09/18/18 1614 98.6 °F (37 °C) 86 18 128/83 95 % Intake/Output Summary (Last 24 hours) at 09/19/18 1343 Last data filed at 09/19/18 5047 Gross per 24 hour Intake              300 ml Output             1500 ml Net            -1200 ml PHYSICAL EXAM: 
General: WD, WN. Alert, cooperative, no acute distress   
EENT:  EOMI. Anicteric sclerae. MMM Resp:  CTA bilaterally, no wheezing or rales. No accessory muscle use CV:  Regular  rhythm,  Normal S1 and S2, No edema GI:  Soft, Non distended, Non tender.  +Bowel sounds Neurologic:  Alert and oriented X 3, normal speech, no gross neuro deficits Skin:  Warm and dry Reviewed most current lab test results and cultures  YES Reviewed most current radiology test results   YES Review and summation of old records today    NO Reviewed patient's current orders and MAR    YES 
PMH/SH reviewed - no change compared to H&P 
 
________________________________________________________________________ Arun Riddle MD  
 
Procedures: see electronic medical records for all procedures/Xrays and details which were not copied into this note but were reviewed prior to creation of Plan. LABS: 
I reviewed today's most current labs and imaging studies. Pertinent labs include: 
Recent Labs  
   09/19/18 
 0433  09/18/18 0361 [id number]  09/18/18 
 9142  09/17/18 
 1202 WBC  10.7   --   11.4*  12.4* HGB  7.1*  7.3*  6.4*  6.9*  
HCT  22.4*  23.0*  20.0*  21.9*  
PLT  267   --   249  248 Recent Labs  
   09/19/18 
 0433  09/18/18 
 0336  09/17/18 
 0340 NA  137  137  138  
K  4.3  4.5  4.5  
CL  106  106  105 CO2  21  23  24 GLU  137*  85  90 BUN  25*  25*  22* CREA  0.91  0.85  0.91  
CA  8.8  8.8  8.9 Signed: Arun Riddle MD

## 2018-09-19 NOTE — PROGRESS NOTES
Patient being transferred to Room 3254. Report given to primary RN. SBAR, history, MAR, POC reviewed. No further questions.

## 2018-09-19 NOTE — PROGRESS NOTES
Progress Note 9/19/2018 2:51 PM 
NAME: Ferrell Soulier MRN:  [id number] Admit Diagnosis: Sepsis ([address] 
central line placement Assessment:             
  
1. 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 KHANH 9/2018 with mobile mass on tricuspid valve with possible perforation with moderate to severe tricuspid regurgitation. 3. Hx MSSA in thoracic spine,  With Hx of  KHANH approx 5/2018 was ok 4. Sinus 5. Anemia 6. Cervical surgery with implant, thoracic lamenectomy 7. Active IV drug use including Heroin 8. Active tobacco, Etoh 
   
  
                      Plan:              
  
Blood cultures positive for pseudomonas. TTE negative. KHANH with mobile mass on tricuspid valve with likely perforation resulting in moderate to severe tricuspid regurgitation. Consistent with endocarditis. Evaluated by cardiac surgery, repeat cultures on 9/11 +. Culture 9/16 NGTD. Abx and duration per ID specialist. 
 
Currently with central line. Repeat TTE with no change in tricuspid valve endocarditis. Culture 9/16 so far no growth, temp last 48 hours was ok. 
 
  
  
 [x]        High complexity decision making was performed 
  
 
 
Subjective:  
 
Ferrell Soulier denies chest pain, dyspnea. Discussed with RN events overnight. Review of Systems: 
 
Symptom Y/N Comments  Symptom Y/N Comments Fever/Chills N   Chest Pain N Poor Appetite N   Edema N   
Cough N   Abdominal Pain N Sputum N   Joint Pain N   
SOB/CUEVA N   Pruritis/Rash N   
Nausea/vomit N   Tolerating PT/OT Diarrhea N   Tolerating Diet Y Constipation N   Other Could NOT obtain due to:   
 
Objective:  
  
Physical Exam: 
 
Last 24hrs VS reviewed since prior progress note. Most recent are: 
 
Visit Vitals  /69  Pulse 79  Temp 98.2 °F (36.8 °C)  Resp 18  Ht 6' (1.829 m)  Wt 86.1 kg (189 lb 13.1 oz)  SpO2 98%  BMI 25.74 kg/m2 Intake/Output Summary (Last 24 hours) at 09/19/18 1400 Last data filed at 09/19/18 4003 Gross per 24 hour Intake              300 ml Output             1500 ml Net            -1200 ml General Appearance: Well developed, well nourished, alert & oriented x 3,  
 no acute distress. Ears/Nose/Mouth/Throat: Hearing grossly normal. 
Neck: Supple. Chest: Lungs clear to auscultation bilaterally. Cardiovascular: Regular rate and rhythm, S1S2 normal, no murmur. Abdomen: Soft, non-tender, bowel sounds are active. Extremities: No edema bilaterally. Skin: Warm and dry. PMH/ reviewed - no change compared to H&P Data Review Telemetry: normal sinus rhythm Lab Data Personally Reviewed: 
 
Recent Labs  
   09/19/18 
 0433  09/18/18 [address]  09/18/18 
 6929 WBC  10.7   --   11.4* HGB  7.1*  7.3*  6.4* HCT  22.4*  23.0*  20.0*  
PLT  267   --   249 No results for input(s): INR, PTP, APTT in the last 72 hours. No lab exists for component: INREXT, INREXT Recent Labs  
   09/19/18 
 0433  09/18/18 
 0336  09/17/18 
 0340 NA  137  137  138  
K  4.3  4.5  4.5  
CL  106  106  105 CO2  21  23  24 BUN  25*  25*  22* CREA  0.91  0.85  0.91  
GLU  137*  85  90  
CA  8.8  8.8  8.9 No results for input(s): CPK, CKNDX, TROIQ in the last 72 hours. No lab exists for component: CPKMB No results found for: CHOL, CHOLX, CHLST, CHOLV, HDL, LDL, LDLC, DLDLP, TGLX, TRIGL, TRIGP, CHHD, CHHDX No results for input(s): SGOT, GPT, AP, TBIL, TP, ALB, GLOB, GGT, AML, LPSE in the last 72 hours. No lab exists for component: AMYP, HLPSE No results for input(s): PH, PCO2, PO2 in the last 72 hours. Medications Personally Reviewed: 
 
Current Facility-Administered Medications Medication Dose Route Frequency  0.9% sodium chloride infusion 250 mL  250 mL IntraVENous PRN  
 tobramycin (NEBCIN) 400 mg in 0.9% sodium chloride 100 mL IVPB  400 mg IntraVENous Q24H  polyethylene glycol (MIRALAX) packet 17 g  17 g Oral DAILY PRN  
 HYDROmorphone (DILAUDID) injection 2 mg  2 mg IntraVENous BID PRN  
 HYDROmorphone (DILAUDID) injection 1 mg  1 mg IntraVENous Q3H PRN  
 levoFLOXacin (LEVAQUIN) 750 mg in D5W IVPB  750 mg IntraVENous Q24H  
 enoxaparin (LOVENOX) injection 40 mg  40 mg SubCUTAneous 7am  
 acetaminophen (TYLENOL) tablet 500 mg  500 mg Oral TID  famotidine (PEPCID) tablet 20 mg  20 mg Oral BID  lidocaine 4 % patch 1 Patch  1 Patch TransDERmal Q24H  cefepime (MAXIPIME) 2 g in 0.9% sodium chloride (MBP/ADV) 100 mL  2 g IntraVENous Q8H  
 sodium chloride (NS) flush 5-10 mL  5-10 mL IntraVENous Q8H  
 sodium chloride (NS) flush 5-10 mL  5-10 mL IntraVENous PRN  
 naloxone (NARCAN) injection 0.4 mg  0.4 mg IntraVENous PRN  
 ondansetron (ZOFRAN) injection 4 mg  4 mg IntraVENous Q4H PRN  
 bisacodyl (DULCOLAX) suppository 10 mg  10 mg Rectal DAILY PRN  
 hydrALAZINE (APRESOLINE) 20 mg/mL injection 10 mg  10 mg IntraVENous Q6H PRN  
 albuterol-ipratropium (DUO-NEB) 2.5 MG-0.5 MG/3 ML  3 mL Nebulization Q4H PRN  
 saline peripheral flush soln 10 mL  10 mL InterCATHeter PRN  
 SALINE PERIPHERAL FLUSH Q8H soln 10 mL  10 mL InterCATHeter Q8H Rosa Isela Olson MD

## 2018-09-20 LAB
ANION GAP SERPL CALC-SCNC: 7 MMOL/L (ref 5–15)
BUN SERPL-MCNC: 22 MG/DL (ref 6–20)
BUN/CREAT SERPL: 23 (ref 12–20)
CALCIUM SERPL-MCNC: 9.2 MG/DL (ref 8.5–10.1)
CHLORIDE SERPL-SCNC: 104 MMOL/L (ref 97–108)
CO2 SERPL-SCNC: 25 MMOL/L (ref 21–32)
CREAT SERPL-MCNC: 0.96 MG/DL (ref 0.7–1.3)
ERYTHROCYTE [DISTWIDTH] IN BLOOD BY AUTOMATED COUNT: 15.3 % (ref 11.5–14.5)
GLUCOSE SERPL-MCNC: 86 MG/DL (ref 65–100)
HCT VFR BLD AUTO: 24.5 % (ref 36.6–50.3)
HGB BLD-MCNC: 7.9 G/DL (ref 12.1–17)
MCH RBC QN AUTO: 26.7 PG (ref 26–34)
MCHC RBC AUTO-ENTMCNC: 32.2 G/DL (ref 30–36.5)
MCV RBC AUTO: 82.8 FL (ref 80–99)
NRBC # BLD: 0 K/UL (ref 0–0.01)
NRBC BLD-RTO: 0 PER 100 WBC
PLATELET # BLD AUTO: 337 K/UL (ref 150–400)
PMV BLD AUTO: 8.6 FL (ref 8.9–12.9)
POTASSIUM SERPL-SCNC: 5.1 MMOL/L (ref 3.5–5.1)
RBC # BLD AUTO: 2.96 M/UL (ref 4.1–5.7)
SODIUM SERPL-SCNC: 136 MMOL/L (ref 136–145)
WBC # BLD AUTO: 13.4 K/UL (ref 4.1–11.1)

## 2018-09-20 PROCEDURE — 36415 COLL VENOUS BLD VENIPUNCTURE: CPT | Performed by: INTERNAL MEDICINE

## 2018-09-20 PROCEDURE — 74011250636 HC RX REV CODE- 250/636: Performed by: INTERNAL MEDICINE

## 2018-09-20 PROCEDURE — 85027 COMPLETE CBC AUTOMATED: CPT | Performed by: INTERNAL MEDICINE

## 2018-09-20 PROCEDURE — 74011000258 HC RX REV CODE- 258: Performed by: INTERNAL MEDICINE

## 2018-09-20 PROCEDURE — 80048 BASIC METABOLIC PNL TOTAL CA: CPT | Performed by: INTERNAL MEDICINE

## 2018-09-20 PROCEDURE — 65660000000 HC RM CCU STEPDOWN

## 2018-09-20 PROCEDURE — 74011250637 HC RX REV CODE- 250/637: Performed by: NURSE PRACTITIONER

## 2018-09-20 RX ORDER — HYDROMORPHONE HYDROCHLORIDE 2 MG/ML
2 INJECTION, SOLUTION INTRAMUSCULAR; INTRAVENOUS; SUBCUTANEOUS
Status: DISCONTINUED | OUTPATIENT
Start: 2018-09-20 | End: 2018-09-21

## 2018-09-20 RX ADMIN — HYDROMORPHONE HYDROCHLORIDE 1 MG: 2 INJECTION, SOLUTION INTRAMUSCULAR; INTRAVENOUS; SUBCUTANEOUS at 04:50

## 2018-09-20 RX ADMIN — Medication 10 ML: at 13:59

## 2018-09-20 RX ADMIN — Medication 10 ML: at 22:09

## 2018-09-20 RX ADMIN — HYDROMORPHONE HYDROCHLORIDE 1 MG: 2 INJECTION, SOLUTION INTRAMUSCULAR; INTRAVENOUS; SUBCUTANEOUS at 15:43

## 2018-09-20 RX ADMIN — FAMOTIDINE 20 MG: 20 TABLET ORAL at 17:53

## 2018-09-20 RX ADMIN — HYDROMORPHONE HYDROCHLORIDE 1 MG: 2 INJECTION, SOLUTION INTRAMUSCULAR; INTRAVENOUS; SUBCUTANEOUS at 22:06

## 2018-09-20 RX ADMIN — HYDROMORPHONE HYDROCHLORIDE 1 MG: 2 INJECTION, SOLUTION INTRAMUSCULAR; INTRAVENOUS; SUBCUTANEOUS at 00:13

## 2018-09-20 RX ADMIN — CEFEPIME HYDROCHLORIDE 2 G: 2 INJECTION, POWDER, FOR SOLUTION INTRAVENOUS at 17:54

## 2018-09-20 RX ADMIN — ACETAMINOPHEN 500 MG: 500 TABLET ORAL at 08:12

## 2018-09-20 RX ADMIN — CEFEPIME HYDROCHLORIDE 2 G: 2 INJECTION, POWDER, FOR SOLUTION INTRAVENOUS at 09:27

## 2018-09-20 RX ADMIN — LEVOFLOXACIN 750 MG: 5 INJECTION, SOLUTION INTRAVENOUS at 15:22

## 2018-09-20 RX ADMIN — HYDROMORPHONE HYDROCHLORIDE 2 MG: 2 INJECTION, SOLUTION INTRAMUSCULAR; INTRAVENOUS; SUBCUTANEOUS at 10:27

## 2018-09-20 RX ADMIN — HYDROMORPHONE HYDROCHLORIDE 2 MG: 2 INJECTION, SOLUTION INTRAMUSCULAR; INTRAVENOUS; SUBCUTANEOUS at 01:51

## 2018-09-20 RX ADMIN — CELECOXIB 100 MG: 100 CAPSULE ORAL at 17:53

## 2018-09-20 RX ADMIN — Medication 10 ML: at 10:27

## 2018-09-20 RX ADMIN — HYDROMORPHONE HYDROCHLORIDE 1 MG: 2 INJECTION, SOLUTION INTRAMUSCULAR; INTRAVENOUS; SUBCUTANEOUS at 18:44

## 2018-09-20 RX ADMIN — ACETAMINOPHEN 500 MG: 500 TABLET ORAL at 22:09

## 2018-09-20 RX ADMIN — CELECOXIB 100 MG: 100 CAPSULE ORAL at 08:12

## 2018-09-20 RX ADMIN — ENOXAPARIN SODIUM 40 MG: 40 INJECTION, SOLUTION INTRAVENOUS; SUBCUTANEOUS at 07:03

## 2018-09-20 RX ADMIN — ACETAMINOPHEN 500 MG: 500 TABLET ORAL at 15:42

## 2018-09-20 RX ADMIN — FAMOTIDINE 20 MG: 20 TABLET ORAL at 08:12

## 2018-09-20 RX ADMIN — CEFEPIME HYDROCHLORIDE 2 G: 2 INJECTION, POWDER, FOR SOLUTION INTRAVENOUS at 01:57

## 2018-09-20 RX ADMIN — Medication 10 ML: at 12:05

## 2018-09-20 RX ADMIN — Medication 10 ML: at 05:08

## 2018-09-20 RX ADMIN — HYDROMORPHONE HYDROCHLORIDE 1 MG: 2 INJECTION, SOLUTION INTRAMUSCULAR; INTRAVENOUS; SUBCUTANEOUS at 08:13

## 2018-09-20 RX ADMIN — Medication 10 ML: at 08:14

## 2018-09-20 RX ADMIN — HYDROMORPHONE HYDROCHLORIDE 1 MG: 2 INJECTION, SOLUTION INTRAMUSCULAR; INTRAVENOUS; SUBCUTANEOUS at 12:05

## 2018-09-20 RX ADMIN — TOBRAMYCIN SULFATE 400 MG: 40 INJECTION, SOLUTION INTRAMUSCULAR; INTRAVENOUS at 12:45

## 2018-09-20 RX ADMIN — Medication 10 ML: at 13:58

## 2018-09-20 NOTE — PROGRESS NOTES
Infectious Disease Progress Note IMPRESSION:  
· Persistent Pseudomonas aeruginosa bacteremia 2/2 ( 9/6), repeat BC also+ 1/1 (9/7)BC  9/11- 1/4., 9/16 - so far no growth Pt admits to washing needles with water off faucet. Denies sharing needles ·  S/p Rapid Response on 9/16. 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 Repeat ECHO shows freely mobile mass seen on TV relatively unchanged from KHANH · R/basilar airspace disease on CXR, repeat CXR shows worsening of infiltrate, pt feels better ·  Evidence of cervical fusion C5-7 & T4/5 laminectomy on CT scan · MRI shows- fluid / edema L3/4 posterior joint facet suspicious for septic arthritis. · Pulmonary nodules,cavitary ,ground glass opacities, on  CT thorax suggestive of septic emboli ·  Reports from [facility] reviewed. Pt had MSSA bacteremia in May 2018. ( not MRSA ) Treated with Nafcillin & thereafter Cefazolin 5/15 to 6/28. · MRI of spine showed inflammatory changes in T5/6 suggestive of septic arthritis. TTE(5/16)  suggestive of vegetation , KHANH done (5/18), no evidence of vegetation as per D/c summary from [facility] · IVDU on going up to time of admission · UDS + for opiates · Smoker , alcohol consumption + · S/p IR evaluation - fluid in spine not accessible for aspiration per IR, S/p Ortho consult - defer to Neurosurgery. · S/p Cardiothoracic surgery evaluation - no surgery at this time. -  
· HIV, Hep C negative  
  
  
PLAN:  
   
· Continue Cefepime IV, Gentamicin , Levaquin for new infiltrate, also for Pseudomonas bacteremia ·  Continue IS  every 1-2 hours , d/w pt again today ·  Plan of care d/w pt again. PSEUDOMONAS SPECIES GROWING IN THIS SINGLE BOTTLE DRAWN ( AC SITE) REFER TO [facility] [id number] FOR ID AND SENSITIVITIES (A) Culture result:    Final  
PRELIMINARY REPORT OF GRAM NEGATIVE RODS GROWING IN THIS SINGLE BOTTLE DRAWN CALLED TO AND READ BACK BY Satnam López RN ON 18 AT 1815 [facility] 7267). MS (A)  
 
 
KHANH () TRICUSPID VALVE: There was moderate thickening. There was normal leaflet 
separation. There was a mobile mass on the atrial side of the tricuspid 
valve with possible perforation concerning for endocarditis. DOPPLER: 
There was moderate to severe regurgitation. Subjective:  
 
Pt seen . Denies new complaints . Pain in lower back + Review of Systems:  A comprehensive review of systems was negative except for that written in the History of Present Illness. Objective:  
 
Blood pressure 130/74, pulse 80, temperature 98 °F (36.7 °C), resp. rate 16, height 6' (1.829 m), weight 190 lb 0.6 oz (86.2 kg), SpO2 99 %. Temp (24hrs), Av.1 °F (36.7 °C), Min:97.9 °F (36.6 °C), Max:98.2 °F (36.8 °C) Patient Vitals for the past 24 hrs: 
 Temp Pulse Resp BP SpO2  
18 2255 98 °F (36.7 °C) 80 16 130/74 99 % 18 1920 98.2 °F (36.8 °C) 76 16 134/66 98 % 18 1435 98.2 °F (36.8 °C) 77 18 134/74 98 % 18 1327 98.2 °F (36.8 °C) 79 18 123/69 98 % 18 0800 98 °F (36.7 °C) 74 18 132/85 94 % 18 0448 97.9 °F (36.6 °C) 81 18 138/86 93 % Lines:  Peripheral IV:   
   
 
 
Physical Exam:  
General:  Alert, cooperative, Eyes:  Sclera anicteric. Pupils equally round and reactive to light. Mouth/Throat: Mucous membranes normal, oral pharynx clear Neck: Supple Lungs:   reduced auscultation bases CV:  Regular rate and rhythm,no murmur, Abdomen:   Soft, non-tender. bowel sounds normal. non-distended, Lumbar tender in lower back Extremities: No  Edema, tattoos + Lines/Devices:  Intact, no erythema, drainage or tenderness Data Review: CBC:  
Recent Labs  
   18 
 0433  18 0361 [id number]  18 
 8358  18 
 1202 WBC  10.7   --   11.4*  12.4*  
RBC  2.71*   --   2.41*  2.60* HGB  7.1*  7.3*  6.4*  6.9*  
HCT  22.4*  23.0*  20.0*  21.9*  
 PLT  267   --   249  248 GRANS   --    --    --   80* LYMPH   --    --    --   11* EOS   --    --    --   1 CMP:  
Recent Labs  
   09/19/18 
 0433  09/18/18 
 0336  09/17/18 
 0340 GLU  137*  85  90 NA  137  137  138  
K  4.3  4.5  4.5  
CL  106  106  105 CO2  21  23  24 BUN  25*  25*  22* CREA  0.91  0.85  0.91  
CA  8.8  8.8  8.9 AGAP  10  8  9 BUCR  27*  29*  24* Studies:     
Lab Results Component Value Date/Time Culture result: NO GROWTH 3 DAYS 09/16/2018 05:10 PM  
 Culture result: NO GROWTH 6 DAYS 09/11/2018 07:03 PM  
 Culture result: (A) 09/11/2018 07:03 PM  
  PSEUDOMONAS AERUGINOSA GROWING IN 1 OF 2 BOTTLES DRAWN (SITE = L UPPER ARM) Culture result: REMAINING BOTTLE(S) HAS/HAVE NO GROWTH IN 5 DAYS 09/11/2018 07:03 PM  
 Culture result: (A) 09/08/2018 04:01 AM  
  PSEUDOMONAS AERUGINOSA GROWING IN THIS SINGLE BOTTLE DRAWN (L MIDLINE SITE) PLEASE REFER TO PREVIOUS BLOOD CULTURE [id number], COLLECTED 9/6/18 FOR SENSITIVITIES Culture result: (A) 09/08/2018 04:01 AM  
  PRELIMINARY REPORT OF GRAM NEGATIVE RODS GROWING IN THIS SINGLE BOTTLE DRAWN CALLED TO AND READ BACK BY Elwood Primrose, RN ON 9/10/18 AT 1817 [facility] 2121). MS  
 Culture result: MRSA NOT PRESENT 09/08/2018 04:01 AM  
 Culture result:  09/08/2018 04:01 AM  
      Screening of patient nares for MRSA is for surveillance purposes and, if positive, to facilitate isolation considerations in high risk settings. It is not intended for automatic decolonization interventions per se as regimens are not sufficiently effective to warrant routine use. XR Results (most recent): 
 
Results from Hospital Encounter encounter on 09/06/18 XR CHEST PORT Narrative EXAM: Portable CXR.  2156 hours. COMPARISON: 1824 hours. INDICATION: central line placement Right IJ CVL has been placed with tip in the lower SVC, without pneumothorax.  
There is otherwise no change including right lower lobe pneumonia appearance. Impression IMPRESSION: Satisfactory CVL placement. Patient Active Problem List  
Diagnosis Code  Sepsis ([facility] [address] A41.9  Endocarditis of tricuspid valve I36.8  Tricuspid valve regurgitation, infectious I07.1  Acute septic pulmonary embolism without acute cor pulmonale (HCC) I26.90  
 Acute right-sided low back pain with sciatica M54.40  Heroin abuse F11.10  Debility R53.81  
 IV drug abuse F19.10 I have discussed the diagnosis with the patient and the intended plan as seen in the above orders. I have discussed medication side effects and warnings with the patient as well. Reviewed test results at length with patient Anti-infectives:  
 
  
 Cefepime iv- 9/7 Gentamicin IV -9/14 Levaquin IV- 9/14 S/p Vancomycin iv- 9/7- 9/11  Fredo Keller MD FACP

## 2018-09-20 NOTE — PROGRESS NOTES
Bedside shift change report given to Jose Purcell (oncoming nurse) by Maco Beck (offgoing nurse). Report included the following information SBAR, Kardex, MAR and Recent Results. PRN pain medications given; pt to transfer to [facility].

## 2018-09-20 NOTE — PROGRESS NOTES
Pharmacy Automatic Renal Dosing Protocol - Antimicrobials ++ID FOLLOWING++ Indication for Antimicrobials:  
? Pseudomonas bacteremia 
? tricuspid valve endocarditis ? pneumonia Current Regimen of Each Antimicrobial: 
Levofloxacin 750 every 24 hours (, day 7 of 7) Cefepime 2 g IV every 8 hours (Start Date  day 14 Tobramycin (was on Gentamicin 400mg X5 days and changed to Tobramycin IV q24h) 400 mg (start  day 7 Previous Antimicrobial Therapy: 
Ceftriaxone x1  am 
Vancomycin 1000 mg q8h - started  - Significant Cultures:  
 PBC -  - pseudomonas A - Final 
 BCx- Pseudomonas A - Final  
 MRSA - negative in nares  blood x 2: Pseudomonas A - Final 
: Blood cx: NG - Prelim Gentamicin Levels: 
Goal Level: PULSE DOSING GENTAMICIN Peak: 15 - 20 mcg/mL Trough: < or = 0.3 mcg/mL Date Dose & Interval Measured (mcg/mL) Extrapolated (mcg/mL) 9/15/18 400 mg IV (~4.6 mg/kg) q 24 hours 0.6 mcg/mL (15 h level) N/A Radiology / Imaging results: (X-ray, CT scan or MRI):  
 + endocarditis of tricuspid valve Paralysis, amputations, malnutrition: none Labs: 
Recent Labs  
   18 
 0158  18 
 6254  18 
 7916 CREA  0.96  0.91  0.85  
BUN  22*  25*  25* WBC  13.4*  10.7  11.4* Temp (24hrs), Av.2 °F (36.8 °C), Min:98 °F (36.7 °C), Max:98.4 °F (36.9 °C) Creatinine Clearance (mL/min) or Dialysis: > 50 Impression/Plan: · Per ID \"will continue with Tobramycin, Levofloxacin and Cefepime\" · Not sure of the benefit of flouroquinolone triple therapy, but today is last day for levofloxacin. · Tobramycin level in AM. · Daily BMP ordered per protocol · Antimicrobial stop date: pending, ~ 6 weeks Pharmacy will follow daily and adjust medications as appropriate for renal function and/or serum levels.  
 
Alexsander Loera, PHARMD

## 2018-09-20 NOTE — PROGRESS NOTES
Mgr Mary Ann has spoken with Dr Owen Khanna asking him to speak with pt regarding transfer to [facility] for the duration of his IV abx. Will pursue this when the pt is willing to agree. D/C plans discussed with Dr Owen Khanna and pt who is willing to be transferred to [facility] for the duration of his IV antibiotics. I have sent the pertinent information for review//approval to [employer], SW at [facility].

## 2018-09-20 NOTE — PROGRESS NOTES
Bedside and Verbal shift change report given to Margie Gifford (oncoming nurse) by Margret Reich RN (offgoing nurse). Report included the following information Kardex, Intake/Output and Recent Results.

## 2018-09-20 NOTE — PROGRESS NOTES
Progress Note 9/20/2018 2:51 PM 
NAME: Abby Lemus MRN:  [id number] Admit Diagnosis: Sepsis ([facility] [address] 
central line placement Assessment:             
  
1. 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 KHANH 9/2018 with mobile mass on tricuspid valve with possible perforation with moderate to severe tricuspid regurgitation. 3. Hx MSSA in thoracic spine,  With Hx of  KHANH approx 5/2018 was ok 4. Sinus 5. Anemia 6. Cervical surgery with implant, thoracic lamenectomy 7. Active IV drug use including Heroin 8. Active tobacco, Etoh 
   
  
                      Plan:              
  
Blood cultures positive for pseudomonas. TTE negative. KHANH with mobile mass on tricuspid valve with likely perforation resulting in moderate to severe tricuspid regurgitation. Consistent with endocarditis. Evaluated by cardiac surgery, repeat cultures on 9/11 +. Culture 9/16 NGTD. Abx and duration per ID specialist. 
 
Currently with central line. Repeat TTE with no change in tricuspid valve endocarditis. Culture 9/16 so far no growth, temp last 96 hrs ok. No surgical indication at this time. No hemodynamic compromise. Will sign off for now. Call if we can be of further assistance. 
 
  
  
 [x]        High complexity decision making was performed 
  
 
 
Subjective:  
 
Abby Lemus denies chest pain, dyspnea. Discussed with RN events overnight. Review of Systems: 
 
Symptom Y/N Comments  Symptom Y/N Comments Fever/Chills N   Chest Pain N Poor Appetite N   Edema N   
Cough N   Abdominal Pain N Sputum N   Joint Pain N   
SOB/CUEVA N   Pruritis/Rash N   
Nausea/vomit N   Tolerating PT/OT Diarrhea N   Tolerating Diet Y Constipation N   Other Could NOT obtain due to:   
 
Objective:  
  
Physical Exam: 
 
Last 24hrs VS reviewed since prior progress note. Most recent are: 
 
Visit Vitals  /71  Pulse 84  Temp 98.2 °F (36.8 °C)  Resp 18  Ht 6' (1.829 m)  Wt 86.2 kg (190 lb 0.6 oz)  SpO2 96%  BMI 25.77 kg/m2 Intake/Output Summary (Last 24 hours) at 09/20/18 1219 Last data filed at 09/20/18 1128 Gross per 24 hour Intake              490 ml Output             2950 ml Net            -2460 ml General Appearance: Well developed, well nourished, alert & oriented x 3,  
 no acute distress. Ears/Nose/Mouth/Throat: Hearing grossly normal. 
Neck: Supple. Chest: Lungs clear to auscultation bilaterally. Cardiovascular: Regular rate and rhythm, S1S2 normal, no murmur. Abdomen: Soft, non-tender, bowel sounds are active. Extremities: No edema bilaterally. Skin: Warm and dry. PMH/SH reviewed - no change compared to H&P Data Review Telemetry: normal sinus rhythm Lab Data Personally Reviewed: 
 
Recent Labs  
   09/20/18 
 0158  09/19/18 
 3742 WBC  13.4*  10.7 HGB  7.9*  7.1*  
HCT  24.5*  22.4*  
PLT  337  267 No results for input(s): INR, PTP, APTT in the last 72 hours. No lab exists for component: INREXT, INREXT Recent Labs  
   09/20/18 
 0158  09/19/18 
 6697  09/18/18 
 3053 NA  136  137  137  
K  5.1  4.3  4.5  
CL  104  106  106 CO2  25  21  23 BUN  22*  25*  25* CREA  0.96  0.91  0.85 GLU  86  137*  85  
CA  9.2  8.8  8.8 No results for input(s): CPK, CKNDX, TROIQ in the last 72 hours. No lab exists for component: CPKMB No results found for: CHOL, CHOLX, CHLST, CHOLV, HDL, LDL, LDLC, DLDLP, TGLX, TRIGL, TRIGP, CHHD, CHHDX No results for input(s): SGOT, GPT, AP, TBIL, TP, ALB, GLOB, GGT, AML, LPSE in the last 72 hours. No lab exists for component: AMYP, HLPSE No results for input(s): PH, PCO2, PO2 in the last 72 hours. Medications Personally Reviewed: 
 
Current Facility-Administered Medications Medication Dose Route Frequency  HYDROmorphone (PF) (DILAUDID) injection 2 mg  2 mg IntraVENous BID PRN  
  HYDROmorphone (PF) (DILAUDID) injection 1 mg  1 mg IntraVENous Q3H PRN  
 celecoxib (CELEBREX) capsule 100 mg  100 mg Oral BID  
 0.9% sodium chloride infusion 250 mL  250 mL IntraVENous PRN  
 tobramycin (NEBCIN) 400 mg in 0.9% sodium chloride 100 mL IVPB  400 mg IntraVENous Q24H  polyethylene glycol (MIRALAX) packet 17 g  17 g Oral DAILY PRN  
 levoFLOXacin (LEVAQUIN) 750 mg in D5W IVPB  750 mg IntraVENous Q24H  
 enoxaparin (LOVENOX) injection 40 mg  40 mg SubCUTAneous 7am  
 acetaminophen (TYLENOL) tablet 500 mg  500 mg Oral TID  famotidine (PEPCID) tablet 20 mg  20 mg Oral BID  lidocaine 4 % patch 1 Patch  1 Patch TransDERmal Q24H  cefepime (MAXIPIME) 2 g in 0.9% sodium chloride (MBP/ADV) 100 mL  2 g IntraVENous Q8H  
 sodium chloride (NS) flush 5-10 mL  5-10 mL IntraVENous Q8H  
 sodium chloride (NS) flush 5-10 mL  5-10 mL IntraVENous PRN  
 naloxone (NARCAN) injection 0.4 mg  0.4 mg IntraVENous PRN  
 ondansetron (ZOFRAN) injection 4 mg  4 mg IntraVENous Q4H PRN  
 bisacodyl (DULCOLAX) suppository 10 mg  10 mg Rectal DAILY PRN  
 hydrALAZINE (APRESOLINE) 20 mg/mL injection 10 mg  10 mg IntraVENous Q6H PRN  
 albuterol-ipratropium (DUO-NEB) 2.5 MG-0.5 MG/3 ML  3 mL Nebulization Q4H PRN  
 saline peripheral flush soln 10 mL  10 mL InterCATHeter PRN  
 SALINE PERIPHERAL FLUSH Q8H soln 10 mL  10 mL InterCATHeter Q8H Nolan Naik MD

## 2018-09-20 NOTE — PROGRESS NOTES
[facility] FLOOR Progress Note Admit Date: 2018 Subjective:  
 
Pt seen with Dr. Alva Meeks. No new complaints. Objective:  
 
Visit Vitals  /80 (BP 1 Location: Right arm, BP Patient Position: At rest)  Pulse 79  Temp 98.4 °F (36.9 °C)  Resp 16  
 Ht 6' (1.829 m)  Wt 190 lb 0.6 oz (86.2 kg)  SpO2 98%  BMI 25.77 kg/m2 Temp (24hrs), Av.2 °F (36.8 °C), Min:98 °F (36.7 °C), Max:98.4 °F (36.9 °C) Last 24hr Input/Output: 
 
Intake/Output Summary (Last 24 hours) at 18 0756 Last data filed at 18 9282 Gross per 24 hour Intake              150 ml Output             2100 ml Net            -1950 ml Oxygen: 
RA Admission Weight: Last Weight Weight: 190 lb (86.2 kg) Weight: 190 lb 0.6 oz (86.2 kg) EXAM: 
   
Lungs:   Clear to auscultation bilaterally. Heart:  Regular rate and rhythm, S1, S2 normal, no murmur, click, rub or gallop. Abdomen:   Soft, non-tender. Bowel sounds normal. No masses,  No organomegaly. Extremities:   
  
Neurologic:  Gross motor and sensory apparatus intact. Activity: Ad page Diet:   
Regular Lab Data Reviewed: Recent Labs  
   18 
 0158 WBC  13.4* HGB  7.9*  
HCT  24.5*  
PLT  337 CREA  0.96 Assessment:  
 
Active Problems: 
  Sepsis ([address] (2018) Endocarditis of tricuspid valve (2018) Tricuspid valve regurgitation, infectious (2018) Acute septic pulmonary embolism without acute cor pulmonale ([address] (2018) Acute right-sided low back pain with sciatica (2018) Heroin abuse (2018) Debility (2018) IV drug abuse (2018) Plan/Recommendations/Medical Decision Makin. Tricuspid valve endocarditis with large vegetation and moderate TR: Pseudomonas grown on  culture.  culture NGTD. On cefepime and vanc since 9-7-18 and tobramycin since 18. Asymptomatic.   Will hold off on any surgery plans until antibiotic course complete. 2. Active IVDA: Encourage cessation 3. Septic arthritis of L3-L4: Care per ID Signed By: JESÚS Miles

## 2018-09-20 NOTE — PROGRESS NOTES
Hospitalist Progress Note NAME: Mercy Prime :  1980 MRN:  [id number] Assessment / Plan: 
TR infective Endocarditis/ Pseudomonas bacteremia/Pulmonary septic emboli/  septic arthritis spine 
-CT thoracic spine showed multiple pulmonary nodules, several of which are cavitary and several groundglass opacities and areas of consolidation. -KHANH on  revealed mobile mass on the atrial side of the tricuspid valve with possible perforation, mod to severe TR.  
-ID, ortho, IR and Cardiothoracic surgery on board 
-No surgical intervention at this time 
-On IV abx per ID recs - currently On cefepime, Levaquin,  Gentamycin Sepsis, POA; due to above. Improved. Acute anemia. Possibly 2/2 IE valve shearing vs hemodilution? 
-1u pRBC  
-iron lvls ok 
-serial H&H Right-sided pleuritic chest pain; possibly pleurisy. Now resolved. -CTA chest with no PE, right basilar opacification with associated right-sided pleural effusion Back pain 2/2 above 
-followed by palliative medicine for pain management PMH of HTN and Chronic anemia 25.0 - 29.9 Overweight / Body mass index is 25.77 kg/(m^2). Code status: Full Prophylaxis: Lovenox Recommended Disposition: Transfer to [facility] [place]. Subjective: Chief Complaint / Reason for Physician Visit: back pain 
 
Continues to have persistent back and hip pain. No significant change. Denies chills, dyspnea. Otherwise no new acute complaints. Review of Systems: 14 pt ROS unremarkable except as stated above. Objective: VITALS:  
Last 24hrs VS reviewed since prior progress note. Most recent are: 
Patient Vitals for the past 24 hrs: 
 Temp Pulse Resp BP SpO2  
18 1606 98.5 °F (36.9 °C) 77 18 107/72 98 %  
18 1104 98.2 °F (36.8 °C) 84 18 122/71 96 %  
18 0721 98.4 °F (36.9 °C) 79 16 136/80 98 %  
18 0330 98.2 °F (36.8 °C) 73 16 124/65 98 % 18 2255 98 °F (36.7 °C) 80 16 130/74 99 % 09/19/18 1920 98.2 °F (36.8 °C) 76 16 134/66 98 % Intake/Output Summary (Last 24 hours) at 09/20/18 1647 Last data filed at 09/20/18 1245 Gross per 24 hour Intake              680 ml Output             2950 ml Net            -2270 ml PHYSICAL EXAM: 
General: WD, WN. Alert, cooperative, no acute distress   
EENT:  EOMI. Anicteric sclerae. MMM Resp:  CTA bilaterally, no wheezing or rales. No accessory muscle use CV:  Regular  rhythm,  Normal S1 and S2, No edema GI:  Soft, Non distended, Non tender.  +Bowel sounds Neurologic:  Alert and oriented X 3, normal speech, no gross neuro deficits Skin:  Warm and dry Reviewed most current lab test results and cultures  YES Reviewed most current radiology test results   YES Review and summation of old records today    NO Reviewed patient's current orders and MAR    YES 
PMH/SH reviewed - no change compared to H&P 
 
________________________________________________________________________ Beth Figueroa MD  
 
Procedures: see electronic medical records for all procedures/Xrays and details which were not copied into this note but were reviewed prior to creation of Plan. LABS: 
I reviewed today's most current labs and imaging studies. Pertinent labs include: 
Recent Labs  
   09/20/18 
 0158  09/19/18 
 0433  09/18/18 0361 [id number]  09/18/18 
 0229 WBC  13.4*  10.7   --   11.4* HGB  7.9*  7.1*  7.3*  6.4* HCT  24.5*  22.4*  23.0*  20.0*  
PLT  337  267   --   249 Recent Labs  
   09/20/18 
 0158  09/19/18 
 4517  09/18/18 
 5518 NA  136  137  137  
K  5.1  4.3  4.5  
CL  104  106  106 CO2  25  21  23 GLU  86  137*  85 BUN  22*  25*  25* CREA  0.96  0.91  0.85 CA  9.2  8.8  8.8 Signed: Beth Figueroa MD

## 2018-09-20 NOTE — PROGRESS NOTES
Problem: Falls - Risk of 
Goal: *Absence of Falls Document Luther Barrera Fall Risk and appropriate interventions in the flowsheet. Outcome: Progressing Towards Goal 
Fall Risk Interventions: 
Mobility Interventions: Patient to call before getting OOB Medication Interventions: Patient to call before getting OOB History of Falls Interventions: Investigate reason for fall, Evaluate medications/consider consulting pharmacy, Door open when patient unattended, Room close to nurse's station

## 2018-09-21 LAB
ANION GAP SERPL CALC-SCNC: 5 MMOL/L (ref 5–15)
BUN SERPL-MCNC: 25 MG/DL (ref 6–20)
BUN/CREAT SERPL: 23 (ref 12–20)
CALCIUM SERPL-MCNC: 9 MG/DL (ref 8.5–10.1)
CHLORIDE SERPL-SCNC: 105 MMOL/L (ref 97–108)
CO2 SERPL-SCNC: 26 MMOL/L (ref 21–32)
CREAT SERPL-MCNC: 1.11 MG/DL (ref 0.7–1.3)
DATE LAST DOSE: NORMAL
ERYTHROCYTE [DISTWIDTH] IN BLOOD BY AUTOMATED COUNT: 15.2 % (ref 11.5–14.5)
GLUCOSE SERPL-MCNC: 87 MG/DL (ref 65–100)
HCT VFR BLD AUTO: 26.1 % (ref 36.6–50.3)
HGB BLD-MCNC: 8.2 G/DL (ref 12.1–17)
MCH RBC QN AUTO: 26.5 PG (ref 26–34)
MCHC RBC AUTO-ENTMCNC: 31.4 G/DL (ref 30–36.5)
MCV RBC AUTO: 84.2 FL (ref 80–99)
NRBC # BLD: 0 K/UL (ref 0–0.01)
NRBC BLD-RTO: 0 PER 100 WBC
PLATELET # BLD AUTO: 359 K/UL (ref 150–400)
PMV BLD AUTO: 8.5 FL (ref 8.9–12.9)
POTASSIUM SERPL-SCNC: 5 MMOL/L (ref 3.5–5.1)
RBC # BLD AUTO: 3.1 M/UL (ref 4.1–5.7)
REPORTED DOSE,DOSE: NORMAL UNITS
REPORTED DOSE/TIME,TMG: NORMAL
SODIUM SERPL-SCNC: 136 MMOL/L (ref 136–145)
TOBRAMYCIN SERPL-MCNC: 1.1 UG/ML
WBC # BLD AUTO: 13.6 K/UL (ref 4.1–11.1)

## 2018-09-21 PROCEDURE — 85027 COMPLETE CBC AUTOMATED: CPT | Performed by: INTERNAL MEDICINE

## 2018-09-21 PROCEDURE — 36415 COLL VENOUS BLD VENIPUNCTURE: CPT | Performed by: INTERNAL MEDICINE

## 2018-09-21 PROCEDURE — 74011250636 HC RX REV CODE- 250/636: Performed by: INTERNAL MEDICINE

## 2018-09-21 PROCEDURE — 74011000258 HC RX REV CODE- 258: Performed by: INTERNAL MEDICINE

## 2018-09-21 PROCEDURE — 74011250636 HC RX REV CODE- 250/636: Performed by: NURSE PRACTITIONER

## 2018-09-21 PROCEDURE — 65660000000 HC RM CCU STEPDOWN

## 2018-09-21 PROCEDURE — 80048 BASIC METABOLIC PNL TOTAL CA: CPT | Performed by: INTERNAL MEDICINE

## 2018-09-21 PROCEDURE — 74011250637 HC RX REV CODE- 250/637: Performed by: NURSE PRACTITIONER

## 2018-09-21 PROCEDURE — 74011000250 HC RX REV CODE- 250: Performed by: NURSE PRACTITIONER

## 2018-09-21 PROCEDURE — 80200 ASSAY OF TOBRAMYCIN: CPT | Performed by: INTERNAL MEDICINE

## 2018-09-21 RX ORDER — HYDROMORPHONE HYDROCHLORIDE 2 MG/ML
2 INJECTION, SOLUTION INTRAMUSCULAR; INTRAVENOUS; SUBCUTANEOUS
Status: DISCONTINUED | OUTPATIENT
Start: 2018-09-21 | End: 2018-09-24

## 2018-09-21 RX ORDER — LEVOFLOXACIN 5 MG/ML
750 INJECTION, SOLUTION INTRAVENOUS EVERY 24 HOURS
Status: DISCONTINUED | OUTPATIENT
Start: 2018-09-21 | End: 2018-09-25 | Stop reason: HOSPADM

## 2018-09-21 RX ORDER — HYDROMORPHONE HYDROCHLORIDE 2 MG/ML
1 INJECTION, SOLUTION INTRAMUSCULAR; INTRAVENOUS; SUBCUTANEOUS
Status: DISCONTINUED | OUTPATIENT
Start: 2018-09-21 | End: 2018-09-24

## 2018-09-21 RX ORDER — HYDROMORPHONE HYDROCHLORIDE 2 MG/ML
2 INJECTION, SOLUTION INTRAMUSCULAR; INTRAVENOUS; SUBCUTANEOUS
Status: DISCONTINUED | OUTPATIENT
Start: 2018-09-22 | End: 2018-09-21

## 2018-09-21 RX ADMIN — CELECOXIB 100 MG: 100 CAPSULE ORAL at 18:30

## 2018-09-21 RX ADMIN — Medication 10 ML: at 23:02

## 2018-09-21 RX ADMIN — FAMOTIDINE 20 MG: 20 TABLET ORAL at 08:52

## 2018-09-21 RX ADMIN — HYDROMORPHONE HYDROCHLORIDE 2 MG: 2 INJECTION, SOLUTION INTRAMUSCULAR; INTRAVENOUS; SUBCUTANEOUS at 17:46

## 2018-09-21 RX ADMIN — CEFEPIME HYDROCHLORIDE 2 G: 2 INJECTION, POWDER, FOR SOLUTION INTRAVENOUS at 18:30

## 2018-09-21 RX ADMIN — LEVOFLOXACIN 750 MG: 5 INJECTION, SOLUTION INTRAVENOUS at 15:01

## 2018-09-21 RX ADMIN — HYDROMORPHONE HYDROCHLORIDE 1 MG: 2 INJECTION, SOLUTION INTRAMUSCULAR; INTRAVENOUS; SUBCUTANEOUS at 02:45

## 2018-09-21 RX ADMIN — HYDROMORPHONE HYDROCHLORIDE 1 MG: 2 INJECTION, SOLUTION INTRAMUSCULAR; INTRAVENOUS; SUBCUTANEOUS at 08:50

## 2018-09-21 RX ADMIN — Medication 10 ML: at 05:39

## 2018-09-21 RX ADMIN — ACETAMINOPHEN 500 MG: 500 TABLET ORAL at 22:09

## 2018-09-21 RX ADMIN — CEFEPIME HYDROCHLORIDE 2 G: 2 INJECTION, POWDER, FOR SOLUTION INTRAVENOUS at 02:48

## 2018-09-21 RX ADMIN — FAMOTIDINE 20 MG: 20 TABLET ORAL at 18:30

## 2018-09-21 RX ADMIN — HYDROMORPHONE HYDROCHLORIDE 1 MG: 2 INJECTION, SOLUTION INTRAMUSCULAR; INTRAVENOUS; SUBCUTANEOUS at 05:39

## 2018-09-21 RX ADMIN — ENOXAPARIN SODIUM 40 MG: 40 INJECTION, SOLUTION INTRAVENOUS; SUBCUTANEOUS at 08:53

## 2018-09-21 RX ADMIN — TOBRAMYCIN SULFATE 400 MG: 40 INJECTION, SOLUTION INTRAMUSCULAR; INTRAVENOUS at 14:02

## 2018-09-21 RX ADMIN — ACETAMINOPHEN 500 MG: 500 TABLET ORAL at 15:55

## 2018-09-21 RX ADMIN — HYDROMORPHONE HYDROCHLORIDE 1 MG: 2 INJECTION, SOLUTION INTRAMUSCULAR; INTRAVENOUS; SUBCUTANEOUS at 19:22

## 2018-09-21 RX ADMIN — HYDROMORPHONE HYDROCHLORIDE 1 MG: 2 INJECTION, SOLUTION INTRAMUSCULAR; INTRAVENOUS; SUBCUTANEOUS at 22:59

## 2018-09-21 RX ADMIN — Medication 10 ML: at 14:08

## 2018-09-21 RX ADMIN — ACETAMINOPHEN 500 MG: 500 TABLET ORAL at 08:52

## 2018-09-21 RX ADMIN — HYDROMORPHONE HYDROCHLORIDE 2 MG: 2 INJECTION, SOLUTION INTRAMUSCULAR; INTRAVENOUS; SUBCUTANEOUS at 00:22

## 2018-09-21 RX ADMIN — Medication 10 ML: at 19:22

## 2018-09-21 RX ADMIN — CELECOXIB 100 MG: 100 CAPSULE ORAL at 08:52

## 2018-09-21 RX ADMIN — Medication 10 ML: at 23:03

## 2018-09-21 RX ADMIN — HYDROMORPHONE HYDROCHLORIDE 2 MG: 2 INJECTION, SOLUTION INTRAMUSCULAR; INTRAVENOUS; SUBCUTANEOUS at 11:28

## 2018-09-21 RX ADMIN — HYDROMORPHONE HYDROCHLORIDE 1 MG: 2 INJECTION, SOLUTION INTRAMUSCULAR; INTRAVENOUS; SUBCUTANEOUS at 14:55

## 2018-09-21 RX ADMIN — CEFEPIME HYDROCHLORIDE 2 G: 2 INJECTION, POWDER, FOR SOLUTION INTRAVENOUS at 11:20

## 2018-09-21 NOTE — PROGRESS NOTES
Palliative Medicine Consult [place]: [phone] ([phone]) Patient Name: Ijeoma He YOB: 1980 Date of Initial Consult: 18 Reason for Consult: low back pain due to septic arthritis Requesting Provider: Shelly Melendez Primary Care Physician: None SUMMARY:  
Ijeoma He is a 43 y.o. with a past history of IVDA, cervical spine, surgery, MI, MITZI, smoker who , who was admitted on 2018 from home with a diagnosis of septic arthritis. Current medical issues leading to Palliative Medicine involvement include: pain management complicated by heroin use. TO BE CLEAR, PALLIATIVE MEDICINE WILL ASSIST WITH ACUTE PAIN MANAGEMENT DURING THIS ADMISSION, HOWEVER, WE ARE NOT ADDICTION SPECIALISTS THUS CANNOT TREAT THIS PATIENT FOR ADDICTION, NOR WILL WE MANAGE THIS PATIENT'S PAIN NEEDS AFTER DISCHARGE.  
: RRT call for sudden worsening right hip pain, fever 102, tachycardia. CVL placed, transferred to PCU Per : only prescriptions in the past 12 months: 
1) Narcan Nasal Spray and Gabapentin 300mg #90/15 day supply by Dr. Amie Ruelas in [place]. Psychosocial: wife  2 years ago suddenly, leaving a 1month old, and 2 other children. Pt has been struggling with drug addiction for 16 years, in-laws realized this on the same day their daughter . They are raising pt's children to which he admits is for the best given his addiction. Pt moved to [place] after his wife , hooking up with friends that were drug users, until after his admission to [facility] this spring; following that admission, he moved down to [place] to try and get clean. He lives in a room that he rents from family. He has weaned back from using 1.5grams heroin/d to 0.5 grams/d (this is not an exact dose as he never knows what exactly the strength or how much of the drug is cut).   He is not using it for the \"high\" rather to be able to function. He works daily in [employer], paid cash, no health insurance. He has never been to a drug treatment program, will consider it on discharge. 24 HOURS OPIOID USE: 
9/20: IV Dilaudid 11mg, Celebrex 200mg, Tylenol 1500mg 9/19: IV Dilaudid 11mg, Toradol 60mg, Tylenol 1500mg 9/18: IV Dilaudid 12mg Toradol 15mg, tylenol 1500mg 9/17: IV Dilaudid 9mg Toradol 60mg, gabapentin 200mg, tylenol 1500mg 9/16: IV Dilaudid 10mg (2mg of the 10mg was a one time dose for sudden worsening pain), gabapentin 400mg, Toradol 75mg 9/15: IV Dilaudid 6mg, gabapentin 400mg, Toradol 45mg 9/14: IV Dilaudid 10mg (2mg of the 10mg was a one time dose for sudden worsening pain), gabapentin 400mg, Toradol 30mg PALLIATIVE DIAGNOSES:  
1. Fever: resolved 2. Back pain: h/o MMSA in thoracic spine. per Neurosurgery, MRI poor quality and was performed without contrast, however, it does suggest  inflammation in paraspinal muscle and adjacent facet joint likely representing persistent infection. No evidence of epidural abscess or neural  impingement. No role for surgical intervention. 3. Heroin abuse (IV) 4. Debility due to pain 5. 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 6. Septic arthritis T5/6 
7. Septic emboli 8. Pseudomonas bacteremia: pt admits to washing needles at the facet 9. Opioid induced constipation: pt denies, last BM 9/20 10. Complex pain management PLAN:  
1. PAIN:  Chest pain much better, right back/hip improving, able to do more ROM. Suggested pt sit in chair at bedside for each meal.  
2. Discussed time to begin dose reduction of IV Dilaudid: will titrate over the next 5 weeks (pt will be inpatient until Oct 28). Pt knows the plan is to  gradually titrate him down so he is off all opiates for discharge, he was very agreeable to reducing the dose of dilaudid today. 1. Decrease frequency of IV dilaudid 1mg q. 3 hours prn pain to q. 4 hours prn. 
2. Continue  Dilaudid 2mg IV bid prn for breakthrough pain. This can be given any time regardless of when he got the 1mg dose. 3. Continue Celebrex 100mg bid. 4. Tylenol 500mg tid. 5. Ice pack to right groin and/or back. 6. Heating pad to low back. 7. Discussed with . 
8. RN CAN CALL ME IF PAIN MEDS NEED ADJUSTING [phone]. Discussed with bedside RN. 3. HEROIN ADDICTION: at this time, pt has not shown any signs of drug misuse. 1. Counseled pt that for his safety, to which pt agreed, during this admission: 1. His door to the hallway must remain opened when he has visitors. 2. He cannot leave the floor, where his RN can see him, without an escort by a hospital staff member. 3. If at any time there is suspicion of drug abuse during this admission, we will drug test him. 4. Reassess Monday. 5. Initial consult note routed to primary continuity provider 6. Communicated plan of care with: Palliative IDT, RNs Yohannes and Deandra GOALS OF CARE / TREATMENT PREFERENCES:  
 
GOALS OF CARE: 
Patient/Health Care Proxy Stated Goals: Prolong life  
 
safe pain management TREATMENT PREFERENCES:  
Code Status: Full Code Advance Care Planning: 
Advance Care Planning 9/17/2018 Patient's Healthcare Decision Maker is: Verbal statement (Legal Next of Kin remains as decision maker) Primary Decision Maker Name Grover Lopez Confirm Advance Directive - Medical Interventions: Full interventions Other Instructions: Other: As far as possible, the palliative care team has discussed with patient / health care proxy about goals of care / treatment preferences for patient. HISTORY:  
 
History obtained from: chart, patient CHIEF COMPLAINT: severe right sided back pain HPI/SUBJECTIVE: The patient is:  
[x] Verbal and participatory [] Non-participatory due to: 9/6: BIBA with c/o constant right sided back pain that has progressively worsened and is now unable to stand, with associated fever of 101.3 for the week. Sxs are  similar to when he was treated for MRSA infection in his spine 3 months ago until July 10, 2018. Pt admits to using IV heroin this am.   
Pt reports \"on Monday experienced sudden severe sharp chest pain\" that eventually resolved on its own, this has happened before. On Tuesday he had sudden sharp low back pain that shoots down his right buttocks \"if you alban a straight line from my spine, over to the right a few inches, then straight down my back, buttocks, to the top of my right thigh\"; he managed to work the day (in [employer]). On Wed woke up with same pain, barely able to get through work, Thursday took the day off hoping that by resting pain would get better. Friday pain still there, now included right hip, deep in the groin, could not even bare weight so he came to the ED. ED W/U: 
EXAM: negative LAB:  Alk phos 149, protein 9, albumin 2.6, wbc 15.8, h/h 9.9/30.0, sed rate 129, CRP 15.50, UDS pos for opiates IMAGING:  L-SPINE CT: scattered bilat lung opacities, some cavitation, concerning for infection/septic emboli HOSPITAL COURSE: admitted to [facility] for routine progression of care. 9/7: midline placed. MRI of poor quality due to pt's inability to lie still due to pain. 9/8: pain is still uncontrolled, pt told RN \"I'd rather have a bullet to my head rather than go through this. \" pain exacerbated with movement, cough, sneezing. 9/10: CT reports there is nothing to drain or biopsy. 9/11: KHANH revealed mobile mass on tricuspid valve with possible perforation with moderate to severe tricuspid regurgitation. (pt was given 100mcg IV Fentanyl and 8mg IV Versed for KHANH and was wide awake, anesthesia gave 200mg IV Propofol).  9/12: pt asked for either higher dose of Dilaudid or more frequent dosing, dilaudid was changed to 4mg PO at which pt got very upset. 9/13: RN reports pt asking frequently for pain mmt, unable to tolerate pain under current dilaudid dose and dosing intervals. Pt reports he fell using the walker; was turning to back up to the bed and the walker caught on the floor and he when his right foot hit the floor the pain was so bad he went down, pulling the IV on top of him. Pt was tearful when talking about his pain. 9/14: sudden onset pleuritic pain early this am, CT scan neg for changes. Pain exacerbated with breathing. 9/17: pain in right hip a little better to which he believes is from the Toradol. Also reports sharp pleuritic pain in right chest. 9/18: pain is much better with Dilaudid 2mg and Toradol together, was able to move his upper leg and hip joint without severe pain interfering with mmt. 
9/21: pt reports chest pain is almost gone, still a little bit of sharp pain with deep inspiration. Still having sharp pain deep in right hip/buttocks, but now able to move leg although still very painful. The 2mg dilaudid dose enables him to get up out of bed to use the bathroom, etc, which he cannot do 2/2 pain with the 1mg dose. Just before the 1mg dose is due, his level pain goes up to 8-9/10, after receiving the dose his LOP 5/10. Clinical Pain Assessment (nonverbal scale for severity on nonverbal patients):  
Clinical Pain Assessment Severity: 5 Location: right low spine shoots down right buttock to mid thigh Character: shooting feels like bone on bone Duration: weeks Effect: cannot bare weight Frequency: constant but worse with mmt Duration: for how long has pt been experiencing pain (e.g., 2 days, 1 month, years) Frequency: how often pain is an issue (e.g., several times per day, once every few days, constant) FUNCTIONAL ASSESSMENT:  
 
Palliative Performance Scale (PPS): PPS: 30 
 
 
 PSYCHOSOCIAL/SPIRITUAL SCREENING:  
 
 Palliative IDT has assessed this patient for cultural preferences / practices and a referral made as appropriate to needs (Cultural Services, Patient Advocacy, Ethics, etc.) Advance Care Planning: 
Advance Care Planning 9/17/2018 Patient's Healthcare Decision Maker is: Verbal statement (Legal Next of Kin remains as decision maker) Primary Decision Maker Name Rere Carbone Confirm Advance Directive - Any spiritual / Pentecostal concerns: 
[] Yes /  [x] No 
 
Caregiver Burnout: 
[] Yes /  [x] No /  [] No Caregiver Present Anticipatory grief assessment:  
[x] Normal  / [] Maladaptive ESAS Anxiety: Anxiety: 0 
 
ESAS Depression: Depression: 3 REVIEW OF SYSTEMS:  
 
Positive and pertinent negative findings in ROS are noted above in HPI. The following systems were [x] reviewed / [] unable to be reviewed as noted in HPI Other findings are noted below. Systems: constitutional, ears/nose/mouth/throat, respiratory, gastrointestinal, genitourinary, musculoskeletal, integumentary, neurologic, psychiatric, endocrine. Positive findings noted below. Modified ESAS Completed by: provider Fatigue: 0 Drowsiness: 0 Depression: 3 Pain: 5 Anxiety: 0 Nausea: 0 Anorexia: 0 Dyspnea: 0 Constipation: No  
  Stool Occurrence(s): 0 PHYSICAL EXAM:  
 
From RN flowsheet: 
Wt Readings from Last 3 Encounters:  
09/20/18 190 lb 7.6 oz (86.4 kg) Blood pressure 113/76, pulse 92, temperature 98 °F (36.7 °C), resp. rate 18, height 6' (1.829 m), weight 190 lb 7.6 oz (86.4 kg), SpO2 100 %. Pain Scale 1: Numeric (0 - 10) Pain Intensity 1: 8 Pain Onset 1: chronic Pain Location 1: Hip Pain Orientation 1: Right Pain Description 1: Shooting Pain Intervention(s) 1: Medication (see MAR) Last bowel movement, if known: 9/20 Constitutional: WD, WN, NAD, pleasant and cooperative, AAOx3 Eyes: pupils equal, anicteric, good eye contact ENMT: no nasal discharge, moist mucous membranes Cardiovascular: regular rhythm, distal pulses intact Respiratory: breathing not labored, shallow breathing, symmetric Gastrointestinal: soft non-tender, +bowel sounds Musculoskeletal: right low spine/hips/buttocks hip roll causes pain Skin: warm, dry Neurologic: following commands, moving all extremities Psychiatric: full affect, no hallucinations, very articulate HISTORY:  
 
Active Problems: 
  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  
Diagnosis Date  Back pain, chronic Past Surgical History:  
Procedure Laterality Date  HX CERVICAL DISKECTOMY  HX FEMUR FRACTURE TX    
 HX ORTHOPAEDIC Back Surgery History reviewed. No pertinent family history. History reviewed, no pertinent family history. Social History Substance Use Topics  Smoking status: Current Every Day Smoker Packs/day: 1.00  Smokeless tobacco: Never Used  Alcohol use Yes No Known Allergies Current Facility-Administered Medications Medication Dose Route Frequency  levoFLOXacin (LEVAQUIN) 750 mg in D5W IVPB  750 mg IntraVENous Q24H  
 HYDROmorphone (PF) (DILAUDID) injection 1 mg  1 mg IntraVENous Q4H PRN  
 HYDROmorphone (PF) (DILAUDID) injection 2 mg  2 mg IntraVENous BID PRN  
 celecoxib (CELEBREX) capsule 100 mg  100 mg Oral BID  
 0.9% sodium chloride infusion 250 mL  250 mL IntraVENous PRN  
 tobramycin (NEBCIN) 400 mg in 0.9% sodium chloride 100 mL IVPB  400 mg IntraVENous Q24H  polyethylene glycol (MIRALAX) packet 17 g  17 g Oral DAILY PRN  
 enoxaparin (LOVENOX) injection 40 mg  40 mg SubCUTAneous 7am  
  acetaminophen (TYLENOL) tablet 500 mg  500 mg Oral TID  famotidine (PEPCID) tablet 20 mg  20 mg Oral BID  lidocaine 4 % patch 1 Patch  1 Patch TransDERmal Q24H  cefepime (MAXIPIME) 2 g in 0.9% sodium chloride (MBP/ADV) 100 mL  2 g IntraVENous Q8H  
 sodium chloride (NS) flush 5-10 mL  5-10 mL IntraVENous Q8H  
 sodium chloride (NS) flush 5-10 mL  5-10 mL IntraVENous PRN  
 naloxone (NARCAN) injection 0.4 mg  0.4 mg IntraVENous PRN  
 ondansetron (ZOFRAN) injection 4 mg  4 mg IntraVENous Q4H PRN  
 bisacodyl (DULCOLAX) suppository 10 mg  10 mg Rectal DAILY PRN  
 hydrALAZINE (APRESOLINE) 20 mg/mL injection 10 mg  10 mg IntraVENous Q6H PRN  
 albuterol-ipratropium (DUO-NEB) 2.5 MG-0.5 MG/3 ML  3 mL Nebulization Q4H PRN  
 saline peripheral flush soln 10 mL  10 mL InterCATHeter PRN  
 SALINE PERIPHERAL FLUSH Q8H soln 10 mL  10 mL InterCATHeter Q8H  
 
 
 
 LAB AND IMAGING FINDINGS:  
 
Lab Results Component Value Date/Time WBC 13.6 (H) 09/21/2018 02:41 AM  
 HGB 8.2 (L) 09/21/2018 02:41 AM  
 PLATELET 303 72/73/4748 02:41 AM  
 
Lab Results Component Value Date/Time Sodium 136 09/21/2018 02:41 AM  
 Potassium 5.0 09/21/2018 02:41 AM  
 Chloride 105 09/21/2018 02:41 AM  
 CO2 26 09/21/2018 02:41 AM  
 BUN 25 (H) 09/21/2018 02:41 AM  
 Creatinine 1.11 09/21/2018 02:41 AM  
 Calcium 9.0 09/21/2018 02:41 AM  
  
Lab Results Component Value Date/Time AST (SGOT) 37 09/14/2018 03:53 AM  
 Alk. phosphatase 144 (H) 09/14/2018 03:53 AM  
 Protein, total 9.0 (H) 09/14/2018 03:53 AM  
 Albumin 2.4 (L) 09/14/2018 03:53 AM  
 Globulin 6.6 (H) 09/14/2018 03:53 AM  
 
No results found for: INR, PTMR, PTP, PT1, PT2, APTT Lab Results Component Value Date/Time Iron 48 09/18/2018 02:11 PM  
 TIBC 197 (L) 09/18/2018 02:11 PM  
 Iron % saturation 24 09/18/2018 02:11 PM  
  
No results found for: PH, PCO2, PO2 No components found for: [place] No results found for: CPK, CKMB Total time: 35 
Counseling / coordination time, spent as noted above:30 
> 50% counseling / coordination?: y 
 
Prolonged service was provided for  []30 min   []75 min in face to face time in the presence of the patient, spent as noted above. Time Start:  
Time End:  
Note: this can only be billed with 88632 (initial) or 05414 (follow up). If multiple start / stop times, list each separately.

## 2018-09-21 NOTE — PROGRESS NOTES
D/C plans discussed with pt who is willing to be transferred to [facility] for the duration of his IV antibiotics. I have sent the pertinent information for review//approval to [facility], SOY at [facility] who wants Dr Sy García to review the potential admission. This will happen over the weekend. I'll follow on Monday. I've asked [employer] to give us a status report on a Care Card application and if pt meets criteria for disability 1015 I spoke with pt who was hesitant with answers to my questions. He states he rents a room from \"people\" that aren't his family. He wants to list only [place] who is his children's  grandmother as his emergency contact at [phone]. He states his only other relative living is his brother who lives in [place]. [facility] [place] [id number] [address] [facility] [address] is where he rents a room.

## 2018-09-21 NOTE — PROGRESS NOTES
Infectious Disease Progress Note IMPRESSION:  
· Persistent Pseudomonas aeruginosa bacteremia 2/2 ( 9/6), repeat BC also+ 1/1 (9/7)BC  9/11- 1/4., 9/16 - so far no growth pt remains afebrile · Etiology of Pseudomonas - possibly from  washing needles with water off faucet. Denies sharing needles ·  S/p Rapid Response on 9/16. 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 Repeat ECHO shows freely mobile mass seen on TV relatively unchanged from KHANH · R/basilar airspace disease on CXR, repeat CXR shows worsening of infiltrate, pt feels better ·  Evidence of cervical fusion C5-7 & T4/5 laminectomy on CT scan · MRI shows- fluid / edema L3/4 posterior joint facet suspicious for septic arthritis. · Pulmonary nodules,cavitary ,ground glass opacities, on  CT thorax suggestive of septic emboli ·  Reports from [facility] reviewed. Pt had MSSA bacteremia in May 2018. ( not MRSA ) Treated with Nafcillin & thereafter Cefazolin 5/15 to 6/28. · MRI of spine showed inflammatory changes in T5/6 suggestive of septic arthritis. TTE(5/16)  suggestive of vegetation , KHANH done (5/18), no evidence of vegetation as per D/c summary from [facility] · IVDU on going up to time of admission · UDS + for opiates · Smoker , alcohol consumption + · S/p IR evaluation - fluid in spine not accessible for aspiration per IR, S/p Ortho consult - defer to Neurosurgery. · S/p Cardiothoracic surgery evaluation - no surgery at this time. -  
· HIV, Hep C negative  
  
  
PLAN:  
   
· Continue Cefepime IV, Gentamicinx 6 weeks end date Oct 28 , Levaquin for new infiltrate, also for coverage for Pseudomonas. Continue x 14 days with end date 9/27 Pseudomonas endocarditis is associated with  high morbidity & mortality rates & .high relapse rates upto 30% ·  Continue IS  every 1-2 hours , d/w pt again today ·  Plan of care d/w pt again. Subjective: Pt seen . Denies new complaints . Pain in lower back  still+ Review of Systems:  A comprehensive review of systems was negative except for that written in the History of Present Illness. Objective:  
 
Blood pressure 121/80, pulse 86, temperature 98.6 °F (37 °C), resp. rate 19, height 6' (1.829 m), weight 190 lb 7.6 oz (86.4 kg), SpO2 98 %. Temp (24hrs), Av.4 °F (36.9 °C), Min:98.2 °F (36.8 °C), Max:98.7 °F (37.1 °C) Patient Vitals for the past 24 hrs: 
 Temp Pulse Resp BP SpO2  
18 2247 98.6 °F (37 °C) 86 19 121/80 98 %  
18 1947 98.7 °F (37.1 °C) 87 20 130/76 100 % 18 1606 98.5 °F (36.9 °C) 77 18 107/72 98 %  
18 1104 98.2 °F (36.8 °C) 84 18 122/71 96 %  
18 0721 98.4 °F (36.9 °C) 79 16 136/80 98 %  
18 0330 98.2 °F (36.8 °C) 73 16 124/65 98 % Lines:  Peripheral IV:   
   
 
 
Physical Exam:  
General:  Alert, cooperative, Eyes:  Sclera anicteric. Pupils equally round and reactive to light. Mouth/Throat: Mucous membranes normal, oral pharynx clear Neck: Supple Lungs:   reduced auscultation bases CV:  Regular rate and rhythm,no murmur, Abdomen:   Soft, non-tender. bowel sounds normal. non-distended, Lumbar tender in lower back Extremities: No  Edema, tattoos + Lines/Devices:  Intact, no erythema, drainage or tenderness Data Review: CBC:  
Recent Labs  
   18 
 0158  18 
 0433  18 0361 [id number]  18 
 0679 WBC  13.4*  10.7   --   11.4*  
RBC  2.96*  2.71*   --   2.41* HGB  7.9*  7.1*  7.3*  6.4* HCT  24.5*  22.4*  23.0*  20.0*  
PLT  337  267   --   249 CMP:  
Recent Labs  
   18 
 0158  18 
 0433  18 
 6340 GLU  86  137*  85 NA  136  137  137  
K  5.1  4.3  4.5  
CL  104  106  106 CO2  25  21  23 BUN  22*  25*  25* CREA  0.96  0.91  0.85 CA  9.2  8.8  8.8 AGAP  7  10  8 BUCR  23*  27*  29* Studies:     
Lab Results Component Value Date/Time Culture result: NO GROWTH 4 DAYS 09/16/2018 05:10 PM  
 Culture result: NO GROWTH 6 DAYS 09/11/2018 07:03 PM  
 Culture result: (A) 09/11/2018 07:03 PM  
  PSEUDOMONAS AERUGINOSA GROWING IN 1 OF 2 BOTTLES DRAWN (SITE = L UPPER ARM) Culture result: REMAINING BOTTLE(S) HAS/HAVE NO GROWTH IN 5 DAYS 09/11/2018 07:03 PM  
 Culture result: (A) 09/08/2018 04:01 AM  
  PSEUDOMONAS AERUGINOSA GROWING IN THIS SINGLE BOTTLE DRAWN (L MIDLINE SITE) PLEASE REFER TO PREVIOUS BLOOD CULTURE [id number], COLLECTED 9/6/18 FOR SENSITIVITIES Culture result: (A) 09/08/2018 04:01 AM  
  PRELIMINARY REPORT OF GRAM NEGATIVE RODS GROWING IN THIS SINGLE BOTTLE DRAWN CALLED TO AND READ BACK BY Elwood Primrose, RN ON 9/10/18 AT [address] [facility], [facility] [address]). [place]  
 Culture result: MRSA NOT PRESENT 09/08/2018 04:01 AM  
 Culture result:  09/08/2018 04:01 AM  
      Screening of patient nares for MRSA is for surveillance purposes and, if positive, to facilitate isolation considerations in high risk settings. It is not intended for automatic decolonization interventions per se as regimens are not sufficiently effective to warrant routine use. XR Results (most recent): 
 
Results from Hospital Encounter encounter on 09/06/18 XR CHEST PORT Narrative EXAM: Portable CXR.  2156 hours. COMPARISON: 1824 hours. INDICATION: central line placement Right IJ CVL has been placed with tip in the lower SVC, without pneumothorax. There is otherwise no change including right lower lobe pneumonia appearance. Impression IMPRESSION: Satisfactory CVL placement. Patient Active Problem List  
Diagnosis Code  Sepsis ([address] A41.9  Endocarditis of tricuspid valve I36.8  Tricuspid valve regurgitation, infectious I07.1  Acute septic pulmonary embolism without acute cor pulmonale (HCC) I26.90  
 Acute right-sided low back pain with sciatica M54.40  Heroin abuse F11.10  Debility R53.81  
 IV drug abuse F19.10 I have discussed the diagnosis with the patient and the intended plan as seen in the above orders. I have discussed medication side effects and warnings with the patient as well. Reviewed test results at length with patient Anti-infectives:  
 
  
 Cefepime iv- 9/7 Gentamicin IV -9/14 Levaquin IV- 9/14 S/p Vancomycin iv- 9/7- 9/11  Janelle Medina MD FACP

## 2018-09-21 NOTE — PROGRESS NOTES
Hospitalist Progress Note NAME: Va Castro :  1980 MRN:  [id number] Assessment / Plan: 
TR infective Endocarditis/ Pseudomonas bacteremia/Pulmonary septic emboli/  septic arthritis spine 
-CT thoracic spine showed multiple pulmonary nodules, several of which are cavitary and several groundglass opacities and areas of consolidation. -KHANH on  revealed mobile mass on the atrial side of the tricuspid valve with possible perforation, mod to severe TR.  
-ID, ortho, IR and Cardiothoracic surgery on board 
-No surgical intervention at this time 
-On IV abx per ID recs Sepsis, POA; due to above. Improved. Acute anemia. Possibly 2/2 IE valve shearing vs hemodilution? 
-1u pRBC  
-iron lvls ok 
-serial H&H Right-sided pleuritic chest pain; possibly pleurisy. Now resolved. -CTA chest with no PE, right basilar opacification with associated right-sided pleural effusion Back pain 2/2 above 
-followed by palliative medicine for pain management PMH of HTN and Chronic anemia Planned for transfer to [facility] for long term IV abx 25.0 - 29.9 Overweight / Body mass index is 25.83 kg/(m^2). Code status: Full Prophylaxis: Lovenox Recommended Disposition: Transfer to [address] [place] [address]. Subjective: Chief Complaint / Reason for Physician Visit: back pain 
 
Continues to have persistent back and hip pain. No significant change. Denies chills, dyspnea. Otherwise no new acute complaints. Review of Systems: 14 pt ROS unremarkable except as stated above. Objective: VITALS:  
Last 24hrs VS reviewed since prior progress note. Most recent are: 
Patient Vitals for the past 24 hrs: 
 Temp Pulse Resp BP SpO2  
18 1434 98 °F (36.7 °C) 92 18 113/76 100 % 18 1137 98 °F (36.7 °C) 86 18 131/85 99 % 18 0809 98.6 °F (37 °C) 87 16 122/78 96 %  
18 0312 97.9 °F (36.6 °C) 72 18 124/77 99 % 18 2247 98.6 °F (37 °C) 86 19 121/80 98 % 09/20/18 1947 98.7 °F (37.1 °C) 87 20 130/76 100 % Intake/Output Summary (Last 24 hours) at 09/21/18 1611 Last data filed at 09/21/18 1506 Gross per 24 hour Intake                0 ml Output             1800 ml Net            -1800 ml PHYSICAL EXAM: 
General: WD, WN. Alert, cooperative, no acute distress   
EENT:  EOMI. Anicteric sclerae. MMM Resp:  CTA bilaterally, no wheezing or rales. No accessory muscle use CV:  Regular  rhythm,  Normal S1 and S2, No edema GI:  Soft, Non distended, Non tender.  +Bowel sounds Neurologic:  Alert and oriented X 3, normal speech, no gross neuro deficits Skin:  Warm and dry Reviewed most current lab test results and cultures  YES Reviewed most current radiology test results   YES Review and summation of old records today    NO Reviewed patient's current orders and MAR    YES 
PMH/ reviewed - no change compared to H&P 
 
________________________________________________________________________ Omayra Peters MD  
 
Procedures: see electronic medical records for all procedures/Xrays and details which were not copied into this note but were reviewed prior to creation of Plan. LABS: 
I reviewed today's most current labs and imaging studies. Pertinent labs include: 
Recent Labs  
   09/21/18 
 0241  09/20/18 
 0158  09/19/18 
 0745 WBC  13.6*  13.4*  10.7 HGB  8.2*  7.9*  7.1*  
HCT  26.1*  24.5*  22.4*  
PLT  359  337  267 Recent Labs  
   09/21/18 
 0241  09/20/18 
 0158  09/19/18 
 8734 NA  136  136  137  
K  5.0  5.1  4.3 CL  105  104  106 CO2  26  25  21 GLU  87  86  137* BUN  25*  22*  25* CREA  1.11  0.96  0.91  
CA  9.0  9.2  8.8 Signed: Omayra Peters MD

## 2018-09-21 NOTE — PROGRESS NOTES
Spiritual Care Partner Volunteer visited patient on Telemetry Unit on 9/21/18. Visited by Rev. Bala Fisher, [id number]  paging service: [phone])

## 2018-09-21 NOTE — PROGRESS NOTES
Problem: Falls - Risk of 
Goal: *Absence of Falls Document Tia Manus Fall Risk and appropriate interventions in the flowsheet. Outcome: Progressing Towards Goal 
Fall Risk Interventions: 
Mobility Interventions: Utilize walker, cane, or other assistive device Medication Interventions: Teach patient to arise slowly Elimination Interventions: Call light in reach History of Falls Interventions: Room close to nurse's station

## 2018-09-21 NOTE — PROGRESS NOTES
Infectious Disease Progress Note IMPRESSION:  
· Persistent Pseudomonas aeruginosa bacteremia 2/2 ( 9/6), repeat BC also+ 1/1 (9/7)BC  9/11- 1/4., 9/16 - so far no growth pt remains afebrile · Etiology of Pseudomonas - possibly from  washing needles with water off faucet. Denies sharing needles ·  S/p Rapid Response on 9/16. 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 Repeat ECHO shows freely mobile mass seen on TV relatively unchanged from KHANH · R/basilar airspace disease on CXR, repeat CXR shows worsening of infiltrate, pt feels better ·  Evidence of cervical fusion C5-7 & T4/5 laminectomy on CT scan · MRI shows- fluid / edema L3/4 posterior joint facet suspicious for septic arthritis. · Pulmonary nodules,cavitary ,ground glass opacities, on  CT thorax suggestive of septic emboli ·  Reports from [facility] reviewed. Pt had MSSA bacteremia in May 2018. ( not MRSA ) Treated with Nafcillin & thereafter Cefazolin 5/15 to 6/28. · MRI of spine showed inflammatory changes in T5/6 suggestive of septic arthritis. TTE(5/16)  suggestive of vegetation , KHANH done (5/18), no evidence of vegetation as per D/c summary from [facility] · IVDU on going up to time of admission · UDS + for opiates · Smoker , alcohol consumption + · S/p IR evaluation - fluid in spine not accessible for aspiration per IR, S/p Ortho consult - defer to Neurosurgery. · S/p Cardiothoracic surgery evaluation - no surgery at this time. -  
· HIV, Hep C negative  
  
  
PLAN:  
   
· Continue Cefepime IV, Gentamicinx 6 weeks end date Oct 28 , Levaquin for new infiltrate, also for coverage for Pseudomonas. Continue x 14 days with end date 9/27 Pseudomonas endocarditis is associated with  high morbidity & mortality rates & .high relapse rates upto 30% ·  Continue IS  every 1-2 hours , d/w pt again today ·  Plan of care d/w pt again. Subjective: Pt seen . Denies new complaints . \" My back still hurts\" Review of Systems:  A comprehensive review of systems was negative except for that written in the History of Present Illness. Objective:  
 
Blood pressure 113/76, pulse 92, temperature 98 °F (36.7 °C), resp. rate 18, height 6' (1.829 m), weight 190 lb 7.6 oz (86.4 kg), SpO2 100 %. Temp (24hrs), Av.3 °F (36.8 °C), Min:97.9 °F (36.6 °C), Max:98.7 °F (37.1 °C) Patient Vitals for the past 24 hrs: 
 Temp Pulse Resp BP SpO2  
18 1434 98 °F (36.7 °C) 92 18 113/76 100 % 18 1137 98 °F (36.7 °C) 86 18 131/85 99 % 18 0809 98.6 °F (37 °C) 87 16 122/78 96 %  
18 0312 97.9 °F (36.6 °C) 72 18 124/77 99 % 18 2247 98.6 °F (37 °C) 86 19 121/80 98 %  
18 1947 98.7 °F (37.1 °C) 87 20 130/76 100 % Lines:  Peripheral IV:   
   
 
 
Physical Exam:  
General:  Alert, cooperative, Eyes:  Sclera anicteric. Pupils equally round and reactive to light. Mouth/Throat: Mucous membranes normal, oral pharynx clear Neck: Supple Lungs:   reduced auscultation bases CV:  Regular rate and rhythm,no murmur, Abdomen:   Soft, non-tender. bowel sounds normal. non-distended, Lumbar tender in lower back Extremities: No  Edema, tattoos + Lines/Devices:  Intact, no erythema, drainage or tenderness Data Review: CBC:  
Recent Labs  
   18 
 02418 
 0158  18 
 8177 WBC  13.6*  13.4*  10.7 RBC  3.10*  2.96*  2.71* HGB  8.2*  7.9*  7.1*  
HCT  26.1*  24.5*  22.4*  
PLT  359  337  267 CMP:  
Recent Labs  
   18 
 0241  18 
 0158  18 
 6680 GLU  87  86  137* NA  136  136  137  
K  5.0  5.1  4.3 CL  105  104  106 CO2  26  25  21 BUN  25*  22*  25* CREA  1.11  0.96  0.91  
CA  9.0  9.2  8.8 AGAP  5  7  10 BUCR  23*  23*  27* Studies:     
Lab Results Component Value Date/Time  Culture result: NO GROWTH 5 DAYS 2018 05:10 PM  
 Culture result: NO GROWTH 6 DAYS 09/11/2018 07:03 PM  
 Culture result: (A) 09/11/2018 07:03 PM  
  PSEUDOMONAS AERUGINOSA GROWING IN 1 OF 2 BOTTLES DRAWN (SITE = L UPPER ARM) Culture result: REMAINING BOTTLE(S) HAS/HAVE NO GROWTH IN 5 DAYS 09/11/2018 07:03 PM  
 Culture result: (A) 09/08/2018 04:01 AM  
  PSEUDOMONAS AERUGINOSA GROWING IN THIS SINGLE BOTTLE DRAWN (L MIDLINE SITE) PLEASE REFER TO PREVIOUS BLOOD CULTURE [id number], COLLECTED 9/6/18 FOR SENSITIVITIES Culture result: (A) 09/08/2018 04:01 AM  
  PRELIMINARY REPORT OF GRAM NEGATIVE RODS GROWING IN THIS SINGLE BOTTLE DRAWN CALLED TO AND READ BACK BY Riana Thao RN ON 9/10/18 AT 18[address] [facility], [facility] [address]). MS  
 Culture result: MRSA NOT PRESENT 09/08/2018 04:01 AM  
 Culture result:  09/08/2018 04:01 AM  
      Screening of patient nares for MRSA is for surveillance purposes and, if positive, to facilitate isolation considerations in high risk settings. It is not intended for automatic decolonization interventions per se as regimens are not sufficiently effective to warrant routine use. XR Results (most recent): 
 
Results from Hospital Encounter encounter on 09/06/18 XR CHEST PORT Narrative EXAM: Portable CXR.  2156 hours. COMPARISON: 1824 hours. INDICATION: central line placement Right IJ CVL has been placed with tip in the lower SVC, without pneumothorax. There is otherwise no change including right lower lobe pneumonia appearance. Impression IMPRESSION: Satisfactory CVL placement. Patient Active Problem List  
Diagnosis Code  Sepsis ([address] A41.9  Endocarditis of tricuspid valve I36.8  Tricuspid valve regurgitation, infectious I07.1  Acute septic pulmonary embolism without acute cor pulmonale (HCC) I26.90  
 Acute right-sided low back pain with sciatica M54.40  Heroin abuse F11.10  Debility R53.81  
 IV drug abuse F19.10 I have discussed the diagnosis with the patient and the intended plan as seen in the above orders. I have discussed medication side effects and warnings with the patient as well. Reviewed test results at length with patient Anti-infectives:  
 
  
 Cefepime iv- 9/7 Gentamicin IV -9/14 Levaquin IV- 9/14 S/p Vancomycin iv- 9/7- 9/11  Harsha Duran MD FACP

## 2018-09-21 NOTE — PROGRESS NOTES
Bedside shift change report given to Padmini (oncoming nurse) by Tan Hairston (offgoing nurse). Report included the following information SBAR, Kardex, MAR and Recent Results. **Palliative medicine clarified pain medication order. Additional Dilaudid 2 mg dose is to be given twice a day when requested; patient may request this medication in addition to other dilaudid order (Q4) even if it hasn't been 4 hours between administrations (e.i. in between q4 doses). Patient and [facility] have an understanding that this order will stand and be monitored by her. Any adjustments to pain medication must be through Palliative medicine, not the hospitalist. Dr. Ofelia Keith notified and in agreement. Please pass this on in nursing report

## 2018-09-21 NOTE — PROGRESS NOTES
Nutrition Assessment: 
 
INTERVENTIONS/RECOMMENDATIONS:  
Meals/Snacks: General/healthful diet:  Continue regular diet. Enc po. ASSESSMENT:  
9/21:  Chart reviewed; med noted for sepsis on admission. Pt is being managed by palliative for pain management. Appetite has improved and tolerating a regular diet. Diet Order: Regular 
% Eaten:  Patient Vitals for the past 72 hrs: 
 % Diet Eaten  
09/20/18 1245 75 %  
09/20/18 1009 80 % Pertinent Medications: [x] Reviewed []Other: 
Pertinent Labs: [x]Reviewed  []Other: 
Food Allergies: [x]None []Other:   Last BM:    [x]Active     []Hyperactive  []Hypoactive       [] Absent  BS Skin:    [x] Intact   [] Incision  [] Breakdown   []Edema   []Other: Anthropometrics: Height: 6' (182.9 cm) Weight: 86.4 kg (190 lb 7.6 oz) IBW (%IBW):   ( ) UBW (%UBW):   (  %) BMI: Body mass index is 25.83 kg/(m^2). This BMI is indicative of: 
[]Underweight   []Normal   []Overweight   [] Obesity   [] Extreme Obesity (BMI>40) Last Weight Metrics: 
Weight Loss Metrics 9/20/2018 9/6/2018 Today's Wt 190 lb 7.6 oz -  
BMI - 25.83 kg/m2 Estimated Nutrition Needs (Based on): 2363 Kcals/day (BMR (1818) x 1. 3AF) , 86 g (1.0 g/kg bw) Protein Carbohydrate: At Least 130 g/day  Fluids: 2400 mL/day NUTRITION DIAGNOSES:  
Problem:  No nutritional diagnosis at this time Etiology: related to Signs/Symptoms: as evidenced by     
Previous Nutrition Dx:  [] Resolved  [] Unresolved           [] Progressing NUTRITION INTERVENTIONS: 
Meals/Snacks: General/healthful diet GOAL:  
PO intake remain >75% of meals next 5-7 days NUTRITION MONITORING AND EVALUATION Food/Nutrient Intake Outcomes: Total energy intake Physical Signs/Symptoms Outcomes: Weight/weight change Previous Goal Met: 
 [x] Met              [] Progressing Towards Goal              [] Not Progressing Towards Goal  
Previous Recommendations: [] Implemented          [] Not Implemented          [x] Not Applicable LEARNING NEEDS (Diet, Food/Nutrient-Drug Interaction):  
 [x] None Identified 
 [] Identified and Education Provided/Documented 
 [] Identified and Pt declined/was not appropriate Cultural, Advent, OR Ethnic Dietary Needs:  
 [x] None Identified 
 [] Identified and Addressed 
 
 [x] Interdisciplinary Care Plan Reviewed/Documented  
 [x] Discharge Planning:  Continue regular diet 
 [] Participated in Interdisciplinary Rounds NUTRITION RISK:  
 [] Patient At Nutritional Risk  
 [x] Patient Not At Nutritional Risk Vy Thorne RD Pager [phone] Weekend Pager [phone]

## 2018-09-22 LAB
ANION GAP SERPL CALC-SCNC: 5 MMOL/L (ref 5–15)
BACTERIA SPEC CULT: NORMAL
BUN SERPL-MCNC: 23 MG/DL (ref 6–20)
BUN/CREAT SERPL: 21 (ref 12–20)
CALCIUM SERPL-MCNC: 9.3 MG/DL (ref 8.5–10.1)
CHLORIDE SERPL-SCNC: 105 MMOL/L (ref 97–108)
CO2 SERPL-SCNC: 26 MMOL/L (ref 21–32)
CREAT SERPL-MCNC: 1.08 MG/DL (ref 0.7–1.3)
ERYTHROCYTE [DISTWIDTH] IN BLOOD BY AUTOMATED COUNT: 15.6 % (ref 11.5–14.5)
GLUCOSE SERPL-MCNC: 77 MG/DL (ref 65–100)
HCT VFR BLD AUTO: 26.5 % (ref 36.6–50.3)
HGB BLD-MCNC: 8.3 G/DL (ref 12.1–17)
MCH RBC QN AUTO: 26.7 PG (ref 26–34)
MCHC RBC AUTO-ENTMCNC: 31.3 G/DL (ref 30–36.5)
MCV RBC AUTO: 85.2 FL (ref 80–99)
NRBC # BLD: 0 K/UL (ref 0–0.01)
NRBC BLD-RTO: 0 PER 100 WBC
PLATELET # BLD AUTO: 333 K/UL (ref 150–400)
PMV BLD AUTO: 8.5 FL (ref 8.9–12.9)
POTASSIUM SERPL-SCNC: 4.7 MMOL/L (ref 3.5–5.1)
RBC # BLD AUTO: 3.11 M/UL (ref 4.1–5.7)
SERVICE CMNT-IMP: NORMAL
SODIUM SERPL-SCNC: 136 MMOL/L (ref 136–145)
WBC # BLD AUTO: 12.4 K/UL (ref 4.1–11.1)

## 2018-09-22 PROCEDURE — 74011000258 HC RX REV CODE- 258: Performed by: INTERNAL MEDICINE

## 2018-09-22 PROCEDURE — 80048 BASIC METABOLIC PNL TOTAL CA: CPT | Performed by: INTERNAL MEDICINE

## 2018-09-22 PROCEDURE — 36592 COLLECT BLOOD FROM PICC: CPT

## 2018-09-22 PROCEDURE — 36415 COLL VENOUS BLD VENIPUNCTURE: CPT | Performed by: INTERNAL MEDICINE

## 2018-09-22 PROCEDURE — 74011250636 HC RX REV CODE- 250/636: Performed by: INTERNAL MEDICINE

## 2018-09-22 PROCEDURE — 74011250637 HC RX REV CODE- 250/637: Performed by: NURSE PRACTITIONER

## 2018-09-22 PROCEDURE — 65660000000 HC RM CCU STEPDOWN

## 2018-09-22 PROCEDURE — 74011250636 HC RX REV CODE- 250/636: Performed by: NURSE PRACTITIONER

## 2018-09-22 PROCEDURE — 85027 COMPLETE CBC AUTOMATED: CPT | Performed by: INTERNAL MEDICINE

## 2018-09-22 PROCEDURE — 74011000250 HC RX REV CODE- 250: Performed by: NURSE PRACTITIONER

## 2018-09-22 RX ADMIN — HYDROMORPHONE HYDROCHLORIDE 1 MG: 2 INJECTION, SOLUTION INTRAMUSCULAR; INTRAVENOUS; SUBCUTANEOUS at 10:58

## 2018-09-22 RX ADMIN — ACETAMINOPHEN 500 MG: 500 TABLET ORAL at 22:59

## 2018-09-22 RX ADMIN — HYDROMORPHONE HYDROCHLORIDE 1 MG: 2 INJECTION, SOLUTION INTRAMUSCULAR; INTRAVENOUS; SUBCUTANEOUS at 15:01

## 2018-09-22 RX ADMIN — CEFEPIME HYDROCHLORIDE 2 G: 2 INJECTION, POWDER, FOR SOLUTION INTRAVENOUS at 02:41

## 2018-09-22 RX ADMIN — HYDROMORPHONE HYDROCHLORIDE 2 MG: 2 INJECTION, SOLUTION INTRAMUSCULAR; INTRAVENOUS; SUBCUTANEOUS at 00:30

## 2018-09-22 RX ADMIN — CELECOXIB 100 MG: 100 CAPSULE ORAL at 17:32

## 2018-09-22 RX ADMIN — CEFEPIME HYDROCHLORIDE 2 G: 2 INJECTION, POWDER, FOR SOLUTION INTRAVENOUS at 17:32

## 2018-09-22 RX ADMIN — CELECOXIB 100 MG: 100 CAPSULE ORAL at 08:41

## 2018-09-22 RX ADMIN — CEFEPIME HYDROCHLORIDE 2 G: 2 INJECTION, POWDER, FOR SOLUTION INTRAVENOUS at 10:12

## 2018-09-22 RX ADMIN — Medication 10 ML: at 15:02

## 2018-09-22 RX ADMIN — ACETAMINOPHEN 500 MG: 500 TABLET ORAL at 17:32

## 2018-09-22 RX ADMIN — Medication 10 ML: at 06:39

## 2018-09-22 RX ADMIN — Medication 10 ML: at 23:00

## 2018-09-22 RX ADMIN — HYDROMORPHONE HYDROCHLORIDE 1 MG: 2 INJECTION, SOLUTION INTRAMUSCULAR; INTRAVENOUS; SUBCUTANEOUS at 18:56

## 2018-09-22 RX ADMIN — HYDROMORPHONE HYDROCHLORIDE 1 MG: 2 INJECTION, SOLUTION INTRAMUSCULAR; INTRAVENOUS; SUBCUTANEOUS at 22:59

## 2018-09-22 RX ADMIN — LEVOFLOXACIN 750 MG: 5 INJECTION, SOLUTION INTRAVENOUS at 15:01

## 2018-09-22 RX ADMIN — FAMOTIDINE 20 MG: 20 TABLET ORAL at 17:32

## 2018-09-22 RX ADMIN — ACETAMINOPHEN 500 MG: 500 TABLET ORAL at 08:41

## 2018-09-22 RX ADMIN — FAMOTIDINE 20 MG: 20 TABLET ORAL at 08:41

## 2018-09-22 RX ADMIN — TOBRAMYCIN SULFATE 400 MG: 40 INJECTION, SOLUTION INTRAMUSCULAR; INTRAVENOUS at 12:51

## 2018-09-22 RX ADMIN — Medication 10 ML: at 15:01

## 2018-09-22 RX ADMIN — HYDROMORPHONE HYDROCHLORIDE 2 MG: 2 INJECTION, SOLUTION INTRAMUSCULAR; INTRAVENOUS; SUBCUTANEOUS at 08:41

## 2018-09-22 RX ADMIN — ENOXAPARIN SODIUM 40 MG: 40 INJECTION, SOLUTION INTRAVENOUS; SUBCUTANEOUS at 06:38

## 2018-09-22 RX ADMIN — HYDROMORPHONE HYDROCHLORIDE 1 MG: 2 INJECTION, SOLUTION INTRAMUSCULAR; INTRAVENOUS; SUBCUTANEOUS at 06:37

## 2018-09-22 RX ADMIN — HYDROMORPHONE HYDROCHLORIDE 1 MG: 2 INJECTION, SOLUTION INTRAMUSCULAR; INTRAVENOUS; SUBCUTANEOUS at 02:48

## 2018-09-22 NOTE — PROGRESS NOTES
Hospitalist Progress Note NAME: Jasmine Vasques :  1980 MRN:  [id number] Assessment / Plan: 
TR infective Endocarditis/ Pseudomonas bacteremia/Pulmonary septic emboli/  septic arthritis spine 
-CT thoracic spine showed multiple pulmonary nodules, several of which are cavitary and several groundglass opacities and areas of consolidation. -KHANH on  revealed mobile mass on the atrial side of the tricuspid valve with possible perforation, mod to severe TR.  
-ID, ortho, IR and Cardiothoracic surgery on board 
-No plans for surgical intervention 
-On IV abx per ID recs Sepsis, POA; due to above. Improved. Acute anemia. Possibly 2/2 IE valve shearing vs hemodilution? 
-1u pRBC  
-iron lvls ok 
-serial H&H Right-sided pleuritic chest pain; possibly pleurisy. Now resolved. -CTA chest with no PE, right basilar opacification with associated right-sided pleural effusion Back pain 2/2 above 
-followed by palliative medicine for pain management PMH of HTN and Chronic anemia Planned for transfer to [facility] for long term IV abx 25.0 - 29.9 Overweight / Body mass index is 25.83 kg/(m^2). Code status: Full Prophylaxis: Lovenox Recommended Disposition: Transfer to [facility]. Subjective: Chief Complaint / Reason for Physician Visit: back pain 
 
Continues to have persistent back and hip pain. No significant change. Denies chills, dyspnea. Otherwise no new acute complaints. Hgb stable. Review of Systems: 14 pt ROS unremarkable except as stated above. Objective: VITALS:  
Last 24hrs VS reviewed since prior progress note. Most recent are: 
Patient Vitals for the past 24 hrs: 
 Temp Pulse Resp BP SpO2  
18 1516 98.1 °F (36.7 °C) 87 19 110/67 97 % 18 1148 97.2 °F (36.2 °C) 80 17 107/64 100 % 18 0752 97.8 °F (36.6 °C) 63 16 113/77 100 % 18 0253 97.5 °F (36.4 °C) 70 18 115/75 98 % 09/21/18 2327 97.7 °F (36.5 °C) 76 18 107/72 98 %  
09/21/18 1938 98.4 °F (36.9 °C) 90 18 115/75 97 % Intake/Output Summary (Last 24 hours) at 09/22/18 1553 Last data filed at 09/22/18 1065 Gross per 24 hour Intake              990 ml Output             1900 ml Net             -910 ml PHYSICAL EXAM: 
General: WD, WN. Alert, cooperative, no acute distress   
EENT:  EOMI. Anicteric sclerae. MMM Resp:  CTA bilaterally, no wheezing or rales. No accessory muscle use CV:  Regular  rhythm,  Normal S1 and S2, No edema GI:  Soft, Non distended, Non tender.  +Bowel sounds Neurologic:  Alert and oriented X 3, normal speech, no gross neuro deficits Skin:  Warm and dry Reviewed most current lab test results and cultures  YES Reviewed most current radiology test results   YES Review and summation of old records today    NO Reviewed patient's current orders and MAR    YES 
PMH/SH reviewed - no change compared to H&P 
 
________________________________________________________________________ Legrand Kehr, MD  
 
Procedures: see electronic medical records for all procedures/Xrays and details which were not copied into this note but were reviewed prior to creation of Plan. LABS: 
I reviewed today's most current labs and imaging studies. Pertinent labs include: 
Recent Labs  
   09/22/18 0530 09/21/18 
 0241 09/20/18 
 0158 WBC  12.4*  13.6*  13.4* HGB  8.3*  8.2*  7.9*  
HCT  26.5*  26.1*  24.5*  
PLT  333  359  337 Recent Labs  
   09/22/18 0530 09/21/18 
 0241  09/20/18 
 0158 NA  136  136  136  
K  4.7  5.0  5.1 CL  105  105  104 CO2  26  26  25 GLU  77  87  86 BUN  23*  25*  22* CREA  1.08  1.11  0.96  
CA  9.3  9.0  9.2 Signed: Legrand Kehr, MD

## 2018-09-22 NOTE — PROGRESS NOTES
Problem: Falls - Risk of 
Goal: *Absence of Falls Document Easter Getting Fall Risk and appropriate interventions in the flowsheet. Outcome: Progressing Towards Goal 
Fall Risk Interventions: 
Mobility Interventions: Communicate number of staff needed for ambulation/transfer, Patient to call before getting OOB, PT Consult for mobility concerns, PT Consult for assist device competence Medication Interventions: Teach patient to arise slowly, Patient to call before getting OOB Elimination Interventions: Call light in reach, Urinal in reach History of Falls Interventions: Consult care management for discharge planning, Evaluate medications/consider consulting pharmacy

## 2018-09-22 NOTE — PROGRESS NOTES
Problem: Pressure Injury - Risk of 
Goal: *Prevention of pressure injury Document Juan Scale and appropriate interventions in the flowsheet. Outcome: Progressing Towards Goal 
Pressure Injury Interventions: 
Sensory Interventions: Assess changes in LOC, Maintain/enhance activity level Moisture Interventions: Absorbent underpads, Limit adult briefs Activity Interventions: Increase time out of bed Mobility Interventions: HOB 30 degrees or less Nutrition Interventions: Document food/fluid/supplement intake Friction and Shear Interventions: HOB 30 degrees or less

## 2018-09-22 NOTE — PROGRESS NOTES
Bedside shift change report given to Kevin Rubio 41 (oncoming nurse) by Jack Gracia RN (offgoing nurse). Report included the following information SBAR, Kardex, Intake/Output, MAR and Cardiac Rhythm Sinus.

## 2018-09-23 LAB
ANION GAP SERPL CALC-SCNC: 6 MMOL/L (ref 5–15)
BUN SERPL-MCNC: 23 MG/DL (ref 6–20)
BUN/CREAT SERPL: 21 (ref 12–20)
CALCIUM SERPL-MCNC: 9.4 MG/DL (ref 8.5–10.1)
CHLORIDE SERPL-SCNC: 105 MMOL/L (ref 97–108)
CO2 SERPL-SCNC: 24 MMOL/L (ref 21–32)
CREAT SERPL-MCNC: 1.11 MG/DL (ref 0.7–1.3)
ERYTHROCYTE [DISTWIDTH] IN BLOOD BY AUTOMATED COUNT: 15.4 % (ref 11.5–14.5)
GLUCOSE SERPL-MCNC: 80 MG/DL (ref 65–100)
HCT VFR BLD AUTO: 28 % (ref 36.6–50.3)
HGB BLD-MCNC: 8.8 G/DL (ref 12.1–17)
MCH RBC QN AUTO: 26.7 PG (ref 26–34)
MCHC RBC AUTO-ENTMCNC: 31.4 G/DL (ref 30–36.5)
MCV RBC AUTO: 84.8 FL (ref 80–99)
NRBC # BLD: 0 K/UL (ref 0–0.01)
NRBC BLD-RTO: 0 PER 100 WBC
PLATELET # BLD AUTO: 341 K/UL (ref 150–400)
PMV BLD AUTO: 8.6 FL (ref 8.9–12.9)
POTASSIUM SERPL-SCNC: 5.1 MMOL/L (ref 3.5–5.1)
RBC # BLD AUTO: 3.3 M/UL (ref 4.1–5.7)
SODIUM SERPL-SCNC: 135 MMOL/L (ref 136–145)
WBC # BLD AUTO: 14.5 K/UL (ref 4.1–11.1)

## 2018-09-23 PROCEDURE — 80048 BASIC METABOLIC PNL TOTAL CA: CPT | Performed by: INTERNAL MEDICINE

## 2018-09-23 PROCEDURE — 74011250636 HC RX REV CODE- 250/636: Performed by: INTERNAL MEDICINE

## 2018-09-23 PROCEDURE — 85027 COMPLETE CBC AUTOMATED: CPT | Performed by: INTERNAL MEDICINE

## 2018-09-23 PROCEDURE — 74011250636 HC RX REV CODE- 250/636: Performed by: NURSE PRACTITIONER

## 2018-09-23 PROCEDURE — 74011250637 HC RX REV CODE- 250/637: Performed by: NURSE PRACTITIONER

## 2018-09-23 PROCEDURE — 74011000258 HC RX REV CODE- 258: Performed by: INTERNAL MEDICINE

## 2018-09-23 PROCEDURE — 65660000000 HC RM CCU STEPDOWN

## 2018-09-23 PROCEDURE — 36415 COLL VENOUS BLD VENIPUNCTURE: CPT | Performed by: INTERNAL MEDICINE

## 2018-09-23 PROCEDURE — 36592 COLLECT BLOOD FROM PICC: CPT

## 2018-09-23 RX ADMIN — TOBRAMYCIN SULFATE 400 MG: 40 INJECTION, SOLUTION INTRAMUSCULAR; INTRAVENOUS at 11:00

## 2018-09-23 RX ADMIN — HYDROMORPHONE HYDROCHLORIDE 2 MG: 2 INJECTION, SOLUTION INTRAMUSCULAR; INTRAVENOUS; SUBCUTANEOUS at 00:25

## 2018-09-23 RX ADMIN — HYDROMORPHONE HYDROCHLORIDE 1 MG: 2 INJECTION, SOLUTION INTRAMUSCULAR; INTRAVENOUS; SUBCUTANEOUS at 10:58

## 2018-09-23 RX ADMIN — ACETAMINOPHEN 500 MG: 500 TABLET ORAL at 09:16

## 2018-09-23 RX ADMIN — CEFEPIME HYDROCHLORIDE 2 G: 2 INJECTION, POWDER, FOR SOLUTION INTRAVENOUS at 02:43

## 2018-09-23 RX ADMIN — ACETAMINOPHEN 500 MG: 500 TABLET ORAL at 22:42

## 2018-09-23 RX ADMIN — ACETAMINOPHEN 500 MG: 500 TABLET ORAL at 15:03

## 2018-09-23 RX ADMIN — Medication 10 ML: at 06:34

## 2018-09-23 RX ADMIN — CELECOXIB 100 MG: 100 CAPSULE ORAL at 17:36

## 2018-09-23 RX ADMIN — HYDROMORPHONE HYDROCHLORIDE 1 MG: 2 INJECTION, SOLUTION INTRAMUSCULAR; INTRAVENOUS; SUBCUTANEOUS at 02:43

## 2018-09-23 RX ADMIN — ENOXAPARIN SODIUM 40 MG: 40 INJECTION, SOLUTION INTRAVENOUS; SUBCUTANEOUS at 06:33

## 2018-09-23 RX ADMIN — Medication 10 ML: at 14:35

## 2018-09-23 RX ADMIN — LEVOFLOXACIN 750 MG: 5 INJECTION, SOLUTION INTRAVENOUS at 14:09

## 2018-09-23 RX ADMIN — CEFEPIME HYDROCHLORIDE 2 G: 2 INJECTION, POWDER, FOR SOLUTION INTRAVENOUS at 09:13

## 2018-09-23 RX ADMIN — Medication 10 ML: at 22:43

## 2018-09-23 RX ADMIN — HYDROMORPHONE HYDROCHLORIDE 1 MG: 2 INJECTION, SOLUTION INTRAMUSCULAR; INTRAVENOUS; SUBCUTANEOUS at 06:33

## 2018-09-23 RX ADMIN — CELECOXIB 100 MG: 100 CAPSULE ORAL at 09:16

## 2018-09-23 RX ADMIN — CEFEPIME HYDROCHLORIDE 2 G: 2 INJECTION, POWDER, FOR SOLUTION INTRAVENOUS at 17:36

## 2018-09-23 RX ADMIN — HYDROMORPHONE HYDROCHLORIDE 2 MG: 2 INJECTION, SOLUTION INTRAMUSCULAR; INTRAVENOUS; SUBCUTANEOUS at 09:08

## 2018-09-23 RX ADMIN — HYDROMORPHONE HYDROCHLORIDE 1 MG: 2 INJECTION, SOLUTION INTRAMUSCULAR; INTRAVENOUS; SUBCUTANEOUS at 14:58

## 2018-09-23 RX ADMIN — HYDROMORPHONE HYDROCHLORIDE 1 MG: 2 INJECTION, SOLUTION INTRAMUSCULAR; INTRAVENOUS; SUBCUTANEOUS at 18:37

## 2018-09-23 RX ADMIN — HYDROMORPHONE HYDROCHLORIDE 1 MG: 2 INJECTION, SOLUTION INTRAMUSCULAR; INTRAVENOUS; SUBCUTANEOUS at 22:43

## 2018-09-23 NOTE — PROGRESS NOTES
Problem: Falls - Risk of 
Goal: *Absence of Falls Document Aureliano Lopes Fall Risk and appropriate interventions in the flowsheet. Outcome: Progressing Towards Goal 
Fall Risk Interventions: 
Mobility Interventions: Communicate number of staff needed for ambulation/transfer, Patient to call before getting OOB, OT consult for ADLs, PT Consult for mobility concerns, PT Consult for assist device competence, Utilize walker, cane, or other assistive device, Strengthening exercises (ROM-active/passive) Medication Interventions: Evaluate medications/consider consulting pharmacy, Patient to call before getting OOB, Teach patient to arise slowly Elimination Interventions: Urinal in reach History of Falls Interventions: Consult care management for discharge planning

## 2018-09-23 NOTE — PROGRESS NOTES
Bedside shift change report given to Wendy Tipton RN (oncoming nurse) by Sandeep Adams RN (offgoing nurse). Report included the following information SBAR, Kardex, Intake/Output, MAR and Recent Results.

## 2018-09-23 NOTE — PROGRESS NOTES
Hospitalist Progress Note NAME: Niranjan Nelson :  1980 MRN:  [id number] Assessment / Plan: 
TR infective Endocarditis/ Pseudomonas bacteremia/Pulmonary septic emboli/  septic arthritis spine 
-CT thoracic spine showed multiple pulmonary nodules, several of which are cavitary and several groundglass opacities and areas of consolidation. -KHANH on  revealed mobile mass on the atrial side of the tricuspid valve with possible perforation, mod to severe TR.  
-ID, ortho, IR and Cardiothoracic surgery on board 
-No plans for surgical intervention 
-On IV abx per ID recs Sepsis, POA; due to above. Improved. Acute anemia. Possibly 2/2 IE valve shearing vs hemodilution? 
-1u pRBC  
-iron lvls ok 
-serial H&H Right-sided pleuritic chest pain; possibly pleurisy. Now resolved. -CTA chest with no PE, right basilar opacification with associated right-sided pleural effusion Back pain 2/2 above 
-followed by palliative medicine for pain management PMH of HTN and Chronic anemia Planned for transfer to [facility] for long term IV abx 25.0 - 29.9 Overweight / Body mass index is 25.83 kg/(m^2). Code status: Full Prophylaxis: Lovenox Recommended Disposition: Transfer to [facility]. Subjective: Chief Complaint / Reason for Physician Visit: back pain 
 
Continues to have back and hip pain. Somewhat improved Denies chills, dyspnea. Otherwise no new acute complaints. Hgb stable. Review of Systems: 14 pt ROS unremarkable except as stated above. Objective: VITALS:  
Last 24hrs VS reviewed since prior progress note. Most recent are: 
Patient Vitals for the past 24 hrs: 
 Temp Pulse Resp BP SpO2  
18 1300 98.2 °F (36.8 °C) 77 18 119/71 98 %  
18 0905 97.8 °F (36.6 °C) 88 16 118/67 97 % 18 0247 97.9 °F (36.6 °C) 75 16 106/64 97 % 18 2259 98 °F (36.7 °C) 88 16 118/64 99 % 18 2028 98.2 °F (36.8 °C) 90 16 112/62 100 % Intake/Output Summary (Last 24 hours) at 09/23/18 1525 Last data filed at 09/23/18 1413 Gross per 24 hour Intake             1270 ml Output             2900 ml Net            -1630 ml PHYSICAL EXAM: 
General: WD, WN. Alert, cooperative, no acute distress   
EENT:  EOMI. Anicteric sclerae. MMM Resp:  CTA bilaterally, no wheezing or rales. No accessory muscle use CV:  Regular  rhythm,  Normal S1 and S2, No edema GI:  Soft, Non distended, Non tender.  +Bowel sounds Neurologic:  Alert and oriented X 3, normal speech, no gross neuro deficits Skin:  Warm and dry Reviewed most current lab test results and cultures  YES Reviewed most current radiology test results   YES Review and summation of old records today    NO Reviewed patient's current orders and MAR    YES 
PMH/SH reviewed - no change compared to H&P 
 
________________________________________________________________________ Erin Cooper MD  
 
Procedures: see electronic medical records for all procedures/Xrays and details which were not copied into this note but were reviewed prior to creation of Plan. LABS: 
I reviewed today's most current labs and imaging studies. Pertinent labs include: 
Recent Labs  
   09/23/18 
 0242  09/22/18 
 0530  09/21/18 
 0241 WBC  14.5*  12.4*  13.6* HGB  8.8*  8.3*  8.2* HCT  28.0*  26.5*  26.1*  
PLT  341  333  359 Recent Labs  
   09/23/18 
 0242  09/22/18 
 0530  09/21/18 
 0241 NA  135*  136  136  
K  5.1  4.7  5.0  
CL  105  105  105 CO2  24  26  26 GLU  80  77  87 BUN  23*  23*  25* CREA  1.11  1.08  1.11  
CA  9.4  9.3  9.0 Signed: Erin Cooper MD

## 2018-09-24 LAB
ANION GAP SERPL CALC-SCNC: 7 MMOL/L (ref 5–15)
BUN SERPL-MCNC: 24 MG/DL (ref 6–20)
BUN/CREAT SERPL: 22 (ref 12–20)
CALCIUM SERPL-MCNC: 9.6 MG/DL (ref 8.5–10.1)
CHLORIDE SERPL-SCNC: 105 MMOL/L (ref 97–108)
CO2 SERPL-SCNC: 24 MMOL/L (ref 21–32)
CREAT SERPL-MCNC: 1.07 MG/DL (ref 0.7–1.3)
CRP SERPL-MCNC: 2.19 MG/DL (ref 0–0.6)
ERYTHROCYTE [DISTWIDTH] IN BLOOD BY AUTOMATED COUNT: 15.4 % (ref 11.5–14.5)
GLUCOSE SERPL-MCNC: 85 MG/DL (ref 65–100)
HCT VFR BLD AUTO: 28.1 % (ref 36.6–50.3)
HGB BLD-MCNC: 9 G/DL (ref 12.1–17)
MCH RBC QN AUTO: 27.1 PG (ref 26–34)
MCHC RBC AUTO-ENTMCNC: 32 G/DL (ref 30–36.5)
MCV RBC AUTO: 84.6 FL (ref 80–99)
NRBC # BLD: 0 K/UL (ref 0–0.01)
NRBC BLD-RTO: 0 PER 100 WBC
PLATELET # BLD AUTO: 316 K/UL (ref 150–400)
PMV BLD AUTO: 8.5 FL (ref 8.9–12.9)
POTASSIUM SERPL-SCNC: 4.8 MMOL/L (ref 3.5–5.1)
RBC # BLD AUTO: 3.32 M/UL (ref 4.1–5.7)
SODIUM SERPL-SCNC: 136 MMOL/L (ref 136–145)
WBC # BLD AUTO: 13.3 K/UL (ref 4.1–11.1)

## 2018-09-24 PROCEDURE — 74011250636 HC RX REV CODE- 250/636: Performed by: INTERNAL MEDICINE

## 2018-09-24 PROCEDURE — 36415 COLL VENOUS BLD VENIPUNCTURE: CPT | Performed by: INTERNAL MEDICINE

## 2018-09-24 PROCEDURE — 74011250637 HC RX REV CODE- 250/637: Performed by: NURSE PRACTITIONER

## 2018-09-24 PROCEDURE — 74011000258 HC RX REV CODE- 258: Performed by: INTERNAL MEDICINE

## 2018-09-24 PROCEDURE — C1751 CATH, INF, PER/CENT/MIDLINE: HCPCS

## 2018-09-24 PROCEDURE — 36592 COLLECT BLOOD FROM PICC: CPT

## 2018-09-24 PROCEDURE — 86140 C-REACTIVE PROTEIN: CPT | Performed by: INTERNAL MEDICINE

## 2018-09-24 PROCEDURE — 65660000000 HC RM CCU STEPDOWN

## 2018-09-24 PROCEDURE — 80048 BASIC METABOLIC PNL TOTAL CA: CPT | Performed by: INTERNAL MEDICINE

## 2018-09-24 PROCEDURE — 85027 COMPLETE CBC AUTOMATED: CPT | Performed by: INTERNAL MEDICINE

## 2018-09-24 PROCEDURE — 74011250636 HC RX REV CODE- 250/636: Performed by: NURSE PRACTITIONER

## 2018-09-24 RX ORDER — HYDROMORPHONE HYDROCHLORIDE 2 MG/ML
2 INJECTION, SOLUTION INTRAMUSCULAR; INTRAVENOUS; SUBCUTANEOUS
Status: DISCONTINUED | OUTPATIENT
Start: 2018-09-24 | End: 2018-09-25 | Stop reason: HOSPADM

## 2018-09-24 RX ORDER — HYDROMORPHONE HYDROCHLORIDE 2 MG/ML
1 INJECTION, SOLUTION INTRAMUSCULAR; INTRAVENOUS; SUBCUTANEOUS
Status: DISCONTINUED | OUTPATIENT
Start: 2018-09-24 | End: 2018-09-24

## 2018-09-24 RX ORDER — CELECOXIB 100 MG/1
100 CAPSULE ORAL
Status: COMPLETED | OUTPATIENT
Start: 2018-09-24 | End: 2018-09-24

## 2018-09-24 RX ORDER — CELECOXIB 100 MG/1
200 CAPSULE ORAL 2 TIMES DAILY
Status: DISCONTINUED | OUTPATIENT
Start: 2018-09-25 | End: 2018-09-25 | Stop reason: HOSPADM

## 2018-09-24 RX ORDER — HYDROMORPHONE HYDROCHLORIDE 2 MG/ML
2 INJECTION, SOLUTION INTRAMUSCULAR; INTRAVENOUS; SUBCUTANEOUS
Status: DISCONTINUED | OUTPATIENT
Start: 2018-09-24 | End: 2018-09-24

## 2018-09-24 RX ADMIN — ACETAMINOPHEN 500 MG: 500 TABLET ORAL at 22:41

## 2018-09-24 RX ADMIN — HYDROMORPHONE HYDROCHLORIDE 2 MG: 2 INJECTION, SOLUTION INTRAMUSCULAR; INTRAVENOUS; SUBCUTANEOUS at 08:11

## 2018-09-24 RX ADMIN — CELECOXIB 100 MG: 100 CAPSULE ORAL at 08:13

## 2018-09-24 RX ADMIN — CEFEPIME HYDROCHLORIDE 2 G: 2 INJECTION, POWDER, FOR SOLUTION INTRAVENOUS at 02:58

## 2018-09-24 RX ADMIN — TOBRAMYCIN SULFATE 400 MG: 40 INJECTION, SOLUTION INTRAMUSCULAR; INTRAVENOUS at 11:44

## 2018-09-24 RX ADMIN — HYDROMORPHONE HYDROCHLORIDE 1 MG: 2 INJECTION INTRAMUSCULAR; INTRAVENOUS; SUBCUTANEOUS at 14:26

## 2018-09-24 RX ADMIN — Medication 10 ML: at 06:37

## 2018-09-24 RX ADMIN — ENOXAPARIN SODIUM 40 MG: 40 INJECTION, SOLUTION INTRAVENOUS; SUBCUTANEOUS at 06:36

## 2018-09-24 RX ADMIN — Medication 10 ML: at 21:03

## 2018-09-24 RX ADMIN — ACETAMINOPHEN 500 MG: 500 TABLET ORAL at 08:13

## 2018-09-24 RX ADMIN — HYDROMORPHONE HYDROCHLORIDE 1 MG: 2 INJECTION, SOLUTION INTRAMUSCULAR; INTRAVENOUS; SUBCUTANEOUS at 10:31

## 2018-09-24 RX ADMIN — CEFEPIME HYDROCHLORIDE 2 G: 2 INJECTION, POWDER, FOR SOLUTION INTRAVENOUS at 09:22

## 2018-09-24 RX ADMIN — CELECOXIB 100 MG: 100 CAPSULE ORAL at 19:54

## 2018-09-24 RX ADMIN — HYDROMORPHONE HYDROCHLORIDE 2 MG: 2 INJECTION INTRAMUSCULAR; INTRAVENOUS; SUBCUTANEOUS at 22:42

## 2018-09-24 RX ADMIN — HYDROMORPHONE HYDROCHLORIDE 1 MG: 2 INJECTION, SOLUTION INTRAMUSCULAR; INTRAVENOUS; SUBCUTANEOUS at 06:36

## 2018-09-24 RX ADMIN — Medication 10 ML: at 14:00

## 2018-09-24 RX ADMIN — HYDROMORPHONE HYDROCHLORIDE 2 MG: 2 INJECTION INTRAMUSCULAR; INTRAVENOUS; SUBCUTANEOUS at 18:58

## 2018-09-24 RX ADMIN — FAMOTIDINE 20 MG: 20 TABLET ORAL at 08:13

## 2018-09-24 RX ADMIN — LEVOFLOXACIN 750 MG: 5 INJECTION, SOLUTION INTRAVENOUS at 14:01

## 2018-09-24 RX ADMIN — HYDROMORPHONE HYDROCHLORIDE 1 MG: 2 INJECTION, SOLUTION INTRAMUSCULAR; INTRAVENOUS; SUBCUTANEOUS at 02:58

## 2018-09-24 RX ADMIN — HYDROMORPHONE HYDROCHLORIDE 2 MG: 2 INJECTION, SOLUTION INTRAMUSCULAR; INTRAVENOUS; SUBCUTANEOUS at 00:14

## 2018-09-24 RX ADMIN — CEFEPIME HYDROCHLORIDE 2 G: 2 INJECTION, POWDER, FOR SOLUTION INTRAVENOUS at 18:39

## 2018-09-24 RX ADMIN — ACETAMINOPHEN 500 MG: 500 TABLET ORAL at 18:40

## 2018-09-24 NOTE — PROGRESS NOTES
.Bedside and Verbal shift change report given to Stella 2 (oncoming nurse) by Ralph Diaz RN (offgoing nurse). Report included the following information SBAR, Kardex, Intake/Output and MAR.

## 2018-09-24 NOTE — PROGRESS NOTES
Palliative Medicine Consult [place]: [phone]) Patient Name: Viral Feliz YOB: 1980 Date of Initial Consult: 18 Reason for Consult: low back pain due to septic arthritis Requesting Provider: Priscilla Hughes Primary Care Physician: None SUMMARY:  
Viral Feliz is a 43 y.o. with a past history of IVDA, cervical spine, surgery, MI, MITZI, smoker who , who was admitted on 2018 from home with a diagnosis of septic arthritis. Current medical issues leading to Palliative Medicine involvement include: pain management complicated by heroin use. TO BE CLEAR, PALLIATIVE MEDICINE WILL ASSIST WITH ACUTE PAIN MANAGEMENT DURING THIS ADMISSION, HOWEVER, WE ARE NOT ADDICTION SPECIALISTS THUS CANNOT TREAT THIS PATIENT FOR ADDICTION, NOR WILL WE MANAGE THIS PATIENT'S PAIN NEEDS AFTER DISCHARGE.  
: RRT call for sudden worsening right hip pain, fever 102, tachycardia. CVL placed, transferred to PCU Per : only prescriptions in the past 12 months: 
1) Narcan Nasal Spray and Gabapentin 300mg #90/15 day supply by Dr. Tre Landis in [place]. Psychosocial: wife  2 years ago suddenly, leaving a 1month old, and 2 other children. Pt has been struggling with drug addiction for 16 years, in-laws realized this on the same day their daughter . They are raising pt's children to which he admits is for the best given his addiction. Pt moved to [place] after his wife , hooking up with friends that were drug users, until after his admission to [facility] this spring; following that admission, he moved down to [facility] to try and get clean. He lives in a room that he rents from family. He has weaned back from using 1.5grams heroin/d to 0.5 grams/d (this is not an exact dose as he never knows what exactly the strength or how much of the drug is cut).   He is not using it for the \"high\" rather to be able to function. He works daily in [employer], paid cash, no health insurance. He has never been to a drug treatment program, will consider it on discharge. 24 HOURS OPIOID USE: 
9/23: IV Diaudid 10mg , Celebrex 200mg, Tylenol 1500mg 
9/22: IV Diaudid 10mg , Celebrex 200mg, Tylenol 1500mg 
9/21: IV Diaudid 12mg , Celebrex 200mg, Tylenol 1500mg 
9/20: IV Dilaudid 11mg, Celebrex 200mg, Tylenol 1500mg 9/19: IV Dilaudid 11mg, Toradol 60mg, Tylenol 1500mg 9/18: IV Dilaudid 12mg Toradol 15mg, tylenol 1500mg 9/17: IV Dilaudid 9mg Toradol 60mg, gabapentin 200mg, tylenol 1500mg 9/16: IV Dilaudid 10mg (2mg of the 10mg was a one time dose for sudden worsening pain), gabapentin 400mg, Toradol 75mg 9/15: IV Dilaudid 6mg, gabapentin 400mg, Toradol 45mg 9/14: IV Dilaudid 10mg (2mg of the 10mg was a one time dose for sudden worsening pain), gabapentin 400mg, Toradol 30mg PALLIATIVE DIAGNOSES:  
1. Fever: resolved 2. Back pain: h/o MMSA in thoracic spine. per Neurosurgery, MRI poor quality and was performed without contrast, however, it does suggest  inflammation in paraspinal muscle and adjacent facet joint likely representing persistent infection. No evidence of epidural abscess or neural  impingement. No role for surgical intervention. 3. Heroin abuse (IV) 4. Debility due to pain 5. 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 6. Septic arthritis T5/6 
7. Septic emboli 8. Pseudomonas bacteremia: pt admits to washing needles at the facet 9. Opioid induced constipation: pt denies, last BM 9/20 10. Complex pain management PLAN:  
1. PAIN:  Chest pain resolved, right back/hip improving, able to do more ROM 2. Discussed time to begin dose reduction of IV Dilaudid: will titrate over the next 5 weeks (pt will be inpatient until Oct 28).   Pt knows the plan is to  gradually titrate him down so he is off all opiates for discharge, he was very agreeable to reducing the dose of dilaudid today. 1. discontinue IV dilaudid 1mg q. 4 hours prn. 
2. change  Dilaudid 2mg IV bid prn for breakthrough pain to 2mg q.4 hours prn 
3. Increase  Celebrex 100mg bid to 200 bid. 4. discontinueTylenol 500mg tid. 5. Discussed with . 
6. RN CAN CALL ME IF PAIN MEDS NEED ADJUSTING [phone]. Discussed with bedside RN. 3. HEROIN ADDICTION: at this time, pt has not shown any signs of drug misuse. 1. Counseled pt that for his safety, to which pt agreed, during this admission: 1. His door to the hallway must remain opened when he has visitors. 2. He cannot leave the floor, where his RN can see him, without an escort by a hospital staff member. 3. If at any time there is suspicion of drug abuse during this admission, we will drug test him. 4. Initial consult note routed to primary continuity provider 5. Communicated plan of care with: Palliative IDT, GOALS OF CARE / TREATMENT PREFERENCES:  
 
GOALS OF CARE: 
Patient/Health Care Proxy Stated Goals: Prolong life  
 
safe pain management TREATMENT PREFERENCES:  
Code Status: Full Code Advance Care Planning: 
Advance Care Planning 9/17/2018 Patient's Healthcare Decision Maker is: Verbal statement (Legal Next of Kin remains as decision maker) Primary Decision Maker Name Eleanor Hollis Confirm Advance Directive - Medical Interventions: Full interventions Other Instructions: Other: As far as possible, the palliative care team has discussed with patient / health care proxy about goals of care / treatment preferences for patient. HISTORY:  
 
History obtained from: chart, patient CHIEF COMPLAINT: severe right sided back pain HPI/SUBJECTIVE: The patient is:  
[x] Verbal and participatory [] Non-participatory due to:  
 
9/6:  BIBA with c/o constant right sided back pain that has progressively worsened and is now unable to stand, with associated fever of 101.3 for the week. Sxs are  similar to when he was treated for MRSA infection in his spine 3 months ago until July 10, 2018. Pt admits to using IV heroin this am.   
Pt reports \"on Monday experienced sudden severe sharp chest pain\" that eventually resolved on its own, this has happened before. On Tuesday he had sudden sharp low back pain that shoots down his right buttocks \"if you alban a straight line from my spine, over to the right a few inches, then straight down my back, buttocks, to the top of my right thigh\"; he managed to work the day (in [employer]). On Wed woke up with same pain, barely able to get through work, Thursday took the day off hoping that by resting pain would get better. Friday pain still there, now included right hip, deep in the groin, could not even bare weight so he came to the ED. ED W/U: 
EXAM: negative LAB:  Alk phos 149, protein 9, albumin 2.6, wbc 15.8, h/h 9.9/30.0, sed rate 129, CRP 15.50, UDS pos for opiates IMAGING:  L-SPINE CT: scattered bilat lung opacities, some cavitation, concerning for infection/septic emboli HOSPITAL COURSE: admitted to [facility] for routine progression of care. 9/7: midline placed. MRI of poor quality due to pt's inability to lie still due to pain. 9/8: pain is still uncontrolled, pt told RN \"I'd rather have a bullet to my head rather than go through this. \" pain exacerbated with movement, cough, sneezing. 9/10: CT reports there is nothing to drain or biopsy. 9/11: KHANH revealed mobile mass on tricuspid valve with possible perforation with moderate to severe tricuspid regurgitation. (pt was given 100mcg IV Fentanyl and 8mg IV Versed for KHANH and was wide awake, anesthesia gave 200mg IV Propofol). 9/12: pt asked for either higher dose of Dilaudid or more frequent dosing, dilaudid was changed to 4mg PO at which pt got very upset.  9/13: RN reports pt asking frequently for pain mmt, unable to tolerate pain under current dilaudid dose and dosing intervals. Pt reports he fell using the walker; was turning to back up to the bed and the walker caught on the floor and he when his right foot hit the floor the pain was so bad he went down, pulling the IV on top of him. Pt was tearful when talking about his pain. 9/14: sudden onset pleuritic pain early this am, CT scan neg for changes. Pain exacerbated with breathing. 9/17: pain in right hip a little better to which he believes is from the Toradol. Also reports sharp pleuritic pain in right chest. 9/18: pain is much better with Dilaudid 2mg and Toradol together, was able to move his upper leg and hip joint without severe pain interfering with mmt. 
9/21: pt reports chest pain is almost gone, still a little bit of sharp pain with deep inspiration. Still having sharp pain deep in right hip/buttocks, but now able to move leg although still very painful. The 2mg dilaudid dose enables him to get up out of bed to use the bathroom, etc, which he cannot do 2/2 pain with the 1mg dose. Just before the 1mg dose is due, his level pain goes up to 8-9/10, after receiving the dose his LOP 5/10. Clinical Pain Assessment (nonverbal scale for severity on nonverbal patients):  
Clinical Pain Assessment Severity: 5 Location: right low spine shoots down right buttock to mid thigh Character: shooting feels like bone on bone Duration: weeks Effect: cannot bare weight Frequency: constant but worse with mmt Duration: for how long has pt been experiencing pain (e.g., 2 days, 1 month, years) Frequency: how often pain is an issue (e.g., several times per day, once every few days, constant) FUNCTIONAL ASSESSMENT:  
 
Palliative Performance Scale (PPS): PPS: 30 
 
 
 PSYCHOSOCIAL/SPIRITUAL SCREENING:  
 
Palliative IDT has assessed this patient for cultural preferences / practices and a referral made as appropriate to needs (Cultural Services, Patient Advocacy, Ethics, etc.) Advance Care Planning: 
Advance Care Planning 9/17/2018 Patient's Healthcare Decision Maker is: Verbal statement (Legal Next of Kin remains as decision maker) Primary Decision Maker Name Chicho Robbins Confirm Advance Directive - Any spiritual / Episcopalian concerns: 
[] Yes /  [x] No 
 
Caregiver Burnout: 
[] Yes /  [x] No /  [] No Caregiver Present Anticipatory grief assessment:  
[x] Normal  / [] Maladaptive ESAS Anxiety: Anxiety: 0 
 
ESAS Depression: Depression: 3 REVIEW OF SYSTEMS:  
 
Positive and pertinent negative findings in ROS are noted above in HPI. The following systems were [x] reviewed / [] unable to be reviewed as noted in HPI Other findings are noted below. Systems: constitutional, ears/nose/mouth/throat, respiratory, gastrointestinal, genitourinary, musculoskeletal, integumentary, neurologic, psychiatric, endocrine. Positive findings noted below. Modified ESAS Completed by: provider Fatigue: 0 Drowsiness: 0 Depression: 3 Pain: 5 Anxiety: 0 Nausea: 0 Anorexia: 0 Dyspnea: 0 Constipation: No  
  Stool Occurrence(s): 0 PHYSICAL EXAM:  
 
From RN flowsheet: 
Wt Readings from Last 3 Encounters:  
09/20/18 190 lb 7.6 oz (86.4 kg) Blood pressure 106/57, pulse 81, temperature 98.7 °F (37.1 °C), resp. rate 20, height 6' (1.829 m), weight 190 lb 7.6 oz (86.4 kg), SpO2 99 %. Pain Scale 1: Numeric (0 - 10) Pain Intensity 1: 8 Pain Onset 1: chronic Pain Location 1: Hip Pain Orientation 1: Right Pain Description 1: Shooting Pain Intervention(s) 1: Medication (see MAR) Last bowel movement, if known: 9/20 Constitutional: WD, WN, NAD, pleasant and cooperative, AAOx3 Eyes: pupils equal, anicteric, good eye contact ENMT: no nasal discharge, moist mucous membranes Cardiovascular: regular rhythm, distal pulses intact Respiratory: breathing not labored, shallow breathing, symmetric Gastrointestinal: soft non-tender, +bowel sounds Musculoskeletal: right low spine/hips/buttocks hip roll causes pain Skin: warm, dry Neurologic: following commands, moving all extremities Psychiatric: full affect, no hallucinations, very articulate HISTORY:  
 
Active Problems: 
  Sepsis (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  
Diagnosis Date  Back pain, chronic Past Surgical History:  
Procedure Laterality Date  HX CERVICAL DISKECTOMY  HX FEMUR FRACTURE TX    
 HX ORTHOPAEDIC Back Surgery History reviewed. No pertinent family history. History reviewed, no pertinent family history. Social History Substance Use Topics  Smoking status: Current Every Day Smoker Packs/day: 1.00  Smokeless tobacco: Never Used  Alcohol use Yes No Known Allergies Current Facility-Administered Medications Medication Dose Route Frequency  HYDROmorphone (DILAUDID) injection 2 mg  2 mg IntraVENous BID PRN  
 HYDROmorphone (DILAUDID) injection 1 mg  1 mg IntraVENous Q4H PRN  
 [START ON 9/25/2018] Tobramycin level  1 Each Other ONCE  
 levoFLOXacin (LEVAQUIN) 750 mg in D5W IVPB  750 mg IntraVENous Q24H  celecoxib (CELEBREX) capsule 100 mg  100 mg Oral BID  
 0.9% sodium chloride infusion 250 mL  250 mL IntraVENous PRN  
 tobramycin (NEBCIN) 400 mg in 0.9% sodium chloride 100 mL IVPB  400 mg IntraVENous Q24H  polyethylene glycol (MIRALAX) packet 17 g  17 g Oral DAILY PRN  
 enoxaparin (LOVENOX) injection 40 mg  40 mg SubCUTAneous 7am  
  acetaminophen (TYLENOL) tablet 500 mg  500 mg Oral TID  famotidine (PEPCID) tablet 20 mg  20 mg Oral BID  lidocaine 4 % patch 1 Patch  1 Patch TransDERmal Q24H  cefepime (MAXIPIME) 2 g in 0.9% sodium chloride (MBP/ADV) 100 mL  2 g IntraVENous Q8H  
 sodium chloride (NS) flush 5-10 mL  5-10 mL IntraVENous Q8H  
 sodium chloride (NS) flush 5-10 mL  5-10 mL IntraVENous PRN  
 naloxone (NARCAN) injection 0.4 mg  0.4 mg IntraVENous PRN  
 ondansetron (ZOFRAN) injection 4 mg  4 mg IntraVENous Q4H PRN  
 bisacodyl (DULCOLAX) suppository 10 mg  10 mg Rectal DAILY PRN  
 hydrALAZINE (APRESOLINE) 20 mg/mL injection 10 mg  10 mg IntraVENous Q6H PRN  
 albuterol-ipratropium (DUO-NEB) 2.5 MG-0.5 MG/3 ML  3 mL Nebulization Q4H PRN  
 saline peripheral flush soln 10 mL  10 mL InterCATHeter PRN  
 SALINE PERIPHERAL FLUSH Q8H soln 10 mL  10 mL InterCATHeter Q8H  
 
 
 
 LAB AND IMAGING FINDINGS:  
 
Lab Results Component Value Date/Time WBC 13.3 (H) 09/24/2018 02:51 AM  
 HGB 9.0 (L) 09/24/2018 02:51 AM  
 PLATELET 552 47/86/9155 02:51 AM  
 
Lab Results Component Value Date/Time Sodium 136 09/24/2018 02:51 AM  
 Potassium 4.8 09/24/2018 02:51 AM  
 Chloride 105 09/24/2018 02:51 AM  
 CO2 24 09/24/2018 02:51 AM  
 BUN 24 (H) 09/24/2018 02:51 AM  
 Creatinine 1.07 09/24/2018 02:51 AM  
 Calcium 9.6 09/24/2018 02:51 AM  
  
Lab Results Component Value Date/Time AST (SGOT) 37 09/14/2018 03:53 AM  
 Alk. phosphatase 144 (H) 09/14/2018 03:53 AM  
 Protein, total 9.0 (H) 09/14/2018 03:53 AM  
 Albumin 2.4 (L) 09/14/2018 03:53 AM  
 Globulin 6.6 (H) 09/14/2018 03:53 AM  
 
No results found for: INR, PTMR, PTP, PT1, PT2, APTT Lab Results Component Value Date/Time Iron 48 09/18/2018 02:11 PM  
 TIBC 197 (L) 09/18/2018 02:11 PM  
 Iron % saturation 24 09/18/2018 02:11 PM  
  
No results found for: PH, PCO2, PO2 No components found for: [place] No results found for: CPK, CKMB Total time: 35 
Counseling / coordination time, spent as noted above:30 
> 50% counseling / coordination?: y 
 
Prolonged service was provided for  []30 min   []75 min in face to face time in the presence of the patient, spent as noted above. Time Start:  
Time End:  
Note: this can only be billed with 39245 (initial) or 26577 (follow up). If multiple start / stop times, list each separately.

## 2018-09-24 NOTE — PROGRESS NOTES
Bedside shift change report given to [address] [place] [address] Ne, RN (oncoming nurse) by Bill Beal RN (offgoing nurse). Report included the following information SBAR, Kardex, Intake/Output, MAR and Recent Results.

## 2018-09-24 NOTE — PROGRESS NOTES
I've contacted [facility] regarding possible d/c to them if they can accept the patient. Awaiting word from them. This would be an EMTALA transfer. Franny Wilson is asking the hospitalist to contact their hospitalist, Dr Karlee Tobin at [phone] to clarify some questions she has. Dr Mayela Manzanares is aware and will contact her this afternoon. Please note new PCP song't for October 31st with Dr Renaldo Aguilar. 1515  For EMTALA d/c to [facility] tomorrow at 0900(at present, they can't not staff 9a, but can do 11a-they are still looking). Please call report to [phone], send completed EMTALA, facesheet, ambulance form, emar, and d/c instructions. Please send with IV access for continuation of IV antibiotics. Thanks EMTALA forms have been initiated.

## 2018-09-24 NOTE — PROGRESS NOTES
Hospitalist Progress Note NAME: Va Castro :  1980 MRN:  [id number] Assessment / Plan: 
TR infective Endocarditis/ Pseudomonas bacteremia/ septic arthritis spine 
-CT thoracic spine showed multiple pulmonary nodules, several of which are cavitary and several groundglass opacities and areas of consolidation. -KHANH on  revealed mobile mass on the atrial side of the tricuspid valve with possible perforation, mod to severe TR.  
-ID, ortho, IR and Cardiothoracic surgery on board 
-No plans for surgical intervention 
-On IV abx per ID recs Sepsis, POA; due to above. Improved. Acute anemia. Possibly 2/2 IE valve shearing vs hemodilution? Now resolved. Hgb stable 
-1u pRBC  
-iron lvls ok 
-serial H&H Right-sided pleuritic chest pain; possibly pleurisy. Now resolved. -CTA chest with no PE, right basilar opacification with associated right-sided pleural effusion Back pain 2/2 above 
-followed by palliative medicine for pain management PMH of HTN and Chronic anemia Planned for transfer to [facility] for long term IV abx 25.0 - 29.9 Overweight / Body mass index is 25.83 kg/(m^2). Code status: Full Prophylaxis: Lovenox Recommended Disposition: Transfer to [facility]. Subjective: Chief Complaint / Reason for Physician Visit: back pain 
 
Continues to have back and hip pain. No significant change. Denies chills, dyspnea. Otherwise no new acute complaints. Hgb stable. Review of Systems: 14 pt ROS unremarkable except as stated above. Objective: VITALS:  
Last 24hrs VS reviewed since prior progress note. Most recent are: 
Patient Vitals for the past 24 hrs: 
 Temp Pulse Resp BP SpO2  
18 0801 98.1 °F (36.7 °C) 86 16 128/69 98 %  
18 0257 98 °F (36.7 °C) 80 16 123/74 98 %  
18 2244 98 °F (36.7 °C) 78 16 109/61 98 %  
18 1927 98 °F (36.7 °C) 80 16 118/66 98 % 09/23/18 1614 98.1 °F (36.7 °C) 81 16 110/59 99 % Intake/Output Summary (Last 24 hours) at 09/24/18 1448 Last data filed at 09/24/18 1401 Gross per 24 hour Intake             1510 ml Output              500 ml Net             1010 ml PHYSICAL EXAM: 
General: WD, WN. Alert, cooperative, no acute distress   
EENT:  EOMI. Anicteric sclerae. MMM Resp:  CTA bilaterally, no wheezing or rales. No accessory muscle use CV:  Regular  rhythm,  Normal S1 and S2, No edema GI:  Soft, Non distended, Non tender.  +Bowel sounds Neurologic:  Alert and oriented X 3, normal speech, no gross neuro deficits Skin:  Warm and dry Reviewed most current lab test results and cultures  YES Reviewed most current radiology test results   YES Review and summation of old records today    NO Reviewed patient's current orders and MAR    YES 
PMH/ reviewed - no change compared to H&P 
 
________________________________________________________________________ Amaya Stoner MD  
 
Procedures: see electronic medical records for all procedures/Xrays and details which were not copied into this note but were reviewed prior to creation of Plan. LABS: 
I reviewed today's most current labs and imaging studies. Pertinent labs include: 
Recent Labs  
   09/24/18 0251 09/23/18 
 0242  09/22/18 
 0530 WBC  13.3*  14.5*  12.4* HGB  9.0*  8.8*  8.3* HCT  28.1*  28.0*  26.5*  
PLT  316  341  333 Recent Labs  
   09/24/18 
 0251 09/23/18 
 0242  09/22/18 
 0530 NA  136  135*  136  
K  4.8  5.1  4.7 CL  105  105  105 CO2  24  24  26 GLU  85  80  77 BUN  24*  23*  23* CREA  1.07  1.11  1.08  
CA  9.6  9.4  9.3 Signed: Amaya Stoner MD

## 2018-09-24 NOTE — PROGRESS NOTES
I discussed with physician  Dr. Jj Ortega at [facility] who is willing to accept this patient in transfer.

## 2018-09-24 NOTE — PROGRESS NOTES
[facility] Coordinator for Care Management Review of the chart for transition to [facility] for continue medical care. Dr. China Sandoval has accepted the patient for transfer to [facility] tomorrow 9-25-18. [address] If any changes in condition prior to transfer please call Dr. China Sandoval [phone] with updates. Patient will be going to the Second floor/Medical/Surgical unit. Specific room # to be given in am 
Nursing to call report to [phone] CM report to me [phone] Please arrange transportation for 9:00AM  
 
Please document patient still in agreement for transfer here. CM to arrange Medical Providers for post acute care.  
 
 
Renae GORDON RN [facility]

## 2018-09-24 NOTE — PROGRESS NOTES
Infectious Disease Progress Note IMPRESSION:  
· Persistent Pseudomonas aeruginosa bacteremia 2/2 ( 9/6), repeat BC also+ 1/1 (9/7)BC  9/11- 1/4., 9/16 - so far no growth pt remains afebrile · Etiology of Pseudomonas - possibly from  washing needles with water from faucet. Denies sharing needles ·  S/p Rapid Response on 9/16. 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 Repeat ECHO shows freely mobile mass seen on TV relatively unchanged from KHANH · R/basilar airspace disease on CXR, repeat CXR shows worsening of infiltrate, pt feels better ·  Evidence of cervical fusion C5-7 & T4/5 laminectomy on CT scan · MRI shows- fluid / edema L3/4 posterior joint facet suspicious for septic arthritis. · Pulmonary nodules,cavitary ,ground glass opacities, on  CT thorax suggestive of septic emboli ·  Reports from [facility] reviewed. Pt had MSSA bacteremia in May 2018. ( not MRSA ) Treated with Nafcillin & thereafter Cefazolin 5/15 to 6/28. · MRI of spine showed inflammatory changes in T5/6 suggestive of septic arthritis. TTE(5/16)  suggestive of vegetation , KHANH done (5/18), no evidence of vegetation as per D/c summary from [facility] · IVDU on going up to time of admission · UDS + for opiates · Smoker , alcohol consumption + · S/p IR evaluation - fluid in spine not accessible for aspiration per IR, S/p Ortho consult - defer to Neurosurgery. · S/p Cardiothoracic surgery evaluation - no surgery at this time. -  
· HIV, Hep C negative  
  
  
PLAN:  
   
· Continue Cefepime IV, Gentamicinx 6 weeks end date Oct 28 , Levaquin for new infiltrate, also for coverage for Pseudomonas. Continue x 14 days with end date 9/27 Pseudomonas endocarditis is associated with  high morbidity & mortality rates & .high relapse rates upto 30% ·  Continue IS  every 1-2 hours , d/w pt again today · Walk daily , d/w pt  
 ·  Plan of care d/w pt again. Subjective:  
 
Pt seen . Denies new complaints Back pain still + Review of Systems:  None except for that mentioned in H&P. 10 point ROS obtained Objective:  
 
Blood pressure 128/69, pulse 86, temperature 98.1 °F (36.7 °C), resp. rate 16, height 6' (1.829 m), weight 190 lb 7.6 oz (86.4 kg), SpO2 98 %. Temp (24hrs), Av.1 °F (36.7 °C), Min:98 °F (36.7 °C), Max:98.2 °F (36.8 °C) Patient Vitals for the past 24 hrs: 
 Temp Pulse Resp BP SpO2  
18 0801 98.1 °F (36.7 °C) 86 16 128/69 98 %  
18 0257 98 °F (36.7 °C) 80 16 123/74 98 %  
18 2244 98 °F (36.7 °C) 78 16 109/61 98 %  
18 1927 98 °F (36.7 °C) 80 16 118/66 98 %  
18 1614 98.1 °F (36.7 °C) 81 16 110/59 99 % 18 1300 98.2 °F (36.8 °C) 77 18 119/71 98 % Lines:  Peripheral IV:   
   
 
 
Physical Exam:  
General:  Alert, cooperative, Eyes:  Sclera anicteric. Pupils equally round and reactive to light. Mouth/Throat: Mucous membranes normal, oral pharynx clear Neck: Supple Lungs:   reduced auscultation bases CV:  Regular rate and rhythm, murmur+, Abdomen:   Soft, non-tender. bowel sounds normal. non-distended, Lumbar tender in lower back Extremities: No  Edema, tattoos + Lines/Devices:  Intact, no erythema, drainage or tenderness Data Review: CBC:  
Recent Labs  
   18 
 0251  18 
 0242  18 
 0530 WBC  13.3*  14.5*  12.4*  
RBC  3.32*  3.30*  3.11* HGB  9.0*  8.8*  8.3* HCT  28.1*  28.0*  26.5*  
PLT  316  341  333 CMP:  
Recent Labs  
   18 
 0251  18 
 0242  18 
 0530 GLU  85  80  77 NA  136  135*  136  
K  4.8  5.1  4.7 CL  105  105  105 CO2  24  24  26 BUN  24*  23*  23* CREA  1.07  1.11  1.08  
CA  9.6  9.4  9.3 AGAP  7  6  5 BUCR  22*  21*  21* Studies:     
Lab Results Component Value Date/Time  Culture result: NO GROWTH 6 DAYS 2018 05:10 PM  
 Culture result: NO GROWTH 6 DAYS 09/11/2018 07:03 PM  
 Culture result: (A) 09/11/2018 07:03 PM  
  PSEUDOMONAS AERUGINOSA GROWING IN 1 OF 2 BOTTLES DRAWN (SITE = L UPPER ARM) Culture result: REMAINING BOTTLE(S) HAS/HAVE NO GROWTH IN 5 DAYS 09/11/2018 07:03 PM  
 Culture result: (A) 09/08/2018 04:01 AM  
  PSEUDOMONAS AERUGINOSA GROWING IN THIS SINGLE BOTTLE DRAWN (L MIDLINE SITE) PLEASE REFER TO PREVIOUS BLOOD CULTURE [id number], COLLECTED 9/6/18 FOR SENSITIVITIES Culture result: (A) 09/08/2018 04:01 AM  
  PRELIMINARY REPORT OF GRAM NEGATIVE RODS GROWING IN THIS SINGLE BOTTLE DRAWN CALLED TO AND READ BACK BY Elwood Primrose, RN ON 9/10/18 AT [address] [facility], [facility] [address]). MS  
 Culture result: MRSA NOT PRESENT 09/08/2018 04:01 AM  
 Culture result:  09/08/2018 04:01 AM  
      Screening of patient nares for MRSA is for surveillance purposes and, if positive, to facilitate isolation considerations in high risk settings. It is not intended for automatic decolonization interventions per se as regimens are not sufficiently effective to warrant routine use. XR Results (most recent): 
 
Results from Hospital Encounter encounter on 09/06/18 XR CHEST PORT Narrative EXAM: Portable CXR.  2156 hours. COMPARISON: 1824 hours. INDICATION: central line placement Right IJ CVL has been placed with tip in the lower SVC, without pneumothorax. There is otherwise no change including right lower lobe pneumonia appearance. Impression IMPRESSION: Satisfactory CVL placement. Patient Active Problem List  
Diagnosis Code  Sepsis ([address] A41.9  Endocarditis of tricuspid valve I36.8  Tricuspid valve regurgitation, infectious I07.1  Acute septic pulmonary embolism without acute cor pulmonale (HCC) I26.90  
 Acute right-sided low back pain with sciatica M54.40  Heroin abuse F11.10  Debility R53.81  
 IV drug abuse F19.10 I have discussed the diagnosis with the patient and the intended plan as seen in the above orders. I have discussed medication side effects and warnings with the patient as well. Reviewed test results at length with patient Anti-infectives:  
 
  
 Cefepime iv- 9/7 Gentamicin IV -9/14 Levaquin IV- 9/14 S/p Vancomycin iv- 9/7- 9/11  Reece Steele MD FACP

## 2018-09-24 NOTE — PROGRESS NOTES
Problem: Pressure Injury - Risk of 
Goal: *Prevention of pressure injury Document Juan Scale and appropriate interventions in the flowsheet. Outcome: Progressing Towards Goal 
Pressure Injury Interventions: 
Sensory Interventions: Assess changes in LOC, Discuss PT/OT consult with provider, Keep linens dry and wrinkle-free, Maintain/enhance activity level, Minimize linen layers, Turn and reposition approx. every two hours (pillows and wedges if needed) Moisture Interventions: Absorbent underpads, Apply protective barrier, creams and emollients, Limit adult briefs Activity Interventions: Increase time out of bed, Pressure redistribution bed/mattress(bed type) Mobility Interventions: Float heels, HOB 30 degrees or less, PT/OT evaluation Nutrition Interventions: Document food/fluid/supplement intake Friction and Shear Interventions: Apply protective barrier, creams and emollients, Feet elevated on foot rest, HOB 30 degrees or less, Lift team/patient mobility team, Lift sheet

## 2018-09-24 NOTE — PROGRESS NOTES
Problem: Falls - Risk of 
Goal: *Absence of Falls Document Joycelyn Kelsey Fall Risk and appropriate interventions in the flowsheet. Outcome: Progressing Towards Goal 
Fall Risk Interventions: 
Mobility Interventions: Communicate number of staff needed for ambulation/transfer, Patient to call before getting OOB, PT Consult for mobility concerns, PT Consult for assist device competence Medication Interventions: Evaluate medications/consider consulting pharmacy, Patient to call before getting OOB, Teach patient to arise slowly, Utilize gait belt for transfers/ambulation Elimination Interventions: Call light in reach, Urinal in reach History of Falls Interventions: Consult care management for discharge planning

## 2018-09-25 ENCOUNTER — HOSPITAL ENCOUNTER (INPATIENT)
Age: 38
LOS: 33 days | Discharge: HOME | DRG: 289 | End: 2018-10-28
Attending: HOSPITALIST | Admitting: HOSPITALIST
Payer: SUBSIDIZED

## 2018-09-25 VITALS
DIASTOLIC BLOOD PRESSURE: 71 MMHG | SYSTOLIC BLOOD PRESSURE: 121 MMHG | HEART RATE: 96 BPM | WEIGHT: 168.87 LBS | OXYGEN SATURATION: 98 % | TEMPERATURE: 97.7 F | BODY MASS INDEX: 22.87 KG/M2 | RESPIRATION RATE: 16 BRPM | HEIGHT: 72 IN

## 2018-09-25 PROBLEM — I33.0 INFECTIVE ENDOCARDITIS: Status: ACTIVE | Noted: 2018-09-25

## 2018-09-25 LAB
ANION GAP SERPL CALC-SCNC: 6 MMOL/L (ref 5–15)
BUN SERPL-MCNC: 25 MG/DL (ref 6–20)
BUN/CREAT SERPL: 23 (ref 12–20)
CALCIUM SERPL-MCNC: 9.2 MG/DL (ref 8.5–10.1)
CHLORIDE SERPL-SCNC: 105 MMOL/L (ref 97–108)
CO2 SERPL-SCNC: 24 MMOL/L (ref 21–32)
COMMENT, HOLDF: NORMAL
CREAT SERPL-MCNC: 1.07 MG/DL (ref 0.7–1.3)
DATE LAST DOSE: NORMAL
ERYTHROCYTE [DISTWIDTH] IN BLOOD BY AUTOMATED COUNT: 15.4 % (ref 11.5–14.5)
ERYTHROCYTE [SEDIMENTATION RATE] IN BLOOD: 9 MM/HR (ref 0–15)
GLUCOSE SERPL-MCNC: 102 MG/DL (ref 65–100)
HCT VFR BLD AUTO: 27.3 % (ref 36.6–50.3)
HGB BLD-MCNC: 8.7 G/DL (ref 12.1–17)
MCH RBC QN AUTO: 27 PG (ref 26–34)
MCHC RBC AUTO-ENTMCNC: 31.9 G/DL (ref 30–36.5)
MCV RBC AUTO: 84.8 FL (ref 80–99)
NRBC # BLD: 0 K/UL (ref 0–0.01)
NRBC BLD-RTO: 0 PER 100 WBC
PLATELET # BLD AUTO: 286 K/UL (ref 150–400)
PMV BLD AUTO: 8.7 FL (ref 8.9–12.9)
POTASSIUM SERPL-SCNC: 4.5 MMOL/L (ref 3.5–5.1)
RBC # BLD AUTO: 3.22 M/UL (ref 4.1–5.7)
REPORTED DOSE,DOSE: NORMAL UNITS
REPORTED DOSE/TIME,TMG: NORMAL
SAMPLES BEING HELD,HOLD: NORMAL
SODIUM SERPL-SCNC: 135 MMOL/L (ref 136–145)
TOBRAMYCIN SERPL-MCNC: 2.2 UG/ML
WBC # BLD AUTO: 12.2 K/UL (ref 4.1–11.1)

## 2018-09-25 PROCEDURE — 80048 BASIC METABOLIC PNL TOTAL CA: CPT | Performed by: INTERNAL MEDICINE

## 2018-09-25 PROCEDURE — 80200 ASSAY OF TOBRAMYCIN: CPT | Performed by: INTERNAL MEDICINE

## 2018-09-25 PROCEDURE — 85027 COMPLETE CBC AUTOMATED: CPT | Performed by: INTERNAL MEDICINE

## 2018-09-25 PROCEDURE — 74011000258 HC RX REV CODE- 258: Performed by: HOSPITALIST

## 2018-09-25 PROCEDURE — 74011250636 HC RX REV CODE- 250/636: Performed by: INTERNAL MEDICINE

## 2018-09-25 PROCEDURE — 74011250636 HC RX REV CODE- 250/636: Performed by: NURSE PRACTITIONER

## 2018-09-25 PROCEDURE — 74011000258 HC RX REV CODE- 258: Performed by: INTERNAL MEDICINE

## 2018-09-25 PROCEDURE — 74011250636 HC RX REV CODE- 250/636: Performed by: HOSPITALIST

## 2018-09-25 PROCEDURE — 36415 COLL VENOUS BLD VENIPUNCTURE: CPT | Performed by: INTERNAL MEDICINE

## 2018-09-25 PROCEDURE — 74011250637 HC RX REV CODE- 250/637: Performed by: HOSPITALIST

## 2018-09-25 PROCEDURE — 65270000032 HC RM SEMIPRIVATE

## 2018-09-25 PROCEDURE — 74011250637 HC RX REV CODE- 250/637: Performed by: NURSE PRACTITIONER

## 2018-09-25 PROCEDURE — 85652 RBC SED RATE AUTOMATED: CPT | Performed by: INTERNAL MEDICINE

## 2018-09-25 RX ORDER — FAMOTIDINE 20 MG/1
20 TABLET, FILM COATED ORAL 2 TIMES DAILY
Status: DISCONTINUED | OUTPATIENT
Start: 2018-09-25 | End: 2018-09-26

## 2018-09-25 RX ORDER — HYDROMORPHONE HYDROCHLORIDE 2 MG/ML
2 INJECTION, SOLUTION INTRAMUSCULAR; INTRAVENOUS; SUBCUTANEOUS
Status: DISPENSED | OUTPATIENT
Start: 2018-09-25 | End: 2018-09-30

## 2018-09-25 RX ORDER — POLYETHYLENE GLYCOL 3350 17 G/17G
17 POWDER, FOR SOLUTION ORAL DAILY
Status: DISCONTINUED | OUTPATIENT
Start: 2018-09-25 | End: 2018-09-28

## 2018-09-25 RX ORDER — LEVOFLOXACIN 5 MG/ML
750 INJECTION, SOLUTION INTRAVENOUS EVERY 24 HOURS
Status: COMPLETED | OUTPATIENT
Start: 2018-09-25 | End: 2018-09-27

## 2018-09-25 RX ORDER — ENOXAPARIN SODIUM 100 MG/ML
40 INJECTION SUBCUTANEOUS EVERY 24 HOURS
Status: DISCONTINUED | OUTPATIENT
Start: 2018-09-26 | End: 2018-10-28 | Stop reason: HOSPADM

## 2018-09-25 RX ORDER — SODIUM CHLORIDE 0.9 % (FLUSH) 0.9 %
5-10 SYRINGE (ML) INJECTION AS NEEDED
Status: DISCONTINUED | OUTPATIENT
Start: 2018-09-25 | End: 2018-09-29

## 2018-09-25 RX ORDER — FAMOTIDINE 20 MG/1
20 TABLET, FILM COATED ORAL 2 TIMES DAILY
Qty: 60 TAB | Refills: 0 | Status: ON HOLD | OUTPATIENT
Start: 2018-09-25 | End: 2018-09-25

## 2018-09-25 RX ORDER — IPRATROPIUM BROMIDE AND ALBUTEROL SULFATE 2.5; .5 MG/3ML; MG/3ML
3 SOLUTION RESPIRATORY (INHALATION)
Qty: 30 NEBULE | Refills: 0 | Status: ON HOLD | OUTPATIENT
Start: 2018-09-25 | End: 2018-09-25

## 2018-09-25 RX ORDER — HYDROMORPHONE HYDROCHLORIDE 2 MG/ML
2 INJECTION, SOLUTION INTRAMUSCULAR; INTRAVENOUS; SUBCUTANEOUS ONCE
Status: COMPLETED | OUTPATIENT
Start: 2018-09-25 | End: 2018-09-25

## 2018-09-25 RX ORDER — FACIAL-BODY WIPES
10 EACH TOPICAL DAILY
Qty: 30 SUPPOSITORY | Refills: 0 | Status: ON HOLD | OUTPATIENT
Start: 2018-09-25 | End: 2018-09-25

## 2018-09-25 RX ORDER — LIDOCAINE 4 G/100G
PATCH TOPICAL
Qty: 30 PATCH | Refills: 0 | Status: ON HOLD | OUTPATIENT
Start: 2018-09-25 | End: 2018-09-25

## 2018-09-25 RX ORDER — CELECOXIB 200 MG/1
200 CAPSULE ORAL 2 TIMES DAILY
Qty: 60 CAP | Refills: 0 | Status: ON HOLD | OUTPATIENT
Start: 2018-09-25 | End: 2018-09-25

## 2018-09-25 RX ORDER — SODIUM CHLORIDE 0.9 % (FLUSH) 0.9 %
5-10 SYRINGE (ML) INJECTION EVERY 8 HOURS
Status: DISCONTINUED | OUTPATIENT
Start: 2018-09-25 | End: 2018-09-29

## 2018-09-25 RX ORDER — LEVOFLOXACIN 5 MG/ML
750 INJECTION, SOLUTION INTRAVENOUS EVERY 24 HOURS
Qty: 300 ML | Refills: 0 | Status: ON HOLD | OUTPATIENT
Start: 2018-09-25 | End: 2018-09-25

## 2018-09-25 RX ORDER — POLYETHYLENE GLYCOL 3350 17 G/17G
17 POWDER, FOR SOLUTION ORAL
Qty: 30 PACKET | Refills: 0 | Status: ON HOLD | OUTPATIENT
Start: 2018-09-25 | End: 2018-09-25

## 2018-09-25 RX ORDER — CELECOXIB 100 MG/1
200 CAPSULE ORAL 2 TIMES DAILY
Status: DISCONTINUED | OUTPATIENT
Start: 2018-09-25 | End: 2018-10-16

## 2018-09-25 RX ADMIN — Medication 10 ML: at 22:33

## 2018-09-25 RX ADMIN — CEFEPIME HYDROCHLORIDE 2 G: 2 INJECTION, POWDER, FOR SOLUTION INTRAVENOUS at 16:30

## 2018-09-25 RX ADMIN — Medication 10 ML: at 10:50

## 2018-09-25 RX ADMIN — LEVOFLOXACIN 750 MG: 5 INJECTION, SOLUTION INTRAVENOUS at 14:54

## 2018-09-25 RX ADMIN — Medication 10 ML: at 08:23

## 2018-09-25 RX ADMIN — HYDROMORPHONE HYDROCHLORIDE 2 MG: 2 INJECTION INTRAMUSCULAR; INTRAVENOUS; SUBCUTANEOUS at 10:49

## 2018-09-25 RX ADMIN — CEFEPIME HYDROCHLORIDE 2 G: 2 INJECTION, POWDER, FOR SOLUTION INTRAVENOUS at 01:39

## 2018-09-25 RX ADMIN — HYDROMORPHONE HYDROCHLORIDE 2 MG: 2 INJECTION INTRAMUSCULAR; INTRAVENOUS; SUBCUTANEOUS at 18:31

## 2018-09-25 RX ADMIN — CELECOXIB 200 MG: 100 CAPSULE ORAL at 18:00

## 2018-09-25 RX ADMIN — HYDROMORPHONE HYDROCHLORIDE 2 MG: 2 INJECTION INTRAMUSCULAR; INTRAVENOUS; SUBCUTANEOUS at 09:09

## 2018-09-25 RX ADMIN — ENOXAPARIN SODIUM 40 MG: 40 INJECTION, SOLUTION INTRAVENOUS; SUBCUTANEOUS at 06:31

## 2018-09-25 RX ADMIN — HYDROMORPHONE HYDROCHLORIDE 2 MG: 2 INJECTION INTRAMUSCULAR; INTRAVENOUS; SUBCUTANEOUS at 02:48

## 2018-09-25 RX ADMIN — ACETAMINOPHEN 500 MG: 500 TABLET ORAL at 08:05

## 2018-09-25 RX ADMIN — CEFEPIME HYDROCHLORIDE 2 G: 2 INJECTION, POWDER, FOR SOLUTION INTRAVENOUS at 08:35

## 2018-09-25 RX ADMIN — CELECOXIB 200 MG: 100 CAPSULE ORAL at 08:08

## 2018-09-25 RX ADMIN — CEFEPIME HYDROCHLORIDE 2 G: 2 INJECTION, POWDER, FOR SOLUTION INTRAVENOUS at 23:52

## 2018-09-25 RX ADMIN — Medication 10 ML: at 18:35

## 2018-09-25 RX ADMIN — HYDROMORPHONE HYDROCHLORIDE 2 MG: 2 INJECTION INTRAMUSCULAR; INTRAVENOUS; SUBCUTANEOUS at 14:31

## 2018-09-25 RX ADMIN — HYDROMORPHONE HYDROCHLORIDE 2 MG: 2 INJECTION INTRAMUSCULAR; INTRAVENOUS; SUBCUTANEOUS at 06:31

## 2018-09-25 RX ADMIN — Medication 10 ML: at 05:31

## 2018-09-25 RX ADMIN — HYDROMORPHONE HYDROCHLORIDE 2 MG: 2 INJECTION INTRAMUSCULAR; INTRAVENOUS; SUBCUTANEOUS at 22:32

## 2018-09-25 RX ADMIN — Medication 10 ML: at 14:46

## 2018-09-25 NOTE — PROGRESS NOTES
For EMTALA d/c to [facility] today at 0900. Please call report to [phone], send completed EMTALA, facesheet, ambulance form, emar, and d/c instructions. Please send with IV access for continuation of IV antibiotics. The pt is in agreement with the transfer. Thanks Transfer note:  42 yo self pay IV drug abuser with endocarditis requiring IJ IV antibiotics until October 28th. For EMTALA transfer to [facility] for continuity of care. Pt states he rents a room from \"people\" (one story-5 steps into main entrance). Pt reported that he is independent with ADLs, and he drives. Pt has been assigned a PCP and an song't has been made for Oct 31st with Dr Brit Mendenhall. Pt states [facility] uses [facility] no DME, HHC, or SNF. Pt has received Care Card application and needs to complete it prior to d/c. He wants to list only Jackie Viramontes who is his children's  grandmother as his emergency contact at [phone] [facility] paperwork has been completed. He states his only other relative living besides his kids is his brother who lives in [place]. The [facility] address of [address] [place] [address] is where he rents a room. He works fulltime in [employer], receives cash, and [employer] is not provided. He has been in drug rehabs in the past.  Note Palliative Care excellent notes. I spoke with SYDNIE Cesar Care who will address his medication follow up thru her department. Possible surgical issues will be addressed post IV antibiotics per their notes(Sept 7th and 20th). [facility]

## 2018-09-25 NOTE — PROGRESS NOTES
Hospital to [facility] SBAR Handoff - Daquan Cassidy 
                                                                      43 y.o.   male [address]   Room: [address] [place] [address] 3 MED TELE  Unit Phone# :  [phone] 
[facility] [address] [address] Dept: [phone] Loc: [id number] SITUATION Admitted:  9/6/2018         Attending Provider:  Jadyn Hernandez MD    
 
Consultations:  IP CONSULT TO ORTHOPEDIC SURGERY 
IP CONSULT TO INFECTIOUS DISEASES 
IP CONSULT TO INTERVENTIONAL RADIOLOGY 
IP CONSULT TO THORACIC SURGERY 
IP CONSULT TO PAIN MANAGEMENT 
IP CONSULT TO PALLIATIVE CARE - PROVIDER 
IP CONSULT TO NEUROSURGERY 
IP CONSULT TO CARDIOLOGY 
 
PCP:  None   None Treatment Team: Attending Provider: Jadyn Hernandez MD; Consulting Provider: Mateusz Araujo NP; Consulting Provider: Ana Andrade MD; Consulting Provider: Denton Le MD; Consulting Provider: Quinton Buitrago MD; Utilization Review: Laina Moreno RN; Consulting Provider: Giancarlo Sevilla MD; Care Manager: Geetha Perez; Utilization Review: Chuy Hooks RN; Consulting Provider: Jadyn Bacon MD; Consulting Provider: Liv Ruiz NP; Utilization Review: Prasanna Levy; Care Manager: Klever Mcdonough; Care Manager: Cipriano Cardoso RN Admitting Dx:  Sepsis ([address] 
central line placement Principal Problem: <principal problem not specified> 
 
9 Days Post-Op of  
Procedure(s): 
Central Line Placement BY: Case Anesthesia             ON: 9/16/2018 Code Status: Full Code Advance Directives:  
Advance Care Planning 9/17/2018 Patient's Healthcare Decision Maker is: Verbal statement (Legal Next of Kin remains as decision maker) Primary Decision Maker Name Sam Cagle Confirm Advance Directive - (Send w/patient) Not Received Isolation:  There are currently no Active Isolations       MDRO: No current active infections Pain Medications given:  0645    Last dose:at  09/25/18 Special Equipment needed: no  Type of equipment: 
 
 
(Not currently on dialysis) (Not currently on dialysis) (Not currently on dialysis) BACKGROUND Allergies: 
No Known Allergies Past Medical History:  
Diagnosis Date  Back pain, chronic Past Surgical History:  
Procedure Laterality Date  HX CERVICAL DISKECTOMY  HX FEMUR FRACTURE TX    
 HX ORTHOPAEDIC Back Surgery No prescriptions prior to admission. Hard scripts included in transfer packet no Vaccinations: There is no immunization history for the selected administration types on file for this patient. Readmission Risks:    Known Risks: none The Charlson [employer]bit[employer] Index tool is an evidenced based tool that has more automatic generated information. The tool looks at many different items such as the age of the patient, how many times they were admitted in the last calendar year, current length of stay in the hospital and their diagnosis. All of these items are pulled automatically from information documented in the chart from various places and will generate a score that predicts whether a patient is at low (less than 13), medium (13-20) or high (21 or greater) risk of being readmitted. ASSESSMENT Temp: 97.9 °F (36.6 °C) (09/25/18 0741) Pulse (Heart Rate): 82 (09/25/18 0741) Resp Rate: 16 (09/25/18 0741)           BP: 129/70 (09/25/18 0741) O2 Sat (%): 98 % (09/25/18 0741) Weight: 86.4 kg (190 lb 7.6 oz)    Height: 6' (182.9 cm) (09/07/18 0724) If above not within 1 hour of discharge: 
 
BP:_____  P:____  R:____ T:_____ O2 Sat: ___%  O2: ______ Active Orders Diet DIET REGULAR Orientation: oriented to time, place, person and situation Active Behaviors: None Active Lines/Drains:  (Peg Tube / Fritz / CL or S/L?): yes Urinary Status: Voiding     Last BM: Last Bowel Movement Date: 09/22/18 Skin Integrity: Tattoos (comment) Mobility: Slightly limited Weight Bearing Status: WBAT (Weight Bearing as Tolerated) Lab Results Component Value Date/Time Glucose 102 (H) 09/25/2018 12:57 AM  
 HGB 8.7 (L) 09/25/2018 12:57 AM  
 HGB 9.0 (L) 09/24/2018 02:51 AM  
  
  RECOMMENDATION See After Visit Summary (AVS) for: · Discharge instructions · [facility] · Special equipment needed (entered pre-discharge by Care Management) · Medication Reconciliation · Follow up Appointment(s) Report given/sent by:  Lyndsey Henderson RN Verbal report given to: Sara Solitario  FAXED to:  [facility] Estimated discharge time:  9/25/2018 at 0900

## 2018-09-25 NOTE — PROGRESS NOTES
Bedside and Verbal shift change report given to Ting(oncoming nurse) by Jasmin Garcia (offgoing nurse). Report included the following information Kardex, MAR and Recent Results.

## 2018-09-25 NOTE — PROGRESS NOTES
Physical therapy:  
 
Physical therapy consult received and chart reviewed. Patient declining physical therapy evaluation this date secondary to low back pain despite being medicated 45 minutes prior to eval attempt. Patient reports that he has been up and walking slowly to bathroom with increased pain. Patient reports that he would like a walker to get around with; left rolling walker in patient's room. Patient reports that the pain management has just been starting to work this week and would like to hold off on physical therapy evaluation until later in the week if able. Will attempt as able.  
 
Myriam Reina, PT

## 2018-09-25 NOTE — PROGRESS NOTES
Problem: Falls - Risk of 
Goal: *Absence of Falls Document Tia Manus Fall Risk and appropriate interventions in the flowsheet. Outcome: Progressing Towards Goal 
Fall Risk Interventions: 
Mobility Interventions: Patient to call before getting OOB, Strengthening exercises (ROM-active/passive) Medication Interventions: Evaluate medications/consider consulting pharmacy, Patient to call before getting OOB Elimination Interventions: Call light in reach, Urinal in reach History of Falls Interventions: Door open when patient unattended, Bed/chair exit alarm, Room close to nurse's station

## 2018-09-25 NOTE — DISCHARGE SUMMARY
Hospitalist Discharge Summary    NAME: Milton Wong   :  1980   MRN:  [id number]     DISCHARGE DIAGNOSIS:    TR infective Endocarditis/ Pseudomonas bacteremia/ septic arthritis spine  -CT thoracic spine showed multiple pulmonary nodules, several of which are cavitary and several groundglass opacities and areas of consolidation. -KHANH on  revealed mobile mass on the atrial side of the tricuspid valve with possible perforation, mod to severe TR.   -ID, ortho, IR and Cardiothoracic surgery on board  -No plans for surgical intervention  -On IV abx per ID recs: tobramycin and cefepime until 10/28 included  - levaquin until  included   - F/U tobramycin level and adjust doses      Sepsis, POA; due to above. Improved.     Acute anemia. Possibly 2/2 IE valve shearing vs hemodilution? Now resolved. Hgb stable  -1u pRBC   -iron lvls ok  -serial H&H     Right-sided pleuritic chest pain; possibly pleurisy. Now resolved. -CTA chest with no PE, right basilar opacification with associated right-sided pleural effusion     Back pain 2/2 above  -followed by palliative medicine for pain management: FU palliative consult      PMH of HTN and Chronic anemia    CONSULTATIONS:  ID    Follow Up: Follow-up Information     Follow up With Details Comments Contact Info    Guzman Friend MD Go on 10/31/2018 For new patient appointment at 11:30AM  [address]  [phone]      None   None (395) Patient stated that they have no PCP      None In 1 week  None (395) Patient stated that they have no PCP      Kaela Velasquez MD In 3 weeks  [facility]  [phone]            Procedures: see electronic medical records for all procedures/Xrays and details which were not copied into this note but were reviewed prior to creation of Plan. Please follow-up tests/labs that are still pendin.  None     PMH/SH reviewed - no change compared to H&P    DISCHARGE SUMMARY/HOSPITAL COURSE: for full details see H&P, daily progress notes, labs, consult notes. Briefly As Per HPI:  Adrián Stover is a 43 y.o.  male with PMHx significant for hx of prior cervical spine surgery ( ACDF C5-7), R rotator cuff injury, b/l superficial htrombus, HTN, anemia, daily IVD use, hx of MSSA bacteremia, T spine epidural abscess s/p T3-6 laminectomies for abscess and phlegmon evacuation on 5/20. Pt [facility] in[facility].  Pt recently moved back to [place] to get away from heroin. He states after the laminectomy, patient loss sensation in his b/l LE L>R. For the past week, he has been having lower back pain which he described as in between his buttocks. He states he thought it was due to recent surgery and continue on with his job. Symptoms progressively worsens where he became weak with subjective fever, chills. He denies chest pain, SOB, palpitations, n/v/d. He denies loss of bladder/bowel control. Pt admits to daily heroin use. He last used it yesterday morning. He was lethargic during my encounter after he received IV narcotics. However he was easily arousable and was able to give appropriate hx. He asked that our ID reach out to the ID physician who took care of him last at [facility]. In the ER, vitals: T100.3, P115, /77, SpO2 98% on RA  CT T/L spine reviewed. MRI L spine showed Increased fluid in the right L3-4 posterior facet joint with surrounding edema extending into the paraspinous musculature. Findings are suspicious for septic arthritis. No significant spinal stenosis or neural foraminal narrowing.      We were asked to admit for work up and evaluation of the above problems.        The patient's hospital course was complicated by:    TR infective Endocarditis/ Pseudomonas bacteremia/ septic arthritis spine    Sepsis, POA; due to above. Improved.     Acute anemia. Possibly 2/2 IE valve shearing vs hemodilution? Now resolved. Hgb stable    Right-sided pleuritic chest pain; possibly pleurisy. Now resolved.     Back pain 2/2 above    -PMH of HTN and Chronic anemia  Patient with complex inpatient course. Please see all notes, labs, vitals, testing and procedures for details. _______________________________________________________________________   Patient  Was transferred to [facility]  before I can physically see him . He was evaluated yesterday by my colleague and his physical exam was unremarkable yesterday. See Discharge Instructions for further details. _______________________________________________________________________    Medications Reviewed:  Current Discharge Medication List      START taking these medications    Details   albuterol-ipratropium (DUO-NEB) 2.5 mg-0.5 mg/3 ml nebu 3 mL by Nebulization route every four (4) hours as needed. Qty: 30 Nebule, Refills: 0      bisacodyl (DULCOLAX) 10 mg suppository Insert 10 mg into rectum daily. Qty: 30 Suppository, Refills: 0      cefepime 2 gram 2 g, ADDaptor 1 Device IVPB 2 g by IntraVENous route every eight (8) hours for 33 days. Qty: 264 Dose, Refills: 0      celecoxib (CELEBREX) 200 mg capsule Take 1 Cap by mouth two (2) times a day for 30 days. Qty: 60 Cap, Refills: 0      famotidine (PEPCID) 20 mg tablet Take 1 Tab by mouth two (2) times a day. Qty: 60 Tab, Refills: 0      levoFLOXacin (LEVAQUIN) 750 mg/150 mL pgbk 150 mL by IntraVENous route every twenty-four (24) hours for 2 days. Qty: 300 mL, Refills: 0      lidocaine 4 % patch As indicated  Qty: 30 Patch, Refills: 0      polyethylene glycol (MIRALAX) 17 gram packet Take 1 Packet by mouth daily as needed. Qty: 30 Packet, Refills: 0      tobramycin 400 mg 400 mg by IntraVENous route every thirty-six (36) hours for 32 days.   Qty: 11 Dose, Refills: 0           _______________________________________________________________________    Risk of deterioration: High  ________________________________________________________________________    Disposition  another inpatient facility  ________________________________________________________________________    Care Plan discussed with:   Patient, Family, RN, Care Manager, Consultant  ________________________________________________________________________    Code Status: Full Code  ________________________________________________________________________    Total time spent in discharge (min): 30mn   ________________________________________________________________________    CDMP Checked: Yes    Signed: Lakesha Olson MD    This note will not be viewable in [facility] [address]

## 2018-09-25 NOTE — PROGRESS NOTES
Advance Care Planning (ACP) Provider Note - Comprehensive Date of ACP Conversation: 09/25/18 Persons included in Conversation:  patient Length of ACP Conversation in minutes:  20 minutes Authorized Decision Maker (if patient is incapable of making informed decisions): This person is: David Ponce ACP for ALL Patients with Decision Making Capacity: 
 Importance of advance care planning, including choosing a healthcare agent to communicate patient's healthcare decisions if patient lost the ability to make decisions, such as after a sudden illness or accident Understanding of the healthcare agent role was assessed and information provided Review of Existing Advance Directive: 
NA For Serious or Chronic Illness: 
Understanding of medical condition Understanding of CPR, goals and expected outcomes, benefits and burdens discussed.  
 
Interventions Provided: 
Completed DDNR

## 2018-09-25 NOTE — PROGRESS NOTES
Discharging patient to [facility] via ambulance with EMTALA. All the documentation regarding EMTALA completed. Patient getting discharged with central line intact. Copy of discharge instructions, H&P, MAR report and all patient belongings given to patient prior to discharge. All cabinets and drawers checked for patient belongings.

## 2018-09-25 NOTE — PROGRESS NOTES
[facility] Dosing Services: Antimicrobial Stewardship Daily Doc Consult for antibiotic dosing of tobramycin, cefepime, and levofloxacin by Dr. Anais Pizano [facility] Indication: tricuspid valve endocarditis, pseudomonas bacteremia, and pneumonia Day of Therapy 12 of levofloxacin (per ID continue until 9/27), 19 of cefepime (per ID continue until 10/28), 12 of tobramycin (per ID continue until 10/28- was previously on gentamicin x5 days) Ht Readings from Last 1 Encounters:  
09/25/18 182.9 cm (72\") Wt Readings from Last 1 Encounters:  
09/25/18 76.6 kg (168 lb 14 oz) Tobramycin therapy: 
Current maintenance dose: 400 mg (~5.2 mg/kg) every 24 hours. Pulse dosing calculated to approximate a therapeutic peak of 15-20 mcg/mL and trough of </= 0.3 mcg/mL. Last random level 2.2 mcg/ml at 0057 on 9/25 Plan for level / Adjustment in Therapy:afebrile, SCr stable, & WBC slightly decreased; based on nomogram increase interval to Q36H w/plan for next level on Friday (2x/wk level checks) Dose administration notes:   Doses given appropriately as scheduled Non-Kinetic Antimicrobial Dosing Regimen:  
Current Regimen:  levofloxacin 750 mg IV Q24H & cefepime 2G IV Q8H Recommendation: continue current dosing regimen Dose administration notes:   Doses given appropriately as scheduled Other Antimicrobial  
(not dosed by pharmacist) N/A Cultures 9/6 Bloodx2: Pseudomonas aeruginosa sens to cefepime, LVQ, & tobramycin; final 
9/7 Hep C: (-) 
9/7 HIV: (-) 
9/8 Blood: Pseudomonas aeruginosa sens to cefepime, LVQ, & tobramycin; final 
9/8 Nares: MRSA (-) 
9/11 Bloodx2: Pseudomonas aeruginosa sens to cefepime, LVQ, & tobramycin; final 
9/16 Blood: NGx6d, final  
Serum Creatinine Lab Results Component Value Date/Time Creatinine 1.07 09/25/2018 12:57 AM  
  
  
Creatinine Clearance Estimated Creatinine Clearance: 100.4 mL/min (based on Cr of 1.07). Temp Temp: WBC Lab Results Component Value Date/Time WBC 12.2 (H) 09/25/2018 12:57 AM  
 
  
H/H Lab Results Component Value Date/Time HGB 8.7 (L) 09/25/2018 12:57 AM  
 
  
Platelets Lab Results Component Value Date/Time PLATELET 284 41/50/9947 12:57 AM  
 
  
 
 
Pharmacist Nicki Renee, PHARMD, BCPS Contact: [phone]

## 2018-09-25 NOTE — PROGRESS NOTES
Palliative Medicine Palliative Medicine was asked by the attending, Dr. Ericka Thomas to assist with managing this patient's pain during this admission, unfortunately, Mr. Jo Sosa was transferred to [facility] and we do not have a Palliative team member stationed there. My plan was to titrate pt off the dilaudid by 0.5mg each week: 
10/1-10/7 dilaudid 1.5mg q. 4 hours prn 
10/8-10/14 dilaudid 1mg q. 4 hours prn 
10/15-10/21 dilaudid 0.5mg q. 4 hours prn 
10/22-10/28 dilaudid 0.2mg q. 6 hours prn He should not need a prescription for discharge. Consider going up on the Tylenol if needed during this time. I spoke with  regarding this plan.

## 2018-09-25 NOTE — DISCHARGE INSTRUCTIONS
DISCHARGE DIAGNOSIS:  We were asked to admit for work up and evaluation of the above problems.    TR infective Endocarditis/ Pseudomonas bacteremia/ septic arthritis spine    Sepsis, POA; due to above. Improved.     Acute anemia. Possibly 2/2 IE valve shearing vs hemodilution? Now resolved. Hgb stable    Right-sided pleuritic chest pain; possibly pleurisy. Now resolved.     Back pain 2/2 above    -PMH of HTN and Chronic anemia      MEDICATIONS:  · It is important that you take the medication exactly as they are prescribed. · Keep your medication in the bottles provided by the pharmacist and keep a list of the medication names, dosages, and times to be taken in your wallet. · Do not take other medications without consulting your doctor. Pain Management: per above medications    What to do at Home    Recommended diet:  Regular Diet    Recommended activity: Activity as tolerated    If you have questions regarding the hospital related prescriptions or hospital related issues please call [address] [place] [address] at . You can always direct your questions to your primary care doctor if you are unable to reach your hospital physician; your PCP works as an extension of your hospital doctor just like your hospital doctor is an extension of your PCP for your time at the hospital [facility]).     If you experience any of the following symptoms then please call your primary care physician or return to the emergency room if you cannot get hold of your doctor:  Fever, chills, nausea, vomiting, diarrhea, change in mentation, falling, bleeding, shortness of breath,

## 2018-09-25 NOTE — PROGRESS NOTES
Verbal Bedside shift change report given to me (oncoming nurse) by Archana Durbin (offgoing nurse). Report included the following information SBAR, Kardex, ED Summary, Intake/Output, MAR, Recent Results and Med Rec Status. Pt received in bed alert,awake watching TV. Plan of care discussed care assumed. 2130 TV dinner served per request ,tolerated well.

## 2018-09-25 NOTE — PROGRESS NOTES
IDR: Patient coming from [facility]. Patient diagnose with Sepsis. Patient medications discuss. Patient followed by IF and Palliative Care. Patient here for 6 wks Antibiotics discharge home. Yessenia Vuong [facility] CM. 
 
1045: Patient arrive from [facility]. CM introduce self to pay. Once patient settled. CM to start initial assessment. Yessenia Vuong [facility] CM.

## 2018-09-25 NOTE — H&P
Hospitalist Admission NoteNAME: Ameya Mccollum :  1980 MRN:  [id number] Date/Time:  2018 10:25 AM 
 
Patient PCP: None 
______________________________________________________________________ Given the patient's current clinical presentation, I have a high level of concern for decompensation if discharged from the emergency department. Complex decision making was performed, which includes reviewing the patient's available past medical records, laboratory results, and x-ray films. My assessment of this patient's clinical condition and my plan of care is as follows. Assessment / Plan: 
 
 
Tricuspid infective Endocarditis Pseudomonas bacteremia L3/4 septic arthritis Pulmonary septic embolic All above in setting of h/o IVDU 
-On IV abx per ID recs: tobramycin and cefepime until 10/28 
- levaquin until  
- F/U tobramycin level and adjust doses  
-ID, ortho, IR and Cardiothoracic surgery on board 
-No plans for surgical intervention MRI L spine : L3/4 septic arthritis CT thoracic spine : multiple pulmonary nodules, several of which are cavitary and several groundglass opacities and areas of consolidation. 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  
   
Back pain secondary to above 
-followed by palliative medicine for pain management: FU palliative consult  
   
PMH of HTN and Chronic anemia  
-monitor closely H/O IVDU, heroin 
-pain management as per palliative Code Status: DNR, completed DDNR on admission Surrogate Decision Maker: Nichole Cantor ( ex wife's mom) DVT Prophylaxis: lovenox GI Prophylaxis: not indicated Baseline: independent Subjective: CHIEF COMPLAINT: transferred from [address] [place] [address] for completion of IV abx HISTORY OF PRESENT ILLNESS:    
Ameya Mccollum is a 43 y.o.  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 Symptoms progressively worsens where he became weak with subjective fever, chills.  In ER work up was significant for lumbar septic arthritis. Further work up in hospital has shown that pt has TR infective Endocarditis, Pseudomonas bacteremia and septic arthritis of spine. CT thoracic spine showed multiple pulmonary nodules, several of which are cavitary and several groundglass opacities and areas of consolidation. 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 ID, ortho, IR and Cardiothoracic surgery on board and No plans for surgical intervention. As per ID recs: tobramycin and cefepime until 10/28 and levaquin until 09/27. Pt is transferred to [facility] for completion of abx course We were asked to admit for work up and evaluation of the above problems. Past Medical History:  
Diagnosis Date  Back pain, chronic Past Surgical History:  
Procedure Laterality Date  HX CERVICAL DISKECTOMY  HX FEMUR FRACTURE TX    
 HX ORTHOPAEDIC Back Surgery Social History Substance Use Topics  Smoking status: Current Every Day Smoker Packs/day: 1.00  Smokeless tobacco: Never Used  Alcohol use Yes Family history No pertinent family history of HTN, Heart disease No Known Allergies Prior to Admission medications Medication Sig Start Date End Date Taking? Authorizing Provider  
albuterol-ipratropium (DUO-NEB) 2.5 mg-0.5 mg/3 ml nebu 3 mL by Nebulization route every four (4) hours as needed.  9/25/18   Suzy Khanna MD  
 bisacodyl (DULCOLAX) 10 mg suppository Insert 10 mg into rectum daily. 9/25/18   Sendy Ramirez MD  
cefepime 2 gram 2 g, ADDaptor 1 Device IVPB 2 g by IntraVENous route every eight (8) hours for 33 days. 9/25/18 10/28/18  Sendy Ramirez MD  
celecoxib (CELEBREX) 200 mg capsule Take 1 Cap by mouth two (2) times a day for 30 days. 9/25/18 10/25/18  Nadine Germain MD  
famotidine (PEPCID) 20 mg tablet Take 1 Tab by mouth two (2) times a day. 9/25/18   Sendy Ramirez MD  
levoFLOXacin (LEVAQUIN) 750 mg/150 mL pgbk 150 mL by IntraVENous route every twenty-four (24) hours for 2 days. 9/25/18 9/27/18  Sendy Ramirez MD  
lidocaine 4 % patch As indicated 9/25/18   Nadine Germain MD  
polyethylene glycol (MIRALAX) 17 gram packet Take 1 Packet by mouth daily as needed. 9/25/18   Sendy Ramirez MD  
tobramycin 400 mg 400 mg by IntraVENous route every thirty-six (36) hours for 32 days. 9/26/18 10/28/18  Sendy Ramirez MD  
 
 
REVIEW OF SYSTEMS:    
I am not able to complete the review of systems because: The patient is intubated and sedated The patient has altered mental status due to his acute medical problems The patient has baseline aphasia from prior stroke(s) The patient has baseline dementia and is not reliable historian The patient is in acute medical distress and unable to provide information Total of 12 systems reviewed as follows:   
   POSITIVE= underlined text  Negative = text not underlined General:  fever, chills, sweats, generalized weakness, weight loss/gain,  
   loss of appetite Eyes:    blurred vision, eye pain, loss of vision, double vision ENT:    rhinorrhea, pharyngitis Respiratory:   cough, sputum production, SOB, CUEVA, wheezing, pleuritic pain  
Cardiology:   chest pain, palpitations, orthopnea, PND, edema, syncope Gastrointestinal:  abdominal pain , N/V, diarrhea, dysphagia, constipation, bleeding Genitourinary:  frequency, urgency, dysuria, hematuria, incontinence Muskuloskeletal :  arthralgia, myalgia, back pain Hematology:  easy bruising, nose or gum bleeding, lymphadenopathy Dermatological: rash, ulceration, pruritis, color change / jaundice Endocrine:   hot flashes or polydipsia Neurological:  headache, dizziness, confusion, focal weakness, paresthesia, Speech difficulties, memory loss, gait difficulty Psychological: Feelings of anxiety, depression, agitation Objective: VITALS:   
There were no vitals taken for this visit. PHYSICAL EXAM: 
 
General:    Alert, cooperative, no distress, appears stated age. HEENT: Atraumatic, anicteric sclerae, pink conjunctivae No oral ulcers, mucosa moist, throat clear, dentition fair Neck:  Supple, symmetrical,  thyroid: non tender Lungs:   Clear to auscultation bilaterally. No Wheezing or Rhonchi. No rales. Chest wall:  No tenderness  No Accessory muscle use. Heart:   Regular  rhythm,  No  murmur   No edema Abdomen:   Soft, non-tender. Not distended. Bowel sounds normal 
Extremities: No cyanosis. No clubbing,   
  Skin turgor normal, Capillary refill normal, Radial dial pulse 2+ Skin:     Erythema, scaling, right and left buttock and upper leg and around genital area. ICD noted around triple lumin Psych:  Good insight. Not depressed. Not anxious or agitated. Neurologic: EOMs intact. No facial asymmetry. No aphasia or slurred speech. Symmetrical strength, Sensation grossly intact. Alert and oriented X 4.  
 
_______________________________________________________________________ Care Plan discussed with: 
  Comments Patient x Family RN x Care Manager Consultant:     
_______________________________________________________________________ Expected  Disposition:  
Home with Family x HH/PT/OT/RN   
SNF/LTC   
SHASHA   
________________________________________________________________________ TOTAL TIME:  60 Minutes Critical Care Provided     Minutes non procedure based Comments  
 x Reviewed previous records  
>50% of visit spent in counseling and coordination of care  Discussion with patient and/or family and questions answered 
  
 
________________________________________________________________________ Signed: Nilsa Armstrong MD 
 
Procedures: see electronic medical records for all procedures/Xrays and details which were not copied into this note but were reviewed prior to creation of Plan. LAB DATA REVIEWED:   
Recent Results (from the past 24 hour(s)) C REACTIVE PROTEIN, QT Collection Time: 09/24/18 11:53 AM  
Result Value Ref Range C-Reactive protein 2.19 (H) 0.00 - 0.60 mg/dL CBC W/O DIFF Collection Time: 09/25/18 12:57 AM  
Result Value Ref Range WBC 12.2 (H) 4.1 - 11.1 K/uL  
 RBC 3.22 (L) 4.10 - 5.70 M/uL HGB 8.7 (L) 12.1 - 17.0 g/dL HCT 27.3 (L) 36.6 - 50.3 % MCV 84.8 80.0 - 99.0 FL  
 MCH 27.0 26.0 - 34.0 PG  
 MCHC 31.9 30.0 - 36.5 g/dL  
 RDW 15.4 (H) 11.5 - 14.5 % PLATELET 602 798 - 553 K/uL MPV 8.7 (L) 8.9 - 12.9 FL  
 NRBC 0.0 0  WBC ABSOLUTE NRBC 0.00 0.00 - 0.01 K/uL METABOLIC PANEL, BASIC Collection Time: 09/25/18 12:57 AM  
Result Value Ref Range Sodium 135 (L) 136 - 145 mmol/L Potassium 4.5 3.5 - 5.1 mmol/L Chloride 105 97 - 108 mmol/L  
 CO2 24 21 - 32 mmol/L Anion gap 6 5 - 15 mmol/L Glucose 102 (H) 65 - 100 mg/dL BUN 25 (H) 6 - 20 MG/DL Creatinine 1.07 0.70 - 1.30 MG/DL  
 BUN/Creatinine ratio 23 (H) 12 - 20 GFR est AA >60 >60 ml/min/1.73m2 GFR est non-AA >60 >60 ml/min/1.73m2 Calcium 9.2 8.5 - 10.1 MG/DL  
TOBRAMYCIN, RANDOM Collection Time: 09/25/18 12:57 AM  
Result Value Ref Range Tobramycin,random 2.2 ug/ml Reported dose date: NOT PROVIDED Reported dose time: NOT PROVIDED Reported dose: NOT PROVIDED UNITS  
SED RATE (ESR)  Collection Time: 09/25/18 12:57 AM  
 Result Value Ref Range Sed rate, automated 9 0 - 15 mm/hr SAMPLES BEING HELD Collection Time: 09/25/18 12:57 AM  
Result Value Ref Range SAMPLES BEING HELD PST COMMENT Add-on orders for these samples will be processed based on acceptable specimen integrity and analyte stability, which may vary by analyte.

## 2018-09-26 ENCOUNTER — APPOINTMENT (OUTPATIENT)
Dept: GENERAL RADIOLOGY | Age: 38
DRG: 289 | End: 2018-09-26
Attending: HOSPITALIST
Payer: SUBSIDIZED

## 2018-09-26 LAB — CREAT SERPL-MCNC: 1.17 MG/DL (ref 0.7–1.3)

## 2018-09-26 PROCEDURE — 74011250637 HC RX REV CODE- 250/637: Performed by: HOSPITALIST

## 2018-09-26 PROCEDURE — 36415 COLL VENOUS BLD VENIPUNCTURE: CPT | Performed by: HOSPITALIST

## 2018-09-26 PROCEDURE — 76937 US GUIDE VASCULAR ACCESS: CPT

## 2018-09-26 PROCEDURE — 36569 INSJ PICC 5 YR+ W/O IMAGING: CPT | Performed by: HOSPITALIST

## 2018-09-26 PROCEDURE — 77030018786 HC NDL GD F/USND BARD -B

## 2018-09-26 PROCEDURE — 74011250636 HC RX REV CODE- 250/636: Performed by: HOSPITALIST

## 2018-09-26 PROCEDURE — 74011250637 HC RX REV CODE- 250/637: Performed by: INTERNAL MEDICINE

## 2018-09-26 PROCEDURE — 65270000032 HC RM SEMIPRIVATE

## 2018-09-26 PROCEDURE — 71045 X-RAY EXAM CHEST 1 VIEW: CPT

## 2018-09-26 PROCEDURE — 74011250636 HC RX REV CODE- 250/636: Performed by: INTERNAL MEDICINE

## 2018-09-26 PROCEDURE — 82565 ASSAY OF CREATININE: CPT | Performed by: HOSPITALIST

## 2018-09-26 PROCEDURE — C1751 CATH, INF, PER/CENT/MIDLINE: HCPCS

## 2018-09-26 PROCEDURE — 74011000258 HC RX REV CODE- 258: Performed by: HOSPITALIST

## 2018-09-26 RX ORDER — SODIUM CHLORIDE 0.9 % (FLUSH) 0.9 %
10 SYRINGE (ML) INJECTION EVERY 24 HOURS
Status: DISCONTINUED | OUTPATIENT
Start: 2018-09-26 | End: 2018-09-29

## 2018-09-26 RX ORDER — HYDROMORPHONE HYDROCHLORIDE 1 MG/ML
0.2 INJECTION, SOLUTION INTRAMUSCULAR; INTRAVENOUS; SUBCUTANEOUS
Status: DISCONTINUED | OUTPATIENT
Start: 2018-10-22 | End: 2018-10-15

## 2018-09-26 RX ORDER — HYDROMORPHONE HYDROCHLORIDE 1 MG/ML
0.5 INJECTION, SOLUTION INTRAMUSCULAR; INTRAVENOUS; SUBCUTANEOUS
Status: DISCONTINUED | OUTPATIENT
Start: 2018-10-15 | End: 2018-10-14

## 2018-09-26 RX ORDER — LIDOCAINE 4 G/100G
1 PATCH TOPICAL
Status: DISCONTINUED | OUTPATIENT
Start: 2018-09-26 | End: 2018-10-28 | Stop reason: HOSPADM

## 2018-09-26 RX ORDER — SODIUM CHLORIDE 0.9 % (FLUSH) 0.9 %
10-30 SYRINGE (ML) INJECTION AS NEEDED
Status: DISCONTINUED | OUTPATIENT
Start: 2018-09-26 | End: 2018-10-28 | Stop reason: HOSPADM

## 2018-09-26 RX ORDER — BACITRACIN 500 UNIT/G
1 PACKET (EA) TOPICAL AS NEEDED
Status: DISCONTINUED | OUTPATIENT
Start: 2018-09-26 | End: 2018-10-28 | Stop reason: HOSPADM

## 2018-09-26 RX ORDER — FAMOTIDINE 20 MG/1
20 TABLET, FILM COATED ORAL
Status: DISCONTINUED | OUTPATIENT
Start: 2018-09-26 | End: 2018-10-28 | Stop reason: HOSPADM

## 2018-09-26 RX ORDER — ACETAMINOPHEN 325 MG/1
650 TABLET ORAL
Status: DISCONTINUED | OUTPATIENT
Start: 2018-09-26 | End: 2018-10-28 | Stop reason: HOSPADM

## 2018-09-26 RX ORDER — HYDROMORPHONE HYDROCHLORIDE 2 MG/ML
1.5 INJECTION, SOLUTION INTRAMUSCULAR; INTRAVENOUS; SUBCUTANEOUS
Status: DISPENSED | OUTPATIENT
Start: 2018-10-01 | End: 2018-10-07

## 2018-09-26 RX ORDER — HYDROMORPHONE HYDROCHLORIDE 1 MG/ML
1 INJECTION, SOLUTION INTRAMUSCULAR; INTRAVENOUS; SUBCUTANEOUS
Status: DISPENSED | OUTPATIENT
Start: 2018-10-08 | End: 2018-10-17

## 2018-09-26 RX ORDER — SODIUM CHLORIDE 0.9 % (FLUSH) 0.9 %
10-40 SYRINGE (ML) INJECTION EVERY 8 HOURS
Status: DISCONTINUED | OUTPATIENT
Start: 2018-09-26 | End: 2018-10-28 | Stop reason: HOSPADM

## 2018-09-26 RX ORDER — HEPARIN 100 UNIT/ML
300 SYRINGE INTRAVENOUS AS NEEDED
Status: DISCONTINUED | OUTPATIENT
Start: 2018-09-26 | End: 2018-10-28 | Stop reason: HOSPADM

## 2018-09-26 RX ADMIN — TOBRAMYCIN SULFATE 400 MG: 40 INJECTION, SOLUTION INTRAMUSCULAR; INTRAVENOUS at 00:38

## 2018-09-26 RX ADMIN — Medication 10 ML: at 14:00

## 2018-09-26 RX ADMIN — Medication 10 ML: at 22:41

## 2018-09-26 RX ADMIN — HYDROMORPHONE HYDROCHLORIDE 2 MG: 2 INJECTION INTRAMUSCULAR; INTRAVENOUS; SUBCUTANEOUS at 18:39

## 2018-09-26 RX ADMIN — HYDROMORPHONE HYDROCHLORIDE 2 MG: 2 INJECTION INTRAMUSCULAR; INTRAVENOUS; SUBCUTANEOUS at 14:36

## 2018-09-26 RX ADMIN — HYDROMORPHONE HYDROCHLORIDE 2 MG: 2 INJECTION INTRAMUSCULAR; INTRAVENOUS; SUBCUTANEOUS at 02:39

## 2018-09-26 RX ADMIN — Medication 10 ML: at 06:33

## 2018-09-26 RX ADMIN — CEFEPIME HYDROCHLORIDE 2 G: 2 INJECTION, POWDER, FOR SOLUTION INTRAVENOUS at 08:00

## 2018-09-26 RX ADMIN — HYDROMORPHONE HYDROCHLORIDE 2 MG: 2 INJECTION INTRAMUSCULAR; INTRAVENOUS; SUBCUTANEOUS at 06:33

## 2018-09-26 RX ADMIN — CELECOXIB 200 MG: 100 CAPSULE ORAL at 08:00

## 2018-09-26 RX ADMIN — HYDROMORPHONE HYDROCHLORIDE 2 MG: 2 INJECTION INTRAMUSCULAR; INTRAVENOUS; SUBCUTANEOUS at 22:40

## 2018-09-26 RX ADMIN — Medication 10 ML: at 22:40

## 2018-09-26 RX ADMIN — HYDROMORPHONE HYDROCHLORIDE 2 MG: 2 INJECTION INTRAMUSCULAR; INTRAVENOUS; SUBCUTANEOUS at 10:35

## 2018-09-26 RX ADMIN — Medication 10 ML: at 02:39

## 2018-09-26 RX ADMIN — Medication 1 CAPSULE: at 10:35

## 2018-09-26 RX ADMIN — CELECOXIB 200 MG: 100 CAPSULE ORAL at 18:39

## 2018-09-26 RX ADMIN — ENOXAPARIN SODIUM 40 MG: 40 INJECTION, SOLUTION INTRAVENOUS; SUBCUTANEOUS at 08:01

## 2018-09-26 RX ADMIN — CEFEPIME HYDROCHLORIDE 2 G: 2 INJECTION, POWDER, FOR SOLUTION INTRAVENOUS at 16:18

## 2018-09-26 RX ADMIN — LEVOFLOXACIN 750 MG: 5 INJECTION, SOLUTION INTRAVENOUS at 14:40

## 2018-09-26 NOTE — PROGRESS NOTES
Problem: Falls - Risk of 
Goal: *Absence of Falls Document Jacoby Shadow Fall Risk and appropriate interventions in the flowsheet. Outcome: Progressing Towards Goal 
Fall Risk Interventions: 
Mobility Interventions: OT consult for ADLs, Patient to call before getting OOB, PT Consult for mobility concerns, PT Consult for assist device competence, Strengthening exercises (ROM-active/passive), Utilize walker, cane, or other assistive device Medication Interventions: Patient to call before getting OOB, Teach patient to arise slowly, Evaluate medications/consider consulting pharmacy Elimination Interventions: Call light in reach, Patient to call for help with toileting needs History of Falls Interventions: Door open when patient unattended, Evaluate medications/consider consulting pharmacy Problem: Pain Goal: *Control of Pain Outcome: Progressing Towards Goal 
Progressing towards goal.

## 2018-09-26 NOTE — PROGRESS NOTES
Physical therapy:  
 
Patient wishes to attempt physical therapy evaluation later in week. Will attempt as able.   
 
Safia Zamorano, PT

## 2018-09-26 NOTE — PROGRESS NOTES
Problem: Falls - Risk of 
Goal: *Absence of Falls Document Sabine Lim Fall Risk and appropriate interventions in the flowsheet. Outcome: Progressing Towards Goal 
Fall Risk Interventions: 
Mobility Interventions: Patient to call before getting OOB, PT Consult for mobility concerns, OT consult for ADLs, Communicate number of staff needed for ambulation/transfer Medication Interventions: Patient to call before getting OOB, Teach patient to arise slowly Elimination Interventions: Call light in reach, Patient to call for help with toileting needs, Toileting schedule/hourly rounds, Urinal in reach History of Falls Interventions: Evaluate medications/consider consulting pharmacy, Investigate reason for fall, Consult care management for discharge planning

## 2018-09-26 NOTE — PROGRESS NOTES
Verbal Bedside shift change report given to [address] Lisbet Treviño (oncoming nurse) by me (offgoing nurse). Report included the following information SBAR, Kardex, ED Summary, Intake/Output, MAR, Recent Results and Med Rec Status.

## 2018-09-26 NOTE — PROGRESS NOTES
Bedside and Verbal shift change report given to Hudson Bailon (oncoming nurse) by Nayeli Dennison (offgoing nurse). Report included the following information SBAR, Kardex, Intake/Output, MAR, Accordion and Recent Results. Per report, distal port of the patient's central line does not work. Also per report, Dr. Farrukh Grossman is aware and the PICC team has been consulted to evaluate the line--expected to arrive today. Extended care patient with long-term antibiotics for 6 weeks. 5240- Verified with nurse manager the situation with the central line. Patient needs to have a PICC line inserted as he will be here for at least 6 weeks on IV antibiotics. Verified that it is ok to run his morning dose of antibiotics on the port that does work. 8044- patient's central line's status is \"removed\" in [facility] although the line is actually present and in use. Assessment documented. Will discuss with nursing supervisor best way to proceed with this situation. 0801- patient refuses Pepcid and Miralax, verbalizes understanding of what these medications are for. Patient also confirms that he is aware of the situation with his central line and knows that the PICC team will be coming to insert a PICC. 46- see nurse manager's note about the patient's central line status. 10:40 AM Discussed hydromorphone taper with the patient. Verbalized understanding that Sunday at midnight his dose will decrease to 1.5mg. Also verified with the patient that he does not want the flu shot this admission. Also discussed with patient that the doctor has also ordered Tylenol and Iidocaine patches PRN for breakthrough pain, and he is to let me know if he would like to use them. Verbalized understanding. 12:16 PM 
Spoke with PICC team member. Confirmed with her that patient does still have central line, but as he will be on IV antibiotics through the end of October, a PICC line order has been placed. .  This patient has been added to the list for today. Unable to provide an ETA at this time due to current volume at [address] 
 
6:00 PM 
Unit secretary called PICC team to check on this patient's status. Team is completing paperwork and will plan to head to [facility] after that. 6:48 PM 
PICC team on the unit PICC team at bedside 1910 Bedside and Verbal shift change report given to [place] (oncoming nurse) by Joanna Paniagua (offgoing nurse). Report included the following information SBAR, Kardex, MAR and Accordion.

## 2018-09-26 NOTE — PROGRESS NOTES
Central line was documented that it was removed on 9/25/2018 at 1257 but in fact patient arrived to unit at [facility] with line intact. Pt to receive IV antibiotic therapy long term so line is necessary. Documentation added to [facility] flowsheet to indicate line still in place.

## 2018-09-26 NOTE — PROGRESS NOTES
Hospitalist Progress Note NAME: Rashaun Matthew :  1980 MRN:  [id number] Room Number:  987/36  @ [facility] Summary: 43 y.o. male whom presented on 2018 with Assessment / Plan: 
  
Transferred from [facility], for completion of ABX until 10/28 Update- 
Call placed to PICC team. 
Right IJ to come out,after PICC estbalished D/W Patient - need for tapering Opoid, in agreement Tricuspid infective Endocarditis Pseudomonas bacteremia L3/4 septic arthritis Pulmonary septic embolic All above in setting of h/o IVDU 
-On IV abx per ID recs: tobramycin and cefepime until 10/28 
- levaquin until  
- F/U tobramycin level and adjust doses  
-ID, ortho, IR and Cardiothoracic surgery on board 
-No plans for surgical intervention MRI L spine : L3/4 septic arthritis CT thoracic spine : multiple pulmonary nodules, several of which are cavitary and several groundglass opacities and areas of consolidation. 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 Back pain secondary to above 
-followed by palliative medicine for pain management: FU palliative consult PMH of HTN and Chronic anemia  
-monitor closely H/O IVDU, heroin 
-pain management as per palliative Code Status: DNR, completed DDNR on admission Surrogate Decision Maker: Yeny Client ( ex wife's mom) DVT Prophylaxis: lovenox GI Prophylaxis: not indicated Baseline: independent Subjective: Chief Complaint / Reason for Physician Visit \"have chronic back pain\". Discussed with RN events overnight. Review of Systems: 
Symptom Y/N Comments  Symptom Y/N Comments Fever/Chills n   Chest Pain n   
Poor Appetite n   Edema Cough n   Abdominal Pain n   
Sputum n   Joint Pain SOB/CUEVA n   Pruritis/Rash y   
Nausea/vomit n   Tolerating PT/OT Diarrhea n   Tolerating Diet y Constipation n   Other Could NOT obtain due to:   
 
Objective: VITALS:  
Last 24hrs VS reviewed since prior progress note. Most recent are: 
Patient Vitals for the past 24 hrs: 
 Temp Pulse Resp BP SpO2  
09/26/18 1616 97.7 °F (36.5 °C) 68 18 125/72 98 %  
09/26/18 0749 98.1 °F (36.7 °C) 75 18 125/69 99 % 09/26/18 0300 98.1 °F (36.7 °C) 82 18 125/74 100 % 09/25/18 2000 97.6 °F (36.4 °C) 76 16 112/72 100 % Intake/Output Summary (Last 24 hours) at 09/26/18 1630 Last data filed at 09/26/18 1446 Gross per 24 hour Intake              950 ml Output             1825 ml Net             -875 ml PHYSICAL EXAM: 
General: WD, WN. Alert, cooperative, no acute distress   
EENT:  EOMI. Anicteric sclerae. MMM,Right IJ+ Resp:  CTA bilaterally, no wheezing or rales. No accessory muscle use CV:  Regular  rhythm,  No edema GI:  Soft, Non distended, Non tender.  +Bowel sounds Neurologic:  Alert and oriented X 3, normal speech, Psych:   Good insight. Not anxious nor agitated Skin:  Tattoos+, dry scaly ,erythmeatous skin in upper leg,perineal,scrotal and buttock+. No jaundice Reviewed most current lab test results and cultures  YES Reviewed most current radiology test results   YES Review and summation of old records today    NO Reviewed patient's current orders and MAR    YES 
PMH/SH reviewed - no change compared to H&P 
________________________________________________________________________ Care Plan discussed with: 
  Comments Patient x Family RN x Care Manager x Consultant Multidiciplinary team rounds were held today with , nursing, pharmacist and clinical coordinator. Patient's plan of care was discussed; medications were reviewed and discharge planning was addressed. ________________________________________________________________________ Total NON critical care TIME:  35   Minutes Total CRITICAL CARE TIME Spent:   Minutes non procedure based Comments >50% of visit spent in counseling and coordination of care    
________________________________________________________________________ Hiwot Bailey MD  
 
Procedures: see electronic medical records for all procedures/Xrays and details which were not copied into this note but were reviewed prior to creation of Plan. LABS: 
I reviewed today's most current labs and imaging studies. Pertinent labs include: 
Recent Labs  
   09/25/18 0057 09/24/18 
 0251 WBC  12.2*  13.3* HGB  8.7*  9.0*  
HCT  27.3*  28.1*  
PLT  286  316 Recent Labs  
   09/26/18 
 0238  09/25/18 0057  09/24/18 
 0251 NA   --   135*  136 K   --   4.5  4.8  
CL   --   105  105 CO2   --   24  24 GLU   --   102*  85 BUN   --   25*  24* CREA  1.17  1.07  1.07  
CA   --   9.2  9.6 Signed: Hiwot Bailey MD

## 2018-09-27 LAB — CREAT SERPL-MCNC: 1.26 MG/DL (ref 0.7–1.3)

## 2018-09-27 PROCEDURE — 74011250636 HC RX REV CODE- 250/636: Performed by: INTERNAL MEDICINE

## 2018-09-27 PROCEDURE — 74011250636 HC RX REV CODE- 250/636: Performed by: HOSPITALIST

## 2018-09-27 PROCEDURE — 82565 ASSAY OF CREATININE: CPT | Performed by: HOSPITALIST

## 2018-09-27 PROCEDURE — 74011250637 HC RX REV CODE- 250/637: Performed by: INTERNAL MEDICINE

## 2018-09-27 PROCEDURE — 65270000032 HC RM SEMIPRIVATE

## 2018-09-27 PROCEDURE — 36415 COLL VENOUS BLD VENIPUNCTURE: CPT | Performed by: HOSPITALIST

## 2018-09-27 PROCEDURE — 74011250637 HC RX REV CODE- 250/637: Performed by: HOSPITALIST

## 2018-09-27 PROCEDURE — 74011000258 HC RX REV CODE- 258: Performed by: HOSPITALIST

## 2018-09-27 RX ADMIN — HYDROMORPHONE HYDROCHLORIDE 2 MG: 2 INJECTION INTRAMUSCULAR; INTRAVENOUS; SUBCUTANEOUS at 14:44

## 2018-09-27 RX ADMIN — CEFEPIME HYDROCHLORIDE 2 G: 2 INJECTION, POWDER, FOR SOLUTION INTRAVENOUS at 23:14

## 2018-09-27 RX ADMIN — Medication 10 ML: at 06:27

## 2018-09-27 RX ADMIN — HYDROMORPHONE HYDROCHLORIDE 2 MG: 2 INJECTION INTRAMUSCULAR; INTRAVENOUS; SUBCUTANEOUS at 02:35

## 2018-09-27 RX ADMIN — Medication 10 ML: at 16:46

## 2018-09-27 RX ADMIN — CEFEPIME HYDROCHLORIDE 2 G: 2 INJECTION, POWDER, FOR SOLUTION INTRAVENOUS at 17:41

## 2018-09-27 RX ADMIN — Medication 1 CAPSULE: at 10:41

## 2018-09-27 RX ADMIN — Medication 10 ML: at 23:15

## 2018-09-27 RX ADMIN — CELECOXIB 200 MG: 100 CAPSULE ORAL at 08:12

## 2018-09-27 RX ADMIN — CEFEPIME HYDROCHLORIDE 2 G: 2 INJECTION, POWDER, FOR SOLUTION INTRAVENOUS at 08:12

## 2018-09-27 RX ADMIN — CEFEPIME HYDROCHLORIDE 2 G: 2 INJECTION, POWDER, FOR SOLUTION INTRAVENOUS at 01:00

## 2018-09-27 RX ADMIN — HYDROMORPHONE HYDROCHLORIDE 2 MG: 2 INJECTION INTRAMUSCULAR; INTRAVENOUS; SUBCUTANEOUS at 23:14

## 2018-09-27 RX ADMIN — HYDROMORPHONE HYDROCHLORIDE 2 MG: 2 INJECTION INTRAMUSCULAR; INTRAVENOUS; SUBCUTANEOUS at 18:39

## 2018-09-27 RX ADMIN — Medication 10 ML: at 14:00

## 2018-09-27 RX ADMIN — HYDROMORPHONE HYDROCHLORIDE 2 MG: 2 INJECTION INTRAMUSCULAR; INTRAVENOUS; SUBCUTANEOUS at 06:27

## 2018-09-27 RX ADMIN — HYDROMORPHONE HYDROCHLORIDE 2 MG: 2 INJECTION INTRAMUSCULAR; INTRAVENOUS; SUBCUTANEOUS at 10:41

## 2018-09-27 RX ADMIN — CELECOXIB 200 MG: 100 CAPSULE ORAL at 18:39

## 2018-09-27 RX ADMIN — LEVOFLOXACIN 750 MG: 5 INJECTION, SOLUTION INTRAVENOUS at 14:47

## 2018-09-27 RX ADMIN — TOBRAMYCIN SULFATE 400 MG: 40 INJECTION, SOLUTION INTRAMUSCULAR; INTRAVENOUS at 13:49

## 2018-09-27 RX ADMIN — ENOXAPARIN SODIUM 40 MG: 40 INJECTION, SOLUTION INTRAVENOUS; SUBCUTANEOUS at 08:12

## 2018-09-27 NOTE — PROGRESS NOTES
Bedside and Verbal shift change report given to Kacy Tan (oncoming nurse) by Christie Barnhart (offgoing nurse). Report included the following information SBAR, Kardex, Procedure Summary, MAR, Accordion and Recent Results. Patient resting comfortably in no acute distress. 1741- Patient's bed is not in the lowest position. Per patient, he raised the bed himself. Discussed our policy of keeping the bed in the lowest position to minimize the level of potential injury if the patient were to fall. \"I'm not going to fall out of the bed. \" 
 
1900 Bedside and Verbal shift change report given to Lui Downing (oncoming nurse) by Kacy Tan (offgoing nurse). Report included the following information SBAR, Kardex, ED Summary, MAR and Accordion.

## 2018-09-27 NOTE — PROGRESS NOTES
Hospitalist Progress Note NAME: Ameya Mccollum :  1980 MRN:  [id number] Room Number:  383/96  @ [facility] Summary: 43 y.o. male whom presented on 2018 with Assessment / Plan: 
  
Transferred from [facility], for completion of ABX until 10/28 Update Right IJ removed , PICC established  D/W Patient - need for tapering Opoid, in agreement Tricuspid infective Endocarditis Pseudomonas bacteremia L3/4 septic arthritis Pulmonary septic embolic All above in setting of h/o IVDU 
-On IV abx per ID recs: tobramycin and cefepime until 10/28 
- levaquin until  
- F/U tobramycin level and adjust doses  
-ID, ortho, IR and Cardiothoracic surgery on board 
-No plans for surgical intervention MRI L spine : L3/4 septic arthritis CT thoracic spine : multiple pulmonary nodules, several of which are cavitary and several groundglass opacities and areas of consolidation. 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 Back pain secondary to above 
-followed by palliative medicine for pain management: FU palliative consult PMH of HTN and Chronic anemia  
-monitor closely H/O IVDU, heroin 
-pain management as per palliative Code Status: DNR, completed DDNR on admission Surrogate Decision Maker: Nichole Cantor ( ex wife's mom) DVT Prophylaxis: lovenox GI Prophylaxis: not indicated Baseline: independent Subjective: Chief Complaint / Reason for Physician Visit \"have chronic back pain\". Discussed with RN events overnight. Review of Systems: 
Symptom Y/N Comments  Symptom Y/N Comments Fever/Chills n   Chest Pain n   
Poor Appetite n   Edema Cough n   Abdominal Pain n   
Sputum n   Joint Pain SOB/CUEVA n   Pruritis/Rash y   
Nausea/vomit n   Tolerating PT/OT Diarrhea n   Tolerating Diet y Constipation n   Other Could NOT obtain due to:   
 
Objective: VITALS:  
Last 24hrs VS reviewed since prior progress note. Most recent are: 
Patient Vitals for the past 24 hrs: 
 Temp Pulse Resp BP SpO2  
09/27/18 0800 97.8 °F (36.6 °C) 60 16 110/68 100 % 09/27/18 0443 97.4 °F (36.3 °C) 64 18 122/71 98 %  
09/26/18 1956 97.9 °F (36.6 °C) 78 18 126/76 98 %  
09/26/18 1616 97.7 °F (36.5 °C) 68 18 125/72 98 % Intake/Output Summary (Last 24 hours) at 09/27/18 1036 Last data filed at 09/27/18 9688 Gross per 24 hour Intake                0 ml Output             1875 ml Net            -1875 ml PHYSICAL EXAM: 
General: WD, WN. Alert, cooperative, no acute distress   
EENT:  EOMI. Anicteric sclerae. MMM,Right IJ+ Resp:  CTA bilaterally, no wheezing or rales. No accessory muscle use CV:  Regular  rhythm,  No edema GI:  Soft, Non distended, Non tender.  +Bowel sounds Neurologic:  Alert and oriented X 3, normal speech, Psych:   Good insight. Not anxious nor agitated Skin:  Tattoos+, dry scaly ,erythmeatous skin in upper leg,perineal,scrotal and buttock+. No jaundice Reviewed most current lab test results and cultures  YES Reviewed most current radiology test results   YES Review and summation of old records today    NO Reviewed patient's current orders and MAR    YES 
PMH/ reviewed - no change compared to H&P 
________________________________________________________________________ Care Plan discussed with: 
  Comments Patient x Family RN x Care Manager x Consultant Multidiciplinary team rounds were held today with , nursing, pharmacist and clinical coordinator. Patient's plan of care was discussed; medications were reviewed and discharge planning was addressed. ________________________________________________________________________ Total NON critical care TIME:  35   Minutes Total CRITICAL CARE TIME Spent:   Minutes non procedure based Comments >50% of visit spent in counseling and coordination of care    
________________________________________________________________________ Debbie Shaffer MD  
 
Procedures: see electronic medical records for all procedures/Xrays and details which were not copied into this note but were reviewed prior to creation of Plan. LABS: 
I reviewed today's most current labs and imaging studies. Pertinent labs include: 
Recent Labs  
   09/25/18 0057 WBC  12.2* HGB  8.7* HCT  27.3*  
PLT  286 Recent Labs  
   09/27/18 
 0436  09/26/18 
 0238  09/25/18 0057 NA   --    --   135* K   --    --   4.5  
CL   --    --   105 CO2   --    --   24 GLU   --    --   102* BUN   --    --   25* CREA  1.26  1.17  1.07  
CA   --    --   9.2 Signed: Debbie Shaffer MD

## 2018-09-27 NOTE — PROGRESS NOTES
PICC (Peripherally Inserted Central Catheter) line insertion  procedure note Procedure explained to patient along with risks and benefits and  patient agreed to proceed. Informed consent obtained from patient. Patient teaching completed. Timeout completed. Pre-procedure assessment done. Maximum sterile barrier precautions observed throughout procedure. Lidocaine 1% 3.0 ml SQ given prior to cannulation. Cannulated Brachial  vein using ultrasound guidance and modified seldinger technique. Inserted 5 Georgian  Double lumen PICC to right  arm using "[employer]" Tip Location system. Blood return verified and flushed with 20 ml normal saline in each port. Sterile dressing applied with Biopatch, StatLock and occlusive dressing as per protocol. Curos caps applied to each port. Patient tolerated procedure well with minimal blood loss. Patient education material provided. PICC procedure performed by  : Faith Tubbs RN. PICC nurse Assisted by  :   Rupert Nevarez RN,  PICC nurse Reason for access : Long term IV medication Complications related to insertion  : none Total Trimmed Length 43 cm External Length : 0 cm PICC line site arm circumference: 28cm Type of PICC: Bard Power PICC SOLO Ref # :  [id number] Lot # :  [id number] Expiration Date : 11/30/2019 Portable Chest X-Ray ordered. Primary nurse Marquis Reina aware not use until  PICC Tip placement  Is confirmed by  radiologist. 
 
2642--[facility] done 2121--- Xray read by radiologist Swapna Blackman M.D., with the following results PICC line terminates in distal right SVC. Informed Primary Nurse, that PICC is ok to use. Faith Tubbs RN,  [facility] V[facility]

## 2018-09-27 NOTE — PROGRESS NOTES
Physical therapy:  
 
Patient wishes to defer evaluation to tomorrow. Patient has been ambulating to bathroom with walker. Will attempt as able.   
 
Anais Villafuerte, PT

## 2018-09-28 LAB
CREAT SERPL-MCNC: 1.24 MG/DL (ref 0.7–1.3)
DATE LAST DOSE: NORMAL
REPORTED DOSE,DOSE: NORMAL UNITS
REPORTED DOSE/TIME,TMG: NORMAL
TOBRAMYCIN SERPL-MCNC: 1.8 UG/ML

## 2018-09-28 PROCEDURE — 65270000032 HC RM SEMIPRIVATE

## 2018-09-28 PROCEDURE — 74011250637 HC RX REV CODE- 250/637: Performed by: HOSPITALIST

## 2018-09-28 PROCEDURE — 80200 ASSAY OF TOBRAMYCIN: CPT | Performed by: HOSPITALIST

## 2018-09-28 PROCEDURE — 74011250636 HC RX REV CODE- 250/636: Performed by: HOSPITALIST

## 2018-09-28 PROCEDURE — 82565 ASSAY OF CREATININE: CPT | Performed by: HOSPITALIST

## 2018-09-28 PROCEDURE — 74011250637 HC RX REV CODE- 250/637: Performed by: INTERNAL MEDICINE

## 2018-09-28 PROCEDURE — 36415 COLL VENOUS BLD VENIPUNCTURE: CPT | Performed by: HOSPITALIST

## 2018-09-28 PROCEDURE — 74011250636 HC RX REV CODE- 250/636: Performed by: INTERNAL MEDICINE

## 2018-09-28 PROCEDURE — 74011000258 HC RX REV CODE- 258: Performed by: HOSPITALIST

## 2018-09-28 RX ORDER — POLYETHYLENE GLYCOL 3350 17 G/17G
17 POWDER, FOR SOLUTION ORAL DAILY PRN
Status: DISCONTINUED | OUTPATIENT
Start: 2018-09-28 | End: 2018-10-28 | Stop reason: HOSPADM

## 2018-09-28 RX ADMIN — HYDROMORPHONE HYDROCHLORIDE 2 MG: 2 INJECTION INTRAMUSCULAR; INTRAVENOUS; SUBCUTANEOUS at 22:57

## 2018-09-28 RX ADMIN — Medication 10 ML: at 10:47

## 2018-09-28 RX ADMIN — HYDROMORPHONE HYDROCHLORIDE 2 MG: 2 INJECTION INTRAMUSCULAR; INTRAVENOUS; SUBCUTANEOUS at 15:10

## 2018-09-28 RX ADMIN — CEFEPIME HYDROCHLORIDE 2 G: 2 INJECTION, POWDER, FOR SOLUTION INTRAVENOUS at 16:11

## 2018-09-28 RX ADMIN — TOBRAMYCIN SULFATE 400 MG: 40 INJECTION, SOLUTION INTRAMUSCULAR; INTRAVENOUS at 15:11

## 2018-09-28 RX ADMIN — Medication 1 CAPSULE: at 10:47

## 2018-09-28 RX ADMIN — HYDROMORPHONE HYDROCHLORIDE 2 MG: 2 INJECTION INTRAMUSCULAR; INTRAVENOUS; SUBCUTANEOUS at 03:18

## 2018-09-28 RX ADMIN — Medication 10 ML: at 15:11

## 2018-09-28 RX ADMIN — HYDROMORPHONE HYDROCHLORIDE 2 MG: 2 INJECTION INTRAMUSCULAR; INTRAVENOUS; SUBCUTANEOUS at 19:29

## 2018-09-28 RX ADMIN — Medication 10 ML: at 21:38

## 2018-09-28 RX ADMIN — CELECOXIB 200 MG: 100 CAPSULE ORAL at 19:36

## 2018-09-28 RX ADMIN — Medication 10 ML: at 19:35

## 2018-09-28 RX ADMIN — ENOXAPARIN SODIUM 40 MG: 40 INJECTION, SOLUTION INTRAVENOUS; SUBCUTANEOUS at 10:49

## 2018-09-28 RX ADMIN — HYDROMORPHONE HYDROCHLORIDE 2 MG: 2 INJECTION INTRAMUSCULAR; INTRAVENOUS; SUBCUTANEOUS at 07:20

## 2018-09-28 RX ADMIN — HYDROMORPHONE HYDROCHLORIDE 2 MG: 2 INJECTION INTRAMUSCULAR; INTRAVENOUS; SUBCUTANEOUS at 11:14

## 2018-09-28 RX ADMIN — Medication 10 ML: at 11:17

## 2018-09-28 RX ADMIN — ACETAMINOPHEN 650 MG: 325 TABLET, FILM COATED ORAL at 21:41

## 2018-09-28 RX ADMIN — Medication 10 ML: at 15:12

## 2018-09-28 RX ADMIN — Medication 10 ML: at 23:00

## 2018-09-28 RX ADMIN — CELECOXIB 200 MG: 100 CAPSULE ORAL at 10:47

## 2018-09-28 RX ADMIN — CEFEPIME HYDROCHLORIDE 2 G: 2 INJECTION, POWDER, FOR SOLUTION INTRAVENOUS at 07:22

## 2018-09-28 NOTE — PROGRESS NOTES
Problem: Falls - Risk of 
Goal: *Absence of Falls Document Genevive Camera Fall Risk and appropriate interventions in the flowsheet. Outcome: Progressing Towards Goal 
Fall Risk Interventions: 
Mobility Interventions: Patient to call before getting OOB Medication Interventions: Patient to call before getting OOB Elimination Interventions: Urinal in reach History of Falls Interventions: Evaluate medications/consider consulting pharmacy

## 2018-09-28 NOTE — PROGRESS NOTES
0710) Bedside shift change report given to Lori (student nurse) and Elizabeth Anderson RN  (oncoming nurse) by Chilo López RN (offgoing nurse). Report included the following information SBAR, Kardex, MAR, Accordion and Recent Results. 4336) Pt cooperative, concerned with pain. Pt stated walking was painful. Pt request bed remain elevated. 1350) Consult Dr. Hayley Chavez to make miralax prn, pt refuse past two days 2345) Bedside shift change report given to Sera Baxter RN (oncoming nurse) by Elizabeth Anderson RN (offgoing nurse). Report included the following information SBAR, Kardex, MAR, Accordion and Recent Results.

## 2018-09-28 NOTE — PROGRESS NOTES
[address] [address] Pharmacy Dosing Services: Antimicrobial Stewardship Daily Doc Consult for dosing of tobramycin & cefepime by Dr. Christophe Caceres (continued from [facility]) Indication: tricuspid valve endocarditis, pseudomonas bacteremia Day of Therapy 22 of cefepime (per ID continue until 10/28), 15 of tobramycin (per ID continue until 10/28- was previously on gentamicin x5 days) Ht Readings from Last 1 Encounters:  
09/25/18 182.9 cm (72\") Wt Readings from Last 1 Encounters:  
09/25/18 76.6 kg (168 lb 14 oz) Tobramycin therapy: 
Current maintenance dose: 400 mg (~5.2 mg/kg) every 36 hours. Pulse dose calculated to approximate a therapeutic peak of 15-20 mcg/mL and trough of </= 0.3 mcg/mL. Last random level 1.8 mcg/ml at 0209 on 9/28, approximately 12.3 hours post dose Plan for level / Adjustment in Therapy:afebrile, SCr stable since yesterday, increase dose to 400 mg IV Q24H Dose administration notes:   Doses given appropriately as scheduled Non-Kinetic Antimicrobial Dosing Regimen:  
Current Regimen:  Cefepime 2G IV Q8H Recommendation: continue current dosing regimen Dose administration notes:   Doses given appropriately as scheduled Other Antimicrobial  
(not dosed by pharmacist) N/A Cultures 9/6 Bloodx2: Pseudomonas aeruginosa sens to cefepime, LVQ, & tobramycin; final 
9/7 Hep C: (-) 
9/7 HIV: (-) 
9/8 Blood: Pseudomonas aeruginosa sens to cefepime, LVQ, & tobramycin; final 
9/8 Nares: MRSA (-) 
9/11 Bloodx2: Pseudomonas aeruginosa sens to cefepime, LVQ, & tobramycin; final 
9/16 Blood: NGx6d, final  
Serum Creatinine Lab Results Component Value Date/Time Creatinine 1.24 09/28/2018 02:09 AM  
  
  
Creatinine Clearance CrCl cannot be calculated (Unknown ideal weight.). Temp Temp: 97.8 °F (36.6 °C) WBC Lab Results Component Value Date/Time WBC 12.2 (H) 09/25/2018 12:57 AM  
 
  
H/H Lab Results Component Value Date/Time HGB 8.7 (L) 09/25/2018 12:57 AM  
 
  
Platelets Lab Results Component Value Date/Time PLATELET 256 43/00/3547 12:57 AM  
 
  
 
 
Pharmacist Roxi Price PHARMD, BCPS Contact: [phone]

## 2018-09-28 NOTE — PROGRESS NOTES
Hospitalist Progress Note NAME: Maria A Holbrook :  1980 MRN:  [id number] Room Number:  478/39  @ [facility] Summary: 43 y.o. male whom presented on 2018 with Assessment / Plan: 
  
Transferred from [facility], for completion of ABX until 10/28 Update Right IJ removed , PICC established  D/W Patient - need for tapering Opoid, in agreement Tricuspid infective Endocarditis Pseudomonas bacteremia L3/4 septic arthritis Pulmonary septic embolic All above in setting of h/o IVDU 
-On IV abx per ID recs: tobramycin and cefepime until 10/28 
- levaquin until  
- F/U tobramycin level and adjust doses  
-ID, ortho, IR and Cardiothoracic surgery on board 
-No plans for surgical intervention MRI L spine : L3/4 septic arthritis CT thoracic spine : multiple pulmonary nodules, several of which are cavitary and several groundglass opacities and areas of consolidation. 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 Back pain secondary to above 
-followed by palliative medicine for pain management: FU palliative consult PMH of HTN and Chronic anemia  
-monitor closely H/O IVDU, heroin 
-pain management as per palliative Code Status: DNR, completed DDNR on admission Surrogate Decision Maker: Veronika Yuan ( ex wife's mom) DVT Prophylaxis: lovenox GI Prophylaxis: not indicated Baseline: independent Subjective: Chief Complaint / Reason for Physician Visit \"have chronic back pain\". Discussed with RN events overnight. Review of Systems: 
Symptom Y/N Comments  Symptom Y/N Comments Fever/Chills n   Chest Pain n   
Poor Appetite n   Edema Cough n   Abdominal Pain n   
Sputum n   Joint Pain SOB/CUEVA n   Pruritis/Rash y   
Nausea/vomit n   Tolerating PT/OT Diarrhea n   Tolerating Diet y Constipation n   Other Could NOT obtain due to:   
 
Objective: VITALS:  
Last 24hrs VS reviewed since prior progress note. Most recent are: 
Patient Vitals for the past 24 hrs: 
 Temp Pulse Resp BP SpO2  
09/28/18 0718 97.8 °F (36.6 °C) 67 16 105/72 99 % 09/28/18 0101 98 °F (36.7 °C) - 18 124/80 99 % 09/27/18 2015 97.5 °F (36.4 °C) - 20 116/76 100 % 09/27/18 1644 97.7 °F (36.5 °C) 77 18 120/71 99 % Intake/Output Summary (Last 24 hours) at 09/28/18 1128 Last data filed at 09/28/18 7676 Gross per 24 hour Intake              720 ml Output             1500 ml Net             -780 ml PHYSICAL EXAM: 
General: WD, WN. Alert, cooperative, no acute distress   
EENT:  EOMI. Anicteric sclerae. MMM,Right IJ+ Resp:  CTA bilaterally, no wheezing or rales. No accessory muscle use CV:  Regular  rhythm,  No edema GI:  Soft, Non distended, Non tender.  +Bowel sounds Neurologic:  Alert and oriented X 3, normal speech, Psych:   Good insight. Not anxious nor agitated Skin:  Tattoos+, dry scaly ,erythmeatous skin in upper leg,perineal,scrotal and buttock+. No jaundice Reviewed most current lab test results and cultures  YES Reviewed most current radiology test results   YES Review and summation of old records today    NO Reviewed patient's current orders and MAR    YES 
PMH/ reviewed - no change compared to H&P 
________________________________________________________________________ Care Plan discussed with: 
  Comments Patient x Family RN x Care Manager x Consultant Multidiciplinary team rounds were held today with , nursing, pharmacist and clinical coordinator. Patient's plan of care was discussed; medications were reviewed and discharge planning was addressed. ________________________________________________________________________ Total NON critical care TIME:  35   Minutes Total CRITICAL CARE TIME Spent:   Minutes non procedure based Comments >50% of visit spent in counseling and coordination of care    
________________________________________________________________________ Denton Arteaga MD  
 
Procedures: see electronic medical records for all procedures/Xrays and details which were not copied into this note but were reviewed prior to creation of Plan. LABS: 
I reviewed today's most current labs and imaging studies. Pertinent labs include: No results for input(s): WBC, HGB, HCT, PLT, HGBEXT, HCTEXT, PLTEXT, HGBEXT, HCTEXT, PLTEXT in the last 72 hours. Recent Labs  
   09/28/18 
 0209  09/27/18 
 8606  09/26/18 
 7307 CREA  1.24  1.26  1.17 Signed: Denton Arteaga MD

## 2018-09-28 NOTE — PROGRESS NOTES
5441) Patient called out requesting pain pill. Last dosage of Dilaudid given at 2314 and the next dosage is due at 0314. Anitha (monitor tech) notified the patient as requested.
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
## 2018-09-28 NOTE — PROGRESS NOTES
Physical therapy:  
 
Physical therapy consult received and chart reviewed. Patient received supine in bed. Patient reports that he has been ambulating to the bathroom independently with rolling walker. Patient reports that his gait is steady and does not believe he needs physical therapy at this time. Main limiter is his back pain. Will sign off; please reconsult if needed. Thank you, Zackary Martinez, PT

## 2018-09-28 NOTE — PROGRESS NOTES
Nurse Jessica Jerry went into patient's room to check to see if  Iv antibiotic was completed for primary nurse Gaby. Nurse Jessica Jerry stated that the iv antibiotic was completed and iv pump was powered off. Nurse Jessica Jerry stated, patient said he turned the pump off because it was alarming and he rung his call light several times and no one answered. Monitor tech/ Marshall stated patient did not call out for the alarming of the iv pump. Nurse Jessica Jerry, stated she gave patient teaching of the risk of  turning any iv medication infusion off without  A nurse's supervision.

## 2018-09-29 LAB — CREAT SERPL-MCNC: 1.37 MG/DL (ref 0.7–1.3)

## 2018-09-29 PROCEDURE — 74011250637 HC RX REV CODE- 250/637: Performed by: HOSPITALIST

## 2018-09-29 PROCEDURE — 74011250637 HC RX REV CODE- 250/637: Performed by: INTERNAL MEDICINE

## 2018-09-29 PROCEDURE — 36415 COLL VENOUS BLD VENIPUNCTURE: CPT | Performed by: HOSPITALIST

## 2018-09-29 PROCEDURE — 74011250636 HC RX REV CODE- 250/636: Performed by: INTERNAL MEDICINE

## 2018-09-29 PROCEDURE — 74011000258 HC RX REV CODE- 258: Performed by: HOSPITALIST

## 2018-09-29 PROCEDURE — 65270000032 HC RM SEMIPRIVATE

## 2018-09-29 PROCEDURE — 74011250636 HC RX REV CODE- 250/636: Performed by: HOSPITALIST

## 2018-09-29 PROCEDURE — 82565 ASSAY OF CREATININE: CPT | Performed by: HOSPITALIST

## 2018-09-29 RX ORDER — SODIUM CHLORIDE 0.9 % (FLUSH) 0.9 %
5-10 SYRINGE (ML) INJECTION EVERY 8 HOURS
Status: DISCONTINUED | OUTPATIENT
Start: 2018-09-30 | End: 2018-10-28 | Stop reason: HOSPADM

## 2018-09-29 RX ORDER — SODIUM CHLORIDE 0.9 % (FLUSH) 0.9 %
5-10 SYRINGE (ML) INJECTION AS NEEDED
Status: DISCONTINUED | OUTPATIENT
Start: 2018-09-29 | End: 2018-10-28 | Stop reason: HOSPADM

## 2018-09-29 RX ADMIN — Medication 10 ML: at 14:18

## 2018-09-29 RX ADMIN — Medication 10 ML: at 06:36

## 2018-09-29 RX ADMIN — HYDROMORPHONE HYDROCHLORIDE 2 MG: 2 INJECTION INTRAMUSCULAR; INTRAVENOUS; SUBCUTANEOUS at 14:26

## 2018-09-29 RX ADMIN — CEFEPIME HYDROCHLORIDE 2 G: 2 INJECTION, POWDER, FOR SOLUTION INTRAVENOUS at 00:02

## 2018-09-29 RX ADMIN — Medication 10 ML: at 22:30

## 2018-09-29 RX ADMIN — HYDROMORPHONE HYDROCHLORIDE 2 MG: 2 INJECTION INTRAMUSCULAR; INTRAVENOUS; SUBCUTANEOUS at 18:42

## 2018-09-29 RX ADMIN — Medication 1 CAPSULE: at 09:05

## 2018-09-29 RX ADMIN — CEFEPIME HYDROCHLORIDE 2 G: 2 INJECTION, POWDER, FOR SOLUTION INTRAVENOUS at 08:56

## 2018-09-29 RX ADMIN — HYDROMORPHONE HYDROCHLORIDE 2 MG: 2 INJECTION INTRAMUSCULAR; INTRAVENOUS; SUBCUTANEOUS at 10:17

## 2018-09-29 RX ADMIN — ENOXAPARIN SODIUM 40 MG: 40 INJECTION, SOLUTION INTRAVENOUS; SUBCUTANEOUS at 08:57

## 2018-09-29 RX ADMIN — HYDROMORPHONE HYDROCHLORIDE 2 MG: 2 INJECTION INTRAMUSCULAR; INTRAVENOUS; SUBCUTANEOUS at 22:31

## 2018-09-29 RX ADMIN — Medication 10 ML: at 00:03

## 2018-09-29 RX ADMIN — CELECOXIB 200 MG: 100 CAPSULE ORAL at 17:44

## 2018-09-29 RX ADMIN — HYDROMORPHONE HYDROCHLORIDE 2 MG: 2 INJECTION INTRAMUSCULAR; INTRAVENOUS; SUBCUTANEOUS at 06:35

## 2018-09-29 RX ADMIN — CEFEPIME HYDROCHLORIDE 2 G: 2 INJECTION, POWDER, FOR SOLUTION INTRAVENOUS at 23:24

## 2018-09-29 RX ADMIN — HYDROMORPHONE HYDROCHLORIDE 2 MG: 2 INJECTION INTRAMUSCULAR; INTRAVENOUS; SUBCUTANEOUS at 02:50

## 2018-09-29 RX ADMIN — CEFEPIME HYDROCHLORIDE 2 G: 2 INJECTION, POWDER, FOR SOLUTION INTRAVENOUS at 16:42

## 2018-09-29 RX ADMIN — TOBRAMYCIN SULFATE 400 MG: 40 INJECTION, SOLUTION INTRAMUSCULAR; INTRAVENOUS at 14:17

## 2018-09-29 RX ADMIN — CELECOXIB 200 MG: 100 CAPSULE ORAL at 08:57

## 2018-09-29 NOTE — PROGRESS NOTES
Problem: Falls - Risk of 
Goal: *Absence of Falls Document Stef Wallis Fall Risk and appropriate interventions in the flowsheet. Outcome: Progressing Towards Goal 
Fall Risk Interventions: 
Mobility Interventions: Patient to call before getting OOB Medication Interventions: Patient to call before getting OOB Elimination Interventions: Patient to call for help with toileting needs History of Falls Interventions: Evaluate medications/consider consulting pharmacy

## 2018-09-29 NOTE — PROGRESS NOTES
0710hrs . Krystal Thomas Bedside and Verbal shift change report given to Mickey House (oncoming nurse) by Kelsey Ames (offgoing nurse). Report included the following information SBAR, Kardex, Intake/Output, MAR, Recent Results and Med Rec Status.  
 1910hrs . Krystal Thomas Bedside and Verbal shift change report given to Camacho (oncoming nurse) by Mickey House (offgoing nurse). Report included the following information SBAR, Kardex, Intake/Output, MAR, Recent Results and Med Rec Status.

## 2018-09-29 NOTE — PROGRESS NOTES
Problem: Falls - Risk of 
Goal: *Absence of Falls Document Gerald Cuba Fall Risk and appropriate interventions in the flowsheet. Outcome: Progressing Towards Goal 
Fall Risk Interventions: 
Mobility Interventions: Utilize walker, cane, or other assistive device Medication Interventions: Teach patient to arise slowly Elimination Interventions: Urinal in reach History of Falls Interventions: Evaluate medications/consider consulting pharmacy

## 2018-09-29 NOTE — PROGRESS NOTES
Problem: Falls - Risk of 
Goal: *Absence of Falls Document Gerald Cuba Fall Risk and appropriate interventions in the flowsheet. Outcome: Progressing Towards Goal 
Fall Risk Interventions: 
Mobility Interventions: Utilize walker, cane, or other assistive device Medication Interventions: Patient to call before getting OOB Elimination Interventions: Patient to call for help with toileting needs History of Falls Interventions: Evaluate medications/consider consulting pharmacy

## 2018-09-29 NOTE — PROGRESS NOTES
2035 Charge nurse rounds performed. Verified that Ham Harris is the RN currently responsible for the care of this patient. The following concerns/changes were discussed: none identified at this time.

## 2018-09-29 NOTE — PROGRESS NOTES
Bedside shift change report given to me (oncoming nurse) by Valarie Rodriguez (offgoing nurse). Report included the following information SBAR, Kardex, Intake/Output, MAR, Recent Results and Med Rec Status.

## 2018-09-29 NOTE — PROGRESS NOTES
Hospitalist Progress Note NAME: Rashaun Matthew :  1980 MRN:  [id number] Room Number:  791/48  @ [facility] Summary: 43 y.o. male whom presented on 2018 with Assessment / Plan: 
  
Transferred from [facility], for completion of ABX until 10/28 Update Right IJ removed , PICC established  D/W Patient - need for tapering Opoid, in agreement Tricuspid infective Endocarditis Pseudomonas bacteremia L3/4 septic arthritis Pulmonary septic embolic All above in setting of h/o IVDU 
-On IV abx per ID recs: tobramycin and cefepime until 10/28 
- levaquin until  
- F/U tobramycin level and adjust doses  
-ID, ortho, IR and Cardiothoracic surgery on board 
-No plans for surgical intervention MRI L spine : L3/4 septic arthritis CT thoracic spine : multiple pulmonary nodules, several of which are cavitary and several groundglass opacities and areas of consolidation. 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 Back pain secondary to above 
-followed by palliative medicine for pain management: FU palliative consult PMH of HTN and Chronic anemia  
-monitor closely H/O IVDU, heroin 
-pain management as per palliative Code Status: DNR, completed DDNR on admission Surrogate Decision Maker: Yeny Client ( ex wife's mom) DVT Prophylaxis: lovenox GI Prophylaxis: not indicated Baseline: independent Subjective: Chief Complaint / Reason for Physician Visit \"have chronic back pain\". Discussed with RN events overnight. Review of Systems: 
Symptom Y/N Comments  Symptom Y/N Comments Fever/Chills n   Chest Pain n   
Poor Appetite n   Edema Cough n   Abdominal Pain n   
Sputum n   Joint Pain SOB/CUEVA n   Pruritis/Rash y   
Nausea/vomit n   Tolerating PT/OT Diarrhea n   Tolerating Diet y Constipation n   Other Could NOT obtain due to:   
 
Objective: VITALS:  
Last 24hrs VS reviewed since prior progress note. Most recent are: 
Patient Vitals for the past 24 hrs: 
 Temp Pulse Resp BP SpO2  
09/29/18 0807 97.7 °F (36.5 °C) 71 18 109/67 100 % 09/29/18 0340 97.6 °F (36.4 °C) 74 16 131/73 100 % 09/29/18 0000 98 °F (36.7 °C) 76 17 114/66 94 % 09/28/18 1927 98.3 °F (36.8 °C) 88 12 114/60 100 % 09/28/18 1508 98 °F (36.7 °C) 66 12 111/89 100 % Intake/Output Summary (Last 24 hours) at 09/29/18 1116 Last data filed at 09/29/18 0547 Gross per 24 hour Intake             1000 ml Output              600 ml Net              400 ml PHYSICAL EXAM: 
General: WD, WN. Alert, cooperative, no acute distress   
EENT:  EOMI. Anicteric sclerae. MMM,Right IJ+ Resp:  CTA bilaterally, no wheezing or rales. No accessory muscle use CV:  Regular  rhythm,  No edema GI:  Soft, Non distended, Non tender.  +Bowel sounds Neurologic:  Alert and oriented X 3, normal speech, Psych:   Good insight. Not anxious nor agitated Skin:  Tattoos+, dry scaly ,erythmeatous skin in upper leg,perineal,scrotal and buttock+. No jaundice Reviewed most current lab test results and cultures  YES Reviewed most current radiology test results   YES Review and summation of old records today    NO Reviewed patient's current orders and MAR    YES 
PMH/ reviewed - no change compared to H&P 
________________________________________________________________________ Care Plan discussed with: 
  Comments Patient x Family RN x Care Manager x Consultant Multidiciplinary team rounds were held today with , nursing, pharmacist and clinical coordinator. Patient's plan of care was discussed; medications were reviewed and discharge planning was addressed. ________________________________________________________________________ Total NON critical care TIME:  35   Minutes Total CRITICAL CARE TIME Spent:   Minutes non procedure based Comments >50% of visit spent in counseling and coordination of care    
________________________________________________________________________ Jose Francisco Edward MD  
 
Procedures: see electronic medical records for all procedures/Xrays and details which were not copied into this note but were reviewed prior to creation of Plan. LABS: 
I reviewed today's most current labs and imaging studies. Pertinent labs include: No results for input(s): WBC, HGB, HCT, PLT, HGBEXT, HCTEXT, PLTEXT, HGBEXT, HCTEXT, PLTEXT in the last 72 hours. Recent Labs  
   09/29/18 
 0254  09/28/18 
 5191  09/27/18 
 0722 CREA  1.37*  1.24  1.26 Signed: Jose Francisco Edward MD

## 2018-09-30 LAB — CREAT SERPL-MCNC: 1.26 MG/DL (ref 0.7–1.3)

## 2018-09-30 PROCEDURE — 74011250636 HC RX REV CODE- 250/636: Performed by: INTERNAL MEDICINE

## 2018-09-30 PROCEDURE — 82565 ASSAY OF CREATININE: CPT | Performed by: HOSPITALIST

## 2018-09-30 PROCEDURE — 36415 COLL VENOUS BLD VENIPUNCTURE: CPT | Performed by: HOSPITALIST

## 2018-09-30 PROCEDURE — 74011250636 HC RX REV CODE- 250/636: Performed by: HOSPITALIST

## 2018-09-30 PROCEDURE — 74011250637 HC RX REV CODE- 250/637: Performed by: HOSPITALIST

## 2018-09-30 PROCEDURE — 65270000032 HC RM SEMIPRIVATE

## 2018-09-30 PROCEDURE — 74011000258 HC RX REV CODE- 258: Performed by: HOSPITALIST

## 2018-09-30 PROCEDURE — 74011250637 HC RX REV CODE- 250/637: Performed by: INTERNAL MEDICINE

## 2018-09-30 RX ADMIN — HYDROMORPHONE HYDROCHLORIDE 2 MG: 2 INJECTION INTRAMUSCULAR; INTRAVENOUS; SUBCUTANEOUS at 02:31

## 2018-09-30 RX ADMIN — Medication 10 ML: at 21:05

## 2018-09-30 RX ADMIN — HYDROMORPHONE HYDROCHLORIDE 2 MG: 2 INJECTION INTRAMUSCULAR; INTRAVENOUS; SUBCUTANEOUS at 18:20

## 2018-09-30 RX ADMIN — HEPARIN 300 UNITS: 100 SYRINGE at 05:30

## 2018-09-30 RX ADMIN — ENOXAPARIN SODIUM 40 MG: 40 INJECTION, SOLUTION INTRAVENOUS; SUBCUTANEOUS at 09:40

## 2018-09-30 RX ADMIN — Medication 1 CAPSULE: at 09:40

## 2018-09-30 RX ADMIN — HYDROMORPHONE HYDROCHLORIDE 2 MG: 2 INJECTION INTRAMUSCULAR; INTRAVENOUS; SUBCUTANEOUS at 10:13

## 2018-09-30 RX ADMIN — Medication 10 ML: at 05:29

## 2018-09-30 RX ADMIN — CELECOXIB 200 MG: 100 CAPSULE ORAL at 18:20

## 2018-09-30 RX ADMIN — CEFEPIME HYDROCHLORIDE 2 G: 2 INJECTION, POWDER, FOR SOLUTION INTRAVENOUS at 05:34

## 2018-09-30 RX ADMIN — HYDROMORPHONE HYDROCHLORIDE 2 MG: 2 INJECTION INTRAMUSCULAR; INTRAVENOUS; SUBCUTANEOUS at 14:36

## 2018-09-30 RX ADMIN — Medication 10 ML: at 13:16

## 2018-09-30 RX ADMIN — HYDROMORPHONE HYDROCHLORIDE 2 MG: 2 INJECTION INTRAMUSCULAR; INTRAVENOUS; SUBCUTANEOUS at 22:21

## 2018-09-30 RX ADMIN — CEFEPIME HYDROCHLORIDE 2 G: 2 INJECTION, POWDER, FOR SOLUTION INTRAVENOUS at 13:15

## 2018-09-30 RX ADMIN — CELECOXIB 200 MG: 100 CAPSULE ORAL at 09:40

## 2018-09-30 RX ADMIN — TOBRAMYCIN SULFATE 400 MG: 40 INJECTION, SOLUTION INTRAMUSCULAR; INTRAVENOUS at 14:36

## 2018-09-30 RX ADMIN — HYDROMORPHONE HYDROCHLORIDE 2 MG: 2 INJECTION INTRAMUSCULAR; INTRAVENOUS; SUBCUTANEOUS at 06:30

## 2018-09-30 RX ADMIN — Medication 10 ML: at 02:31

## 2018-09-30 RX ADMIN — CEFEPIME HYDROCHLORIDE 2 G: 2 INJECTION, POWDER, FOR SOLUTION INTRAVENOUS at 21:05

## 2018-09-30 NOTE — PROGRESS NOTES
Problem: Falls - Risk of 
Goal: *Absence of Falls Document Leny Lockett Fall Risk and appropriate interventions in the flowsheet. Outcome: Progressing Towards Goal 
Fall Risk Interventions: 
Mobility Interventions: Assess mobility with egress test, Utilize walker, cane, or other assistive device, Strengthening exercises (ROM-active/passive) Medication Interventions: Evaluate medications/consider consulting pharmacy, Teach patient to arise slowly Elimination Interventions: Toileting schedule/hourly rounds, Urinal in reach, Call light in reach History of Falls Interventions: Evaluate medications/consider consulting pharmacy

## 2018-09-30 NOTE — PROGRESS NOTES
Bedside shift change report given to me (oncoming nurse) by Miri Small (offgoing nurse). Report included the following information SBAR, Kardex, Intake/Output, MAR and Recent Results.

## 2018-09-30 NOTE — PROGRESS NOTES
Hospitalist Progress Note NAME: Radha Charles :  1980 MRN:  [id number] Room Number:  597/64  @ [facility] Summary: 43 y.o. male whom presented on 2018 with Assessment / Plan: 
  
Transferred from [facility], for completion of ABX until 10/28 Update Right IJ removed , PICC established  D/W Patient - need for tapering Opoid, in agreement Tricuspid infective Endocarditis Pseudomonas bacteremia L3/4 septic arthritis Pulmonary septic embolic All above in setting of h/o IVDU 
-On IV abx per ID recs: tobramycin and cefepime until 10/28 
- levaquin until  
- F/U tobramycin level and adjust doses  
-ID, ortho, IR and Cardiothoracic surgery on board 
-No plans for surgical intervention MRI L spine : L3/4 septic arthritis CT thoracic spine : multiple pulmonary nodules, several of which are cavitary and several groundglass opacities and areas of consolidation. 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 Back pain secondary to above 
-followed by palliative medicine for pain management: FU palliative consult PMH of HTN and Chronic anemia  
-monitor closely H/O IVDU, heroin 
-pain management as per palliative Code Status: DNR, completed DDNR on admission Surrogate Decision Maker: Saira Cartagena ( ex wife's mom) DVT Prophylaxis: lovenox GI Prophylaxis: not indicated Baseline: independent Subjective: Chief Complaint / Reason for Physician Visit OK Objective: VITALS:  
Last 24hrs VS reviewed since prior progress note. Most recent are: 
 
 
Patient Vitals for the past 24 hrs: 
 Temp Pulse Resp BP SpO2  
18 0744 97.7 °F (36.5 °C) 60 16 106/61 99 % 18 0239 97.9 °F (36.6 °C) 70 18 111/63 98 %  
18 2045 97.9 °F (36.6 °C) 72 18 112/55 98 %  
18 1641 97.4 °F (36.3 °C) 94 18 129/85 100 % LUNGS CTA CVS - Systolic murmur Intake/Output Summary (Last 24 hours) at 09/30/18 1525 Last data filed at 09/30/18 4349 Gross per 24 hour Intake              400 ml Output                0 ml Net              400 ml PHYSICAL EXAM: 
 
LABS: 
I reviewed today's most current labs and imaging studies. Pertinent labs include: No results for input(s): WBC, HGB, HCT, PLT, HGBEXT, HCTEXT, PLTEXT, HGBEXT, HCTEXT, PLTEXT in the last 72 hours. Recent Labs  
   09/30/18 
 0240  09/29/18 
 0254  09/28/18 
 0209 CREA  1.26  1.37*  1.24 Signed: Odilia Ellis MD

## 2018-09-30 NOTE — PROGRESS NOTES
0710hrs . Musa Inch Bedside and Verbal shift change report given to Jenna) by Anen Carrasco (offgoing nurse). Report included the following information SBAR, Kardex, Intake/Output, MAR, Recent Results and Med Rec Status.  
 1910hrs . Musa Inch Bedside and Verbal shift change report given to Camacho (oncoming nurse) by [facility] nurse). Report included the following information SBAR, Kardex, Intake/Output, MAR, Recent Results and Med Rec Status.

## 2018-09-30 NOTE — PROGRESS NOTES
Problem: Falls - Risk of 
Goal: *Absence of Falls Document Jey Levels Fall Risk and appropriate interventions in the flowsheet. Outcome: Progressing Towards Goal 
Fall Risk Interventions: 
Mobility Interventions: Assess mobility with egress test 
 
  
 
Medication Interventions: Teach patient to arise slowly Elimination Interventions: Call light in reach History of Falls Interventions: Evaluate medications/consider consulting pharmacy Problem: Pain Goal: *Control of Pain Outcome: Progressing Towards Goal 
COnt on current pain regimen. Pt is aware that pain medication dose will be tapered off in the coming week. Will cont to monitor Problem: General Infection Care Plan (Adult and Pediatric) Goal: Improvement in signs and symptoms of infection Outcome: Progressing Towards Goal 
Pt cont on ABT therapy. No reactions noted. Will cont to monitor

## 2018-10-01 LAB — CREAT SERPL-MCNC: 1.12 MG/DL (ref 0.7–1.3)

## 2018-10-01 PROCEDURE — 74011000258 HC RX REV CODE- 258: Performed by: HOSPITALIST

## 2018-10-01 PROCEDURE — 74011250636 HC RX REV CODE- 250/636: Performed by: INTERNAL MEDICINE

## 2018-10-01 PROCEDURE — 74011250637 HC RX REV CODE- 250/637: Performed by: HOSPITALIST

## 2018-10-01 PROCEDURE — 82565 ASSAY OF CREATININE: CPT | Performed by: HOSPITALIST

## 2018-10-01 PROCEDURE — 36415 COLL VENOUS BLD VENIPUNCTURE: CPT | Performed by: HOSPITALIST

## 2018-10-01 PROCEDURE — 76450000000

## 2018-10-01 PROCEDURE — 74011250636 HC RX REV CODE- 250/636: Performed by: HOSPITALIST

## 2018-10-01 PROCEDURE — 74011250637 HC RX REV CODE- 250/637: Performed by: INTERNAL MEDICINE

## 2018-10-01 PROCEDURE — 65270000032 HC RM SEMIPRIVATE

## 2018-10-01 RX ADMIN — HYDROMORPHONE HYDROCHLORIDE 1.5 MG: 2 INJECTION INTRAMUSCULAR; INTRAVENOUS; SUBCUTANEOUS at 19:04

## 2018-10-01 RX ADMIN — CEFEPIME HYDROCHLORIDE 2 G: 2 INJECTION, POWDER, FOR SOLUTION INTRAVENOUS at 15:08

## 2018-10-01 RX ADMIN — ENOXAPARIN SODIUM 40 MG: 40 INJECTION, SOLUTION INTRAVENOUS; SUBCUTANEOUS at 11:09

## 2018-10-01 RX ADMIN — HYDROMORPHONE HYDROCHLORIDE 1.5 MG: 2 INJECTION INTRAMUSCULAR; INTRAVENOUS; SUBCUTANEOUS at 22:54

## 2018-10-01 RX ADMIN — HYDROMORPHONE HYDROCHLORIDE 1.5 MG: 2 INJECTION INTRAMUSCULAR; INTRAVENOUS; SUBCUTANEOUS at 11:05

## 2018-10-01 RX ADMIN — Medication 10 ML: at 11:09

## 2018-10-01 RX ADMIN — Medication 10 ML: at 05:25

## 2018-10-01 RX ADMIN — Medication 10 ML: at 22:53

## 2018-10-01 RX ADMIN — HYDROMORPHONE HYDROCHLORIDE 1.5 MG: 2 INJECTION INTRAMUSCULAR; INTRAVENOUS; SUBCUTANEOUS at 02:21

## 2018-10-01 RX ADMIN — CEFEPIME HYDROCHLORIDE 2 G: 2 INJECTION, POWDER, FOR SOLUTION INTRAVENOUS at 05:23

## 2018-10-01 RX ADMIN — CEFEPIME HYDROCHLORIDE 2 G: 2 INJECTION, POWDER, FOR SOLUTION INTRAVENOUS at 22:53

## 2018-10-01 RX ADMIN — HYDROMORPHONE HYDROCHLORIDE 1.5 MG: 2 INJECTION INTRAMUSCULAR; INTRAVENOUS; SUBCUTANEOUS at 15:06

## 2018-10-01 RX ADMIN — Medication 10 ML: at 08:46

## 2018-10-01 RX ADMIN — CELECOXIB 200 MG: 100 CAPSULE ORAL at 17:49

## 2018-10-01 RX ADMIN — TOBRAMYCIN SULFATE 400 MG: 40 INJECTION, SOLUTION INTRAMUSCULAR; INTRAVENOUS at 16:36

## 2018-10-01 RX ADMIN — Medication 10 ML: at 15:09

## 2018-10-01 RX ADMIN — Medication 10 ML: at 08:45

## 2018-10-01 RX ADMIN — HYDROMORPHONE HYDROCHLORIDE 1.5 MG: 2 INJECTION INTRAMUSCULAR; INTRAVENOUS; SUBCUTANEOUS at 06:45

## 2018-10-01 RX ADMIN — CELECOXIB 200 MG: 100 CAPSULE ORAL at 08:45

## 2018-10-01 RX ADMIN — Medication 10 ML: at 15:13

## 2018-10-01 RX ADMIN — Medication 1 CAPSULE: at 11:05

## 2018-10-01 NOTE — CONSULTS
Palliative Medicine Consult  [place]: [phone])    Patient Name: Radha Boyer  YOB: 1980    Consult noted and chart reviewed  Patient is on IV Hydromorphone, but needs to be tapered off opioids prior to discharge on 10/28  Will see patient Wednesday 10/3    Gregg Jorgensen NP

## 2018-10-01 NOTE — PROGRESS NOTES
Initial Nutrition Assessment: 
 
INTERVENTIONS/RECOMMENDATIONS:  
·  reg diet as tolerated ·  enc po and fluid intake ASSESSMENT:  
Admitted to ED [facility] for sepsis and transferred to [facility] for completion of abx through 10/28. PICC placed 9/26, palliative care on board for pain management. Hx IVDU. Pt has had some wt loss since admissions to ED [facility]. We are encouraging po intake and honoring preferences as we can. Diet Order: Regular 
% Eaten:  85% Pertinent Medications: [x]Reviewed []Other Pertinent Labs: [x]Reviewed []Other Food Allergies: [x]None []Other Last BM:    [x]Active     []Hyperactive  []Hypoactive       [] Absent BS Skin:    [x] Intact   [] Incision  [] Breakdown  [] Other: Anthropometrics:  
Height: 6' (182.9 cm) Weight: 78.8 kg (173 lb 12.8 oz) IBW (%IBW): 80.7 kg (178 lb) (97.64 %) UBW (%UBW): 86.4 kg (190 lb 7.6 oz) (91.25 %) Last Weight Metrics: 
Weight Loss Metrics 10/1/2018 9/25/2018 9/6/2018 Today's Wt 173 lb 12.8 oz 168 lb 14 oz -  
BMI - 23.57 kg/m2 22.9 kg/m2 BMI: Body mass index is 23.57 kg/(m^2). This BMI is indicative of: 
 []Underweight    [x]Normal    []Overweight    [] Obesity   [] Extreme Obesity (BMI>40) Estimated Nutrition Needs (Based on):  
2363 Kcals/day (3299 x1.3 AF) , 86 g (~1g/kg) Protein Carbohydrate: At Least 130 g/day  Fluids: 2400 mL/day (1ml/kcal) NUTRITION DIAGNOSES:  
Problem:  No nutritional diagnosis at this time Etiology: related to Signs/Symptoms: as evidenced by NUTRITION INTERVENTIONS:healthy regular diet GOAL:  
PO intake > 75% most meals in 7-10 days LEARNING NEEDS (Diet, Food/Nutrient-Drug Interaction):  
 [x] None Identified 
 [] Identified and Education Provided/Documented 
 [] Identified and Pt declined/was not appropriate Cultureal, Roman Catholic, OR Ethnic Dietary Needs:  
 [x] None Identified 
 [] Identified and Addressed 
 
 [x] Interdisciplinary Care Plan Reviewed/Documented [x] Discharge Planning:   Reg diet ad page MONITORING /EVALUATION:  
  
  
  
 
NUTRITION RISK:  
 [] Patient At Nutritional Risk 
  [] High              [] Moderate/Mild           []  Low   
 [x] Patient Not At Nutritional Risk PT SEEN FOR:  
 []  MD Consult: []Calorie Count []Diabetic Diet Education []Diet Education []Electrolyte Management 
   [x]General Nutrition Management and Supplements []Management of Tube Feeding []TPN Recommendations []  RN Referral:  []MST score >=2 
   []Enteral/Parenteral Nutrition PTA []Pregnant: Gestational DM or Multigestation 
   []Pressure Ulcer/Wound Care needs 
     
[]  Low BMI [x]  SAVANAH Grossman, PhD, RD, [facility] Pager [phone]

## 2018-10-01 NOTE — INTERDISCIPLINARY ROUNDS
IDR with Dr. Iesha LINDER), Alfreda Lopez (pharmacist), Shaina Sanon (), Lu (nurse manager), Isael Aleman (diabetes educator), Elisabeth Rowan (stundent nurse) and Michel Mejia (RN) to discuss plan of care including consult pharmacy and palliative to switch to oral pain management since shortage IV opiates.

## 2018-10-01 NOTE — PROGRESS NOTES
Problem: Falls - Risk of 
Goal: *Absence of Falls Document [place] Ingrid Fall Risk and appropriate interventions in the flowsheet. Outcome: Progressing Towards Goal 
Fall Risk Interventions: 
Mobility Interventions: Assess mobility with egress test 
 
  
 
Medication Interventions: Evaluate medications/consider consulting pharmacy Elimination Interventions: Urinal in reach History of Falls Interventions: Evaluate medications/consider consulting pharmacy Problem: Pain Goal: *Control of Pain Outcome: Progressing Towards Goal 
Pt pain med will be titrated down to 1.5mg of dilaudid starting tonight at mid. Pt is aware. Pt pain resides at 6-8/10 at all times. Will cont to monitor.

## 2018-10-01 NOTE — PROGRESS NOTES
Hospitalist Progress Note NAME: Rosa Isela Solomon :  1980 MRN:  [id number] Room Number:  263/02  @ [facility] Summary: 43 y.o. male whom presented on 2018 with Assessment / Plan: 
  
Transferred from [facility], for completion of ABX until 10/28 Update Consult palliative for pain mgmt Right IJ removed , PICC established  D/W Patient - need for tapering Opoid, in agreement Tricuspid infective Endocarditis Pseudomonas bacteremia L3/4 septic arthritis Pulmonary septic embolic All above in setting of h/o IVDU 
-On IV abx per ID recs: tobramycin and cefepime until 10/28 
- levaquin until  
- F/U tobramycin level and adjust doses  
-ID, ortho, IR and Cardiothoracic surgery on board 
-No plans for surgical intervention MRI L spine : L3/4 septic arthritis CT thoracic spine : multiple pulmonary nodules, several of which are cavitary and several groundglass opacities and areas of consolidation. 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 Back pain secondary to above 
-followed by palliative medicine for pain management: FU palliative consult PMH of HTN and Chronic anemia  
-monitor closely H/O IVDU, heroin 
-pain management as per palliative Code Status: DNR, completed DDNR on admission Surrogate Decision Maker: Pratima Romanokonrad ( ex wife's mom) DVT Prophylaxis: lovenox GI Prophylaxis: not indicated Baseline: independent Subjective: Chief Complaint / Reason for Physician Visit \"have chronic back pain\". Discussed with RN events overnight. Review of Systems: 
Symptom Y/N Comments  Symptom Y/N Comments Fever/Chills n   Chest Pain n   
Poor Appetite n   Edema Cough n   Abdominal Pain n   
Sputum n   Joint Pain SOB/CUEVA n   Pruritis/Rash y   
Nausea/vomit n   Tolerating PT/OT Diarrhea n   Tolerating Diet y Constipation n   Other Could NOT obtain due to:   
 
Objective: VITALS:  
Last 24hrs VS reviewed since prior progress note. Most recent are: 
Patient Vitals for the past 24 hrs: 
 Temp Pulse Resp BP SpO2  
10/01/18 0749 97.4 °F (36.3 °C) 61 12 114/63 100 % 10/01/18 0228 98 °F (36.7 °C) 62 18 114/65 97 % 09/30/18 1940 98.3 °F (36.8 °C) 66 18 121/61 100 % 09/30/18 1618 97.8 °F (36.6 °C) 65 16 115/65 96 % Intake/Output Summary (Last 24 hours) at 10/01/18 1140 Last data filed at 09/30/18 2105 Gross per 24 hour Intake              400 ml Output                0 ml Net              400 ml PHYSICAL EXAM: 
General: WD, WN. Alert, cooperative, no acute distress   
EENT:  EOMI. Anicteric sclerae. MMM,Right IJ+ Resp:  CTA bilaterally, no wheezing or rales. No accessory muscle use CV:  Regular  rhythm,  No edema GI:  Soft, Non distended, Non tender.  +Bowel sounds Neurologic:  Alert and oriented X 3, normal speech, Psych:   Good insight. Not anxious nor agitated Skin:  Tattoos+, dry scaly ,erythmeatous skin in upper leg,perineal,scrotal and buttock+. No jaundice Reviewed most current lab test results and cultures  YES Reviewed most current radiology test results   YES Review and summation of old records today    NO Reviewed patient's current orders and MAR    YES 
PMH/SH reviewed - no change compared to H&P 
________________________________________________________________________ Care Plan discussed with: 
  Comments Patient x Family RN x Care Manager x Consultant Multidiciplinary team rounds were held today with , nursing, pharmacist and clinical coordinator. Patient's plan of care was discussed; medications were reviewed and discharge planning was addressed. ________________________________________________________________________ Total NON critical care TIME:  35   Minutes Total CRITICAL CARE TIME Spent:   Minutes non procedure based Comments >50% of visit spent in counseling and coordination of care    
________________________________________________________________________ Promise Raman MD  
 
Procedures: see electronic medical records for all procedures/Xrays and details which were not copied into this note but were reviewed prior to creation of Plan. LABS: 
I reviewed today's most current labs and imaging studies. Pertinent labs include: No results for input(s): WBC, HGB, HCT, PLT, HGBEXT, HCTEXT, PLTEXT, HGBEXT, HCTEXT, PLTEXT in the last 72 hours. Recent Labs 10/01/18 
 0221  09/30/18 
 0240  09/29/18 
 8511 CREA  1.12  1.26  1.37* Signed: Promise Raman MD

## 2018-10-01 NOTE — CONSULTS
Palliative Medicine Consult  [place]: [phone]     Patient Name: Rocio Sood  YOB: 1980      Diluadid Taper plan that is currently in place is as follows:  10/1-10/7 dilaudid 1.5mg q. 4 hours prn    10/8-10/14 dilaudid 1mg q. 4 hours prn  10/15-10/21 dilaudid 0.5mg q. 4 hours prn  10/22-10/28 dilaudid 0.2mg q. 6 hours prn    Medical team has re-consulted Palliative Medicine for a taper from IV to Oral opioids, and then off of Oral opioids due to the need to be conservative with IV opioid usage    Patient has done well on the IV opioid taper, and it has controlled his pain well  He had shared decision making with the Palliative Team at [facility], and the plan above was a taper that was agreed upon by the provider and the patient. If a conversion to orals is started, the patient needs to have shared decision making in this, so that he has greater chance of success. Currently utilizing IV Dilaudid 1.5mg q4 hours prn  Has been using IV Diluadid every 4 hours around the clock, using 6 doses every 24 hours  1.5mg IV Dilaudid = 6mg PO Dilaudid    Suggestion for Opioid conversion:  10/2-10/7 Dilaudid 6mg PO every 4 hours PRN  10/8-10/14 Dilaudid 4mg PO every 4 hours PRN  10/15-10/21 Dilaudid 2mg PO every 4 hours PRN  10/22-10/28 Dilaudid 1mg PO every 4 hours prn    Patient should be able to stop PRN Dilaudid at discharge and will not need a script for discharge    I will see patient on 10/3- recommend that this taper start 10/2/2018 after attending has a conversation with him to ensure buy in.   This way we will know if he is tolerating/agreeable to the conversion to PO so that adjustments can be made if needed on 10/3

## 2018-10-01 NOTE — PROGRESS NOTES
0710) Bedside shift change report given to Rosana Ambrosio RN (oncoming nurse) by Dionicia Carrel, RN (offgoing nurse). Report included the following information SBAR, Kardex, MAR, Accordion and Recent Results. 1120) Consult Dr. Mary Charles for palliative consult for pain management. 1930) Bedside shift change report given to Cristiane Phillips RN (oncoming nurse) by Rosana Ambrosio RN (offgoing nurse). Report included the following information SBAR, Kardex, MAR, Accordion and Recent Results.

## 2018-10-02 LAB
CREAT SERPL-MCNC: 1.3 MG/DL (ref 0.7–1.3)
DATE LAST DOSE: NORMAL
REPORTED DOSE,DOSE: NORMAL UNITS
REPORTED DOSE/TIME,TMG: NORMAL
TOBRAMYCIN SERPL-MCNC: 4 UG/ML

## 2018-10-02 PROCEDURE — 74011000258 HC RX REV CODE- 258: Performed by: HOSPITALIST

## 2018-10-02 PROCEDURE — 74011250636 HC RX REV CODE- 250/636: Performed by: HOSPITALIST

## 2018-10-02 PROCEDURE — 74011250637 HC RX REV CODE- 250/637: Performed by: HOSPITALIST

## 2018-10-02 PROCEDURE — 36592 COLLECT BLOOD FROM PICC: CPT

## 2018-10-02 PROCEDURE — 65270000032 HC RM SEMIPRIVATE

## 2018-10-02 PROCEDURE — 36415 COLL VENOUS BLD VENIPUNCTURE: CPT | Performed by: HOSPITALIST

## 2018-10-02 PROCEDURE — 82565 ASSAY OF CREATININE: CPT | Performed by: HOSPITALIST

## 2018-10-02 PROCEDURE — 74011250637 HC RX REV CODE- 250/637: Performed by: INTERNAL MEDICINE

## 2018-10-02 PROCEDURE — 80200 ASSAY OF TOBRAMYCIN: CPT | Performed by: HOSPITALIST

## 2018-10-02 PROCEDURE — 74011250636 HC RX REV CODE- 250/636: Performed by: INTERNAL MEDICINE

## 2018-10-02 RX ADMIN — CELECOXIB 200 MG: 100 CAPSULE ORAL at 20:04

## 2018-10-02 RX ADMIN — Medication 10 ML: at 20:06

## 2018-10-02 RX ADMIN — Medication 40 ML: at 03:59

## 2018-10-02 RX ADMIN — Medication 10 ML: at 14:46

## 2018-10-02 RX ADMIN — HYDROMORPHONE HYDROCHLORIDE 1.5 MG: 2 INJECTION INTRAMUSCULAR; INTRAVENOUS; SUBCUTANEOUS at 02:59

## 2018-10-02 RX ADMIN — Medication 10 ML: at 14:47

## 2018-10-02 RX ADMIN — CEFEPIME HYDROCHLORIDE 2 G: 2 INJECTION, POWDER, FOR SOLUTION INTRAVENOUS at 14:46

## 2018-10-02 RX ADMIN — CEFEPIME HYDROCHLORIDE 2 G: 2 INJECTION, POWDER, FOR SOLUTION INTRAVENOUS at 06:28

## 2018-10-02 RX ADMIN — Medication 10 ML: at 03:59

## 2018-10-02 RX ADMIN — Medication 1 CAPSULE: at 11:45

## 2018-10-02 RX ADMIN — ENOXAPARIN SODIUM 40 MG: 40 INJECTION, SOLUTION INTRAVENOUS; SUBCUTANEOUS at 11:44

## 2018-10-02 RX ADMIN — CEFEPIME HYDROCHLORIDE 2 G: 2 INJECTION, POWDER, FOR SOLUTION INTRAVENOUS at 22:37

## 2018-10-02 RX ADMIN — CELECOXIB 200 MG: 100 CAPSULE ORAL at 09:29

## 2018-10-02 RX ADMIN — HYDROMORPHONE HYDROCHLORIDE 1.5 MG: 2 INJECTION INTRAMUSCULAR; INTRAVENOUS; SUBCUTANEOUS at 11:44

## 2018-10-02 RX ADMIN — HYDROMORPHONE HYDROCHLORIDE 1.5 MG: 2 INJECTION INTRAMUSCULAR; INTRAVENOUS; SUBCUTANEOUS at 20:04

## 2018-10-02 RX ADMIN — HYDROMORPHONE HYDROCHLORIDE 1.5 MG: 2 INJECTION INTRAMUSCULAR; INTRAVENOUS; SUBCUTANEOUS at 15:46

## 2018-10-02 RX ADMIN — HYDROMORPHONE HYDROCHLORIDE 1.5 MG: 2 INJECTION INTRAMUSCULAR; INTRAVENOUS; SUBCUTANEOUS at 07:06

## 2018-10-02 NOTE — PROGRESS NOTES
[facility] Dosing Services: Antimicrobial Stewardship Daily Doc Consult for dosing of tobramycin and cefepime by Dr. Nichole Henriquez (continued from [facility]). Levofloxacin completed 9/27/18 Indication: tricuspid valve endocarditis, pseudomonas bacteremia Day of Therapy: Continue through 10/28/18 Ht Readings from Last 1 Encounters:  
10/01/18 182.9 cm (72\") Wt Readings from Last 1 Encounters:  
10/01/18 78.8 kg (173 lb 12.8 oz) Tobramycin: 
Current maintenance dose: 400 mg (~5.2 mg/kg) every 24 hours. Pulse dose calculated to approximate a therapeutic peak of 15-20 mcg/mL and trough of </= 0.3 mcg/mL. Last random level 4 mcg/ml on 10/2, approximately 11.5 hours post dose Plan: SCr slightly increased. Based on nomogram, increase to tobramycin 400 mg IV every 36 hours. Dose administration notes:  10/1 dose given a little late Cefepime:  
Current Regimen:  Cefepime 2G IV Q8H Recommendation: continue current dosing regimen Dose administration notes:   Doses given appropriately as scheduled Other Antimicrobial  
(not dosed by pharmacist) None Cultures 9/6 Bloodx2: Pseudomonas aeruginosa sens to cefepime, LVQ, & tobramycin; final 
9/7 Hep C: (-) 
9/7 HIV: (-) 
9/8 Blood: Pseudomonas aeruginosa sens to cefepime, LVQ, & tobramycin; final 
9/8 Nares: MRSA (-) 
9/11 Bloodx2: Pseudomonas aeruginosa sens to cefepime, LVQ, & tobramycin; final 
9/16 Blood: NGx6d, final  
Serum Creatinine Lab Results Component Value Date/Time Creatinine 1.30 10/02/2018 03:58 AM  
   
Creatinine Clearance Estimated Creatinine Clearance: 83.7 mL/min (based on Cr of 1.3). Temp Temp: 97.5 °F (36.4 °C) WBC Lab Results Component Value Date/Time WBC 12.2 (H) 09/25/2018 12:57 AM  
  
H/H Lab Results Component Value Date/Time HGB 8.7 (L) 09/25/2018 12:57 AM  
  
Platelets Lab Results Component Value Date/Time PLATELET 136 01/67/8263 12:57 AM  
  
 
 
Thank you, Sera Lindsey, PharmD, BCPS 
[phone]

## 2018-10-02 NOTE — PROGRESS NOTES
Hospitalist Progress Note NAME: Ganesh Alvarez :  1980 MRN:  [id number] Room Number:  230/69  @ [facility] Summary: 43 y.o. male whom presented on 2018 with Assessment / Plan: 
  
Transferred from [facility], for completion of ABX until 10/28 Update Had a discussion with the patient regarding switching IV Dilaudid to oral. Patient is not amenable to this change. He reports taking high doses of oral Dilaudid at [facility] however they did not work. We will continue IV Dilaudid for now, until further discussion with palliative team tomorrow. Tricuspid infective Endocarditis Pseudomonas bacteremia L3/4 septic arthritis Pulmonary septic embolic All above in setting of h/o IVDU 
-On IV abx per ID recs: tobramycin and cefepime until 10/28 
- levaquin until  
- F/U tobramycin level and adjust doses  
-ID, ortho, IR and Cardiothoracic surgery on board 
-No plans for surgical intervention MRI L spine : L3/4 septic arthritis CT thoracic spine : multiple pulmonary nodules, several of which are cavitary and several groundglass opacities and areas of consolidation. 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 Back pain secondary to above 
-followed by palliative medicine for pain management: FU palliative consult PMH of HTN and Chronic anemia  
-monitor closely H/O IVDU, heroin 
-pain management as per palliative Code Status: DNR, completed DDNR on admission Surrogate Decision Maker: Willis Sexton ( ex wife's mom) DVT Prophylaxis: lovenox GI Prophylaxis: not indicated Baseline: independent Subjective: Chief Complaint / Reason for Physician Visit \"have chronic back pain\". Discussed with RN events overnight. Review of Systems: 
Symptom Y/N Comments  Symptom Y/N Comments Fever/Chills n   Chest Pain n   
Poor Appetite n   Edema Cough n   Abdominal Pain n   
Sputum n   Joint Pain SOB/CUEVA n   Pruritis/Rash y   
Nausea/vomit n   Tolerating PT/OT Diarrhea n   Tolerating Diet y Constipation n   Other Could NOT obtain due to:   
 
Objective: VITALS:  
Last 24hrs VS reviewed since prior progress note. Most recent are: 
Patient Vitals for the past 24 hrs: 
 Temp Pulse Resp BP SpO2  
10/02/18 1550 98.2 °F (36.8 °C) 67 18 126/80 99 % 10/02/18 0800 97.5 °F (36.4 °C) (!) 54 16 118/54 99 % 10/02/18 0258 97.6 °F (36.4 °C) 69 14 110/59 99 % 10/01/18 2055 97.9 °F (36.6 °C) 72 16 113/66 99 % Intake/Output Summary (Last 24 hours) at 10/02/18 1606 Last data filed at 10/02/18 0800 Gross per 24 hour Intake              300 ml Output              400 ml Net             -100 ml PHYSICAL EXAM: 
General: WD, WN. Alert, cooperative, no acute distress   
EENT:  EOMI. Anicteric sclerae. MMM,Right IJ+ Resp:  CTA bilaterally, no wheezing or rales. No accessory muscle use CV:  Regular  rhythm,  No edema GI:  Soft, Non distended, Non tender.  +Bowel sounds Neurologic:  Alert and oriented X 3, normal speech, Psych:   Good insight. Not anxious nor agitated Skin:  Tattoos+, dry scaly ,erythmeatous skin in upper leg,perineal,scrotal and buttock+. No jaundice Reviewed most current lab test results and cultures  YES Reviewed most current radiology test results   YES Review and summation of old records today    NO Reviewed patient's current orders and MAR    YES 
PMH/SH reviewed - no change compared to H&P 
________________________________________________________________________ Care Plan discussed with: 
  Comments Patient x Family RN x Care Manager x Consultant Multidiciplinary team rounds were held today with , nursing, pharmacist and clinical coordinator.   Patient's plan of care was discussed; medications were reviewed and discharge planning was addressed. ________________________________________________________________________ Total NON critical care TIME:  35   Minutes Total CRITICAL CARE TIME Spent:   Minutes non procedure based Comments >50% of visit spent in counseling and coordination of care    
________________________________________________________________________ Tello Gorman MD  
 
Procedures: see electronic medical records for all procedures/Xrays and details which were not copied into this note but were reviewed prior to creation of Plan. LABS: 
I reviewed today's most current labs and imaging studies. Pertinent labs include: No results for input(s): WBC, HGB, HCT, PLT, HGBEXT, HCTEXT, PLTEXT, HGBEXT, HCTEXT, PLTEXT in the last 72 hours. Recent Labs 10/02/18 
 4882  10/01/18 
 0221  09/30/18 
 0240 CREA  1.30  1.12  1.26 Signed: Tello Gorman MD

## 2018-10-02 NOTE — PROGRESS NOTES
Problem: Falls - Risk of 
Goal: *Absence of Falls Document Mal Julian Fall Risk and appropriate interventions in the flowsheet. Outcome: Progressing Towards Goal 
Fall Risk Interventions: 
Mobility Interventions: OT consult for ADLs, Patient to call before getting OOB, PT Consult for mobility concerns Medication Interventions: Patient to call before getting OOB, Teach patient to arise slowly Elimination Interventions: Bed/chair exit alarm, Call light in reach, Urinal in reach History of Falls Interventions: Evaluate medications/consider consulting pharmacy

## 2018-10-02 NOTE — PROGRESS NOTES
Spiritual Care Assessment/Progress Note [facility] 
 
 
NAME: Cheikh Young      MRN: [id number] AGE: 43 y.o. SEX: male Roman Catholic Affiliation: No preference Language: English  
 
10/2/2018     Total Time (in minutes): 10 Spiritual Assessment begun in [address] through conversation with: 
  
    [x]Patient        [] Family    [] Friend(s) Reason for Consult: Palliative Care, Initial/Spiritual Assessment Spiritual beliefs: (Please include comment if needed) 
   [] Identifies with a shailesh tradition:     
   [] Supported by a shailesh community:        
   [] Claims no spiritual orientation:       
   [] Seeking spiritual identity:            
   [x] Adheres to an individual form of spirituality:       
   [] Not able to assess:                   
 
    
Identified resources for coping:  
   [] Prayer                           
   [] Music                  [] Guided Imagery [x] Family/friends                 [] Pet visits [] Devotional reading                         [] Unknown 
   [] Other:                                          
 
 
Interventions offered during this visit: (See comments for more details) Patient Interventions: Initial/Spiritual assessment, patient floor, Affirmation of emotions/emotional suffering, Normalization of emotional/spiritual concerns Plan of Care: 
 
 [] Support spiritual and/or cultural needs  
 [] Support AMD and/or advance care planning process    
 [] Support grieving process 
 [] Coordinate Rites and/or Rituals  
 [] Coordination with community clergy [] No spiritual needs identified at this time 
 [] Detailed Plan of Care below (See Comments)  [] Make referral to Music Therapy 
[] Make referral to Pet Therapy    
[] Make referral to Addiction services 
[] Make referral to [facility] 
[] Make referral to Spiritual Care Partner 
[] No future visits requested       
[x] Follow up visits as needed Comments: Initial visit with patient on Med Surg due to new palliative care consult. Patient was resting and did not feel up to a lengthy visit at this time. Patient did indicate his frustration with planned extended hospitalization but shared that he has support from his friends and family. Chart review revealed that the patient has appreciated and benefited from [id number] [facility]' visits in the past. This  will continue to follow-up and offer support as needed and able. Chaplain Marcell Khan M.Div.  Paging Service [phone] ([phone])

## 2018-10-02 NOTE — PROGRESS NOTES
Bedside shift change report given to Bradly Gerardo (oncoming nurse) by Jane Ramos (offgoing nurse). Report included the following information SBAR, Kardex, Intake/Output, MAR and Recent Results.

## 2018-10-02 NOTE — PROGRESS NOTES
Problem: Falls - Risk of 
Goal: *Absence of Falls Document [facility] Fall Risk and appropriate interventions in the flowsheet. Outcome: Progressing Towards Goal 
Fall Risk Interventions: 
Mobility Interventions: PT Consult for mobility concerns, Patient to call before getting OOB, Utilize walker, cane, or other assistive device Medication Interventions: Evaluate medications/consider consulting pharmacy Elimination Interventions: Urinal in reach History of Falls Interventions: Evaluate medications/consider consulting pharmacy

## 2018-10-02 NOTE — PROGRESS NOTES
Verbal shift change report given to Manjeet Chaudhry RN (oncoming nurse) by Manjeet Chaudhry RN (offgoing nurse). Report included the following information SBAR, Kardex, MAR and Recent Results.

## 2018-10-03 LAB — CREAT SERPL-MCNC: 1.21 MG/DL (ref 0.7–1.3)

## 2018-10-03 PROCEDURE — 74011250636 HC RX REV CODE- 250/636: Performed by: HOSPITALIST

## 2018-10-03 PROCEDURE — 74011250637 HC RX REV CODE- 250/637: Performed by: HOSPITALIST

## 2018-10-03 PROCEDURE — 74011000258 HC RX REV CODE- 258: Performed by: HOSPITALIST

## 2018-10-03 PROCEDURE — 65270000032 HC RM SEMIPRIVATE

## 2018-10-03 PROCEDURE — 74011250637 HC RX REV CODE- 250/637: Performed by: INTERNAL MEDICINE

## 2018-10-03 PROCEDURE — 36415 COLL VENOUS BLD VENIPUNCTURE: CPT | Performed by: HOSPITALIST

## 2018-10-03 PROCEDURE — 36592 COLLECT BLOOD FROM PICC: CPT

## 2018-10-03 PROCEDURE — 74011250636 HC RX REV CODE- 250/636: Performed by: INTERNAL MEDICINE

## 2018-10-03 PROCEDURE — 82565 ASSAY OF CREATININE: CPT | Performed by: HOSPITALIST

## 2018-10-03 RX ADMIN — Medication 10 ML: at 04:25

## 2018-10-03 RX ADMIN — Medication 10 ML: at 04:23

## 2018-10-03 RX ADMIN — HYDROMORPHONE HYDROCHLORIDE 1.5 MG: 2 INJECTION INTRAMUSCULAR; INTRAVENOUS; SUBCUTANEOUS at 04:23

## 2018-10-03 RX ADMIN — HYDROMORPHONE HYDROCHLORIDE 1.5 MG: 2 INJECTION INTRAMUSCULAR; INTRAVENOUS; SUBCUTANEOUS at 08:34

## 2018-10-03 RX ADMIN — Medication 10 ML: at 12:42

## 2018-10-03 RX ADMIN — Medication 10 ML: at 12:40

## 2018-10-03 RX ADMIN — HYDROMORPHONE HYDROCHLORIDE 1.5 MG: 2 INJECTION INTRAMUSCULAR; INTRAVENOUS; SUBCUTANEOUS at 00:22

## 2018-10-03 RX ADMIN — Medication 10 ML: at 08:32

## 2018-10-03 RX ADMIN — Medication 10 ML: at 14:42

## 2018-10-03 RX ADMIN — CELECOXIB 200 MG: 100 CAPSULE ORAL at 18:50

## 2018-10-03 RX ADMIN — Medication 10 ML: at 08:34

## 2018-10-03 RX ADMIN — HYDROMORPHONE HYDROCHLORIDE 1.5 MG: 2 INJECTION INTRAMUSCULAR; INTRAVENOUS; SUBCUTANEOUS at 20:43

## 2018-10-03 RX ADMIN — ENOXAPARIN SODIUM 40 MG: 40 INJECTION, SOLUTION INTRAVENOUS; SUBCUTANEOUS at 08:40

## 2018-10-03 RX ADMIN — Medication 10 ML: at 14:41

## 2018-10-03 RX ADMIN — HYDROMORPHONE HYDROCHLORIDE 1.5 MG: 2 INJECTION INTRAMUSCULAR; INTRAVENOUS; SUBCUTANEOUS at 12:39

## 2018-10-03 RX ADMIN — CEFEPIME HYDROCHLORIDE 2 G: 2 INJECTION, POWDER, FOR SOLUTION INTRAVENOUS at 22:23

## 2018-10-03 RX ADMIN — Medication 10 ML: at 20:45

## 2018-10-03 RX ADMIN — CELECOXIB 200 MG: 100 CAPSULE ORAL at 08:39

## 2018-10-03 RX ADMIN — HYDROMORPHONE HYDROCHLORIDE 1.5 MG: 2 INJECTION INTRAMUSCULAR; INTRAVENOUS; SUBCUTANEOUS at 16:38

## 2018-10-03 RX ADMIN — TOBRAMYCIN 400 MG: 40 INJECTION INTRAMUSCULAR; INTRAVENOUS at 04:23

## 2018-10-03 RX ADMIN — CEFEPIME HYDROCHLORIDE 2 G: 2 INJECTION, POWDER, FOR SOLUTION INTRAVENOUS at 05:48

## 2018-10-03 RX ADMIN — CEFEPIME HYDROCHLORIDE 2 G: 2 INJECTION, POWDER, FOR SOLUTION INTRAVENOUS at 14:40

## 2018-10-03 RX ADMIN — Medication 1 CAPSULE: at 10:52

## 2018-10-03 NOTE — PROGRESS NOTES
1000) Pt stated plan for central line dressing after shower. Plan for shower after lunch. 1638) Pt request shower after dinner. 1700) Dr. Ran Cortes at bedside, discuss pain management planning. 1830) Pt asleep in bed. Request shower and dressing change later in evening. 1910) Bedside shift change report given to GISELLE Sandra (oncoming nurse) by Elizabeth Anderson RN (offgoing nurse). Report included the following information SBAR, Kardex, MAR, Accordion and Recent Results.

## 2018-10-03 NOTE — PROGRESS NOTES
Bedside and Verbal shift change report given to Barbie Matthew, [facility] ([facility]) by Lilo Hernandez RN (offgoing nurse). Report included the following information SBAR, Kardex, Intake/Output and Recent Results.

## 2018-10-03 NOTE — PROGRESS NOTES
2000) Bedside and Verbal shift change report given to GISELLE Sandra (oncoming nurse) by Satinder Sol RN (offgoing nurse). Report included the following information SBAR, Kardex, Intake/Output, MAR, Accordion, Recent Results and Med Rec Status. 0730) Bedside and Verbal shift change report given to Ale Raman RN and Pamela Cuba, nursing student (oncoming nurse) by GISELLE Sandra (offgoing nurse). Report included the following information SBAR, Kardex, Intake/Output, MAR, Accordion, Recent Results and Med Rec Status.

## 2018-10-03 NOTE — PROGRESS NOTES
Hospitalist Progress Note NAME: Rosa Isela Solomon :  1980 MRN:  [id number] [facility] Summary: 43 y.o. male whom presented on 2018 with Assessment / Plan: 
  
Transferred from [facility], for completion of ABX until 10/28 Update Had a discussion with the patient regarding switching IV Dilaudid to oral. Patient is not amenable to this change. He reports taking high doses of oral Dilaudid at [facility] however they did not work. We will continue IV Dilaudid for now. Tricuspid infective Endocarditis Pseudomonas bacteremia L3/4 septic arthritis Pulmonary septic embolic All above in setting of h/o IVDU 
-On IV abx per ID recs: tobramycin and cefepime until 10/28 
- levaquin until  
- F/U tobramycin level and adjust doses  
-ID, ortho, IR and Cardiothoracic surgery on board 
-No plans for surgical intervention MRI L spine : L3/4 septic arthritis CT thoracic spine : multiple pulmonary nodules, several of which are cavitary and several groundglass opacities and areas of consolidation. 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 Back pain secondary to above 
-followed by palliative medicine for pain management: FU palliative consult PMH of HTN and Chronic anemia  
-monitor closely H/O IVDU, heroin 
-pain management as per palliative Code Status: DNR, completed DDNR on admission Surrogate Decision Maker: Pratima Juárez ( ex wife's mom) DVT Prophylaxis: lovenox GI Prophylaxis: not indicated Baseline: independent Subjective: Chief Complaint / Reason for Physician Visit \"ok\". Discussed with RN events overnight. Review of Systems: 
Symptom Y/N Comments  Symptom Y/N Comments Fever/Chills    Chest Pain Poor Appetite    Edema Cough    Abdominal Pain n   
Sputum    Joint Pain y Back pain SOB/CUEVA    Pruritis/Rash y   
 Nausea/vomit    Tolerating PT/OT Diarrhea    Tolerating Diet y Constipation    Other Could NOT obtain due to:   
 
Objective: VITALS:  
Last 24hrs VS reviewed since prior progress note. Most recent are: 
Patient Vitals for the past 24 hrs: 
 Temp Pulse Resp BP SpO2  
10/03/18 0831 97.7 °F (36.5 °C) 76 16 123/67 99 % 10/03/18 0428 98.2 °F (36.8 °C) 71 16 123/74 99 % 10/03/18 0022 98.4 °F (36.9 °C) 69 18 117/60 99 % 10/02/18 1550 98.2 °F (36.8 °C) 67 18 126/80 99 % Intake/Output Summary (Last 24 hours) at 10/03/18 1312 Last data filed at 10/03/18 9647 Gross per 24 hour Intake              300 ml Output                0 ml Net              300 ml PHYSICAL EXAM: 
General: Alert, cooperative, no acute distress   
Resp:  CTA bilaterally CV:  Regular  rhythm,  No edema GI:  Soft, Non distended, Non tender.  +Bowel sounds Neurologic:  Alert and oriented X 3, normal speech Psych:   Not anxious nor agitated Skin:  Tattoos+, dry scaly ,erythmeatous skin in upper leg,perineal,scrotal and buttock+. No jaundice Reviewed most current lab test results and cultures  YES Reviewed most current radiology test results   YES Review and summation of old records today    NO Reviewed patient's current orders and MAR    YES 
PMH/ reviewed - no change compared to H&P 
________________________________________________________________________ Care Plan discussed with: 
  Comments Patient x Family RN x Care Manager x Consultant Multidiciplinary team rounds were held today with , nursing, pharmacist and clinical coordinator. Patient's plan of care was discussed; medications were reviewed and discharge planning was addressed. ________________________________________________________________________ Total NON critical care TIME:  20 Minutes Total CRITICAL CARE TIME Spent:   Minutes non procedure based Comments >50% of visit spent in counseling and coordination of care    
________________________________________________________________________ Melissa Belle MD  
 
Procedures: see electronic medical records for all procedures/Xrays and details which were not copied into this note but were reviewed prior to creation of Plan. LABS: 
I reviewed today's most current labs and imaging studies. Pertinent labs include: No results for input(s): WBC, HGB, HCT, PLT, HGBEXT, HCTEXT, PLTEXT, HGBEXT, HCTEXT, PLTEXT in the last 72 hours. Recent Labs 10/03/18 
 0021  10/02/18 
 6027  10/01/18 
 0221 CREA  1.21  1.30  1.12 Signed: Melissa Belle MD

## 2018-10-03 NOTE — PROGRESS NOTES
1910) Bedside and Verbal shift change report given to GISELLE Sandra (oncoming nurse) by Taylor Encinas RN (offgoing nurse). Report included the following information SBAR, Kardex, Intake/Output, MAR, Accordion, Recent Results and Med Rec Status. 0720) Bedside and Verbal shift change report given to Renae Palomares RN (oncoming nurse) by GISELLE Sandra (offgoing nurse). Report included the following information SBAR, Kardex, Intake/Output, MAR, Accordion, Recent Results and Med Rec Status.

## 2018-10-03 NOTE — PROGRESS NOTES
Bedside shift change report given to Juan (oncoming nurse) by Meeta Wick (offgoing nurse). Report included the following information SBAR, Kardex, Intake/Output and MAR.

## 2018-10-04 LAB — CREAT SERPL-MCNC: 1.32 MG/DL (ref 0.7–1.3)

## 2018-10-04 PROCEDURE — 36415 COLL VENOUS BLD VENIPUNCTURE: CPT | Performed by: HOSPITALIST

## 2018-10-04 PROCEDURE — 74011250636 HC RX REV CODE- 250/636: Performed by: HOSPITALIST

## 2018-10-04 PROCEDURE — 74011250636 HC RX REV CODE- 250/636: Performed by: INTERNAL MEDICINE

## 2018-10-04 PROCEDURE — 65270000032 HC RM SEMIPRIVATE

## 2018-10-04 PROCEDURE — 74011250637 HC RX REV CODE- 250/637: Performed by: INTERNAL MEDICINE

## 2018-10-04 PROCEDURE — 74011250637 HC RX REV CODE- 250/637: Performed by: HOSPITALIST

## 2018-10-04 PROCEDURE — 74011000258 HC RX REV CODE- 258: Performed by: HOSPITALIST

## 2018-10-04 PROCEDURE — 82565 ASSAY OF CREATININE: CPT | Performed by: HOSPITALIST

## 2018-10-04 RX ADMIN — ENOXAPARIN SODIUM 40 MG: 40 INJECTION, SOLUTION INTRAVENOUS; SUBCUTANEOUS at 09:20

## 2018-10-04 RX ADMIN — HYDROMORPHONE HYDROCHLORIDE 1.5 MG: 2 INJECTION INTRAMUSCULAR; INTRAVENOUS; SUBCUTANEOUS at 21:19

## 2018-10-04 RX ADMIN — HYDROMORPHONE HYDROCHLORIDE 1.5 MG: 2 INJECTION INTRAMUSCULAR; INTRAVENOUS; SUBCUTANEOUS at 09:21

## 2018-10-04 RX ADMIN — CEFEPIME HYDROCHLORIDE 2 G: 2 INJECTION, POWDER, FOR SOLUTION INTRAVENOUS at 21:19

## 2018-10-04 RX ADMIN — HYDROMORPHONE HYDROCHLORIDE 1.5 MG: 2 INJECTION INTRAMUSCULAR; INTRAVENOUS; SUBCUTANEOUS at 17:07

## 2018-10-04 RX ADMIN — HYDROMORPHONE HYDROCHLORIDE 1.5 MG: 2 INJECTION INTRAMUSCULAR; INTRAVENOUS; SUBCUTANEOUS at 01:12

## 2018-10-04 RX ADMIN — Medication 10 ML: at 05:18

## 2018-10-04 RX ADMIN — Medication 10 ML: at 13:14

## 2018-10-04 RX ADMIN — HYDROMORPHONE HYDROCHLORIDE 1.5 MG: 2 INJECTION INTRAMUSCULAR; INTRAVENOUS; SUBCUTANEOUS at 13:13

## 2018-10-04 RX ADMIN — CEFEPIME HYDROCHLORIDE 2 G: 2 INJECTION, POWDER, FOR SOLUTION INTRAVENOUS at 05:18

## 2018-10-04 RX ADMIN — Medication 10 ML: at 21:19

## 2018-10-04 RX ADMIN — TOBRAMYCIN 400 MG: 40 INJECTION INTRAMUSCULAR; INTRAVENOUS at 15:39

## 2018-10-04 RX ADMIN — CELECOXIB 200 MG: 100 CAPSULE ORAL at 17:07

## 2018-10-04 RX ADMIN — CELECOXIB 200 MG: 100 CAPSULE ORAL at 09:20

## 2018-10-04 RX ADMIN — Medication 1 CAPSULE: at 09:20

## 2018-10-04 RX ADMIN — HYDROMORPHONE HYDROCHLORIDE 1.5 MG: 2 INJECTION INTRAMUSCULAR; INTRAVENOUS; SUBCUTANEOUS at 05:22

## 2018-10-04 RX ADMIN — CEFEPIME HYDROCHLORIDE 2 G: 2 INJECTION, POWDER, FOR SOLUTION INTRAVENOUS at 13:12

## 2018-10-04 RX ADMIN — Medication 10 ML: at 05:19

## 2018-10-04 NOTE — PROGRESS NOTES
Problem: Falls - Risk of 
Goal: *Absence of Falls Document [facility] Fall Risk and appropriate interventions in the flowsheet. Fall Risk Interventions: 
Mobility Interventions: Utilize walker, cane, or other assistive device Medication Interventions: Teach patient to arise slowly Elimination Interventions: Urinal in reach History of Falls Interventions: Evaluate medications/consider consulting pharmacy

## 2018-10-04 NOTE — PROGRESS NOTES
Hospitalist Progress Note NAME: Delmy Mahajan :  1980 MRN:  [id number] [facility] Summary: 43 y.o. male whom presented on 2018 with Assessment / Plan: 
  
Transferred from [facility], for completion of ABX until 10/28 Update Had a discussion with the patient regarding switching IV Dilaudid to oral. Patient is not amenable to this change. He reports taking high doses of oral Dilaudid at [facility] however they did not work. We will continue IV Dilaudid for now. Tricuspid infective Endocarditis Pseudomonas bacteremia L3/4 septic arthritis Pulmonary septic embolic All above in setting of h/o IVDU 
-On IV abx per ID recs: tobramycin and cefepime until 10/28 
- levaquin until  
- F/U tobramycin level and adjust doses  
-ID, ortho, IR and Cardiothoracic surgery on board 
-No plans for surgical intervention MRI L spine : L3/4 septic arthritis CT thoracic spine : multiple pulmonary nodules, several of which are cavitary and several groundglass opacities and areas of consolidation. 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 Back pain secondary to above 
-followed by palliative medicine for pain management: FU palliative consult PMH of HTN and Chronic anemia  
-monitor closely H/O IVDU, heroin 
-pain management as per palliative Code Status: DNR, completed DDNR on admission Surrogate Decision Maker: Main Adams ( ex wife's mom) DVT Prophylaxis: lovenox GI Prophylaxis: not indicated Baseline: independent Subjective: Chief Complaint / Reason for Physician Visit \"ok\". Discussed with RN events overnight. Review of Systems: 
Symptom Y/N Comments  Symptom Y/N Comments Fever/Chills    Chest Pain Poor Appetite    Edema Cough    Abdominal Pain n   
Sputum    Joint Pain y Back pain SOB/CUEVA    Pruritis/Rash y   
 Nausea/vomit    Tolerating PT/OT Diarrhea    Tolerating Diet y Constipation    Other Could NOT obtain due to:   
 
Objective: VITALS:  
Last 24hrs VS reviewed since prior progress note. Most recent are: 
Patient Vitals for the past 24 hrs: 
 Temp Pulse Resp BP SpO2  
10/04/18 0749 98.2 °F (36.8 °C) 70 16 108/69 100 % 10/04/18 0354 97.5 °F (36.4 °C) 82 20 129/71 99 % 10/03/18 2043 97.9 °F (36.6 °C) 82 16 128/67 100 % 10/03/18 1441 97.9 °F (36.6 °C) 66 14 108/59 97 % Intake/Output Summary (Last 24 hours) at 10/04/18 1344 Last data filed at 10/03/18 2223 Gross per 24 hour Intake              100 ml Output                0 ml Net              100 ml PHYSICAL EXAM: 
General: Alert, cooperative, no acute distress   
Resp:  CTA bilaterally CV:  Regular  rhythm,  No edema GI:  Soft, Non distended, Non tender.  +Bowel sounds Neurologic:  Alert and oriented X 3, normal speech Psych:   Not anxious nor agitated Skin:  Tattoos+, dry scaly ,erythmeatous skin in upper leg,perineal,scrotal and buttock+. No jaundice Reviewed most current lab test results and cultures  YES Reviewed most current radiology test results   YES Review and summation of old records today    NO Reviewed patient's current orders and MAR    YES 
PMH/ reviewed - no change compared to H&P 
________________________________________________________________________ Care Plan discussed with: 
  Comments Patient x Family RN x Care Manager x Consultant Multidiciplinary team rounds were held today with , nursing, pharmacist and clinical coordinator. Patient's plan of care was discussed; medications were reviewed and discharge planning was addressed. ________________________________________________________________________ Total NON critical care TIME:  20 Minutes Total CRITICAL CARE TIME Spent:   Minutes non procedure based Comments >50% of visit spent in counseling and coordination of care    
________________________________________________________________________ Traci Romero MD  
 
Procedures: see electronic medical records for all procedures/Xrays and details which were not copied into this note but were reviewed prior to creation of Plan. LABS: 
I reviewed today's most current labs and imaging studies. Pertinent labs include: No results for input(s): WBC, HGB, HCT, PLT, HGBEXT, HCTEXT, PLTEXT, HGBEXT, HCTEXT, PLTEXT in the last 72 hours. Recent Labs 10/04/18 
 3246  10/03/18 
 0021  10/02/18 
 7422 CREA  1.32*  1.21  1.30 Signed: Traci Romero MD

## 2018-10-04 NOTE — PROGRESS NOTES
Problem: Falls - Risk of 
Goal: *Absence of Falls Document Kendra Ambrosio Fall Risk and appropriate interventions in the flowsheet. Outcome: Progressing Towards Goal 
Fall Risk Interventions: 
Mobility Interventions: Utilize walker, cane, or other assistive device Medication Interventions: Teach patient to arise slowly Elimination Interventions: Urinal in reach History of Falls Interventions: Evaluate medications/consider consulting pharmacy

## 2018-10-04 NOTE — PROGRESS NOTES
0710hrs . Alexa Vallejo Bedside and Verbal shift change report given to Khadra Mcnally (oncoming nurse) by Jocy (offgoing nurse). Report included the following information SBAR, Kardex, Intake/Output, MAR, Recent Results and Med Rec Status. 1910hrs . Alexa Vallejo Bedside and Verbal shift change report given to Negrito WALKER 25) by [facility] nurse). Report included the following information SBAR, Kardex, Intake/Output, MAR, Recent Results and Med Rec Status.

## 2018-10-05 LAB
CREAT SERPL-MCNC: 1.3 MG/DL (ref 0.7–1.3)
DATE LAST DOSE: NORMAL
REPORTED DOSE,DOSE: NORMAL UNITS
REPORTED DOSE/TIME,TMG: NORMAL
TOBRAMYCIN SERPL-MCNC: 1.5 UG/ML

## 2018-10-05 PROCEDURE — 74011250636 HC RX REV CODE- 250/636: Performed by: INTERNAL MEDICINE

## 2018-10-05 PROCEDURE — 74011000258 HC RX REV CODE- 258: Performed by: HOSPITALIST

## 2018-10-05 PROCEDURE — 36415 COLL VENOUS BLD VENIPUNCTURE: CPT | Performed by: HOSPITALIST

## 2018-10-05 PROCEDURE — 74011250637 HC RX REV CODE- 250/637: Performed by: HOSPITALIST

## 2018-10-05 PROCEDURE — 80200 ASSAY OF TOBRAMYCIN: CPT | Performed by: HOSPITALIST

## 2018-10-05 PROCEDURE — 74011250637 HC RX REV CODE- 250/637: Performed by: INTERNAL MEDICINE

## 2018-10-05 PROCEDURE — 65270000032 HC RM SEMIPRIVATE

## 2018-10-05 PROCEDURE — 82565 ASSAY OF CREATININE: CPT | Performed by: HOSPITALIST

## 2018-10-05 PROCEDURE — 74011250636 HC RX REV CODE- 250/636: Performed by: HOSPITALIST

## 2018-10-05 RX ADMIN — CEFEPIME HYDROCHLORIDE 2 G: 2 INJECTION, POWDER, FOR SOLUTION INTRAVENOUS at 05:57

## 2018-10-05 RX ADMIN — Medication 1 CAPSULE: at 10:06

## 2018-10-05 RX ADMIN — ENOXAPARIN SODIUM 40 MG: 40 INJECTION, SOLUTION INTRAVENOUS; SUBCUTANEOUS at 10:07

## 2018-10-05 RX ADMIN — Medication 20 ML: at 05:58

## 2018-10-05 RX ADMIN — CELECOXIB 200 MG: 100 CAPSULE ORAL at 17:52

## 2018-10-05 RX ADMIN — Medication 10 ML: at 21:45

## 2018-10-05 RX ADMIN — Medication 10 ML: at 14:16

## 2018-10-05 RX ADMIN — Medication 10 ML: at 01:48

## 2018-10-05 RX ADMIN — Medication 10 ML: at 05:58

## 2018-10-05 RX ADMIN — HYDROMORPHONE HYDROCHLORIDE 1.5 MG: 2 INJECTION INTRAMUSCULAR; INTRAVENOUS; SUBCUTANEOUS at 10:07

## 2018-10-05 RX ADMIN — CELECOXIB 200 MG: 100 CAPSULE ORAL at 10:06

## 2018-10-05 RX ADMIN — HYDROMORPHONE HYDROCHLORIDE 1.5 MG: 2 INJECTION INTRAMUSCULAR; INTRAVENOUS; SUBCUTANEOUS at 14:06

## 2018-10-05 RX ADMIN — Medication 10 ML: at 14:17

## 2018-10-05 RX ADMIN — CEFEPIME HYDROCHLORIDE 2 G: 2 INJECTION, POWDER, FOR SOLUTION INTRAVENOUS at 21:46

## 2018-10-05 RX ADMIN — CEFEPIME HYDROCHLORIDE 2 G: 2 INJECTION, POWDER, FOR SOLUTION INTRAVENOUS at 14:16

## 2018-10-05 RX ADMIN — HYDROMORPHONE HYDROCHLORIDE 1.5 MG: 2 INJECTION INTRAMUSCULAR; INTRAVENOUS; SUBCUTANEOUS at 21:46

## 2018-10-05 RX ADMIN — HYDROMORPHONE HYDROCHLORIDE 1.5 MG: 2 INJECTION INTRAMUSCULAR; INTRAVENOUS; SUBCUTANEOUS at 05:57

## 2018-10-05 RX ADMIN — HYDROMORPHONE HYDROCHLORIDE 1.5 MG: 2 INJECTION INTRAMUSCULAR; INTRAVENOUS; SUBCUTANEOUS at 17:53

## 2018-10-05 RX ADMIN — HYDROMORPHONE HYDROCHLORIDE 1.5 MG: 2 INJECTION INTRAMUSCULAR; INTRAVENOUS; SUBCUTANEOUS at 01:48

## 2018-10-05 NOTE — ROUTINE PROCESS
Bedside and Verbal shift change report given to Kassandra Mckeon RN (oncoming nurse) by Mara Bosworth (offgoing nurse). Report given with SBAR, Cassius, MAR and Recent Results.

## 2018-10-05 NOTE — PROGRESS NOTES
0710hrs . Bruna Harding Bedside and Verbal shift change report given to Khadra Mcnally (oncoming nurse) by Angelito Corado (offgoing nurse). Report included the following information SBAR, Kardex, Intake/Output, MAR, Recent Results and Med Rec Status. 1910hrs . Bruna Harding Bedside and Verbal shift change report given to Camacho (oncoming nurse) by [facility] nurse). Report included the following information SBAR, Kardex, Intake/Output, MAR, Recent Results and Med Rec Status.

## 2018-10-05 NOTE — PROGRESS NOTES
1920) Bedside and Verbal shift change report given to GISELLE Sandra (oncoming nurse) by Jared Quinones RN (offgoing nurse). Report included the following information SBAR, Kardex, Intake/Output, MAR, Accordion, Recent Results and Med Rec Status. 0100) Bedside and Verbal shift change report given to GISELLE Richey (oncoming nurse) by GISELLE Sandra (offgoing nurse). Report included the following information SBAR, Kardex, Intake/Output, MAR, Accordion, Recent Results and Med Rec Status.

## 2018-10-05 NOTE — ROUTINE PROCESS
Bedside and Verbal shift change report given to oncoming nurse by Francisco Peterson RN (offgoing nurse). Report given with SBAR, Kardex, MAR and Recent Results.

## 2018-10-05 NOTE — PROGRESS NOTES
Problem: Falls - Risk of 
Goal: *Absence of Falls Document Gerald Cuba Fall Risk and appropriate interventions in the flowsheet. Outcome: Progressing Towards Goal 
Fall Risk Interventions: 
Mobility Interventions: Communicate number of staff needed for ambulation/transfer, Patient to call before getting OOB, Utilize walker, cane, or other assistive device Medication Interventions: Evaluate medications/consider consulting pharmacy, Teach patient to arise slowly Elimination Interventions: Call light in reach, Patient to call for help with toileting needs History of Falls Interventions: Evaluate medications/consider consulting pharmacy

## 2018-10-05 NOTE — PROGRESS NOTES
Problem: Falls - Risk of 
Goal: *Absence of Falls Document Kareem Hammond Fall Risk and appropriate interventions in the flowsheet. Outcome: Progressing Towards Goal 
Fall Risk Interventions: 
Mobility Interventions: Utilize walker, cane, or other assistive device Medication Interventions: Teach patient to arise slowly Elimination Interventions: Urinal in reach History of Falls Interventions: Evaluate medications/consider consulting pharmacy

## 2018-10-05 NOTE — PROGRESS NOTES
Bedside shift change report given to me (oncoming nurse) by Lesley Johnson (offgoing nurse). Report included the following information SBAR, Kardex, Intake/Output, MAR and Recent Results. 2342: Bedside shift change report given to 1541 Lily Tolliver (oncoming nurse) by me (offgoing nurse). Report included the following information SBAR, Kardex, Intake/Output, MAR and Recent Results.

## 2018-10-05 NOTE — PROGRESS NOTES
[facility] Dosing Services: Antimicrobial Stewardship Daily Doc Consult for dosing of tobramycin and cefepime by Dr. Tucker Shteh (continued from [id number]). Indication: tricuspid valve endocarditis, pseudomonas bacteremia Day of Therapy: 29 of cefepime (per ID continue until 10/28), 22 of tobramycin (per ID continue until 10/28- was previously on gentamicin x5 days) Ht Readings from Last 1 Encounters:  
10/01/18 182.9 cm (72\") Wt Readings from Last 1 Encounters:  
10/01/18 78.8 kg (173 lb 12.8 oz) Tobramycin therapy: 
Current maintenance dose: 400 mg (~5.2 mg/kg) every 36 hours. Pulse dose calculated to approximate a therapeutic peak of 15-20 mcg/mL and trough of </= 0.3 mcg/mL. Last random level 1.5 mcg/ml @ 1037, collected about 19 hours post dose Plan for level / Adjustment in Therapy: afebrile, SCr stable, no new WBC; continue current dosing regimen and plan for repeat level 10/9 (not yet ordered) Dose administration notes:   Doses given appropriately as scheduled Non-Kinetic Antimicrobial Dosing Regimen:  
Current Regimen:  Cefepime 2G IV Q8H Recommendation: continue current dosing regimen Dose administration notes:   Doses given appropriately as scheduled Other Antimicrobial  
(not dosed by pharmacist) N/A Cultures 9/6 Bloodx2: Pseudomonas aeruginosa sens to cefepime, LVQ, & tobramycin; final 
9/7 Hep C: (-) 
9/7 HIV: (-) 
9/8 Blood: Pseudomonas aeruginosa sens to cefepime, LVQ, & tobramycin; final 
9/8 Nares: MRSA (-) 
9/11 Bloodx2: Pseudomonas aeruginosa sens to cefepime, LVQ, & tobramycin; final 
9/16 Blood: NGx6d, final  
Serum Creatinine Lab Results Component Value Date/Time Creatinine 1.30 10/05/2018 03:18 AM  
  
  
Creatinine Clearance Estimated Creatinine Clearance: 83.7 mL/min (based on Cr of 1.3). Temp Temp: 97.8 °F (36.6 °C) WBC Lab Results Component Value Date/Time WBC 12.2 (H) 09/25/2018 12:57 AM  
 
  
H/H Lab Results Component Value Date/Time HGB 8.7 (L) 09/25/2018 12:57 AM  
 
  
Platelets Lab Results Component Value Date/Time PLATELET 140 94/25/8209 12:57 AM  
 
  
 
 
Pharmacist ROGELIO RichardsD, BCPS Contact: [phone]

## 2018-10-06 LAB — CREAT SERPL-MCNC: 1.41 MG/DL (ref 0.7–1.3)

## 2018-10-06 PROCEDURE — 74011250637 HC RX REV CODE- 250/637: Performed by: HOSPITALIST

## 2018-10-06 PROCEDURE — 65270000032 HC RM SEMIPRIVATE

## 2018-10-06 PROCEDURE — 74011250637 HC RX REV CODE- 250/637: Performed by: INTERNAL MEDICINE

## 2018-10-06 PROCEDURE — 74011250636 HC RX REV CODE- 250/636: Performed by: INTERNAL MEDICINE

## 2018-10-06 PROCEDURE — 74011000258 HC RX REV CODE- 258: Performed by: HOSPITALIST

## 2018-10-06 PROCEDURE — 36415 COLL VENOUS BLD VENIPUNCTURE: CPT | Performed by: HOSPITALIST

## 2018-10-06 PROCEDURE — 74011250636 HC RX REV CODE- 250/636: Performed by: HOSPITALIST

## 2018-10-06 PROCEDURE — 82565 ASSAY OF CREATININE: CPT | Performed by: HOSPITALIST

## 2018-10-06 PROCEDURE — 74011000250 HC RX REV CODE- 250: Performed by: INTERNAL MEDICINE

## 2018-10-06 RX ADMIN — CELECOXIB 200 MG: 100 CAPSULE ORAL at 08:43

## 2018-10-06 RX ADMIN — HEPARIN 300 UNITS: 100 SYRINGE at 09:50

## 2018-10-06 RX ADMIN — Medication 10 ML: at 11:51

## 2018-10-06 RX ADMIN — Medication 10 ML: at 08:44

## 2018-10-06 RX ADMIN — Medication 10 ML: at 13:33

## 2018-10-06 RX ADMIN — Medication 10 ML: at 09:53

## 2018-10-06 RX ADMIN — HYDROMORPHONE HYDROCHLORIDE 1.5 MG: 2 INJECTION INTRAMUSCULAR; INTRAVENOUS; SUBCUTANEOUS at 17:34

## 2018-10-06 RX ADMIN — TOBRAMYCIN 400 MG: 40 INJECTION INTRAMUSCULAR; INTRAVENOUS at 05:24

## 2018-10-06 RX ADMIN — HYDROMORPHONE HYDROCHLORIDE 1.5 MG: 2 INJECTION INTRAMUSCULAR; INTRAVENOUS; SUBCUTANEOUS at 13:32

## 2018-10-06 RX ADMIN — Medication 10 ML: at 17:35

## 2018-10-06 RX ADMIN — Medication 10 ML: at 04:45

## 2018-10-06 RX ADMIN — ACETAMINOPHEN 650 MG: 325 TABLET, FILM COATED ORAL at 13:54

## 2018-10-06 RX ADMIN — CEFEPIME HYDROCHLORIDE 2 G: 2 INJECTION, POWDER, FOR SOLUTION INTRAVENOUS at 21:32

## 2018-10-06 RX ADMIN — Medication 10 ML: at 21:31

## 2018-10-06 RX ADMIN — ENOXAPARIN SODIUM 40 MG: 40 INJECTION, SOLUTION INTRAVENOUS; SUBCUTANEOUS at 09:53

## 2018-10-06 RX ADMIN — CELECOXIB 200 MG: 100 CAPSULE ORAL at 17:33

## 2018-10-06 RX ADMIN — Medication 1 CAPSULE: at 11:30

## 2018-10-06 RX ADMIN — HEPARIN 300 UNITS: 100 SYRINGE at 09:42

## 2018-10-06 RX ADMIN — Medication 10 ML: at 09:42

## 2018-10-06 RX ADMIN — CEFEPIME HYDROCHLORIDE 2 G: 2 INJECTION, POWDER, FOR SOLUTION INTRAVENOUS at 06:35

## 2018-10-06 RX ADMIN — HYDROMORPHONE HYDROCHLORIDE 1.5 MG: 2 INJECTION INTRAMUSCULAR; INTRAVENOUS; SUBCUTANEOUS at 21:32

## 2018-10-06 RX ADMIN — HYDROMORPHONE HYDROCHLORIDE 1.5 MG: 2 INJECTION INTRAMUSCULAR; INTRAVENOUS; SUBCUTANEOUS at 05:44

## 2018-10-06 RX ADMIN — HYDROMORPHONE HYDROCHLORIDE 1.5 MG: 2 INJECTION INTRAMUSCULAR; INTRAVENOUS; SUBCUTANEOUS at 01:42

## 2018-10-06 RX ADMIN — HYDROMORPHONE HYDROCHLORIDE 1.5 MG: 2 INJECTION INTRAMUSCULAR; INTRAVENOUS; SUBCUTANEOUS at 09:41

## 2018-10-06 RX ADMIN — CEFEPIME HYDROCHLORIDE 2 G: 2 INJECTION, POWDER, FOR SOLUTION INTRAVENOUS at 13:36

## 2018-10-06 NOTE — PROGRESS NOTES
[facility] Dosing Services: Antimicrobial Stewardship Consult for dosing of tobramycin and cefepime by Dr. Anson Aragon (continued from [facility]). Indication: tricuspid valve endocarditis, pseudomonas bacteremia Day of Therapy: 30 of cefepime (per ID continue until 10/28), 23 of tobramycin (per ID continue until 10/28- was previously on gentamicin x5 days) FYI:  
Daily SCr ordered Other previous abx (CTX x1 on 9/7, vancomycin 9/7/-11), 14 days of LVQ completed 9/27 Ht Readings from Last 1 Encounters:  
10/01/18 182.9 cm (72\") Wt Readings from Last 1 Encounters:  
10/01/18 78.8 kg (173 lb 12.8 oz) Tobramycin therapy: 
Current maintenance dose: Tobramycin 400 mg (~5.2 mg/kg) every 36 hours Pulse dose calculated to approximate a therapeutic peak of 15-20 mcg/mL and trough of </= 0.3 mcg/mL. Last random level 1.5 mcg/ml @ 1037, collected about 19 hours post dose Plan for level / Adjustment in Therapy: afebrile, SCr stable, no new WBC; continue current dosing regimen and plan for repeat level 10/9 (not yet ordered) Dose administration notes:   Doses given appropriately as scheduled Non-Kinetic Antimicrobial Dosing Regimen:  
Current Regimen:  Cefepime 2G IV Q8H Recommendation: continue current dosing regimen Dose administration notes:   Doses given appropriately as scheduled Other Antimicrobial  
(not dosed by pharmacist) N/A Cultures 9/6 Bloodx2: Pseudomonas aeruginosa sens to cefepime, LVQ, & tobramycin; final 
9/7 Hep C: (-) 
9/7 HIV: (-) 
9/8 Blood: Pseudomonas aeruginosa sens to cefepime, LVQ, & tobramycin; final 
9/8 Nares: MRSA (-) 
9/11 Bloodx2: Pseudomonas aeruginosa sens to cefepime, LVQ, & tobramycin; final 
9/16 Blood: NGx6d, final  
Serum Creatinine Lab Results Component Value Date/Time Creatinine 1.41 (H) 10/06/2018 02:17 AM  
  
  
Creatinine Clearance Estimated Creatinine Clearance: 77.2 mL/min (based on Cr of 1.41). Temp Temp: 97.7 °F (36.5 °C) WBC Lab Results Component Value Date/Time WBC 12.2 (H) 09/25/2018 12:57 AM  
 
  
H/H Lab Results Component Value Date/Time HGB 8.7 (L) 09/25/2018 12:57 AM  
 
  
Platelets Lab Results Component Value Date/Time PLATELET 136 72/86/7683 12:57 AM  
 
  
 
 
Pharmacist Christophe Sandoval RPh Contact: [phone]

## 2018-10-06 NOTE — PROGRESS NOTES
Problem: Falls - Risk of 
Goal: *Absence of Falls Document Debria Check Fall Risk and appropriate interventions in the flowsheet. Outcome: Progressing Towards Goal 
Fall Risk Interventions: 
Mobility Interventions: Utilize walker, cane, or other assistive device Medication Interventions: Teach patient to arise slowly Elimination Interventions: Call light in reach History of Falls Interventions: Evaluate medications/consider consulting pharmacy Problem: Pain Goal: *Control of Pain Outcome: Progressing Towards Goal 
Pt is calling q4 on the hour and most times before medication is due. Pain is only slightly relieved with pt pain score constantly between 5-8. Will cont to Marcelo Carson

## 2018-10-06 NOTE — PROGRESS NOTES
0710) Bedside shift change report given to GISELLE MOODY(oncoming nurse) by  Mary Ann Milligan RN  (offgoing nurse). Report included the following information SBAR, Kardex, MAR, Accordion and Recent Results. 5716) Heparin locked both ports PICC line. Pt stated pain worse today, try lidocaine patch 
1148) Blood return both lines of PICC 
1354) Try tylenol and repositioning for pain [address] Zach) Pt discuss how his mobility has improved since he has been in the hospital and that he can now  the shower for 15 minutes before the pain becomes unbearable. 1920) Bedside shift change report given to GISELLE Alexander (oncoming nurse) by GISELLE MOODY (offgoing nurse). Report included the following information SBAR, Kardex, MAR, Accordion and Recent Results.

## 2018-10-06 NOTE — PROGRESS NOTES
Hospitalist Progress Note NAME: Jake Silveira :  1980 MRN:  [id number] Interim Hospital Summary: 43 y.o. male whom presented on 2018 with Assessment / Plan: 
  
[facility], for completion of ABX until 10/28 Update Had a discussion with the patient regarding switching IV Dilaudid to oral. Patient is not amenable to this change. He reports taking high doses of oral Dilaudid at [facility] however they did not work. We will continue IV Dilaudid for now. Tricuspid infective Endocarditis Pseudomonas bacteremia L3/4 septic arthritis Pulmonary septic embolic All above in setting of h/o IVDU 
-On IV abx per ID recs: tobramycin and cefepime until 10/28 
- levaquin until  
- F/U tobramycin level and adjust doses  
-ID, ortho, IR and Cardiothoracic surgery on board 
-No plans for surgical intervention MRI L spine : L3/4 septic arthritis CT thoracic spine : multiple pulmonary nodules, several of which are cavitary and several groundglass opacities and areas of consolidation. 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 Back pain secondary to above 
-followed by palliative medicine for pain management: FU palliative consult PMH of HTN and Chronic anemia  
-monitor closely H/O IVDU, heroin 
-pain management as per palliative Code Status: DNR, completed DDNR on admission Surrogate Decision Maker: Kamaljit Cornejo ( ex wife's mom) DVT Prophylaxis: lovenox GI Prophylaxis: not indicated Baseline: independent Subjective: Chief Complaint / Reason for Physician Visit \"ok\". Discussed with RN events overnight. Review of Systems: 
Symptom Y/N Comments  Symptom Y/N Comments Fever/Chills    Chest Pain Poor Appetite    Edema Cough    Abdominal Pain n   
Sputum    Joint Pain y Back pain SOB/CUEVA    Pruritis/Rash y   
 Nausea/vomit    Tolerating PT/OT Diarrhea    Tolerating Diet y Constipation    Other Could NOT obtain due to:   
 
Objective: VITALS:  
Last 24hrs VS reviewed since prior progress note. Most recent are: 
Patient Vitals for the past 24 hrs: 
 Temp Pulse Resp BP SpO2  
10/06/18 0843 97.7 °F (36.5 °C) 66 16 123/73 99 % 10/06/18 0444 97.5 °F (36.4 °C) 70 16 123/75 100 % 10/05/18 2026 98.2 °F (36.8 °C) 72 18 117/77 98 % 10/05/18 1641 98.2 °F (36.8 °C) 72 16 121/71 100 % Intake/Output Summary (Last 24 hours) at 10/06/18 0901 Last data filed at 10/06/18 5888 Gross per 24 hour Intake              700 ml Output              650 ml Net               50 ml PHYSICAL EXAM: 
General: Alert, cooperative, no acute distress   
Resp:  CTA bilaterally CV:  Regular  rhythm,  No edema GI:  Soft, Non distended, Non tender.  +Bowel sounds Neurologic:  Alert and oriented X 3, normal speech Psych:   Not anxious nor agitated Skin:  Tattoos+, dry scaly ,erythmeatous skin in upper leg,perineal,scrotal and buttock+. No jaundice Reviewed most current lab test results and cultures  YES Reviewed most current radiology test results   YES Review and summation of old records today    NO Reviewed patient's current orders and MAR    YES 
PMH/ reviewed - no change compared to H&P 
________________________________________________________________________ Care Plan discussed with: 
  Comments Patient x Family RN x Care Manager x Consultant Multidiciplinary team rounds were held today with , nursing, pharmacist and clinical coordinator. Patient's plan of care was discussed; medications were reviewed and discharge planning was addressed. ________________________________________________________________________ Total NON critical care TIME:  20 Minutes Total CRITICAL CARE TIME Spent:   Minutes non procedure based Comments >50% of visit spent in counseling and coordination of care    
________________________________________________________________________ Michael Chaidez MD  
 
Procedures: see electronic medical records for all procedures/Xrays and details which were not copied into this note but were reviewed prior to creation of Plan. LABS: 
I reviewed today's most current labs and imaging studies. Pertinent labs include: No results for input(s): WBC, HGB, HCT, PLT, HGBEXT, HCTEXT, PLTEXT, HGBEXT, HCTEXT, PLTEXT in the last 72 hours. Recent Labs 10/06/18 
 0217  10/05/18 
 0170  10/04/18 
 8459 CREA  1.41*  1.30  1.32* Signed: Michael Chaidez MD

## 2018-10-06 NOTE — PROGRESS NOTES
Bedside shift change report given to me (oncoming nurse) by [facility] (offgoing nurse). Report included the following information SBAR, Kardex, Intake/Output, MAR and Recent Results.

## 2018-10-06 NOTE — PROGRESS NOTES
Hospitalist Progress Note NAME: Gina Cobos :  1980 MRN:  [id number] Interim Hospital Summary: 43 y.o. male whom presented on 2018 with Assessment / Plan: 
  
Transferred from [facility], for completion of ABX until 10/28 Update Had a discussion with the patient regarding switching IV Dilaudid to oral. Patient is not amenable to this change. He reports taking high doses of oral Dilaudid at [facility] however they did not work. We will continue IV Dilaudid for now. Tricuspid infective Endocarditis Pseudomonas bacteremia L3/4 septic arthritis Pulmonary septic embolic All above in setting of h/o IVDU 
-On IV abx per ID recs: tobramycin and cefepime until 10/28 
- levaquin until  
- F/U tobramycin level and adjust doses  
-ID, ortho, IR and Cardiothoracic surgery on board 
-No plans for surgical intervention MRI L spine : L3/4 septic arthritis CT thoracic spine : multiple pulmonary nodules, several of which are cavitary and several groundglass opacities and areas of consolidation. 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 Back pain secondary to above 
-followed by palliative medicine for pain management: FU palliative consult PMH of HTN and Chronic anemia  
-monitor closely H/O IVDU, heroin 
-pain management as per palliative Code Status: DNR, completed DDNR on admission Surrogate Decision Maker: Ziggy Gillespiepool ( ex wife's mom) DVT Prophylaxis: lovenox GI Prophylaxis: not indicated Baseline: independent Subjective: Chief Complaint / Reason for Physician Visit \"ok\". Discussed with RN events overnight. Review of Systems: 
Symptom Y/N Comments  Symptom Y/N Comments Fever/Chills    Chest Pain Poor Appetite    Edema Cough    Abdominal Pain n   
Sputum    Joint Pain y Back pain SOB/CUEVA    Pruritis/Rash y   
 Nausea/vomit    Tolerating PT/OT Diarrhea    Tolerating Diet y Constipation    Other Could NOT obtain due to:   
 
Objective: VITALS:  
Last 24hrs VS reviewed since prior progress note. Most recent are: 
Patient Vitals for the past 24 hrs: 
 Temp Pulse Resp BP SpO2  
10/06/18 0843 97.7 °F (36.5 °C) 66 16 123/73 99 % 10/06/18 0444 97.5 °F (36.4 °C) 70 16 123/75 100 % 10/05/18 2026 98.2 °F (36.8 °C) 72 18 117/77 98 % 10/05/18 1641 98.2 °F (36.8 °C) 72 16 121/71 100 % Intake/Output Summary (Last 24 hours) at 10/06/18 7570 Last data filed at 10/06/18 7794 Gross per 24 hour Intake              700 ml Output              650 ml Net               50 ml PHYSICAL EXAM: 
General: Alert, cooperative, no acute distress   
Resp:  CTA bilaterally CV:  Regular  rhythm,  No edema GI:  Soft, Non distended, Non tender.  +Bowel sounds Neurologic:  Alert and oriented X 3, normal speech Psych:   Not anxious nor agitated Skin:  Tattoos+, dry scaly ,erythmeatous skin in upper leg,perineal,scrotal and buttock+. No jaundice Reviewed most current lab test results and cultures  YES Reviewed most current radiology test results   YES Review and summation of old records today    NO Reviewed patient's current orders and MAR    YES 
PMH/ reviewed - no change compared to H&P 
________________________________________________________________________ Care Plan discussed with: 
  Comments Patient x Family RN x Care Manager x Consultant Multidiciplinary team rounds were held today with , nursing, pharmacist and clinical coordinator. Patient's plan of care was discussed; medications were reviewed and discharge planning was addressed. ________________________________________________________________________ Total NON critical care TIME:  20 Minutes Total CRITICAL CARE TIME Spent:   Minutes non procedure based Comments >50% of visit spent in counseling and coordination of care    
________________________________________________________________________ Jonah Mejia MD  
 
Procedures: see electronic medical records for all procedures/Xrays and details which were not copied into this note but were reviewed prior to creation of Plan. LABS: 
I reviewed today's most current labs and imaging studies. Pertinent labs include: No results for input(s): WBC, HGB, HCT, PLT, HGBEXT, HCTEXT, PLTEXT, HGBEXT, HCTEXT, PLTEXT in the last 72 hours. Recent Labs 10/06/18 
 0217  10/05/18 
 9448  10/04/18 
 7820 CREA  1.41*  1.30  1.32* Signed: Jonah Mejia MD

## 2018-10-06 NOTE — PROGRESS NOTES
Problem: Falls - Risk of 
Goal: *Absence of Falls Document Yanique Milligan Fall Risk and appropriate interventions in the flowsheet. Outcome: Progressing Towards Goal 
Fall Risk Interventions: 
Mobility Interventions: Utilize walker, cane, or other assistive device Medication Interventions: Teach patient to arise slowly Elimination Interventions: Call light in reach, Patient to call for help with toileting needs History of Falls Interventions: Evaluate medications/consider consulting pharmacy

## 2018-10-07 LAB — CREAT SERPL-MCNC: 1.32 MG/DL (ref 0.7–1.3)

## 2018-10-07 PROCEDURE — 74011250637 HC RX REV CODE- 250/637: Performed by: INTERNAL MEDICINE

## 2018-10-07 PROCEDURE — 74011250636 HC RX REV CODE- 250/636: Performed by: HOSPITALIST

## 2018-10-07 PROCEDURE — 74011000258 HC RX REV CODE- 258: Performed by: HOSPITALIST

## 2018-10-07 PROCEDURE — 82565 ASSAY OF CREATININE: CPT | Performed by: HOSPITALIST

## 2018-10-07 PROCEDURE — 36415 COLL VENOUS BLD VENIPUNCTURE: CPT | Performed by: HOSPITALIST

## 2018-10-07 PROCEDURE — 74011250636 HC RX REV CODE- 250/636: Performed by: INTERNAL MEDICINE

## 2018-10-07 PROCEDURE — 65270000032 HC RM SEMIPRIVATE

## 2018-10-07 PROCEDURE — 74011250637 HC RX REV CODE- 250/637: Performed by: HOSPITALIST

## 2018-10-07 RX ADMIN — Medication 1 CAPSULE: at 09:32

## 2018-10-07 RX ADMIN — CELECOXIB 200 MG: 100 CAPSULE ORAL at 08:45

## 2018-10-07 RX ADMIN — CEFEPIME HYDROCHLORIDE 2 G: 2 INJECTION, POWDER, FOR SOLUTION INTRAVENOUS at 05:36

## 2018-10-07 RX ADMIN — ENOXAPARIN SODIUM 40 MG: 40 INJECTION, SOLUTION INTRAVENOUS; SUBCUTANEOUS at 09:28

## 2018-10-07 RX ADMIN — HYDROMORPHONE HYDROCHLORIDE 1.5 MG: 2 INJECTION INTRAMUSCULAR; INTRAVENOUS; SUBCUTANEOUS at 21:45

## 2018-10-07 RX ADMIN — Medication 10 ML: at 16:29

## 2018-10-07 RX ADMIN — Medication 10 ML: at 21:45

## 2018-10-07 RX ADMIN — Medication 10 ML: at 14:44

## 2018-10-07 RX ADMIN — HYDROMORPHONE HYDROCHLORIDE 1.5 MG: 2 INJECTION INTRAMUSCULAR; INTRAVENOUS; SUBCUTANEOUS at 09:28

## 2018-10-07 RX ADMIN — HYDROMORPHONE HYDROCHLORIDE 1.5 MG: 2 INJECTION INTRAMUSCULAR; INTRAVENOUS; SUBCUTANEOUS at 17:29

## 2018-10-07 RX ADMIN — Medication 10 ML: at 05:38

## 2018-10-07 RX ADMIN — Medication 10 ML: at 13:27

## 2018-10-07 RX ADMIN — CEFEPIME HYDROCHLORIDE 2 G: 2 INJECTION, POWDER, FOR SOLUTION INTRAVENOUS at 13:27

## 2018-10-07 RX ADMIN — HYDROMORPHONE HYDROCHLORIDE 1.5 MG: 2 INJECTION INTRAMUSCULAR; INTRAVENOUS; SUBCUTANEOUS at 05:37

## 2018-10-07 RX ADMIN — CEFEPIME HYDROCHLORIDE 2 G: 2 INJECTION, POWDER, FOR SOLUTION INTRAVENOUS at 21:45

## 2018-10-07 RX ADMIN — HYDROMORPHONE HYDROCHLORIDE 1.5 MG: 2 INJECTION INTRAMUSCULAR; INTRAVENOUS; SUBCUTANEOUS at 13:27

## 2018-10-07 RX ADMIN — HYDROMORPHONE HYDROCHLORIDE 1.5 MG: 2 INJECTION INTRAMUSCULAR; INTRAVENOUS; SUBCUTANEOUS at 01:41

## 2018-10-07 RX ADMIN — CELECOXIB 200 MG: 100 CAPSULE ORAL at 17:30

## 2018-10-07 RX ADMIN — Medication 10 ML: at 17:33

## 2018-10-07 RX ADMIN — TOBRAMYCIN 400 MG: 40 INJECTION INTRAMUSCULAR; INTRAVENOUS at 16:29

## 2018-10-07 NOTE — PROGRESS NOTES
Problem: Falls - Risk of 
Goal: *Absence of Falls Document Meg Langley Fall Risk and appropriate interventions in the flowsheet. Outcome: Progressing Towards Goal 
Fall Risk Interventions: 
Mobility Interventions: Utilize walker, cane, or other assistive device Medication Interventions: Teach patient to arise slowly Elimination Interventions: Call light in reach History of Falls Interventions: Evaluate medications/consider consulting pharmacy Problem: Pain Goal: *Control of Pain Outcome: Progressing Towards Goal 
Pt cont calling out every 4hrs to the min. Per day shift, pt pain scores have been worse today without relief. Pain medication due to be tapered again tomorrow night. Will cont to monitor

## 2018-10-07 NOTE — PROGRESS NOTES
Hospitalist Progress Note NAME: Hugo Bateman :  1980 MRN:  [id number] Interim Hospital Summary: 43 y.o. male whom presented on 2018 with Assessment / Plan: 
  
Transferred [facility], for completion of ABX until 10/28 Update Had a discussion with the patient regarding switching IV Dilaudid to oral. Patient is not amenable to this change. He reports taking high doses of oral Dilaudid at [facility] however they did not work. We will continue IV Dilaudid for now. Tricuspid infective Endocarditis Pseudomonas bacteremia L3/4 septic arthritis Pulmonary septic embolic All above in setting of h/o IVDU 
-On IV abx per ID recs: tobramycin and cefepime until 10/28 
- levaquin until  
- F/U tobramycin level and adjust doses  
-ID, ortho, IR and Cardiothoracic surgery on board 
-No plans for surgical intervention MRI L spine : L3/4 septic arthritis CT thoracic spine : multiple pulmonary nodules, several of which are cavitary and several groundglass opacities and areas of consolidation. 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 Back pain secondary to above 
-followed by palliative medicine for pain management: FU palliative consult PMH of HTN and Chronic anemia  
-monitor closely H/O IVDU, heroin 
-pain management as per palliative Code Status: DNR, completed DDNR on admission Surrogate Decision Maker: Antwan Bradford ( ex wife's mom) DVT Prophylaxis: lovenox GI Prophylaxis: not indicated Baseline: independent Subjective: Chief Complaint / Reason for Physician Visit \"ok\". Discussed with RN events overnight. Review of Systems: 
Symptom Y/N Comments  Symptom Y/N Comments Fever/Chills    Chest Pain Poor Appetite    Edema Cough    Abdominal Pain n   
Sputum    Joint Pain y Back pain SOB/CUEVA    Pruritis/Rash y   
 Nausea/vomit    Tolerating PT/OT Diarrhea    Tolerating Diet y Constipation    Other Could NOT obtain due to:   
 
Objective: VITALS:  
Last 24hrs VS reviewed since prior progress note. Most recent are: 
Patient Vitals for the past 24 hrs: 
 Temp Pulse Resp BP SpO2  
10/07/18 0845 97.5 °F (36.4 °C) 60 16 126/78 100 % 10/07/18 0548 97.5 °F (36.4 °C) 60 18 123/78 98 % 10/06/18 2014 97.5 °F (36.4 °C) 61 18 120/69 100 % 10/06/18 1603 97.5 °F (36.4 °C) 64 16 115/64 100 % Intake/Output Summary (Last 24 hours) at 10/07/18 1012 Last data filed at 10/07/18 8411 Gross per 24 hour Intake              500 ml Output                0 ml Net              500 ml PHYSICAL EXAM: 
General: Alert, cooperative, no acute distress   
Resp:  CTA bilaterally CV:  Regular  rhythm,  No edema GI:  Soft, Non distended, Non tender.  +Bowel sounds Neurologic:  Alert and oriented X 3, normal speech Psych:   Not anxious nor agitated Skin:  Tattoos+, dry scaly ,erythmeatous skin in upper leg,perineal,scrotal and buttock+. No jaundice Reviewed most current lab test results and cultures  YES Reviewed most current radiology test results   YES Review and summation of old records today    NO Reviewed patient's current orders and MAR    YES 
PMH/ reviewed - no change compared to H&P 
________________________________________________________________________ Care Plan discussed with: 
  Comments Patient x Family RN x Care Manager x Consultant Multidiciplinary team rounds were held today with , nursing, pharmacist and clinical coordinator. Patient's plan of care was discussed; medications were reviewed and discharge planning was addressed. ________________________________________________________________________ Total NON critical care TIME:  20 Minutes Total CRITICAL CARE TIME Spent:   Minutes non procedure based Comments >50% of visit spent in counseling and coordination of care    
________________________________________________________________________ Nilsa Armstrong MD  
 
Procedures: see electronic medical records for all procedures/Xrays and details which were not copied into this note but were reviewed prior to creation of Plan. LABS: 
I reviewed today's most current labs and imaging studies. Pertinent labs include: No results for input(s): WBC, HGB, HCT, PLT, HGBEXT, HCTEXT, PLTEXT, HGBEXT, HCTEXT, PLTEXT in the last 72 hours. Recent Labs 10/07/18 
 3850  10/06/18 
 0217  10/05/18 
 0039 CREA  1.32*  1.41*  1.30 Signed: Nisla Armstrong MD

## 2018-10-07 NOTE — PROGRESS NOTES
Bedside shift change report given to me (oncoming nurse) by Fan Peters (offgoing nurse). Report included the following information SBAR, Kardex, Intake/Output, MAR and Recent Results.

## 2018-10-08 LAB — CREAT SERPL-MCNC: 1.4 MG/DL (ref 0.7–1.3)

## 2018-10-08 PROCEDURE — 74011250637 HC RX REV CODE- 250/637: Performed by: HOSPITALIST

## 2018-10-08 PROCEDURE — 74011250637 HC RX REV CODE- 250/637: Performed by: INTERNAL MEDICINE

## 2018-10-08 PROCEDURE — 74011250636 HC RX REV CODE- 250/636: Performed by: HOSPITALIST

## 2018-10-08 PROCEDURE — 65270000032 HC RM SEMIPRIVATE

## 2018-10-08 PROCEDURE — 74011250636 HC RX REV CODE- 250/636: Performed by: INTERNAL MEDICINE

## 2018-10-08 PROCEDURE — 36415 COLL VENOUS BLD VENIPUNCTURE: CPT | Performed by: HOSPITALIST

## 2018-10-08 PROCEDURE — 82565 ASSAY OF CREATININE: CPT | Performed by: HOSPITALIST

## 2018-10-08 PROCEDURE — 74011000258 HC RX REV CODE- 258: Performed by: HOSPITALIST

## 2018-10-08 RX ADMIN — HYDROMORPHONE HYDROCHLORIDE 1 MG: 1 INJECTION, SOLUTION INTRAMUSCULAR; INTRAVENOUS; SUBCUTANEOUS at 01:19

## 2018-10-08 RX ADMIN — CEFEPIME HYDROCHLORIDE 2 G: 2 INJECTION, POWDER, FOR SOLUTION INTRAVENOUS at 05:22

## 2018-10-08 RX ADMIN — Medication 10 ML: at 21:29

## 2018-10-08 RX ADMIN — CEFEPIME HYDROCHLORIDE 2 G: 2 INJECTION, POWDER, FOR SOLUTION INTRAVENOUS at 21:29

## 2018-10-08 RX ADMIN — CEFEPIME HYDROCHLORIDE 2 G: 2 INJECTION, POWDER, FOR SOLUTION INTRAVENOUS at 13:13

## 2018-10-08 RX ADMIN — Medication 10 ML: at 13:14

## 2018-10-08 RX ADMIN — Medication 10 ML: at 05:22

## 2018-10-08 RX ADMIN — Medication 1 CAPSULE: at 10:17

## 2018-10-08 RX ADMIN — ENOXAPARIN SODIUM 40 MG: 40 INJECTION, SOLUTION INTRAVENOUS; SUBCUTANEOUS at 09:21

## 2018-10-08 RX ADMIN — HYDROMORPHONE HYDROCHLORIDE 1 MG: 1 INJECTION, SOLUTION INTRAMUSCULAR; INTRAVENOUS; SUBCUTANEOUS at 05:22

## 2018-10-08 RX ADMIN — HYDROMORPHONE HYDROCHLORIDE 1 MG: 1 INJECTION, SOLUTION INTRAMUSCULAR; INTRAVENOUS; SUBCUTANEOUS at 09:22

## 2018-10-08 RX ADMIN — HYDROMORPHONE HYDROCHLORIDE 1 MG: 1 INJECTION, SOLUTION INTRAMUSCULAR; INTRAVENOUS; SUBCUTANEOUS at 13:14

## 2018-10-08 RX ADMIN — Medication 10 ML: at 13:13

## 2018-10-08 RX ADMIN — HYDROMORPHONE HYDROCHLORIDE 1 MG: 1 INJECTION, SOLUTION INTRAMUSCULAR; INTRAVENOUS; SUBCUTANEOUS at 21:29

## 2018-10-08 RX ADMIN — CELECOXIB 200 MG: 100 CAPSULE ORAL at 17:17

## 2018-10-08 RX ADMIN — HYDROMORPHONE HYDROCHLORIDE 1 MG: 1 INJECTION, SOLUTION INTRAMUSCULAR; INTRAVENOUS; SUBCUTANEOUS at 17:17

## 2018-10-08 RX ADMIN — CELECOXIB 200 MG: 100 CAPSULE ORAL at 09:21

## 2018-10-08 NOTE — PROGRESS NOTES
Problem: Falls - Risk of 
Goal: *Absence of Falls Document Tony Gay Fall Risk and appropriate interventions in the flowsheet. Outcome: Progressing Towards Goal 
Fall Risk Interventions: 
Mobility Interventions: Utilize walker, cane, or other assistive device Medication Interventions: Teach patient to arise slowly Elimination Interventions: Call light in reach History of Falls Interventions: Evaluate medications/consider consulting pharmacy

## 2018-10-08 NOTE — PROGRESS NOTES
Hospitalist Progress Note NAME: Rashaun Matthew :  1980 MRN:  [id number] Interim Hospital Summary: 43 y.o. male whom presented on 2018 with Assessment / Plan: 
  
Transferred from [facility], for completion of ABX until 10/28 Update Had a discussion with the patient regarding switching IV Dilaudid to oral. Patient is not amenable to this change. He reports taking high doses of oral Dilaudid at [facility] however they did not work. We will continue IV Dilaudid for now. Tricuspid infective Endocarditis Pseudomonas bacteremia L3/4 septic arthritis Pulmonary septic embolic All above in setting of h/o IVDU 
-On IV abx per ID recs: tobramycin and cefepime until 10/28 
- levaquin until  
- F/U tobramycin level and adjust doses  
-ID, ortho, IR and Cardiothoracic surgery on board 
-No plans for surgical intervention MRI L spine : L3/4 septic arthritis CT thoracic spine : multiple pulmonary nodules, several of which are cavitary and several groundglass opacities and areas of consolidation. 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 Back pain secondary to above 
-followed by palliative medicine for pain management: FU palliative consult PMH of HTN and Chronic anemia  
-monitor closely H/O IVDU, heroin 
-pain management as per palliative Code Status: DNR, completed DDNR on admission Surrogate Decision Maker: Darsaray Client ( ex wife's mom) DVT Prophylaxis: lovenox GI Prophylaxis: not indicated Baseline: independent Subjective: Chief Complaint / Reason for Physician Visit \"ok\". Discussed with RN events overnight. Review of Systems: 
Symptom Y/N Comments  Symptom Y/N Comments Fever/Chills    Chest Pain Poor Appetite    Edema Cough    Abdominal Pain n   
Sputum    Joint Pain y Back pain SOB/CUEVA    Pruritis/Rash y   
 Nausea/vomit    Tolerating PT/OT Diarrhea    Tolerating Diet y Constipation    Other Could NOT obtain due to:   
 
Objective: VITALS:  
Last 24hrs VS reviewed since prior progress note. Most recent are: 
Patient Vitals for the past 24 hrs: 
 Temp Pulse Resp BP SpO2  
10/08/18 0807 97.5 °F (36.4 °C) 72 16 121/81 100 % 10/08/18 0521 97.5 °F (36.4 °C) (!) 55 18 124/77 99 % 10/07/18 2028 97.8 °F (36.6 °C) 72 18 119/73 100 % 10/07/18 1628 98 °F (36.7 °C) 69 16 131/73 100 % Intake/Output Summary (Last 24 hours) at 10/08/18 1334 Last data filed at 10/07/18 2144 Gross per 24 hour Intake              200 ml Output                0 ml Net              200 ml PHYSICAL EXAM: 
General: Alert, cooperative, no acute distress   
Resp:  CTA bilaterally CV:  Regular  rhythm,  No edema GI:  Soft, Non distended, Non tender.  +Bowel sounds Neurologic:  Alert and oriented X 3, normal speech Psych:   Not anxious nor agitated Skin:  Tattoos+, dry scaly ,erythmeatous skin in upper leg,perineal,scrotal and buttock+. No jaundice Reviewed most current lab test results and cultures  YES Reviewed most current radiology test results   YES Review and summation of old records today    NO Reviewed patient's current orders and MAR    YES 
PMH/ reviewed - no change compared to H&P 
________________________________________________________________________ Care Plan discussed with: 
  Comments Patient x Family RN x Care Manager x Consultant Multidiciplinary team rounds were held today with , nursing, pharmacist and clinical coordinator. Patient's plan of care was discussed; medications were reviewed and discharge planning was addressed. ________________________________________________________________________ Total NON critical care TIME:  20 Minutes Total CRITICAL CARE TIME Spent:   Minutes non procedure based Comments >50% of visit spent in counseling and coordination of care    
________________________________________________________________________ Staci Dunbar MD  
 
Procedures: see electronic medical records for all procedures/Xrays and details which were not copied into this note but were reviewed prior to creation of Plan. LABS: 
I reviewed today's most current labs and imaging studies. Pertinent labs include: No results for input(s): WBC, HGB, HCT, PLT, HGBEXT, HCTEXT, PLTEXT, HGBEXT, HCTEXT, PLTEXT in the last 72 hours. Recent Labs 10/08/18 
 0326  10/07/18 
 8525  10/06/18 
 0217 CREA  1.40*  1.32*  1.41* Signed: Staci Dunbar MD

## 2018-10-08 NOTE — PROGRESS NOTES
Problem: Falls - Risk of 
Goal: *Absence of Falls Document Mona Card Fall Risk and appropriate interventions in the flowsheet. Outcome: Progressing Towards Goal 
Fall Risk Interventions: 
Mobility Interventions: Utilize walker, cane, or other assistive device Medication Interventions: Teach patient to arise slowly Elimination Interventions: Call light in reach History of Falls Interventions: Evaluate medications/consider consulting pharmacy

## 2018-10-08 NOTE — PROGRESS NOTES
Bedside shift change report given to me (oncoming nurse) by Moe Etienne (offgoing nurse). Report included the following information SBAR, Kardex, Intake/Output, MAR and Recent Results.

## 2018-10-08 NOTE — PROGRESS NOTES
Problem: Falls - Risk of 
Goal: *Absence of Falls Document Angie Gonzalez Fall Risk and appropriate interventions in the flowsheet. Outcome: Progressing Towards Goal 
Fall Risk Interventions: 
Mobility Interventions: Utilize walker, cane, or other assistive device Medication Interventions: Teach patient to arise slowly Elimination Interventions: Call light in reach History of Falls Interventions: Consult care management for discharge planning Problem: Pain Goal: *Control of Pain Outcome: Progressing Towards Goal 
COnt on current pain regimen. Pt PRN dilaudid dose will be tapered down to 1mg tonight at 0000. WIll cont to monitor.

## 2018-10-08 NOTE — PROGRESS NOTES
0710hrs . Gregory Shabazz Bedside and Verbal shift change report given to Khadra Mcnally (oncoming nurse) by Bruno Payne (offgoing nurse). Report included the following information SBAR, Kardex, Intake/Output, MAR, Recent Results and Med Rec Status. 1910hrs . Gregory Shabazz Bedside and Verbal shift change report given to Camacho (oncoming nurse) by [facility] nurse). Report included the following information SBAR, Kardex, Intake/Output, MAR, Recent Results and Med Rec Status.

## 2018-10-09 LAB
CREAT SERPL-MCNC: 0.84 MG/DL (ref 0.7–1.3)
DATE LAST DOSE: NORMAL
REPORTED DOSE,DOSE: NORMAL UNITS
REPORTED DOSE/TIME,TMG: NORMAL
TOBRAMYCIN SERPL-MCNC: 10.3 UG/ML

## 2018-10-09 PROCEDURE — 74011000258 HC RX REV CODE- 258: Performed by: HOSPITALIST

## 2018-10-09 PROCEDURE — 80200 ASSAY OF TOBRAMYCIN: CPT | Performed by: HOSPITALIST

## 2018-10-09 PROCEDURE — 74011250637 HC RX REV CODE- 250/637: Performed by: HOSPITALIST

## 2018-10-09 PROCEDURE — 74011250636 HC RX REV CODE- 250/636: Performed by: INTERNAL MEDICINE

## 2018-10-09 PROCEDURE — 82565 ASSAY OF CREATININE: CPT | Performed by: HOSPITALIST

## 2018-10-09 PROCEDURE — 74011000250 HC RX REV CODE- 250: Performed by: HOSPITALIST

## 2018-10-09 PROCEDURE — 65270000032 HC RM SEMIPRIVATE

## 2018-10-09 PROCEDURE — 74011250637 HC RX REV CODE- 250/637: Performed by: INTERNAL MEDICINE

## 2018-10-09 PROCEDURE — 36415 COLL VENOUS BLD VENIPUNCTURE: CPT | Performed by: HOSPITALIST

## 2018-10-09 PROCEDURE — 74011250636 HC RX REV CODE- 250/636: Performed by: HOSPITALIST

## 2018-10-09 PROCEDURE — 36600 WITHDRAWAL OF ARTERIAL BLOOD: CPT

## 2018-10-09 RX ADMIN — CEFEPIME HYDROCHLORIDE 2 G: 2 INJECTION, POWDER, FOR SOLUTION INTRAVENOUS at 05:45

## 2018-10-09 RX ADMIN — CEFEPIME HYDROCHLORIDE 2 G: 2 INJECTION, POWDER, FOR SOLUTION INTRAVENOUS at 21:33

## 2018-10-09 RX ADMIN — TOBRAMYCIN 400 MG: 40 INJECTION INTRAMUSCULAR; INTRAVENOUS at 06:09

## 2018-10-09 RX ADMIN — Medication 10 ML: at 05:45

## 2018-10-09 RX ADMIN — CEFEPIME HYDROCHLORIDE 2 G: 2 INJECTION, POWDER, FOR SOLUTION INTRAVENOUS at 13:19

## 2018-10-09 RX ADMIN — HYDROMORPHONE HYDROCHLORIDE 1 MG: 1 INJECTION, SOLUTION INTRAMUSCULAR; INTRAVENOUS; SUBCUTANEOUS at 21:27

## 2018-10-09 RX ADMIN — HEPARIN 300 UNITS: 100 SYRINGE at 12:02

## 2018-10-09 RX ADMIN — HYDROMORPHONE HYDROCHLORIDE 1 MG: 1 INJECTION, SOLUTION INTRAMUSCULAR; INTRAVENOUS; SUBCUTANEOUS at 05:45

## 2018-10-09 RX ADMIN — HEPARIN 300 UNITS: 100 SYRINGE at 12:01

## 2018-10-09 RX ADMIN — ENOXAPARIN SODIUM 40 MG: 40 INJECTION, SOLUTION INTRAVENOUS; SUBCUTANEOUS at 09:14

## 2018-10-09 RX ADMIN — ALTEPLASE 2 MG: 2.2 INJECTION, POWDER, LYOPHILIZED, FOR SOLUTION INTRAVENOUS at 18:40

## 2018-10-09 RX ADMIN — HYDROMORPHONE HYDROCHLORIDE 1 MG: 1 INJECTION, SOLUTION INTRAMUSCULAR; INTRAVENOUS; SUBCUTANEOUS at 09:15

## 2018-10-09 RX ADMIN — CELECOXIB 200 MG: 100 CAPSULE ORAL at 17:10

## 2018-10-09 RX ADMIN — HYDROMORPHONE HYDROCHLORIDE 1 MG: 1 INJECTION, SOLUTION INTRAMUSCULAR; INTRAVENOUS; SUBCUTANEOUS at 01:32

## 2018-10-09 RX ADMIN — Medication 10 ML: at 13:19

## 2018-10-09 RX ADMIN — Medication 1 CAPSULE: at 09:14

## 2018-10-09 RX ADMIN — CELECOXIB 200 MG: 100 CAPSULE ORAL at 09:14

## 2018-10-09 RX ADMIN — Medication 10 ML: at 21:33

## 2018-10-09 RX ADMIN — Medication 10 ML: at 21:28

## 2018-10-09 RX ADMIN — HYDROMORPHONE HYDROCHLORIDE 1 MG: 1 INJECTION, SOLUTION INTRAMUSCULAR; INTRAVENOUS; SUBCUTANEOUS at 17:10

## 2018-10-09 RX ADMIN — HYDROMORPHONE HYDROCHLORIDE 1 MG: 1 INJECTION, SOLUTION INTRAMUSCULAR; INTRAVENOUS; SUBCUTANEOUS at 13:19

## 2018-10-09 NOTE — PROGRESS NOTES
Problem: Falls - Risk of 
Goal: *Absence of Falls Document Jey Levels Fall Risk and appropriate interventions in the flowsheet. Outcome: Progressing Towards Goal 
Fall Risk Interventions: 
Mobility Interventions: Assess mobility with egress test, Utilize walker, cane, or other assistive device Medication Interventions: Teach patient to arise slowly Elimination Interventions: Call light in reach History of Falls Interventions: Evaluate medications/consider consulting pharmacy

## 2018-10-09 NOTE — PROGRESS NOTES
0710hrs . Adilia Eagle Bedside and Verbal shift change report given to Jenna) by Camacho (offgoing nurse). Report included the following information SBAR, Kardex, Intake/Output, MAR, Recent Results, Med Rec Status. 1200hrs Attempted to draw blood from PICC line for tobramycin level, 2 ports had blood return but not enough for the blood needed for tobramycin level. Red port has sluggish blood return and flushing. Purple port was not flushing. Heparin flush was administered on both ports. After an hour, only the red port is flushing. Alteplase flush was ordered to declot red port. Nursing supervisor administered alteplase flush. 1910hrs  . Adilia Eagle Bedside and Verbal shift change report given to Hanna (oncoming nurse) by [facility] nurse). Report included the following information SBAR, Kardex, Intake/Output, MAR, Recent Results, Med Rec Status.

## 2018-10-09 NOTE — PROGRESS NOTES
Hospitalist Progress Note NAME: Hugo Bateman :  1980 MRN:  [id number] Interim Hospital Summary: 43 y.o. male whom presented on 2018 with Assessment / Plan: 
  
Transferred from [facility], for completion of ABX until 10/28 Update 
-no change in exam or plan of care. Tricuspid infective Endocarditis Pseudomonas bacteremia L3/4 septic arthritis Pulmonary septic embolic All above in setting of h/o IVDU 
-On IV abx per ID recs: tobramycin and cefepime until 10/28 
- levaquin until  
-F/U tobramycin level and adjust doses  
-ID, ortho, IR and Cardiothoracic surgery on board 
-No plans for surgical intervention MRI L spine : L3/4 septic arthritis CT thoracic spine : multiple pulmonary nodules, several of which are cavitary and several groundglass opacities and areas of consolidation. 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 Back pain secondary to above 
-followed by palliative medicine for pain management: FU palliative consult PMH of HTN and Chronic anemia  
-monitor closely H/O IVDU, heroin 
-pain management as per palliative Code Status: DNR, completed DDNR on admission Surrogate Decision Maker: Antwan Bradford ( ex wife's mom) DVT Prophylaxis: lovenox GI Prophylaxis: not indicated Baseline: independent Subjective: Chief Complaint / Reason for Physician Visit \"ok\". Discussed with RN events overnight. Review of Systems: 
Symptom Y/N Comments  Symptom Y/N Comments Fever/Chills    Chest Pain Poor Appetite    Edema Cough    Abdominal Pain Sputum    Joint Pain y Back pain SOB/CUEVA    Pruritis/Rash Nausea/vomit    Tolerating PT/OT y Diarrhea    Tolerating Diet y Constipation    Other Could NOT obtain due to:   
 
Objective: VITALS:  
Last 24hrs VS reviewed since prior progress note. Most recent are: Patient Vitals for the past 24 hrs: 
 Temp Pulse Resp BP SpO2  
10/09/18 0729 98 °F (36.7 °C) 69 18 122/77 -  
10/08/18 2042 98.4 °F (36.9 °C) 83 18 138/74 100 % 10/08/18 1715 98.2 °F (36.8 °C) 68 18 130/85 100 % No intake or output data in the 24 hours ending 10/09/18 0858 PHYSICAL EXAM: 
General: Alert, cooperative, no acute distress   
Resp:  CTA bilaterally CV:  Regular  rhythm,  No edema GI:  Soft, Non distended, Non tender.  +Bowel sounds Neurologic:  Alert and oriented X 3, normal speech Skin:  Tattoos+, dry scaly ,erythmeatous skin in upper leg,perineal,scrotal and buttock+. No jaundice Reviewed most current lab test results and cultures  YES Reviewed most current radiology test results   YES Review and summation of old records today    NO Reviewed patient's current orders and MAR    YES 
PMH/ reviewed - no change compared to H&P 
________________________________________________________________________ Care Plan discussed with: 
  Comments Patient x Family RN x Care Manager x Consultant Multidiciplinary team rounds were held today with , nursing, pharmacist and clinical coordinator. Patient's plan of care was discussed; medications were reviewed and discharge planning was addressed. ________________________________________________________________________ Total NON critical care TIME:  15 Minutes Total CRITICAL CARE TIME Spent:   Minutes non procedure based Comments >50% of visit spent in counseling and coordination of care    
________________________________________________________________________ Michael Chaidez MD  
 
Procedures: see electronic medical records for all procedures/Xrays and details which were not copied into this note but were reviewed prior to creation of Plan. LABS: 
I reviewed today's most current labs and imaging studies. Pertinent labs include: No results for input(s): WBC, HGB, HCT, PLT, HGBEXT, HCTEXT, PLTEXT, HGBEXT, HCTEXT, PLTEXT in the last 72 hours. Recent Labs 10/09/18 
 0543  10/08/18 
 0182  10/07/18 
 6332 CREA  0.84  1.40*  1.32* Signed: Aneudy Fields MD

## 2018-10-10 LAB
CREAT SERPL-MCNC: 1.52 MG/DL (ref 0.7–1.3)
DATE LAST DOSE: NORMAL
REPORTED DOSE,DOSE: NORMAL UNITS
REPORTED DOSE/TIME,TMG: NORMAL
TOBRAMYCIN SERPL-MCNC: 1.3 UG/ML

## 2018-10-10 PROCEDURE — 74011250636 HC RX REV CODE- 250/636: Performed by: INTERNAL MEDICINE

## 2018-10-10 PROCEDURE — 74011250636 HC RX REV CODE- 250/636: Performed by: HOSPITALIST

## 2018-10-10 PROCEDURE — 65270000032 HC RM SEMIPRIVATE

## 2018-10-10 PROCEDURE — 74011250637 HC RX REV CODE- 250/637: Performed by: HOSPITALIST

## 2018-10-10 PROCEDURE — 36415 COLL VENOUS BLD VENIPUNCTURE: CPT | Performed by: HOSPITALIST

## 2018-10-10 PROCEDURE — 74011000258 HC RX REV CODE- 258: Performed by: HOSPITALIST

## 2018-10-10 PROCEDURE — 80200 ASSAY OF TOBRAMYCIN: CPT | Performed by: HOSPITALIST

## 2018-10-10 PROCEDURE — 74011250637 HC RX REV CODE- 250/637: Performed by: INTERNAL MEDICINE

## 2018-10-10 PROCEDURE — 82565 ASSAY OF CREATININE: CPT | Performed by: HOSPITALIST

## 2018-10-10 RX ADMIN — TOBRAMYCIN 400 MG: 40 INJECTION INTRAMUSCULAR; INTRAVENOUS at 17:34

## 2018-10-10 RX ADMIN — CELECOXIB 200 MG: 100 CAPSULE ORAL at 17:34

## 2018-10-10 RX ADMIN — HYDROMORPHONE HYDROCHLORIDE 1 MG: 1 INJECTION, SOLUTION INTRAMUSCULAR; INTRAVENOUS; SUBCUTANEOUS at 13:43

## 2018-10-10 RX ADMIN — Medication 10 ML: at 13:42

## 2018-10-10 RX ADMIN — HYDROMORPHONE HYDROCHLORIDE 1 MG: 1 INJECTION, SOLUTION INTRAMUSCULAR; INTRAVENOUS; SUBCUTANEOUS at 21:45

## 2018-10-10 RX ADMIN — CEFEPIME HYDROCHLORIDE 2 G: 2 INJECTION, POWDER, FOR SOLUTION INTRAVENOUS at 13:42

## 2018-10-10 RX ADMIN — Medication 10 ML: at 05:42

## 2018-10-10 RX ADMIN — HYDROMORPHONE HYDROCHLORIDE 1 MG: 1 INJECTION, SOLUTION INTRAMUSCULAR; INTRAVENOUS; SUBCUTANEOUS at 17:34

## 2018-10-10 RX ADMIN — HYDROMORPHONE HYDROCHLORIDE 1 MG: 1 INJECTION, SOLUTION INTRAMUSCULAR; INTRAVENOUS; SUBCUTANEOUS at 09:39

## 2018-10-10 RX ADMIN — Medication 10 ML: at 21:45

## 2018-10-10 RX ADMIN — HYDROMORPHONE HYDROCHLORIDE 1 MG: 1 INJECTION, SOLUTION INTRAMUSCULAR; INTRAVENOUS; SUBCUTANEOUS at 01:30

## 2018-10-10 RX ADMIN — HYDROMORPHONE HYDROCHLORIDE 1 MG: 1 INJECTION, SOLUTION INTRAMUSCULAR; INTRAVENOUS; SUBCUTANEOUS at 05:40

## 2018-10-10 RX ADMIN — Medication 1 CAPSULE: at 09:39

## 2018-10-10 RX ADMIN — CELECOXIB 200 MG: 100 CAPSULE ORAL at 09:39

## 2018-10-10 RX ADMIN — CEFEPIME HYDROCHLORIDE 2 G: 2 INJECTION, POWDER, FOR SOLUTION INTRAVENOUS at 21:46

## 2018-10-10 RX ADMIN — ENOXAPARIN SODIUM 40 MG: 40 INJECTION, SOLUTION INTRAVENOUS; SUBCUTANEOUS at 09:39

## 2018-10-10 RX ADMIN — CEFEPIME HYDROCHLORIDE 2 G: 2 INJECTION, POWDER, FOR SOLUTION INTRAVENOUS at 05:42

## 2018-10-10 NOTE — PROGRESS NOTES
Bedside and Verbal shift change report [facility] [address] [facility] (oncoming nurse) by Anirudh Haas rn (offgoing nurse). Report included the following information SBAR, Kardex, Intake/Output, MAR, Recent Results and Med Rec Status.

## 2018-10-10 NOTE — PROGRESS NOTES
Hospitalist Progress Note NAME: Gina Cobos :  1980 MRN:  [id number] Room Number:  872/98  @ [facility] Summary: 43 y.o. male whom presented on 2018 with Assessment / Plan: 
  
Transferred from [facility], for completion of ABX until 10/28 Update -c/w iv dilaudid, pain well controlled on current regimen Tricuspid infective Endocarditis Pseudomonas bacteremia L3/4 septic arthritis Pulmonary septic embolic All above in setting of h/o IVDU 
-On IV abx per ID recs: tobramycin and cefepime until 10/28 
- levaquin until  
- F/U tobramycin level and adjust doses  
-ID, ortho, IR and Cardiothoracic surgery on board 
-No plans for surgical intervention MRI L spine : L3/4 septic arthritis CT thoracic spine : multiple pulmonary nodules, several of which are cavitary and several groundglass opacities and areas of consolidation. 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 Back pain secondary to above 
-followed by palliative medicine for pain management: FU palliative consult PMH of HTN and Chronic anemia  
-monitor closely H/O IVDU, heroin 
-pain management as per palliative Code Status: DNR, completed DDNR on admission Surrogate Decision Maker: Ziggy Cardona ( ex wife's mom) DVT Prophylaxis: lovenox GI Prophylaxis: not indicated Baseline: independent Subjective: Chief Complaint / Reason for Physician Visit \"have chronic back pain\". Discussed with RN events overnight. Review of Systems: 
Symptom Y/N Comments  Symptom Y/N Comments Fever/Chills n   Chest Pain n   
Poor Appetite n   Edema Cough n   Abdominal Pain n   
Sputum n   Joint Pain SOB/CUEVA n   Pruritis/Rash y   
Nausea/vomit n   Tolerating PT/OT Diarrhea n   Tolerating Diet y Constipation n   Other Could NOT obtain due to:   
 
Objective: VITALS:  
Last 24hrs VS reviewed since prior progress note. Most recent are: 
Patient Vitals for the past 24 hrs: 
 Temp Pulse Resp BP SpO2  
10/10/18 0937 97.7 °F (36.5 °C) 81 16 130/70 100 % 10/09/18 2312 98.5 °F (36.9 °C) 71 16 128/75 100 % 10/09/18 1708 98.7 °F (37.1 °C) 63 18 127/69 100 % Intake/Output Summary (Last 24 hours) at 10/10/18 1424 Last data filed at 10/10/18 9417 Gross per 24 hour Intake              900 ml Output                0 ml Net              900 ml PHYSICAL EXAM: 
General: WD, WN. Alert, cooperative, no acute distress   
EENT:  EOMI. Anicteric sclerae. MMM,Right IJ+ Resp:  CTA bilaterally, no wheezing or rales. No accessory muscle use CV:  Regular  rhythm,  No edema GI:  Soft, Non distended, Non tender.  +Bowel sounds Neurologic:  Alert and oriented X 3, normal speech, Psych:   Good insight. Not anxious nor agitated Skin:  Tattoos+, dry scaly ,erythmeatous skin in upper leg,perineal,scrotal and buttock+. No jaundice Reviewed most current lab test results and cultures  YES Reviewed most current radiology test results   YES Review and summation of old records today    NO Reviewed patient's current orders and MAR    YES 
PMH/SH reviewed - no change compared to H&P 
________________________________________________________________________ Care Plan discussed with: 
  Comments Patient x Family RN x Care Manager x Consultant Multidiciplinary team rounds were held today with , nursing, pharmacist and clinical coordinator. Patient's plan of care was discussed; medications were reviewed and discharge planning was addressed. ________________________________________________________________________ Total NON critical care TIME:  35   Minutes Total CRITICAL CARE TIME Spent:   Minutes non procedure based Comments >50% of visit spent in counseling and coordination of care ________________________________________________________________________ My Holt MD  
 
Procedures: see electronic medical records for all procedures/Xrays and details which were not copied into this note but were reviewed prior to creation of Plan. LABS: 
I reviewed today's most current labs and imaging studies. Pertinent labs include: No results for input(s): WBC, HGB, HCT, PLT, HGBEXT, HCTEXT, PLTEXT, HGBEXT, HCTEXT, PLTEXT in the last 72 hours. Recent Labs 10/10/18 
 0530  10/09/18 
 0543  10/08/18 
 5236 CREA  1.52*  0.84  1.40* Signed: My Holt MD

## 2018-10-10 NOTE — PROGRESS NOTES
PICC line checked at 2100 for patency post activase insertion with dwelling time of 2hrs. Purple hub line patent with brisk blood return. 10 cc aspriated and discarded. Line flushed with 10 cc NS.

## 2018-10-10 NOTE — PROGRESS NOTES
Bedside and Verbal shift change report given to Negrito Rudd 1841 (oncoming nurse) by ALISSA Monroy RN (offgoing nurse). Report given with SBAR, Kardex, Intake/Output, MAR and Recent Results.

## 2018-10-10 NOTE — PROGRESS NOTES
[facility] Dosing Services: Antimicrobial Stewardship Daily Doc Consult for dosing of tobramycin and cefepime by Dr. Myriam Martinez (continued from [facility]). Indication: tricuspid valve endocarditis, pseudomonas bacteremia End after 10/28/18, per ID Ht Readings from Last 1 Encounters:  
10/01/18 182.9 cm (72\") Wt Readings from Last 1 Encounters:  
10/01/18 78.8 kg (173 lb 12.8 oz) Tobramycin: 
Current maintenance dose: tobramycin 400 mg IV every 36 hours Pulse dose calculated to approximate a therapeutic peak of 15-20 mcg/mL and trough of </= 0.3 mcg/mL Last random level 1.3 mcg/ml on 10/10, approximately 23 hours post infusion Plan: SCr elevated compared to yesterday, but not as drastically when compared to all other days. Level borderline on nomogram. Continue current dose with plans to recheck level on Friday Dose administration notes:   Doses given appropriately as scheduled Cefepime:  
Current Regimen:  cefepime 2 g IV every 8 hours Recommendation: Continue current dose Dose administration notes:   Doses given appropriately as scheduled Other Antimicrobial  
(not dosed by pharmacist) None Cultures  9/6 Bloodx2: Pseudomonas aeruginosa sens to cefepime, LVQ, & tobramycin; final 
9/7 Hep C: (-) 
9/7 HIV: (-) 
9/8 Blood: Pseudomonas aeruginosa sens to cefepime, LVQ, & tobramycin; final 
9/8 Nares: MRSA (-) 
9/11 Bloodx2: Pseudomonas aeruginosa sens to cefepime, LVQ, & tobramycin; final 
9/16 Blood: NGx6d, final  
  
Serum Creatinine Lab Results Component Value Date/Time Creatinine 1.52 (H) 10/10/2018 05:30 AM  
   
Creatinine Clearance Estimated Creatinine Clearance: 71.6 mL/min (based on Cr of 1.52). Temp Temp: 98 °F (36.7 °C) WBC Lab Results Component Value Date/Time WBC 12.2 (H) 09/25/2018 12:57 AM  
  
H/H Lab Results Component Value Date/Time HGB 8.7 (L) 09/25/2018 12:57 AM  
  
Platelets Lab Results Component Value Date/Time PLATELET 094 74/02/8058 12:57 AM  
  
 
 
Thank you, Dyan Carl, PharmD, BCPS 
[phone]

## 2018-10-10 NOTE — PROGRESS NOTES
11:27 PM 
Charge nurse rounds performed. Verified that Diego Leiva  is the RN currently responsible for the care of this patient. The following concerns/changes were discussed: none identified at this time

## 2018-10-11 LAB — CREAT SERPL-MCNC: 1.5 MG/DL (ref 0.7–1.3)

## 2018-10-11 PROCEDURE — 74011250637 HC RX REV CODE- 250/637: Performed by: INTERNAL MEDICINE

## 2018-10-11 PROCEDURE — 65270000032 HC RM SEMIPRIVATE

## 2018-10-11 PROCEDURE — 36592 COLLECT BLOOD FROM PICC: CPT

## 2018-10-11 PROCEDURE — 36415 COLL VENOUS BLD VENIPUNCTURE: CPT | Performed by: HOSPITALIST

## 2018-10-11 PROCEDURE — 74011000258 HC RX REV CODE- 258: Performed by: HOSPITALIST

## 2018-10-11 PROCEDURE — 74011250637 HC RX REV CODE- 250/637: Performed by: HOSPITALIST

## 2018-10-11 PROCEDURE — 82565 ASSAY OF CREATININE: CPT | Performed by: HOSPITALIST

## 2018-10-11 PROCEDURE — 74011250636 HC RX REV CODE- 250/636: Performed by: INTERNAL MEDICINE

## 2018-10-11 PROCEDURE — 74011250636 HC RX REV CODE- 250/636: Performed by: HOSPITALIST

## 2018-10-11 RX ADMIN — HYDROMORPHONE HYDROCHLORIDE 1 MG: 1 INJECTION, SOLUTION INTRAMUSCULAR; INTRAVENOUS; SUBCUTANEOUS at 13:19

## 2018-10-11 RX ADMIN — Medication 20 ML: at 13:19

## 2018-10-11 RX ADMIN — Medication 10 ML: at 05:56

## 2018-10-11 RX ADMIN — Medication 10 ML: at 21:51

## 2018-10-11 RX ADMIN — Medication 10 ML: at 21:50

## 2018-10-11 RX ADMIN — Medication 1 CAPSULE: at 09:24

## 2018-10-11 RX ADMIN — HYDROMORPHONE HYDROCHLORIDE 1 MG: 1 INJECTION, SOLUTION INTRAMUSCULAR; INTRAVENOUS; SUBCUTANEOUS at 21:50

## 2018-10-11 RX ADMIN — HYDROMORPHONE HYDROCHLORIDE 1 MG: 1 INJECTION, SOLUTION INTRAMUSCULAR; INTRAVENOUS; SUBCUTANEOUS at 17:35

## 2018-10-11 RX ADMIN — ENOXAPARIN SODIUM 40 MG: 40 INJECTION, SOLUTION INTRAVENOUS; SUBCUTANEOUS at 09:24

## 2018-10-11 RX ADMIN — CEFEPIME HYDROCHLORIDE 2 G: 2 INJECTION, POWDER, FOR SOLUTION INTRAVENOUS at 21:51

## 2018-10-11 RX ADMIN — CEFEPIME HYDROCHLORIDE 2 G: 2 INJECTION, POWDER, FOR SOLUTION INTRAVENOUS at 05:56

## 2018-10-11 RX ADMIN — HYDROMORPHONE HYDROCHLORIDE 1 MG: 1 INJECTION, SOLUTION INTRAMUSCULAR; INTRAVENOUS; SUBCUTANEOUS at 01:45

## 2018-10-11 RX ADMIN — HYDROMORPHONE HYDROCHLORIDE 1 MG: 1 INJECTION, SOLUTION INTRAMUSCULAR; INTRAVENOUS; SUBCUTANEOUS at 09:25

## 2018-10-11 RX ADMIN — CEFEPIME HYDROCHLORIDE 2 G: 2 INJECTION, POWDER, FOR SOLUTION INTRAVENOUS at 13:18

## 2018-10-11 RX ADMIN — CELECOXIB 200 MG: 100 CAPSULE ORAL at 17:35

## 2018-10-11 RX ADMIN — CELECOXIB 200 MG: 100 CAPSULE ORAL at 09:24

## 2018-10-11 RX ADMIN — HYDROMORPHONE HYDROCHLORIDE 1 MG: 1 INJECTION, SOLUTION INTRAMUSCULAR; INTRAVENOUS; SUBCUTANEOUS at 05:55

## 2018-10-11 RX ADMIN — Medication 10 ML: at 13:20

## 2018-10-11 NOTE — PROGRESS NOTES
1915) Bedside and Verbal shift change report given to Alexandra Mark RN (oncoming nurse) by Dorota Cain RN (offgoing nurse). Report included the following information SBAR, Kardex, Intake/Output, MAR, Accordion, Recent Results and Med Rec Status. 0715) Bedside and Verbal shift change report given to A (oncoming nurse) by Isa Field RN (offgoing nurse). Report included the following information SBAR, Kardex, Intake/Output, MAR, Accordion, Recent Results and Med Rec Status.

## 2018-10-11 NOTE — PROGRESS NOTES
0710hrs . [place] Regalado Bedside and Verbal shift change report given to Khadra Mcnally (oncoming nurse) by Corine Brown (offgoing nurse). Report included the following information SBAR, Kardex, Intake/Output, MAR, Recent Results and Med Rec Status. 1600hrs PICC line dressing was changed, end capped were also changed. 1910hrs . [place] Regalado Bedside and Verbal shift change report given to Camacho (oncoming nurse) by Khadra Mcnally (offgoing nurse).  Report included the following information SBAR, Kardex, Intake/Output, MAR, Recent Results and Med Rec Status.

## 2018-10-11 NOTE — PROGRESS NOTES
Problem: Falls - Risk of 
Goal: *Absence of Falls Document Jey Levels Fall Risk and appropriate interventions in the flowsheet. Outcome: Progressing Towards Goal 
Fall Risk Interventions: 
Mobility Interventions: Communicate number of staff needed for ambulation/transfer, Utilize walker, cane, or other assistive device Medication Interventions: Teach patient to arise slowly Elimination Interventions: Call light in reach, Patient to call for help with toileting needs History of Falls Interventions: Consult care management for discharge planning, Evaluate medications/consider consulting pharmacy

## 2018-10-11 NOTE — PROGRESS NOTES
Hospitalist Progress Note NAME: Ganesh Alvarez :  1980 MRN:  [id number] Room Number:  640/41  @ [facility] Summary: 43 y.o. male whom presented on 2018 with Assessment / Plan: 
  
Transferred from [facility], for completion of ABX until 10/28 Update -c/w iv dilaudid, pain well controlled on current regimen Tricuspid infective Endocarditis Pseudomonas bacteremia L3/4 septic arthritis Pulmonary septic embolic All above in setting of h/o IVDU 
-On IV abx per ID recs: tobramycin and cefepime until 10/28 
- levaquin until  
- F/U tobramycin level and adjust doses  
-ID, ortho, IR and Cardiothoracic surgery on board 
-No plans for surgical intervention MRI L spine : L3/4 septic arthritis CT thoracic spine : multiple pulmonary nodules, several of which are cavitary and several groundglass opacities and areas of consolidation. 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 Back pain secondary to above 
-followed by palliative medicine for pain management: FU palliative consult PMH of HTN and Chronic anemia  
-monitor closely H/O IVDU, heroin 
-pain management as per palliative Code Status: DNR, completed DDNR on admission Surrogate Decision Maker: Willis Sexton ( ex wife's mom) DVT Prophylaxis: lovenox GI Prophylaxis: not indicated Baseline: independent Subjective: Chief Complaint / Reason for Physician Visit \"have chronic back pain\". Discussed with RN events overnight. Review of Systems: 
Symptom Y/N Comments  Symptom Y/N Comments Fever/Chills n   Chest Pain n   
Poor Appetite n   Edema Cough n   Abdominal Pain n   
Sputum n   Joint Pain SOB/CUEVA n   Pruritis/Rash y   
Nausea/vomit n   Tolerating PT/OT Diarrhea n   Tolerating Diet y Constipation n   Other Could NOT obtain due to:   
 
Objective: VITALS:  
Last 24hrs VS reviewed since prior progress note. Most recent are: 
Patient Vitals for the past 24 hrs: 
 Temp Pulse Resp BP SpO2  
10/11/18 1541 97.7 °F (36.5 °C) 71 18 113/61 100 % 10/11/18 0812 97.9 °F (36.6 °C) 67 16 109/63 99 % 10/11/18 0555 98.2 °F (36.8 °C) 65 16 118/69 100 % 10/10/18 2000 98.3 °F (36.8 °C) 79 16 129/75 100 % No intake or output data in the 24 hours ending 10/11/18 1631 PHYSICAL EXAM: 
General: WD, WN. Alert, cooperative, no acute distress   
EENT:  EOMI. Anicteric sclerae. MMM,Right IJ+ Resp:  CTA bilaterally, no wheezing or rales. No accessory muscle use CV:  Regular  rhythm,  No edema GI:  Soft, Non distended, Non tender.  +Bowel sounds Neurologic:  Alert and oriented X 3, normal speech, Psych:   Good insight. Not anxious nor agitated Skin:  Tattoos+, dry scaly ,erythmeatous skin in upper leg,perineal,scrotal and buttock+. No jaundice Reviewed most current lab test results and cultures  YES Reviewed most current radiology test results   YES Review and summation of old records today    NO Reviewed patient's current orders and MAR    YES 
PMH/ reviewed - no change compared to H&P 
________________________________________________________________________ Care Plan discussed with: 
  Comments Patient x Family RN x Care Manager x Consultant Multidiciplinary team rounds were held today with , nursing, pharmacist and clinical coordinator. Patient's plan of care was discussed; medications were reviewed and discharge planning was addressed. ________________________________________________________________________ Total NON critical care TIME:  35   Minutes Total CRITICAL CARE TIME Spent:   Minutes non procedure based Comments >50% of visit spent in counseling and coordination of care    
________________________________________________________________________ Livanelma Gavin MD  
 
 Procedures: see electronic medical records for all procedures/Xrays and details which were not copied into this note but were reviewed prior to creation of Plan. LABS: 
I reviewed today's most current labs and imaging studies. Pertinent labs include: No results for input(s): WBC, HGB, HCT, PLT, HGBEXT, HCTEXT, PLTEXT, HGBEXT, HCTEXT, PLTEXT in the last 72 hours. Recent Labs 10/11/18 
 0150  10/10/18 
 0530  10/09/18 
 0543 CREA  1.50*  1.52*  0.84 Signed: Chon Hurtado MD

## 2018-10-12 LAB
CREAT SERPL-MCNC: 1.69 MG/DL (ref 0.7–1.3)
DATE LAST DOSE: NORMAL
ERYTHROCYTE [DISTWIDTH] IN BLOOD BY AUTOMATED COUNT: 18.2 % (ref 11.5–14.5)
HCT VFR BLD AUTO: 27.4 % (ref 36.6–50.3)
HGB BLD-MCNC: 8.9 G/DL (ref 12.1–17)
MCH RBC QN AUTO: 27.6 PG (ref 26–34)
MCHC RBC AUTO-ENTMCNC: 32.5 G/DL (ref 30–36.5)
MCV RBC AUTO: 85.1 FL (ref 80–99)
NRBC # BLD: 0 K/UL (ref 0–0.01)
NRBC BLD-RTO: 0 PER 100 WBC
PLATELET # BLD AUTO: 128 K/UL (ref 150–400)
PMV BLD AUTO: 9.6 FL (ref 8.9–12.9)
RBC # BLD AUTO: 3.22 M/UL (ref 4.1–5.7)
REPORTED DOSE,DOSE: NORMAL UNITS
REPORTED DOSE/TIME,TMG: NORMAL
TOBRAMYCIN SERPL-MCNC: 3 UG/ML
WBC # BLD AUTO: 12.1 K/UL (ref 4.1–11.1)

## 2018-10-12 PROCEDURE — 36592 COLLECT BLOOD FROM PICC: CPT

## 2018-10-12 PROCEDURE — 74011000258 HC RX REV CODE- 258: Performed by: HOSPITALIST

## 2018-10-12 PROCEDURE — 74011250637 HC RX REV CODE- 250/637: Performed by: HOSPITALIST

## 2018-10-12 PROCEDURE — 82565 ASSAY OF CREATININE: CPT | Performed by: HOSPITALIST

## 2018-10-12 PROCEDURE — 85027 COMPLETE CBC AUTOMATED: CPT | Performed by: INTERNAL MEDICINE

## 2018-10-12 PROCEDURE — 36415 COLL VENOUS BLD VENIPUNCTURE: CPT | Performed by: HOSPITALIST

## 2018-10-12 PROCEDURE — 74011250636 HC RX REV CODE- 250/636: Performed by: HOSPITALIST

## 2018-10-12 PROCEDURE — 80200 ASSAY OF TOBRAMYCIN: CPT | Performed by: HOSPITALIST

## 2018-10-12 PROCEDURE — 74011250636 HC RX REV CODE- 250/636: Performed by: INTERNAL MEDICINE

## 2018-10-12 PROCEDURE — 65270000032 HC RM SEMIPRIVATE

## 2018-10-12 PROCEDURE — 74011250637 HC RX REV CODE- 250/637: Performed by: INTERNAL MEDICINE

## 2018-10-12 RX ADMIN — HYDROMORPHONE HYDROCHLORIDE 1 MG: 1 INJECTION, SOLUTION INTRAMUSCULAR; INTRAVENOUS; SUBCUTANEOUS at 21:23

## 2018-10-12 RX ADMIN — CELECOXIB 200 MG: 100 CAPSULE ORAL at 09:19

## 2018-10-12 RX ADMIN — Medication 10 ML: at 21:22

## 2018-10-12 RX ADMIN — Medication 10 ML: at 09:20

## 2018-10-12 RX ADMIN — CEFEPIME HYDROCHLORIDE 2 G: 2 INJECTION, POWDER, FOR SOLUTION INTRAVENOUS at 21:29

## 2018-10-12 RX ADMIN — Medication 10 ML: at 05:42

## 2018-10-12 RX ADMIN — TOBRAMYCIN 400 MG: 40 INJECTION INTRAMUSCULAR; INTRAVENOUS at 05:45

## 2018-10-12 RX ADMIN — HYDROMORPHONE HYDROCHLORIDE 1 MG: 1 INJECTION, SOLUTION INTRAMUSCULAR; INTRAVENOUS; SUBCUTANEOUS at 05:42

## 2018-10-12 RX ADMIN — Medication 10 ML: at 14:22

## 2018-10-12 RX ADMIN — Medication 1 CAPSULE: at 09:19

## 2018-10-12 RX ADMIN — ENOXAPARIN SODIUM 40 MG: 40 INJECTION, SOLUTION INTRAVENOUS; SUBCUTANEOUS at 09:19

## 2018-10-12 RX ADMIN — HYDROMORPHONE HYDROCHLORIDE 1 MG: 1 INJECTION, SOLUTION INTRAMUSCULAR; INTRAVENOUS; SUBCUTANEOUS at 13:20

## 2018-10-12 RX ADMIN — Medication 10 ML: at 09:21

## 2018-10-12 RX ADMIN — HYDROMORPHONE HYDROCHLORIDE 1 MG: 1 INJECTION, SOLUTION INTRAMUSCULAR; INTRAVENOUS; SUBCUTANEOUS at 09:20

## 2018-10-12 RX ADMIN — Medication 10 ML: at 14:23

## 2018-10-12 RX ADMIN — CEFEPIME HYDROCHLORIDE 2 G: 2 INJECTION, POWDER, FOR SOLUTION INTRAVENOUS at 05:42

## 2018-10-12 RX ADMIN — HYDROMORPHONE HYDROCHLORIDE 1 MG: 1 INJECTION, SOLUTION INTRAMUSCULAR; INTRAVENOUS; SUBCUTANEOUS at 01:28

## 2018-10-12 RX ADMIN — CEFEPIME HYDROCHLORIDE 2 G: 2 INJECTION, POWDER, FOR SOLUTION INTRAVENOUS at 14:23

## 2018-10-12 RX ADMIN — HYDROMORPHONE HYDROCHLORIDE 1 MG: 1 INJECTION, SOLUTION INTRAMUSCULAR; INTRAVENOUS; SUBCUTANEOUS at 17:19

## 2018-10-12 RX ADMIN — CELECOXIB 200 MG: 100 CAPSULE ORAL at 17:20

## 2018-10-12 NOTE — PROGRESS NOTES
Problem: Falls - Risk of 
Goal: *Absence of Falls Document Meg Langley Fall Risk and appropriate interventions in the flowsheet. Outcome: Progressing Towards Goal 
Fall Risk Interventions: 
Mobility Interventions: Communicate number of staff needed for ambulation/transfer Medication Interventions: Teach patient to arise slowly Elimination Interventions: Call light in reach History of Falls Interventions: Consult care management for discharge planning Problem: Pain Goal: *Control of Pain Outcome: Progressing Towards Goal 
Pt pain not well managed. Pt still calling out as often as he can get pain medication with no more than 1-2 points of relief. Will cont to monitor

## 2018-10-12 NOTE — PROGRESS NOTES
Bedside shift change report given to me (oncoming nurse) by Valarie Rodriguez (offgoing nurse). Report included the following information SBAR, Kardex, Intake/Output, MAR and Recent Results.

## 2018-10-12 NOTE — PROGRESS NOTES
0710) Bedside shift change report given to Guido Daniel RN and Ernie Martínez RN (oncoming nurse) by Jeffrey Atkins RN (offgoing nurse). Report included the following information SBAR, Kardex, MAR, Accordion and Recent Results. 1500) Pt outside with volunteer 1950) Bedside shift change report given to Sonja Schuler RN (oncoming nurse) by Guido Daniel RN (offgoing nurse). Report included the following information SBAR, Kardex, MAR and Accordion.

## 2018-10-12 NOTE — PROGRESS NOTES
Hospitalist Progress Note NAME: Hugo Bateman :  1980 MRN:  [id number] Room Number:  216/93  @ [facility] Summary: 43 y.o. male whom presented on 2018 with Assessment / Plan: 
  
Transferred from [facility], for completion of ABX until 10/28 Update -c/w iv dilaudid, pain well controlled on current regimen Tricuspid infective Endocarditis Pseudomonas bacteremia L3/4 septic arthritis Pulmonary septic embolic All above in setting of h/o IVDU 
-On IV abx per ID recs: tobramycin and cefepime until 10/28 
- levaquin until  
- F/U tobramycin level and adjust doses  
-ID, ortho, IR and Cardiothoracic surgery on board 
-No plans for surgical intervention MRI L spine : L3/4 septic arthritis CT thoracic spine : multiple pulmonary nodules, several of which are cavitary and several groundglass opacities and areas of consolidation. 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 Back pain secondary to above 
-followed by palliative medicine for pain management: FU palliative consult PMH of HTN and Chronic anemia  
-monitor closely H/O IVDU, heroin 
-pain management as per palliative Code Status: DNR, completed DDNR on admission Surrogate Decision Maker: Antwan Bradford ( ex wife's mom) DVT Prophylaxis: lovenox GI Prophylaxis: not indicated Baseline: independent Subjective: Chief Complaint / Reason for Physician Visit \"have chronic back pain\". Discussed with RN events overnight. Review of Systems: 
Symptom Y/N Comments  Symptom Y/N Comments Fever/Chills n   Chest Pain n   
Poor Appetite n   Edema Cough n   Abdominal Pain n   
Sputum n   Joint Pain SOB/CUEVA n   Pruritis/Rash y   
Nausea/vomit n   Tolerating PT/OT Diarrhea n   Tolerating Diet y Constipation n   Other Could NOT obtain due to:   
 
Objective: VITALS:  
Last 24hrs VS reviewed since prior progress note. Most recent are: 
Patient Vitals for the past 24 hrs: 
 Temp Pulse Resp BP SpO2  
10/12/18 0916 97.3 °F (36.3 °C) 72 16 113/74 100 % 10/12/18 0554 98.3 °F (36.8 °C) 69 18 134/79 99 % 10/11/18 2000 98.3 °F (36.8 °C) 74 18 137/79 99 % 10/11/18 1541 97.7 °F (36.5 °C) 71 18 113/61 100 % Intake/Output Summary (Last 24 hours) at 10/12/18 1530 Last data filed at 10/11/18 2151 Gross per 24 hour Intake              600 ml Output                0 ml Net              600 ml PHYSICAL EXAM: 
General: WD, WN. Alert, cooperative, no acute distress   
EENT:  EOMI. Anicteric sclerae. MMM,Right IJ+ Resp:  CTA bilaterally, no wheezing or rales. No accessory muscle use CV:  Regular  rhythm,  No edema GI:  Soft, Non distended, Non tender.  +Bowel sounds Neurologic:  Alert and oriented X 3, normal speech, Psych:   Good insight. Not anxious nor agitated Skin:  Tattoos+, dry scaly ,erythmeatous skin in upper leg,perineal,scrotal and buttock+. No jaundice Reviewed most current lab test results and cultures  YES Reviewed most current radiology test results   YES Review and summation of old records today    NO Reviewed patient's current orders and MAR    YES 
PMH/ reviewed - no change compared to H&P 
________________________________________________________________________ Care Plan discussed with: 
  Comments Patient x Family RN x Care Manager x Consultant Multidiciplinary team rounds were held today with , nursing, pharmacist and clinical coordinator. Patient's plan of care was discussed; medications were reviewed and discharge planning was addressed. ________________________________________________________________________ Total NON critical care TIME:  35   Minutes Total CRITICAL CARE TIME Spent:   Minutes non procedure based Comments >50% of visit spent in counseling and coordination of care    
________________________________________________________________________ Naun Roldan MD  
 
Procedures: see electronic medical records for all procedures/Xrays and details which were not copied into this note but were reviewed prior to creation of Plan. LABS: 
I reviewed today's most current labs and imaging studies. Pertinent labs include: 
Recent Labs 10/12/18 
 0541 WBC  12.1* HGB  8.9* HCT  27.4*  
PLT  128* Recent Labs 10/12/18 
 0541  10/11/18 
 0150  10/10/18 
 0530 CREA  1.69*  1.50*  1.52* Signed: Naun Roldan MD

## 2018-10-13 ENCOUNTER — APPOINTMENT (OUTPATIENT)
Dept: GENERAL RADIOLOGY | Age: 38
DRG: 289 | End: 2018-10-13
Attending: INTERNAL MEDICINE
Payer: SUBSIDIZED

## 2018-10-13 LAB — CREAT SERPL-MCNC: 1.54 MG/DL (ref 0.7–1.3)

## 2018-10-13 PROCEDURE — 82565 ASSAY OF CREATININE: CPT | Performed by: HOSPITALIST

## 2018-10-13 PROCEDURE — 74011250637 HC RX REV CODE- 250/637: Performed by: HOSPITALIST

## 2018-10-13 PROCEDURE — 74011250637 HC RX REV CODE- 250/637: Performed by: INTERNAL MEDICINE

## 2018-10-13 PROCEDURE — 36415 COLL VENOUS BLD VENIPUNCTURE: CPT | Performed by: HOSPITALIST

## 2018-10-13 PROCEDURE — 74011000258 HC RX REV CODE- 258: Performed by: HOSPITALIST

## 2018-10-13 PROCEDURE — 74011250636 HC RX REV CODE- 250/636: Performed by: HOSPITALIST

## 2018-10-13 PROCEDURE — 65270000032 HC RM SEMIPRIVATE

## 2018-10-13 PROCEDURE — 71045 X-RAY EXAM CHEST 1 VIEW: CPT

## 2018-10-13 PROCEDURE — 74011250636 HC RX REV CODE- 250/636: Performed by: INTERNAL MEDICINE

## 2018-10-13 PROCEDURE — 74011000250 HC RX REV CODE- 250: Performed by: INTERNAL MEDICINE

## 2018-10-13 PROCEDURE — 36592 COLLECT BLOOD FROM PICC: CPT

## 2018-10-13 RX ADMIN — CELECOXIB 200 MG: 100 CAPSULE ORAL at 17:33

## 2018-10-13 RX ADMIN — ENOXAPARIN SODIUM 40 MG: 40 INJECTION, SOLUTION INTRAVENOUS; SUBCUTANEOUS at 09:17

## 2018-10-13 RX ADMIN — HYDROMORPHONE HYDROCHLORIDE 1 MG: 1 INJECTION, SOLUTION INTRAMUSCULAR; INTRAVENOUS; SUBCUTANEOUS at 13:21

## 2018-10-13 RX ADMIN — Medication 10 ML: at 05:22

## 2018-10-13 RX ADMIN — HYDROMORPHONE HYDROCHLORIDE 1 MG: 1 INJECTION, SOLUTION INTRAMUSCULAR; INTRAVENOUS; SUBCUTANEOUS at 01:14

## 2018-10-13 RX ADMIN — CEFEPIME HYDROCHLORIDE 2 G: 2 INJECTION, POWDER, FOR SOLUTION INTRAVENOUS at 05:21

## 2018-10-13 RX ADMIN — HYDROMORPHONE HYDROCHLORIDE 1 MG: 1 INJECTION, SOLUTION INTRAMUSCULAR; INTRAVENOUS; SUBCUTANEOUS at 09:18

## 2018-10-13 RX ADMIN — CELECOXIB 200 MG: 100 CAPSULE ORAL at 09:16

## 2018-10-13 RX ADMIN — Medication 10 ML: at 21:07

## 2018-10-13 RX ADMIN — CEFEPIME HYDROCHLORIDE 2 G: 2 INJECTION, POWDER, FOR SOLUTION INTRAVENOUS at 21:07

## 2018-10-13 RX ADMIN — Medication 1 CAPSULE: at 09:17

## 2018-10-13 RX ADMIN — Medication 10 ML: at 13:23

## 2018-10-13 RX ADMIN — SODIUM CHLORIDE 500 ML: 900 INJECTION, SOLUTION INTRAVENOUS at 17:39

## 2018-10-13 RX ADMIN — HYDROMORPHONE HYDROCHLORIDE 1 MG: 1 INJECTION, SOLUTION INTRAMUSCULAR; INTRAVENOUS; SUBCUTANEOUS at 17:34

## 2018-10-13 RX ADMIN — HYDROMORPHONE HYDROCHLORIDE 1 MG: 1 INJECTION, SOLUTION INTRAMUSCULAR; INTRAVENOUS; SUBCUTANEOUS at 05:21

## 2018-10-13 RX ADMIN — CEFEPIME HYDROCHLORIDE 2 G: 2 INJECTION, POWDER, FOR SOLUTION INTRAVENOUS at 13:22

## 2018-10-13 RX ADMIN — TOBRAMYCIN 400 MG: 40 INJECTION INTRAMUSCULAR; INTRAVENOUS at 17:34

## 2018-10-13 RX ADMIN — HYDROMORPHONE HYDROCHLORIDE 1 MG: 1 INJECTION, SOLUTION INTRAMUSCULAR; INTRAVENOUS; SUBCUTANEOUS at 21:07

## 2018-10-13 NOTE — PROGRESS NOTES
Problem: Falls - Risk of 
Goal: *Absence of Falls Document Sabine Lim Fall Risk and appropriate interventions in the flowsheet. Outcome: Progressing Towards Goal 
Fall Risk Interventions: 
Mobility Interventions: Communicate number of staff needed for ambulation/transfer Medication Interventions: Teach patient to arise slowly Elimination Interventions: Call light in reach History of Falls Interventions: Consult care management for discharge planning

## 2018-10-13 NOTE — PROGRESS NOTES
2005: Bedside shift change report given to Austin Peña (oncoming nurse) by FRANSISCO (offgoing nurse). Report included the following information SBAR, Kardex, ED Summary, Intake/Output, MAR and Recent Results. 9285: Bedside shift change report given to South María (oncoming nurse) by Austin Peña (offgoing nurse). Report included the following information SBAR, Kardex, ED Summary, Intake/Output, MAR and Recent Results.

## 2018-10-13 NOTE — PROGRESS NOTES
Hospitalist Progress Note NAME: Rashaun Matthew :  1980 MRN:  [id number] Room Number:  054/95  @ [facility] Summary: 43 y.o. male whom presented on 2018 with Assessment / Plan: 
  
Transferred from [facility], for completion of ABX until 10/28 Update -c/w iv dilaudid, pain well controlled on current regimen Tricuspid infective Endocarditis Pseudomonas bacteremia L3/4 septic arthritis Pulmonary septic embolic All above in setting of h/o IVDU 
-On IV abx per ID recs: tobramycin and cefepime until 10/28 
- levaquin until  
- F/U tobramycin level and adjust doses  
-ID, ortho, IR and Cardiothoracic surgery on board 
-No plans for surgical intervention MRI L spine : L3/4 septic arthritis CT thoracic spine : multiple pulmonary nodules, several of which are cavitary and several groundglass opacities and areas of consolidation. 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 Back pain secondary to above 
-followed by palliative medicine for pain management: FU palliative consult PMH of HTN and Chronic anemia  
-monitor closely H/O IVDU, heroin 
-pain management as per palliative Code Status: DNR, completed DDNR on admission Surrogate Decision Maker: Yeny Client ( ex wife's mom) DVT Prophylaxis: lovenox GI Prophylaxis: not indicated Baseline: independent Subjective: Chief Complaint / Reason for Physician Visit \"have chronic back pain\". Discussed with RN events overnight. Review of Systems: 
Symptom Y/N Comments  Symptom Y/N Comments Fever/Chills n   Chest Pain n   
Poor Appetite n   Edema Cough n   Abdominal Pain n   
Sputum n   Joint Pain SOB/CUEVA n   Pruritis/Rash y   
Nausea/vomit n   Tolerating PT/OT Diarrhea n   Tolerating Diet y Constipation n   Other Could NOT obtain due to:   
 
Objective: VITALS:  
Last 24hrs VS reviewed since prior progress note. Most recent are: 
Patient Vitals for the past 24 hrs: 
 Temp Pulse Resp BP SpO2  
10/13/18 0845 97.8 °F (36.6 °C) 77 16 136/87 100 % 10/13/18 0505 98.1 °F (36.7 °C) 74 16 132/78 99 % 10/12/18 2120 98 °F (36.7 °C) 81 16 134/78 100 % 10/12/18 1601 98.8 °F (37.1 °C) 80 16 133/73 100 % Intake/Output Summary (Last 24 hours) at 10/13/18 1213 Last data filed at 10/13/18 6931 Gross per 24 hour Intake              980 ml Output                0 ml Net              980 ml PHYSICAL EXAM: 
General: WD, WN. Alert, cooperative, no acute distress   
EENT:  EOMI. Anicteric sclerae. MMM,Right IJ+ Resp:  CTA bilaterally, no wheezing or rales. No accessory muscle use CV:  Regular  rhythm,  No edema GI:  Soft, Non distended, Non tender.  +Bowel sounds Neurologic:  Alert and oriented X 3, normal speech, Psych:   Good insight. Not anxious nor agitated Skin:  Tattoos+, dry scaly ,erythmeatous skin in upper leg,perineal,scrotal and buttock+. No jaundice Reviewed most current lab test results and cultures  YES Reviewed most current radiology test results   YES Review and summation of old records today    NO Reviewed patient's current orders and MAR    YES 
PMH/ reviewed - no change compared to H&P 
________________________________________________________________________ Care Plan discussed with: 
  Comments Patient x Family RN x Care Manager x Consultant Multidiciplinary team rounds were held today with , nursing, pharmacist and clinical coordinator. Patient's plan of care was discussed; medications were reviewed and discharge planning was addressed. ________________________________________________________________________ Total NON critical care TIME:  35   Minutes Total CRITICAL CARE TIME Spent:   Minutes non procedure based Comments >50% of visit spent in counseling and coordination of care    
________________________________________________________________________ Meg Dailey MD  
 
Procedures: see electronic medical records for all procedures/Xrays and details which were not copied into this note but were reviewed prior to creation of Plan. LABS: 
I reviewed today's most current labs and imaging studies. Pertinent labs include: 
Recent Labs 10/12/18 
 0541 WBC  12.1* HGB  8.9* HCT  27.4*  
PLT  128* Recent Labs 10/13/18 
 7359  10/12/18 
 0541  10/11/18 
 0150 CREA  1.54*  1.69*  1.50* Signed: Meg Dailey MD

## 2018-10-13 NOTE — PROGRESS NOTES
[address] [address] Pharmacy Dosing Services: Antimicrobial Stewardship Daily Doc Consult for dosing of tobramycin and cefepime by Dr. Rossy Cardoso (continued from [facility]). Indication: tricuspid valve endocarditis, pseudomonas bacteremia End after 10/28/18, per ID Ht Readings from Last 1 Encounters:  
10/01/18 182.9 cm (72\") Wt Readings from Last 1 Encounters:  
10/01/18 78.8 kg (173 lb 12.8 oz) Tobramycin: 
Current maintenance dose: tobramycin 400 mg IV every 36 hours Pulse dose calculated to approximate a therapeutic peak of 15-20 mcg/mL and trough of </= 0.3 mcg/mL Last random level 3 mcg/mL on 10/12 at 2002, approximately 14 hours post infusion Plan: SCr slightly lower than yesterday. Continue current regimen. Dose administration notes:   Doses given appropriately as scheduled Cefepime:  
Current Regimen:  cefepime 2 g IV every 8 hours Recommendation: Continue current dose Dose administration notes:   Doses given appropriately as scheduled Other Antimicrobial  
(not dosed by pharmacist) None Cultures  9/6 Bloodx2: Pseudomonas aeruginosa sens to cefepime, LVQ, & tobramycin; final 
9/7 Hep C: (-) 
9/7 HIV: (-) 
9/8 Blood: Pseudomonas aeruginosa sens to cefepime, LVQ, & tobramycin; final 
9/8 Nares: MRSA (-) 
9/11 Bloodx2: Pseudomonas aeruginosa sens to cefepime, LVQ, & tobramycin; final 
9/16 Blood: NGx6d, final  
  
Serum Creatinine Lab Results Component Value Date/Time Creatinine 1.54 (H) 10/13/2018 05:19 AM  
   
Creatinine Clearance Estimated Creatinine Clearance: 70.7 mL/min (based on Cr of 1.54). Temp Temp: 98.1 °F (36.7 °C) WBC Lab Results Component Value Date/Time WBC 12.1 (H) 10/12/2018 05:41 AM  
  
H/H Lab Results Component Value Date/Time HGB 8.9 (L) 10/12/2018 05:41 AM  
  
Platelets Lab Results Component Value Date/Time PLATELET 563 (L) 56/79/8376 05:41 AM  
  
 
 
Thank you, Jordon Gill, Pharm. D. 
[phone]

## 2018-10-13 NOTE — PROGRESS NOTES
Problem: Falls - Risk of 
Goal: *Absence of Falls Document Clifm Furth Fall Risk and appropriate interventions in the flowsheet. Outcome: Progressing Towards Goal 
Fall Risk Interventions: 
Mobility Interventions: Communicate number of staff needed for ambulation/transfer Medication Interventions: Teach patient to arise slowly Elimination Interventions: Call light in reach History of Falls Interventions: Consult care management for discharge planning

## 2018-10-13 NOTE — PROGRESS NOTES
Problem: Falls - Risk of 
Goal: *Absence of Falls Document Ben Clark Fall Risk and appropriate interventions in the flowsheet. Outcome: Progressing Towards Goal 
Fall Risk Interventions: 
Mobility Interventions: Communicate number of staff needed for ambulation/transfer Medication Interventions: Teach patient to arise slowly Elimination Interventions: Call light in reach History of Falls Interventions: Consult care management for discharge planning

## 2018-10-14 LAB
ANION GAP SERPL CALC-SCNC: 8 MMOL/L (ref 5–15)
BUN SERPL-MCNC: 24 MG/DL (ref 6–20)
BUN/CREAT SERPL: 16 (ref 12–20)
CALCIUM SERPL-MCNC: 9.2 MG/DL (ref 8.5–10.1)
CHLORIDE SERPL-SCNC: 105 MMOL/L (ref 97–108)
CO2 SERPL-SCNC: 23 MMOL/L (ref 21–32)
CREAT SERPL-MCNC: 1.43 MG/DL (ref 0.7–1.3)
CREAT SERPL-MCNC: 1.48 MG/DL (ref 0.7–1.3)
ERYTHROCYTE [DISTWIDTH] IN BLOOD BY AUTOMATED COUNT: 18.4 % (ref 11.5–14.5)
GLUCOSE SERPL-MCNC: 91 MG/DL (ref 65–100)
HCT VFR BLD AUTO: 25.5 % (ref 36.6–50.3)
HGB BLD-MCNC: 8.3 G/DL (ref 12.1–17)
MCH RBC QN AUTO: 28.1 PG (ref 26–34)
MCHC RBC AUTO-ENTMCNC: 32.5 G/DL (ref 30–36.5)
MCV RBC AUTO: 86.4 FL (ref 80–99)
NRBC # BLD: 0 K/UL (ref 0–0.01)
NRBC BLD-RTO: 0 PER 100 WBC
PLATELET # BLD AUTO: 156 K/UL (ref 150–400)
PMV BLD AUTO: 9.7 FL (ref 8.9–12.9)
POTASSIUM SERPL-SCNC: 4.5 MMOL/L (ref 3.5–5.1)
RBC # BLD AUTO: 2.95 M/UL (ref 4.1–5.7)
SODIUM SERPL-SCNC: 136 MMOL/L (ref 136–145)
WBC # BLD AUTO: 11.4 K/UL (ref 4.1–11.1)

## 2018-10-14 PROCEDURE — 93005 ELECTROCARDIOGRAM TRACING: CPT

## 2018-10-14 PROCEDURE — 74011250636 HC RX REV CODE- 250/636: Performed by: INTERNAL MEDICINE

## 2018-10-14 PROCEDURE — 74011250637 HC RX REV CODE- 250/637: Performed by: INTERNAL MEDICINE

## 2018-10-14 PROCEDURE — 74011250636 HC RX REV CODE- 250/636: Performed by: HOSPITALIST

## 2018-10-14 PROCEDURE — 65270000032 HC RM SEMIPRIVATE

## 2018-10-14 PROCEDURE — 74011000250 HC RX REV CODE- 250: Performed by: INTERNAL MEDICINE

## 2018-10-14 PROCEDURE — 74011000258 HC RX REV CODE- 258: Performed by: HOSPITALIST

## 2018-10-14 PROCEDURE — 36592 COLLECT BLOOD FROM PICC: CPT

## 2018-10-14 PROCEDURE — 36415 COLL VENOUS BLD VENIPUNCTURE: CPT | Performed by: INTERNAL MEDICINE

## 2018-10-14 PROCEDURE — 85027 COMPLETE CBC AUTOMATED: CPT | Performed by: INTERNAL MEDICINE

## 2018-10-14 PROCEDURE — 80048 BASIC METABOLIC PNL TOTAL CA: CPT | Performed by: HOSPITALIST

## 2018-10-14 PROCEDURE — 74011250637 HC RX REV CODE- 250/637: Performed by: HOSPITALIST

## 2018-10-14 RX ORDER — HYDROMORPHONE HYDROCHLORIDE 1 MG/ML
0.5 INJECTION, SOLUTION INTRAMUSCULAR; INTRAVENOUS; SUBCUTANEOUS
Status: DISCONTINUED | OUTPATIENT
Start: 2018-10-16 | End: 2018-10-15

## 2018-10-14 RX ADMIN — ACETAMINOPHEN 650 MG: 325 TABLET, FILM COATED ORAL at 09:39

## 2018-10-14 RX ADMIN — HYDROMORPHONE HYDROCHLORIDE 1 MG: 1 INJECTION, SOLUTION INTRAMUSCULAR; INTRAVENOUS; SUBCUTANEOUS at 20:24

## 2018-10-14 RX ADMIN — ACETAMINOPHEN 650 MG: 325 TABLET, FILM COATED ORAL at 16:36

## 2018-10-14 RX ADMIN — HYDROMORPHONE HYDROCHLORIDE 1 MG: 1 INJECTION, SOLUTION INTRAMUSCULAR; INTRAVENOUS; SUBCUTANEOUS at 16:36

## 2018-10-14 RX ADMIN — Medication 10 ML: at 12:37

## 2018-10-14 RX ADMIN — CEFEPIME HYDROCHLORIDE 2 G: 2 INJECTION, POWDER, FOR SOLUTION INTRAVENOUS at 21:22

## 2018-10-14 RX ADMIN — Medication 10 ML: at 16:37

## 2018-10-14 RX ADMIN — Medication 20 ML: at 21:22

## 2018-10-14 RX ADMIN — Medication 1 CAPSULE: at 09:39

## 2018-10-14 RX ADMIN — CEFEPIME HYDROCHLORIDE 2 G: 2 INJECTION, POWDER, FOR SOLUTION INTRAVENOUS at 06:13

## 2018-10-14 RX ADMIN — ENOXAPARIN SODIUM 40 MG: 40 INJECTION, SOLUTION INTRAVENOUS; SUBCUTANEOUS at 08:49

## 2018-10-14 RX ADMIN — CELECOXIB 200 MG: 100 CAPSULE ORAL at 08:46

## 2018-10-14 RX ADMIN — HYDROMORPHONE HYDROCHLORIDE 1 MG: 1 INJECTION, SOLUTION INTRAMUSCULAR; INTRAVENOUS; SUBCUTANEOUS at 01:01

## 2018-10-14 RX ADMIN — CEFEPIME HYDROCHLORIDE 2 G: 2 INJECTION, POWDER, FOR SOLUTION INTRAVENOUS at 14:55

## 2018-10-14 RX ADMIN — HYDROMORPHONE HYDROCHLORIDE 1 MG: 1 INJECTION, SOLUTION INTRAMUSCULAR; INTRAVENOUS; SUBCUTANEOUS at 12:37

## 2018-10-14 RX ADMIN — Medication 10 ML: at 05:00

## 2018-10-14 RX ADMIN — CELECOXIB 200 MG: 100 CAPSULE ORAL at 17:57

## 2018-10-14 RX ADMIN — HYDROMORPHONE HYDROCHLORIDE 1 MG: 1 INJECTION, SOLUTION INTRAMUSCULAR; INTRAVENOUS; SUBCUTANEOUS at 04:55

## 2018-10-14 RX ADMIN — HYDROMORPHONE HYDROCHLORIDE 1 MG: 1 INJECTION, SOLUTION INTRAMUSCULAR; INTRAVENOUS; SUBCUTANEOUS at 08:45

## 2018-10-14 NOTE — PROGRESS NOTES
Hospitalist Progress Note NAME: Jing Gruber :  1980 MRN:  [id number] Room Number:  476/13  @ [facility] Summary: 43 y.o. male whom presented on 2018 with Assessment / Plan: 
  
Transferred from [facility], for completion of ABX until 10/28 Update C/O left sided pleuritic pain, likely MSK Lidocaine patch applied. CXR,Labs reviewed -unremarkable 
 
-c/w iv dilaudid, pain well controlled on current regimen Tricuspid infective Endocarditis Pseudomonas bacteremia L3/4 septic arthritis Pulmonary septic embolic All above in setting of h/o IVDU 
-On IV abx per ID recs: tobramycin and cefepime until 10/28 
- levaquin until  
- F/U tobramycin level and adjust doses  
-ID, ortho, IR and Cardiothoracic surgery on board 
-No plans for surgical intervention MRI L spine : L3/4 septic arthritis CT thoracic spine : multiple pulmonary nodules, several of which are cavitary and several groundglass opacities and areas of consolidation. 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 Back pain secondary to above 
-followed by palliative medicine for pain management: FU palliative consult PMH of HTN and Chronic anemia  
-monitor closely H/O IVDU, heroin 
-pain management as per palliative Code Status: DNR, completed DDNR on admission Surrogate Decision Maker: Mercy Gutierrez ( ex wife's mom) DVT Prophylaxis: lovenox GI Prophylaxis: not indicated Baseline: independent Subjective: Chief Complaint / Reason for Physician Visit \"have chronic back pain\". Discussed with RN events overnight. Review of Systems: 
Symptom Y/N Comments  Symptom Y/N Comments Fever/Chills n   Chest Pain n   
Poor Appetite n   Edema Cough n   Abdominal Pain n   
Sputum n   Joint Pain SOB/CUEVA n   Pruritis/Rash y   
Nausea/vomit n   Tolerating PT/OT Diarrhea n   Tolerating Diet y Constipation n   Other Could NOT obtain due to:   
 
Objective: VITALS:  
Last 24hrs VS reviewed since prior progress note. Most recent are: 
Patient Vitals for the past 24 hrs: 
 Temp Pulse Resp BP SpO2  
10/14/18 0844 98.2 °F (36.8 °C) 78 12 136/76 99 % 10/14/18 0455 97.9 °F (36.6 °C) 72 16 122/70 100 % 10/13/18 2026 98.1 °F (36.7 °C) 82 16 138/88 100 % 10/13/18 1605 98.2 °F (36.8 °C) 85 16 131/84 99 % Intake/Output Summary (Last 24 hours) at 10/14/18 1402 Last data filed at 10/14/18 4896 Gross per 24 hour Intake             1760 ml Output              450 ml Net             1310 ml PHYSICAL EXAM: 
General: WD, WN. Alert, cooperative, no acute distress   
EENT:  EOMI. Anicteric sclerae. MMM,Right IJ+ Resp:  CTA bilaterally, no wheezing or rales. No accessory muscle use CV:  Regular  rhythm,  No edema GI:  Soft, Non distended, Non tender.  +Bowel sounds Neurologic:  Alert and oriented X 3, normal speech, Psych:   Good insight. Not anxious nor agitated Skin:  Tattoos+, dry scaly ,erythmeatous skin in upper leg,perineal,scrotal and buttock+. No jaundice Reviewed most current lab test results and cultures  YES Reviewed most current radiology test results   YES Review and summation of old records today    NO Reviewed patient's current orders and MAR    YES 
PMH/ reviewed - no change compared to H&P 
________________________________________________________________________ Care Plan discussed with: 
  Comments Patient x Family RN x Care Manager x Consultant Multidiciplinary team rounds were held today with , nursing, pharmacist and clinical coordinator. Patient's plan of care was discussed; medications were reviewed and discharge planning was addressed. ________________________________________________________________________ Total NON critical care TIME:  35   Minutes Total CRITICAL CARE TIME Spent:   Minutes non procedure based Comments >50% of visit spent in counseling and coordination of care    
________________________________________________________________________ Promise Raman MD  
 
Procedures: see electronic medical records for all procedures/Xrays and details which were not copied into this note but were reviewed prior to creation of Plan. LABS: 
I reviewed today's most current labs and imaging studies. Pertinent labs include: 
Recent Labs 10/14/18 
 [id number]  10/12/18 
 0541 WBC  11.4*  12.1* HGB  8.3*  8.9* HCT  25.5*  27.4*  
PLT  156  128* Recent Labs 10/14/18 
 5275  10/13/18 
 5165  10/12/18 
 3368 NA  136   --    --   
K  4.5   --    --   
CL  105   --    --   
CO2  23   --    --   
GLU  91   --    --   
BUN  24*   --    --   
CREA  1.48*  1.43*  1.54*  1.69* CA  9.2   --    --   
 
 
Signed: Promise Raman MD

## 2018-10-14 NOTE — PROGRESS NOTES
0700) Bedside shift change report given to Valentín Tafoya RN (oncoming nurse) by Torito Spears RN (offgoing nurse). Report included the following information SBAR, Kardex, MAR, Accordion and Recent Results. 5999) pt report L chest pain, just above rib cage border and L shoulder pain. /76. Dr. Nia Blair notified. 0900) Dr. Nia Blair in room. EKG done. 1340) Consult Dr. Nia Blair, extend 1 mg dilaudid dose one more day 
1920) Bedside shift change report given to Susan Wadsworth RN (oncoming nurse) by Valentín Tafoya RN (offgoing nurse). Report included the following information SBAR, Kardex, MAR, Accordion and Recent Results.

## 2018-10-14 NOTE — PROGRESS NOTES
1920: Bedside shift change report given to [facility] (oncoming nurse) by Arvind Nicholas (offgoing nurse). Report included the following information SBAR, Kardex, ED Summary, Intake/Output, MAR and Recent Results. 5974: Bedside shift change report given to Lawanda Viramontes (oncoming nurse) by [facility] (offgoing nurse). Report included the following information SBAR, Kardex, ED Summary, Intake/Output, MAR and Recent Results.

## 2018-10-15 ENCOUNTER — APPOINTMENT (OUTPATIENT)
Dept: CT IMAGING | Age: 38
DRG: 289 | End: 2018-10-15
Attending: INTERNAL MEDICINE
Payer: SUBSIDIZED

## 2018-10-15 LAB — CREAT SERPL-MCNC: 1.53 MG/DL (ref 0.7–1.3)

## 2018-10-15 PROCEDURE — 36415 COLL VENOUS BLD VENIPUNCTURE: CPT | Performed by: HOSPITALIST

## 2018-10-15 PROCEDURE — 74011000250 HC RX REV CODE- 250: Performed by: INTERNAL MEDICINE

## 2018-10-15 PROCEDURE — 74011250637 HC RX REV CODE- 250/637: Performed by: INTERNAL MEDICINE

## 2018-10-15 PROCEDURE — 65270000032 HC RM SEMIPRIVATE

## 2018-10-15 PROCEDURE — 74011250636 HC RX REV CODE- 250/636: Performed by: INTERNAL MEDICINE

## 2018-10-15 PROCEDURE — 74011250636 HC RX REV CODE- 250/636: Performed by: HOSPITALIST

## 2018-10-15 PROCEDURE — 74011250637 HC RX REV CODE- 250/637: Performed by: HOSPITALIST

## 2018-10-15 PROCEDURE — 71250 CT THORAX DX C-: CPT

## 2018-10-15 PROCEDURE — 82565 ASSAY OF CREATININE: CPT | Performed by: HOSPITALIST

## 2018-10-15 PROCEDURE — 36592 COLLECT BLOOD FROM PICC: CPT

## 2018-10-15 PROCEDURE — 74011000258 HC RX REV CODE- 258: Performed by: HOSPITALIST

## 2018-10-15 RX ORDER — HYDROMORPHONE HYDROCHLORIDE 1 MG/ML
0.5 INJECTION, SOLUTION INTRAMUSCULAR; INTRAVENOUS; SUBCUTANEOUS
Status: DISCONTINUED | OUTPATIENT
Start: 2018-10-18 | End: 2018-10-18

## 2018-10-15 RX ORDER — TRAMADOL HYDROCHLORIDE 50 MG/1
50 TABLET ORAL
Status: COMPLETED | OUTPATIENT
Start: 2018-10-15 | End: 2018-10-15

## 2018-10-15 RX ADMIN — HYDROMORPHONE HYDROCHLORIDE 1 MG: 1 INJECTION, SOLUTION INTRAMUSCULAR; INTRAVENOUS; SUBCUTANEOUS at 12:13

## 2018-10-15 RX ADMIN — CELECOXIB 200 MG: 100 CAPSULE ORAL at 18:43

## 2018-10-15 RX ADMIN — HYDROMORPHONE HYDROCHLORIDE 1 MG: 1 INJECTION, SOLUTION INTRAMUSCULAR; INTRAVENOUS; SUBCUTANEOUS at 04:29

## 2018-10-15 RX ADMIN — CEFEPIME HYDROCHLORIDE 2 G: 2 INJECTION, POWDER, FOR SOLUTION INTRAVENOUS at 05:35

## 2018-10-15 RX ADMIN — HYDROMORPHONE HYDROCHLORIDE 1 MG: 1 INJECTION, SOLUTION INTRAMUSCULAR; INTRAVENOUS; SUBCUTANEOUS at 00:29

## 2018-10-15 RX ADMIN — TRAMADOL HYDROCHLORIDE 50 MG: 50 TABLET, FILM COATED ORAL at 15:08

## 2018-10-15 RX ADMIN — CELECOXIB 200 MG: 100 CAPSULE ORAL at 08:01

## 2018-10-15 RX ADMIN — HYDROMORPHONE HYDROCHLORIDE 1 MG: 1 INJECTION, SOLUTION INTRAMUSCULAR; INTRAVENOUS; SUBCUTANEOUS at 20:03

## 2018-10-15 RX ADMIN — HYDROMORPHONE HYDROCHLORIDE 1 MG: 1 INJECTION, SOLUTION INTRAMUSCULAR; INTRAVENOUS; SUBCUTANEOUS at 08:01

## 2018-10-15 RX ADMIN — ENOXAPARIN SODIUM 40 MG: 40 INJECTION, SOLUTION INTRAVENOUS; SUBCUTANEOUS at 08:00

## 2018-10-15 RX ADMIN — CEFEPIME HYDROCHLORIDE 2 G: 2 INJECTION, POWDER, FOR SOLUTION INTRAVENOUS at 21:48

## 2018-10-15 RX ADMIN — Medication 20 ML: at 05:35

## 2018-10-15 RX ADMIN — TOBRAMYCIN 400 MG: 40 INJECTION INTRAMUSCULAR; INTRAVENOUS at 06:27

## 2018-10-15 RX ADMIN — CEFEPIME HYDROCHLORIDE 2 G: 2 INJECTION, POWDER, FOR SOLUTION INTRAVENOUS at 13:35

## 2018-10-15 RX ADMIN — Medication 10 ML: at 13:35

## 2018-10-15 RX ADMIN — Medication 20 ML: at 21:48

## 2018-10-15 RX ADMIN — HYDROMORPHONE HYDROCHLORIDE 1 MG: 1 INJECTION, SOLUTION INTRAMUSCULAR; INTRAVENOUS; SUBCUTANEOUS at 16:05

## 2018-10-15 RX ADMIN — Medication 1 CAPSULE: at 10:44

## 2018-10-15 NOTE — PROGRESS NOTES
Discussed with palliative consultant Ginny Smith. Patient is concerned about tapering his IV Dilaudid. He reports having increased left-sided chest pain radiating to his back. Appreciate assistance of Dr Darlene Moody. She recommended to continue current dose of IV Dilaudid without tapering. Tomorrow , nurse practitioner will communicate with the patient regarding his pain control. Previous x-ray and lab work have been unremarkable. I have requested a CT without contrast already.

## 2018-10-15 NOTE — PROGRESS NOTES
Hospitalist Progress Note NAME: Jonny Waddell :  1980 MRN:  [id number] Room Number:  116/41  @ [facility] Summary: 43 y.o. male whom presented on 2018 with Assessment / Plan: 
  
Transferred from [facility], for completion of ABX until 10/28 Update C/O left sided pleuritic pain, likely MSK Lidocaine patch applied. CXR,Labs reviewed -unremarkable 
 
-c/w iv dilaudid, pain well controlled on current regimen Tricuspid infective Endocarditis Pseudomonas bacteremia L3/4 septic arthritis Pulmonary septic embolic All above in setting of h/o IVDU 
-On IV abx per ID recs: tobramycin and cefepime until 10/28 
- levaquin until  
- F/U tobramycin level and adjust doses  
-ID, ortho, IR and Cardiothoracic surgery on board 
-No plans for surgical intervention MRI L spine : L3/4 septic arthritis CT thoracic spine : multiple pulmonary nodules, several of which are cavitary and several groundglass opacities and areas of consolidation. 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 Back pain secondary to above 
-followed by palliative medicine for pain management: FU palliative consult PMH of HTN and Chronic anemia  
-monitor closely H/O IVDU, heroin 
-pain management as per palliative Code Status: DNR, completed DDNR on admission Surrogate Decision Maker: Lacy Jorgensen ( ex wife's mom) DVT Prophylaxis: lovenox GI Prophylaxis: not indicated Baseline: independent Subjective: Chief Complaint / Reason for Physician Visit \"have chronic back pain\". Discussed with RN events overnight. Review of Systems: 
Symptom Y/N Comments  Symptom Y/N Comments Fever/Chills n   Chest Pain n   
Poor Appetite n   Edema Cough n   Abdominal Pain n   
Sputum n   Joint Pain SOB/CUEVA n   Pruritis/Rash y   
Nausea/vomit n   Tolerating PT/OT Diarrhea n   Tolerating Diet y Constipation n   Other Could NOT obtain due to:   
 
Objective: VITALS:  
Last 24hrs VS reviewed since prior progress note. Most recent are: 
Patient Vitals for the past 24 hrs: 
 Temp Pulse Resp BP SpO2  
10/15/18 0758 97.8 °F (36.6 °C) 72 16 132/80 99 % 10/15/18 0359 97.7 °F (36.5 °C) 90 16 136/90 100 % 10/14/18 1948 98.1 °F (36.7 °C) 79 16 121/84 100 % 10/14/18 1455 97.9 °F (36.6 °C) 76 16 137/75 100 % Intake/Output Summary (Last 24 hours) at 10/15/18 4318 Last data filed at 10/15/18 0028 Gross per 24 hour Intake              120 ml Output                0 ml Net              120 ml PHYSICAL EXAM: 
General: WD, WN. Alert, cooperative, no acute distress   
EENT:  EOMI. Anicteric sclerae. MMM,Right IJ+ Resp:  CTA bilaterally, no wheezing or rales. No accessory muscle use CV:  Regular  rhythm,  No edema GI:  Soft, Non distended, Non tender.  +Bowel sounds Neurologic:  Alert and oriented X 3, normal speech, Psych:   Good insight. Not anxious nor agitated Skin:  Tattoos+, dry scaly ,erythmeatous skin in upper leg,perineal,scrotal and buttock+. No jaundice Reviewed most current lab test results and cultures  YES Reviewed most current radiology test results   YES Review and summation of old records today    NO Reviewed patient's current orders and MAR    YES 
PMH/SH reviewed - no change compared to H&P 
________________________________________________________________________ Care Plan discussed with: 
  Comments Patient x Family RN x Care Manager x Consultant Multidiciplinary team rounds were held today with , nursing, pharmacist and clinical coordinator. Patient's plan of care was discussed; medications were reviewed and discharge planning was addressed. ________________________________________________________________________ Total NON critical care TIME:  35   Minutes Total CRITICAL CARE TIME Spent:   Minutes non procedure based Comments >50% of visit spent in counseling and coordination of care    
________________________________________________________________________ Yanet Macias MD  
 
Procedures: see electronic medical records for all procedures/Xrays and details which were not copied into this note but were reviewed prior to creation of Plan. LABS: 
I reviewed today's most current labs and imaging studies. Pertinent labs include: 
Recent Labs 10/14/18 
 0453 WBC  11.4* HGB  8.3* HCT  25.5*  
PLT  156 Recent Labs 10/15/18 
 0405  10/14/18 
 0453  10/13/18 
 5802 NA   --   136   --   
K   --   4.5   --   
CL   --   105   --   
CO2   --   23   --   
GLU   --   91   --   
BUN   --   24*   --   
CREA  1.53*  1.48*  1.43*  1.54* CA   --   9.2   --   
 
 
Signed: Yanet Macias MD

## 2018-10-15 NOTE — PROGRESS NOTES
0710hrs . Clerance Riis Bedside and Verbal shift change report given to Jenna) by Nasir (offgoing nurse). Report included the following information SBAR, Kardex, Intake/Output, MAR, Recent Results and Med Rec Status. 1350hrs MD was consulted regarding patient's complaint of pain which he stated that is not getting better. MD went in to see the patient. CT chest was ordered and palliative was re-consulted. 1445hrs Radiology was called for CT chest ordered. 1450hrs As per MD's order, Palliative was called to reevaluate patient's pain. 1910hrs . Clerance Riis Bedside and Verbal shift change report given to Nasir (oncoming nurse) by Giancarlo García (offgoing nurse). Report included the following information SBAR, Kardex, Intake/Output, MAR, Recent Results and Med Rec Status.

## 2018-10-15 NOTE — PROGRESS NOTES
Dr Jose You called , patient is complaining of  Left sided chest pain,  is resistant to furthur taper of dilaudid, he is requesting to be seen by Palliative care , specifically wants to talk to Gerri soto , who had been managing his pain during his recent stay at [facility]. Dr Jose You has ordered work up for pain . I let Dr Jose You know, Poornima Thomas is off today , she will call the patient tomorrow , suggested to continue with current dose of I/V Dilaudid without furthur taper,   and if need be patient can be seen by out patient palliative provider covering for long term care patient at [facility]. I have sent an email to out patient palliative provider for update. Lorri Cabrera MD  
 
Palliative care team  
 
[phone]

## 2018-10-15 NOTE — PROGRESS NOTES
Attended interdisciplinary rounds on Med Tele where patient's care was discussed. Chaplain Mandeep Mackay M.Div.  [facility] Service [phone]

## 2018-10-15 NOTE — PROGRESS NOTES
Initial Nutrition Assessment: 
  
INTERVENTIONS/RECOMMENDATIONS:  
·  reg diet as tolerated ·  enc po and fluid intake 
  
ASSESSMENT:  
Admitted to ED [facility] for sepsis and transferred to [facility] for completion of abx through 10/28. PICC placed 9/26, palliative care on board for pain management. Hx IVDU. Pt has had some wt loss since admissions to ED [facility]. We are encouraging po intake and honoring preferences as we can. Only complaint is back pain, tolerating diet. 
  
Diet Order: Regular 
% Eaten:  85% Pertinent Medications: [x]Reviewed []Other Pertinent Labs: [x]Reviewed []Other Food Allergies: [x]None []Other Last BM:                     [x]Active     []Hyperactive  []Hypoactive       [] Absent BS Skin:               [x] Intact                        [] Incision                     [] Breakdown                                    [] Other: 
  
Anthropometrics:  
Height: 6' (182.9 cm)            Weight: 78.8 kg (173 lb 12.8 oz) IBW (%IBW): 80.7 kg (178 lb) (97.64 %)                 UBW (%UBW): 86.4 kg (190 lb 7.6 oz) (91.25 %) Last Weight Metrics: 
Weight Loss Metrics 10/15/2018 9/25/2018 9/6/2018 Today's Wt 173 lb 12.8 oz 168 lb 14 oz -  
BMI - 23.57 kg/m2 22.9 kg/m2  
  
  
BMI: Body mass index is 23.57 kg/(m^2). This BMI is indicative of: 
 []Underweight    [x]Normal    []Overweight    [] Obesity   [] Extreme Obesity (BMI>40)  
  
Estimated Nutrition Needs (Based on):  
2363 Kcals/day (3462 x1.3 AF) , 86 g (~1g/kg) Protein Carbohydrate: At Least 130 g/day  Fluids: 2400 mL/day (1ml/kcal) 
  
NUTRITION DIAGNOSES:  
Problem:  No nutritional diagnosis at this time Etiology: related to Signs/Symptoms: as evidenced by     
  
NUTRITION INTERVENTIONS:healthy regular diet 
               
   
GOAL:  
PO intake > 75% most meals in 7-10 days. Previous goal met. 
  
LEARNING NEEDS (Diet, Food/Nutrient-Drug Interaction):  
 [x] None Identified [] Identified and Education Provided/Documented 
 [] Identified and Pt declined/was not appropriate 
   
Cultureal, Presybeterian, OR Ethnic Dietary Needs:  
 [x] None Identified 
 [] Identified and Addressed 
  
 [x] Interdisciplinary Care Plan Reviewed/Documented  
 [x] Discharge Planning:   Reg diet ad page 
  
MONITORING /EVALUATION:  
  
NUTRITION RISK:  
 [] Patient At Nutritional Risk 
                       [] High              [] Moderate/Mild           []  Low                                     
 [x] Patient Not At Nutritional Risk 
  
PT SEEN FOR:  
 []  MD Consult:           []Calorie Count []Diabetic Diet Education []Diet Education []Electrolyte Management 
                                                                  [x]General Nutrition Management and Supplements []Management of Tube Feeding []TPN Recommendations []  RN Referral:                       []MST score >=2 
                                                                  []Enteral/Parenteral Nutrition PTA []Pregnant: Gestational DM or Multigestation 
                                                                  []Pressure Ulcer/Wound Care needs 
   
[]  Low BMI [x]  SAVANAH Nunez, PhD, RD, [facility] Pager [phone]

## 2018-10-15 NOTE — PROGRESS NOTES
Bedside shift change report given to GISELLE Muñiz (oncoming nurse) by Bhumi Blackburn (offgoing nurse). Report included the following information SBAR, Kardex, Intake/Output, MAR and Recent Results.

## 2018-10-15 NOTE — PROGRESS NOTES
Problem: Falls - Risk of 
Goal: *Absence of Falls Document Hussein Emmanuel Fall Risk and appropriate interventions in the flowsheet. Outcome: Progressing Towards Goal 
Fall Risk Interventions: 
Mobility Interventions: Communicate number of staff needed for ambulation/transfer Medication Interventions: Teach patient to arise slowly, Evaluate medications/consider consulting pharmacy Elimination Interventions: Call light in reach, Patient to call for help with toileting needs History of Falls Interventions: Evaluate medications/consider consulting pharmacy

## 2018-10-16 ENCOUNTER — APPOINTMENT (OUTPATIENT)
Dept: ULTRASOUND IMAGING | Age: 38
DRG: 289 | End: 2018-10-16
Attending: INTERNAL MEDICINE
Payer: SUBSIDIZED

## 2018-10-16 LAB
ALBUMIN SERPL-MCNC: 2.5 G/DL (ref 3.5–5)
ANION GAP SERPL CALC-SCNC: 11 MMOL/L (ref 5–15)
ANION GAP SERPL CALC-SCNC: 15 MMOL/L (ref 5–15)
APPEARANCE UR: CLEAR
ATRIAL RATE: 80 BPM
BACTERIA URNS QL MICRO: ABNORMAL /HPF
BILIRUB UR QL: NEGATIVE
BUN SERPL-MCNC: 30 MG/DL (ref 6–20)
BUN SERPL-MCNC: 31 MG/DL (ref 6–20)
BUN/CREAT SERPL: 16 (ref 12–20)
BUN/CREAT SERPL: 17 (ref 12–20)
CALCIUM SERPL-MCNC: 8.9 MG/DL (ref 8.5–10.1)
CALCIUM SERPL-MCNC: 9.1 MG/DL (ref 8.5–10.1)
CALCULATED P AXIS, ECG09: 46 DEGREES
CALCULATED R AXIS, ECG10: 31 DEGREES
CALCULATED T AXIS, ECG11: 25 DEGREES
CHLORIDE SERPL-SCNC: 105 MMOL/L (ref 97–108)
CHLORIDE SERPL-SCNC: 106 MMOL/L (ref 97–108)
CHLORIDE UR-SCNC: 160 MMOL/L
CO2 SERPL-SCNC: 20 MMOL/L (ref 21–32)
CO2 SERPL-SCNC: 22 MMOL/L (ref 21–32)
COLLECT DURATION TIME UR: ABNORMAL HR
COLOR UR: ABNORMAL
CREAT CL 24H UR+SERPL-VRATE: ABNORMAL ML/MIN (ref 97–137)
CREAT SERPL-MCNC: 1.87 MG/DL (ref 0.7–1.3)
CREAT SERPL-MCNC: 1.87 MG/DL (ref 0.7–1.3)
CREAT SERPL-MCNC: 1.9 MG/DL (ref 0.7–1.3)
CREAT SERPL-MCNC: 1.91 MG/DL (ref 0.7–1.3)
CREAT UR-MCNC: 71.1 MG/DL
DATE LAST DOSE: NORMAL
DIAGNOSIS, 93000: NORMAL
EOSINOPHIL #/AREA URNS HPF: NEGATIVE /[HPF]
EPITH CASTS URNS QL MICRO: ABNORMAL /LPF
GLUCOSE SERPL-MCNC: 79 MG/DL (ref 65–100)
GLUCOSE SERPL-MCNC: 81 MG/DL (ref 65–100)
GLUCOSE UR STRIP.AUTO-MCNC: NEGATIVE MG/DL
HGB UR QL STRIP: ABNORMAL
KETONES UR QL STRIP.AUTO: NEGATIVE MG/DL
LEUKOCYTE ESTERASE UR QL STRIP.AUTO: NEGATIVE
MAGNESIUM SERPL-MCNC: 1.5 MG/DL (ref 1.6–2.4)
NITRITE UR QL STRIP.AUTO: NEGATIVE
P-R INTERVAL, ECG05: 158 MS
PH UR STRIP: 5.5 [PH] (ref 5–8)
PHOSPHATE SERPL-MCNC: 5 MG/DL (ref 2.6–4.7)
PHOSPHATE SERPL-MCNC: 5 MG/DL (ref 2.6–4.7)
POTASSIUM SERPL-SCNC: 4.6 MMOL/L (ref 3.5–5.1)
POTASSIUM SERPL-SCNC: 4.7 MMOL/L (ref 3.5–5.1)
POTASSIUM UR-SCNC: 19 MMOL/L
PROT UR STRIP-MCNC: 30 MG/DL
PROT UR-MCNC: 94 MG/DL (ref 0–11.9)
PROT/CREAT UR-RTO: 1.3
Q-T INTERVAL, ECG07: 376 MS
QRS DURATION, ECG06: 94 MS
QTC CALCULATION (BEZET), ECG08: 433 MS
RBC #/AREA URNS HPF: ABNORMAL /HPF (ref 0–5)
REPORTED DOSE,DOSE: NORMAL UNITS
REPORTED DOSE/TIME,TMG: NORMAL
SODIUM SERPL-SCNC: 138 MMOL/L (ref 136–145)
SODIUM SERPL-SCNC: 141 MMOL/L (ref 136–145)
SODIUM UR-SCNC: 153 MMOL/L
SP GR UR REFRACTOMETRY: 1.01 (ref 1–1.03)
SPECIMEN VOL ?TM UR: ABNORMAL ML
TOBRAMYCIN SERPL-MCNC: 1.6 UG/ML
UROBILINOGEN UR QL STRIP.AUTO: 0.2 EU/DL (ref 0.2–1)
VENTRICULAR RATE, ECG03: 80 BPM
WBC URNS QL MICRO: ABNORMAL /HPF (ref 0–4)

## 2018-10-16 PROCEDURE — 82575 CREATININE CLEARANCE TEST: CPT | Performed by: HOSPITALIST

## 2018-10-16 PROCEDURE — 84156 ASSAY OF PROTEIN URINE: CPT | Performed by: INTERNAL MEDICINE

## 2018-10-16 PROCEDURE — 80200 ASSAY OF TOBRAMYCIN: CPT | Performed by: HOSPITALIST

## 2018-10-16 PROCEDURE — 84100 ASSAY OF PHOSPHORUS: CPT | Performed by: INTERNAL MEDICINE

## 2018-10-16 PROCEDURE — 82436 ASSAY OF URINE CHLORIDE: CPT | Performed by: INTERNAL MEDICINE

## 2018-10-16 PROCEDURE — 84133 ASSAY OF URINE POTASSIUM: CPT | Performed by: INTERNAL MEDICINE

## 2018-10-16 PROCEDURE — 74011250636 HC RX REV CODE- 250/636: Performed by: HOSPITALIST

## 2018-10-16 PROCEDURE — 36592 COLLECT BLOOD FROM PICC: CPT

## 2018-10-16 PROCEDURE — 80069 RENAL FUNCTION PANEL: CPT | Performed by: INTERNAL MEDICINE

## 2018-10-16 PROCEDURE — 74011250637 HC RX REV CODE- 250/637: Performed by: HOSPITALIST

## 2018-10-16 PROCEDURE — 74011250637 HC RX REV CODE- 250/637: Performed by: INTERNAL MEDICINE

## 2018-10-16 PROCEDURE — 74011000258 HC RX REV CODE- 258: Performed by: HOSPITALIST

## 2018-10-16 PROCEDURE — 83735 ASSAY OF MAGNESIUM: CPT | Performed by: INTERNAL MEDICINE

## 2018-10-16 PROCEDURE — 74011250636 HC RX REV CODE- 250/636: Performed by: INTERNAL MEDICINE

## 2018-10-16 PROCEDURE — 65270000032 HC RM SEMIPRIVATE

## 2018-10-16 PROCEDURE — 81001 URINALYSIS AUTO W/SCOPE: CPT | Performed by: INTERNAL MEDICINE

## 2018-10-16 PROCEDURE — 84300 ASSAY OF URINE SODIUM: CPT | Performed by: INTERNAL MEDICINE

## 2018-10-16 PROCEDURE — 87205 SMEAR GRAM STAIN: CPT | Performed by: INTERNAL MEDICINE

## 2018-10-16 PROCEDURE — 76770 US EXAM ABDO BACK WALL COMP: CPT

## 2018-10-16 PROCEDURE — 80048 BASIC METABOLIC PNL TOTAL CA: CPT | Performed by: INTERNAL MEDICINE

## 2018-10-16 RX ORDER — METHYLPREDNISOLONE 4 MG/1
TABLET ORAL
Status: DISCONTINUED | OUTPATIENT
Start: 2018-10-16 | End: 2018-10-16 | Stop reason: SDUPTHER

## 2018-10-16 RX ORDER — METHYLPREDNISOLONE 4 MG/1
4 TABLET ORAL ONCE
Status: COMPLETED | OUTPATIENT
Start: 2018-10-16 | End: 2018-10-16

## 2018-10-16 RX ORDER — SODIUM CHLORIDE 0.9 % (FLUSH) 0.9 %
5-10 SYRINGE (ML) INJECTION AS NEEDED
Status: DISCONTINUED | OUTPATIENT
Start: 2018-10-16 | End: 2018-10-28 | Stop reason: HOSPADM

## 2018-10-16 RX ORDER — METHYLPREDNISOLONE 4 MG/1
4 TABLET ORAL
Status: COMPLETED | OUTPATIENT
Start: 2018-10-18 | End: 2018-10-20

## 2018-10-16 RX ORDER — METHYLPREDNISOLONE 4 MG/1
8 TABLET ORAL ONCE
Status: COMPLETED | OUTPATIENT
Start: 2018-10-16 | End: 2018-10-16

## 2018-10-16 RX ORDER — METHYLPREDNISOLONE 4 MG/1
8 TABLET ORAL
Status: COMPLETED | OUTPATIENT
Start: 2018-10-16 | End: 2018-10-16

## 2018-10-16 RX ORDER — METHYLPREDNISOLONE 4 MG/1
4 TABLET ORAL ONCE
Status: DISPENSED | OUTPATIENT
Start: 2018-10-16 | End: 2018-10-17

## 2018-10-16 RX ORDER — SODIUM CHLORIDE 9 MG/ML
75 INJECTION, SOLUTION INTRAVENOUS CONTINUOUS
Status: DISCONTINUED | OUTPATIENT
Start: 2018-10-16 | End: 2018-10-28 | Stop reason: HOSPADM

## 2018-10-16 RX ORDER — SODIUM CHLORIDE 0.9 % (FLUSH) 0.9 %
5-10 SYRINGE (ML) INJECTION EVERY 8 HOURS
Status: DISCONTINUED | OUTPATIENT
Start: 2018-10-16 | End: 2018-10-28 | Stop reason: HOSPADM

## 2018-10-16 RX ORDER — METHYLPREDNISOLONE 4 MG/1
4 TABLET ORAL
Status: COMPLETED | OUTPATIENT
Start: 2018-10-17 | End: 2018-10-18

## 2018-10-16 RX ORDER — METHYLPREDNISOLONE 4 MG/1
4 TABLET ORAL
Status: COMPLETED | OUTPATIENT
Start: 2018-10-17 | End: 2018-10-21

## 2018-10-16 RX ORDER — METHYLPREDNISOLONE 4 MG/1
8 TABLET ORAL
Status: COMPLETED | OUTPATIENT
Start: 2018-10-17 | End: 2018-10-17

## 2018-10-16 RX ORDER — MAGNESIUM SULFATE HEPTAHYDRATE 40 MG/ML
2 INJECTION, SOLUTION INTRAVENOUS ONCE
Status: COMPLETED | OUTPATIENT
Start: 2018-10-16 | End: 2018-10-16

## 2018-10-16 RX ORDER — METHYLPREDNISOLONE 4 MG/1
4 TABLET ORAL
Status: COMPLETED | OUTPATIENT
Start: 2018-10-17 | End: 2018-10-19

## 2018-10-16 RX ADMIN — HYDROMORPHONE HYDROCHLORIDE 1 MG: 1 INJECTION, SOLUTION INTRAMUSCULAR; INTRAVENOUS; SUBCUTANEOUS at 08:07

## 2018-10-16 RX ADMIN — SODIUM CHLORIDE 150 ML/HR: 900 INJECTION, SOLUTION INTRAVENOUS at 10:11

## 2018-10-16 RX ADMIN — HYDROMORPHONE HYDROCHLORIDE 1 MG: 1 INJECTION, SOLUTION INTRAMUSCULAR; INTRAVENOUS; SUBCUTANEOUS at 00:02

## 2018-10-16 RX ADMIN — METHYLPREDNISOLONE 4 MG: 4 TABLET ORAL at 20:10

## 2018-10-16 RX ADMIN — Medication 10 ML: at 20:11

## 2018-10-16 RX ADMIN — MAGNESIUM SULFATE IN WATER 2 G: 40 INJECTION, SOLUTION INTRAVENOUS at 18:16

## 2018-10-16 RX ADMIN — Medication 10 ML: at 20:12

## 2018-10-16 RX ADMIN — Medication 1 CAPSULE: at 10:11

## 2018-10-16 RX ADMIN — Medication 10 ML: at 14:30

## 2018-10-16 RX ADMIN — HYDROMORPHONE HYDROCHLORIDE 1 MG: 1 INJECTION, SOLUTION INTRAMUSCULAR; INTRAVENOUS; SUBCUTANEOUS at 04:05

## 2018-10-16 RX ADMIN — METHYLPREDNISOLONE 8 MG: 4 TABLET ORAL at 14:29

## 2018-10-16 RX ADMIN — CEFEPIME HYDROCHLORIDE 2 G: 2 INJECTION, POWDER, FOR SOLUTION INTRAVENOUS at 22:05

## 2018-10-16 RX ADMIN — METHYLPREDNISOLONE 8 MG: 4 TABLET ORAL at 16:02

## 2018-10-16 RX ADMIN — HYDROMORPHONE HYDROCHLORIDE 1 MG: 1 INJECTION, SOLUTION INTRAMUSCULAR; INTRAVENOUS; SUBCUTANEOUS at 11:50

## 2018-10-16 RX ADMIN — CEFEPIME HYDROCHLORIDE 2 G: 2 INJECTION, POWDER, FOR SOLUTION INTRAVENOUS at 06:24

## 2018-10-16 RX ADMIN — CELECOXIB 200 MG: 100 CAPSULE ORAL at 08:07

## 2018-10-16 RX ADMIN — ENOXAPARIN SODIUM 40 MG: 40 INJECTION, SOLUTION INTRAVENOUS; SUBCUTANEOUS at 08:07

## 2018-10-16 RX ADMIN — SODIUM CHLORIDE 150 ML/HR: 900 INJECTION, SOLUTION INTRAVENOUS at 20:26

## 2018-10-16 RX ADMIN — Medication 20 ML: at 06:24

## 2018-10-16 RX ADMIN — HYDROMORPHONE HYDROCHLORIDE 1 MG: 1 INJECTION, SOLUTION INTRAMUSCULAR; INTRAVENOUS; SUBCUTANEOUS at 20:10

## 2018-10-16 RX ADMIN — METHYLPREDNISOLONE 4 MG: 4 TABLET ORAL at 14:32

## 2018-10-16 RX ADMIN — Medication 10 ML: at 22:05

## 2018-10-16 RX ADMIN — CEFEPIME HYDROCHLORIDE 2 G: 2 INJECTION, POWDER, FOR SOLUTION INTRAVENOUS at 14:30

## 2018-10-16 RX ADMIN — HYDROMORPHONE HYDROCHLORIDE 1 MG: 1 INJECTION, SOLUTION INTRAMUSCULAR; INTRAVENOUS; SUBCUTANEOUS at 16:03

## 2018-10-16 NOTE — PROGRESS NOTES
0710hrs . [facility] Bedside and Verbal shift change report given to Khadra Mcnally (oncoming nurse) by Patrick Bazan (offgoing nurse). Report included the following information SBAR, Kardex, Intake/Output, MAR, Recent Results and Med Rec Status.  
 1910hrs . [facility] Bedside and Verbal shift change report given to Negrito López) by [facility] nurse). Report included the following information SBAR, Kardex, Intake/Output, MAR, Recent Results and Med Rec Status.

## 2018-10-16 NOTE — PROGRESS NOTES
Bedside shift change report given to GISELLE Muñiz (oncoming nurse) by Manan Sparrow (offgoing nurse). Report included the following information SBAR, Kardex, Intake/Output, MAR and Recent Results.

## 2018-10-16 NOTE — PROGRESS NOTES
Hospitalist Progress Note NAME: Greg Oconnor :  1980 MRN:  [id number] Room Number:  673/33  @ [facility] Summary: 43 y.o. male whom presented on 2018 with Assessment / Plan: 
  
Transferred from [facility], for completion of ABX until 10/28 Update Please see notes from palliative today,following my discussion with Glen Tellez( appreciate her assistance) MITZI 10/16 - Tobramycin induced Get renal USG,renal failure panel Ur protein/Cr ratio,Ur eosinophils Renal USG Ivf Follow Cr 
D/w  ID Dr Naveen Sosa, will d/c Tobramycin, renal adjust cefepime Left sided pleuritic pain, likely MSK, costochondritis Lidocaine patch applied. CXR,Labs reviewed -unremarkable,CT Chest reviewed -c/w iv dilaudid,pallaitive on board. Further dose adjustment per them. Since , we cant do Tramadol(MITZI), Will give a trial of Medrol dose pack. Tricuspid infective Endocarditis Pseudomonas bacteremia L3/4 septic arthritis Pulmonary septic embolic All above in setting of h/o IVDU 
-On IV abx per ID recs: tobramycin and cefepime until 10/28 
- levaquin until  
- F/U tobramycin level and adjust doses  
-ID, ortho, IR and Cardiothoracic surgery on board 
-No plans for surgical intervention MRI L spine : L3/4 septic arthritis CT thoracic spine : multiple pulmonary nodules, several of which are cavitary and several groundglass opacities and areas of consolidation. 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 Back pain secondary to above 
-followed by palliative medicine for pain management: FU palliative consult PMH of HTN and Chronic anemia  
-monitor closely H/O IVDU, heroin 
-pain management as per palliative Code Status: DNR, completed DDNR on admission Surrogate Decision Maker: Tamika Holland ( ex wife's mom) DVT Prophylaxis: lovenox GI Prophylaxis: not indicated Baseline: independent Subjective: Chief Complaint / Reason for Physician Visit \"my pain is not improving\". Discussed with RN events overnight. Review of Systems: 
Symptom Y/N Comments  Symptom Y/N Comments Fever/Chills n   Chest Pain n   
Poor Appetite n   Edema Cough n   Abdominal Pain n   
Sputum n   Joint Pain SOB/CUEVA n   Pruritis/Rash y   
Nausea/vomit n   Tolerating PT/OT Diarrhea n   Tolerating Diet y Constipation n   Other Could NOT obtain due to:   
 
Objective: VITALS:  
Last 24hrs VS reviewed since prior progress note. Most recent are: 
Patient Vitals for the past 24 hrs: 
 Temp Pulse Resp BP SpO2  
10/16/18 1600 98.1 °F (36.7 °C) 90 16 126/72 100 % 10/16/18 0720 97.6 °F (36.4 °C) 73 18 118/79 99 % 10/16/18 0405 97.8 °F (36.6 °C) 79 18 130/78 98 % 10/15/18 1940 98.2 °F (36.8 °C) 79 18 131/77 100 % Intake/Output Summary (Last 24 hours) at 10/16/18 1639 Last data filed at 10/15/18 2148 Gross per 24 hour Intake              100 ml Output                0 ml Net              100 ml PHYSICAL EXAM: 
General: WD, WN. Alert, cooperative, no acute distress   
EENT:  EOMI. Anicteric sclerae. MMM,Right IJ+ Resp:  CTA bilaterally, no wheezing or rales. No accessory muscle use CV:  Regular  rhythm,  No edema GI:  Soft, Non distended, Non tender.  +Bowel sounds Neurologic:  Alert and oriented X 3, normal speech, Psych:   Good insight. Not anxious nor agitated Skin:  Tattoos+, dry scaly ,erythmeatous skin in upper leg,perineal,scrotal and buttock+. No jaundice Reviewed most current lab test results and cultures  YES Reviewed most current radiology test results   YES Review and summation of old records today    NO Reviewed patient's current orders and MAR    YES 
PMH/SH reviewed - no change compared to H&P 
________________________________________________________________________ Care Plan discussed with: 
 Comments Patient x Family RN x Care Manager x Consultant Multidiciplinary team rounds were held today with , nursing, pharmacist and clinical coordinator. Patient's plan of care was discussed; medications were reviewed and discharge planning was addressed. ________________________________________________________________________ Total NON critical care TIME:  35   Minutes Total CRITICAL CARE TIME Spent:   Minutes non procedure based Comments >50% of visit spent in counseling and coordination of care    
________________________________________________________________________ Barbie Foss MD  
 
Procedures: see electronic medical records for all procedures/Xrays and details which were not copied into this note but were reviewed prior to creation of Plan. LABS: 
I reviewed today's most current labs and imaging studies. Pertinent labs include: 
Recent Labs 10/14/18 
 0453 WBC  11.4* HGB  8.3* HCT  25.5*  
PLT  156 Recent Labs 10/16/18 
 1200  10/16/18 
 0417  10/15/18 
 0405  10/14/18 
 4922 NA  141  138   --   136  
K  4.7  4.6   --   4.5  
CL  106  105   --   105 CO2  20*  22   --   23  
GLU  79  81   --   91 BUN  31*  30*   --   24* CREA  1.87*  1.91*  1.90*  1.53*  1.48*  1.43* CA  9.1  8.9   --   9.2 MG  1.5*   --    --    --   
PHOS  5.0*  5.0*   --    --    --   
ALB  2.5*   --    --    --   
 
 
Signed: Barbie Foss MD

## 2018-10-16 NOTE — PROGRESS NOTES
Palliative Medicine S: spoke with pt, who reports left sided chest pain that radiates to his left shoulder and upper back that started Friday night (10/12) and worsened Saturday (10/13). The pain is exacerbated by breathing (inspiratory). He does not want to decrease the dilaudid for fear of the pain worsening. Denies other sxs such as SOB, diaphoresis, exertional pain. I asked Mr. Yina Moreno what his discharge plan is,ie, does he want to seek drug rehab? He reports that someone at the hospital named Michel Roberto is helping him find a Pain Management Clinic for his chronic neck and back pain, and that Richardyehuda Roberto is off until Thursday (10/18). O: chest CT on 10/15: 1. Small left pleural effusion, increased in size since 9/14/2018, with mild adjacent atelectasis, also increased. 2. Development of a new area of airspace disease in the right lower lobe which may represent subsegmental atelectasis or infarction. 3. No significant adenopathy within the limitations of a noncontrast study. Per Jarek Ospina, on exam pt appears to be in some pain, pain is reproducible with AP chest compression, which typically indicates MSK pain, or costochondritis. A/P: Pt has been getting IV Dilaudid during this admission for pain 2/2  left hip septic arthritis. He had right pleuritic chest pain early on in the admission that responded very well to Toradol, and after running its course, we transitioned to Celebrex. Unfortunately, his creatinine has gone up, today 1.91, and later repeat 1.87, renal ultrasound is pending.  I've discussed with the patient in the past and again today the rational for titrating the dilaudid dose down, to minimize withdrawal when he is discharged, that he is going to be discharged on 10/28 or 10/29, and that no provider will be giving him a prescription for opioids given his history of drug abuse, so it is in his best interest that we gradually reduce his dose. That said, based on my discussion with Jc Giles, I do believe he is having pain, and because of the increase in his creatinine, we are limited in what we use. During this admission, Mr. Macey Bernal has agreeable to trying non-opioid treatments, and if we don't adequately treat his pain, he will probably leave the hospital without completing abx treatment. Per my discussion with a counselor at [facility], we are not exacerbating his addiction by treating his pain. 1) continue Dilaudid 1mg q. 4 hours prn.  
 2) medrol pack to reduce inflammation; hopefully this will help given the improvement before with Toradol. 3) I will call Ham Harris on 10/18 to find out where she is with getting him into a Pain Management Clinic. I doubt any clinic will accept him as he has no insurance and his recent use 
     of heroin. I will again offer to help him get into a drug rehab clinic.

## 2018-10-16 NOTE — PROGRESS NOTES
Problem: Falls - Risk of 
Goal: *Absence of Falls Document April Mariel Fall Risk and appropriate interventions in the flowsheet. Outcome: Progressing Towards Goal 
Fall Risk Interventions: 
Mobility Interventions: Communicate number of staff needed for ambulation/transfer, Utilize walker, cane, or other assistive device Medication Interventions: Evaluate medications/consider consulting pharmacy, Teach patient to arise slowly Elimination Interventions: Call light in reach, Urinal in reach History of Falls Interventions: Door open when patient unattended, Evaluate medications/consider consulting pharmacy

## 2018-10-17 LAB — CREAT SERPL-MCNC: 1.44 MG/DL (ref 0.7–1.3)

## 2018-10-17 PROCEDURE — 82565 ASSAY OF CREATININE: CPT | Performed by: HOSPITALIST

## 2018-10-17 PROCEDURE — 77030027138 HC INCENT SPIROMETER -A

## 2018-10-17 PROCEDURE — 74011000258 HC RX REV CODE- 258: Performed by: HOSPITALIST

## 2018-10-17 PROCEDURE — 74011250636 HC RX REV CODE- 250/636: Performed by: HOSPITALIST

## 2018-10-17 PROCEDURE — 36415 COLL VENOUS BLD VENIPUNCTURE: CPT | Performed by: HOSPITALIST

## 2018-10-17 PROCEDURE — 74011250637 HC RX REV CODE- 250/637: Performed by: INTERNAL MEDICINE

## 2018-10-17 PROCEDURE — 65270000032 HC RM SEMIPRIVATE

## 2018-10-17 PROCEDURE — 74011250636 HC RX REV CODE- 250/636: Performed by: INTERNAL MEDICINE

## 2018-10-17 RX ADMIN — METHYLPREDNISOLONE 8 MG: 4 TABLET ORAL at 22:30

## 2018-10-17 RX ADMIN — Medication 10 ML: at 08:35

## 2018-10-17 RX ADMIN — METHYLPREDNISOLONE 4 MG: 4 TABLET ORAL at 19:07

## 2018-10-17 RX ADMIN — Medication 10 ML: at 12:23

## 2018-10-17 RX ADMIN — CEFEPIME HYDROCHLORIDE 2 G: 2 INJECTION, POWDER, FOR SOLUTION INTRAVENOUS at 05:45

## 2018-10-17 RX ADMIN — METHYLPREDNISOLONE 4 MG: 4 TABLET ORAL at 13:20

## 2018-10-17 RX ADMIN — HYDROMORPHONE HYDROCHLORIDE 1 MG: 1 INJECTION, SOLUTION INTRAMUSCULAR; INTRAVENOUS; SUBCUTANEOUS at 16:19

## 2018-10-17 RX ADMIN — CEFEPIME HYDROCHLORIDE 2 G: 2 INJECTION, POWDER, FOR SOLUTION INTRAVENOUS at 22:29

## 2018-10-17 RX ADMIN — Medication 10 ML: at 20:50

## 2018-10-17 RX ADMIN — METHYLPREDNISOLONE 4 MG: 4 TABLET ORAL at 08:35

## 2018-10-17 RX ADMIN — ENOXAPARIN SODIUM 40 MG: 40 INJECTION, SOLUTION INTRAVENOUS; SUBCUTANEOUS at 08:35

## 2018-10-17 RX ADMIN — Medication 1 CAPSULE: at 11:23

## 2018-10-17 RX ADMIN — SODIUM CHLORIDE 150 ML/HR: 900 INJECTION, SOLUTION INTRAVENOUS at 11:25

## 2018-10-17 RX ADMIN — HYDROMORPHONE HYDROCHLORIDE 1 MG: 1 INJECTION, SOLUTION INTRAMUSCULAR; INTRAVENOUS; SUBCUTANEOUS at 12:22

## 2018-10-17 RX ADMIN — Medication 10 ML: at 12:22

## 2018-10-17 RX ADMIN — Medication 10 ML: at 19:17

## 2018-10-17 RX ADMIN — SODIUM CHLORIDE 150 ML/HR: 900 INJECTION, SOLUTION INTRAVENOUS at 19:16

## 2018-10-17 RX ADMIN — CEFEPIME HYDROCHLORIDE 2 G: 2 INJECTION, POWDER, FOR SOLUTION INTRAVENOUS at 13:20

## 2018-10-17 RX ADMIN — HYDROMORPHONE HYDROCHLORIDE 1 MG: 1 INJECTION, SOLUTION INTRAMUSCULAR; INTRAVENOUS; SUBCUTANEOUS at 20:49

## 2018-10-17 RX ADMIN — ACETAMINOPHEN 650 MG: 325 TABLET, FILM COATED ORAL at 11:23

## 2018-10-17 RX ADMIN — Medication 10 ML: at 13:27

## 2018-10-17 RX ADMIN — HYDROMORPHONE HYDROCHLORIDE 1 MG: 1 INJECTION, SOLUTION INTRAMUSCULAR; INTRAVENOUS; SUBCUTANEOUS at 08:30

## 2018-10-17 RX ADMIN — HYDROMORPHONE HYDROCHLORIDE 1 MG: 1 INJECTION, SOLUTION INTRAMUSCULAR; INTRAVENOUS; SUBCUTANEOUS at 00:14

## 2018-10-17 NOTE — PROGRESS NOTES
Problem: Falls - Risk of 
Goal: *Absence of Falls Document Mal Julian Fall Risk and appropriate interventions in the flowsheet. Outcome: Progressing Towards Goal 
Fall Risk Interventions: 
Mobility Interventions: Communicate number of staff needed for ambulation/transfer, Utilize walker, cane, or other assistive device Medication Interventions: Evaluate medications/consider consulting pharmacy, Teach patient to arise slowly Elimination Interventions: Call light in reach, Urinal in reach History of Falls Interventions: Door open when patient unattended, Evaluate medications/consider consulting pharmacy

## 2018-10-17 NOTE — PROGRESS NOTES
0710) Bedside shift change report given to Taylor Encinas RN (oncoming nurse) by Elizabeth Barrios RN (offgoing nurse). Report included the following information SBAR, Kardex and MAR.  
3545) Discuss with Renae, care manager, pt request for [facility] application. Plan to discuss tomorrow with pt 
1610) pt stated breathing easier now, pain starting to ease. 1920) Bedside shift change report given to Debbie Logan RN (oncoming nurse) by Taylor Encinas RN (offgoing nurse). Report included the following information SBAR, Kardex, MAR, Accordion and Recent Results.

## 2018-10-17 NOTE — PROGRESS NOTES
1910) Bedside and Verbal shift change report given to GISELLE Sandra (oncoming nurse) by Mickey House RN (offgoing nurse). Report included the following information SBAR, Kardex, Intake/Output, MAR, Accordion, Recent Results and Med Rec Status.

## 2018-10-17 NOTE — PROGRESS NOTES
Hospitalist Progress Note Subjective:  
Daily Progress Note: 10/17/2018 1:57 PM 
 
Pt upset that he is on a renal diet. No nausea or vomiting. Pt reports left sided rib pain which was 9/10 but imporved to 4/10 with iv pain medicine Current Facility-Administered Medications Medication Dose Route Frequency  0.9% sodium chloride infusion  150 mL/hr IntraVENous CONTINUOUS  
 sodium chloride (NS) flush 5-10 mL  5-10 mL IntraVENous Q8H  
 sodium chloride (NS) flush 5-10 mL  5-10 mL IntraVENous PRN  
 methylPREDNISolone (MEDROL) tablet 4 mg  4 mg Oral ACB  methylPREDNISolone (MEDROL) tablet 4 mg  4 mg Oral PCL  
 methylPREDNISolone (MEDROL) tablet 4 mg  4 mg Oral PCD  methylPREDNISolone (MEDROL) tablet 8 mg  8 mg Oral QHS  [START ON 10/18/2018] methylPREDNISolone (MEDROL) tablet 4 mg  4 mg Oral QHS  [START ON 10/18/2018] HYDROmorphone (PF) (DILAUDID) injection 0.5 mg  0.5 mg IntraVENous Q4H PRN  
 sodium chloride (NS) flush 5-10 mL  5-10 mL IntraVENous PRN  
 sodium chloride (NS) flush 5-10 mL  5-10 mL IntraVENous Q8H  
 polyethylene glycol (MIRALAX) packet 17 g  17 g Oral DAILY PRN  
 lactobac ac& pc-s.therm-b.anim (ROLDAN Q/RISAQUAD)  1 Cap Oral DAILY  famotidine (PEPCID) tablet 20 mg  20 mg Oral BID PRN  
 acetaminophen (TYLENOL) tablet 650 mg  650 mg Oral Q6H PRN  
 lidocaine 4 % patch 1 Patch  1 Patch TransDERmal DAILY PRN  
 HYDROmorphone (PF) (DILAUDID) injection 1 mg  1 mg IntraVENous Q4H PRN  
 sodium chloride (NS) flush 10-30 mL  10-30 mL InterCATHeter PRN  
 sodium chloride (NS) flush 10-40 mL  10-40 mL InterCATHeter Q8H  
 heparin (porcine) pf 300 Units  300 Units InterCATHeter PRN  
 bacitracin 500 unit/gram packet 1 Packet  1 Packet Topical PRN  
 enoxaparin (LOVENOX) injection 40 mg  40 mg SubCUTAneous Q24H  cefepime (MAXIPIME) 2 g in 0.9% sodium chloride (MBP/ADV) 100 mL  2 g IntraVENous Q8H Review of Systems A comprehensive review of systems was negative except for that written in the HPI. Objective:  
 
Visit Vitals /82 Pulse 74 Temp 97.6 °F (36.4 °C) Resp 16 Ht 6' (1.829 m) Wt 78.8 kg (173 lb 12.8 oz) SpO2 100% BMI 23.57 kg/m² O2 Device: Room air Temp (24hrs), Av.9 °F (36.6 °C), Min:97.6 °F (36.4 °C), Max:98.1 °F (36.7 °C) 
 
 
10/17 0701 - 10/17 1900 In: 240 [P.O.:240] Out: 1100 [Urine:1100] 10/15 1901 - 10/17 0700 In: 56 [P.O.:690; I.V.:200] Out: 800 [Urine:800] Visit Vitals /82 Pulse 74 Temp 97.6 °F (36.4 °C) Resp 16 Ht 6' (1.829 m) Wt 78.8 kg (173 lb 12.8 oz) SpO2 100% BMI 23.57 kg/m² General:  Alert, cooperative, no distress, appears stated age. Head:  Normocephalic, without obvious abnormality, atraumatic. Eyes:  Conjunctivae/corneas clear. PERRL, EOMs intact. Fundi benign Ears:  Normal TMs and external ear canals both ears. Nose: Nares normal. Septum midline. Mucosa normal. No drainage or sinus tenderness. Throat: Lips, mucosa, and tongue normal. Teeth and gums normal.  
Neck: Supple, symmetrical, trachea midline, no adenopathy, thyroid: no enlargement/tenderness/nodules, no carotid bruit and no JVD. Back:   Symmetric, no curvature. ROM normal. No CVA tenderness. Lungs:   Clear to auscultation bilaterally. Chest wall:  No tenderness or deformity. Heart:  Regular rate and rhythm, S1, S2 normal, no murmur, click, rub or gallop. Abdomen:   Soft, non-tender. Bowel sounds normal. No masses,  No organomegaly. Genitalia:  Normal male without lesion, discharge or tenderness. Rectal:  Normal tone, normal prostate, no masses or tenderness Guaiac negative stool. Extremities: Extremities normal, atraumatic, no cyanosis or edema. Pulses: 2+ and symmetric all extremities. Skin: Skin color, texture, turgor normal. No rashes or lesions Lymph nodes: Cervical, supraclavicular, and axillary nodes normal.  
 Neurologic: CNII-XII intact. Normal strength, sensation and reflexes throughout. Additional comments:I reviewed the patient's new clinical lab test results. crt improved off of tobramycin Data Review Recent Results (from the past 24 hour(s)) URINALYSIS W/MICROSCOPIC Collection Time: 10/16/18  4:08 PM  
Result Value Ref Range Color YELLOW/STRAW Appearance CLEAR CLEAR Specific gravity 1.015 1.003 - 1.030    
 pH (UA) 5.5 5.0 - 8.0 Protein 30 (A) NEG mg/dL Glucose NEGATIVE  NEG mg/dL Ketone NEGATIVE  NEG mg/dL Bilirubin NEGATIVE  NEG Blood TRACE (A) NEG Urobilinogen 0.2 0.2 - 1.0 EU/dL Nitrites NEGATIVE  NEG Leukocyte Esterase NEGATIVE  NEG    
 WBC 0-4 0 - 4 /hpf  
 RBC 0-5 0 - 5 /hpf Epithelial cells FEW FEW /lpf Bacteria 2+ (A) NEG /hpf EOSINOPHILS, URINE Collection Time: 10/16/18  4:08 PM  
Result Value Ref Range Eosinophils,urine NEGATIVE PROTEIN/CREATININE RATIO, URINE Collection Time: 10/16/18  4:08 PM  
Result Value Ref Range Protein, urine random 94 (H) 0.0 - 11.9 mg/dL Creatinine, urine 71.10 mg/dL Protein/Creat. urine Ratio 1.3 SODIUM, UR, RANDOM Collection Time: 10/16/18  4:08 PM  
Result Value Ref Range Sodium,urine random 153 MMOL/L  
POTASSIUM, UR, RANDOM Collection Time: 10/16/18  4:08 PM  
Result Value Ref Range Potassium urine, random 19 MMOL/L  
CHLORIDE, URINE RANDOM Collection Time: 10/16/18  4:08 PM  
Result Value Ref Range Chloride,urine random 160 MMOL/L  
CREATININE CLEARANCE Collection Time: 10/16/18  4:08 PM  
Result Value Ref Range Creatinine Clearance Cannot be calculated 97 - 137 mL/min Creatinine 1.87 (H) 0.70 - 1.30 MG/DL Total volume, urine NOT PROVIDED mL Period of collection NOT PROVIDED hr  
CREATININE Collection Time: 10/17/18 12:23 AM  
Result Value Ref Range Creatinine 1.44 (H) 0.70 - 1.30 MG/DL  
 GFR est AA >60 >60 ml/min/1.73m2 GFR est non-AA 55 (L) >60 ml/min/1.73m2 Assessment/Plan:  
 
Principal Problem: 
  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 Tobramycin dc'd 2/2 elevated creatinine. maria c - improved off of tobramycin, will switch back to regular Hx of ivdu - per palliative Care Plan discussed with: Nurse Total time spent with patient, care team, > 50% coordination of care: 30 minutes. Signed By: Bro Garcia DO October 17, 2018

## 2018-10-17 NOTE — PROGRESS NOTES
Continue to follow for coordination of services. Discussed in rounds today. Anticipation for post acute care follow-up. Will follow-up about the Care Card Process, [address]ce [address] [place] [address] Application, Patient will need PCP, specialist and med voucher at discharge. Detail note to follow [address] HEIDI RN  
[phone]

## 2018-10-17 NOTE — PROGRESS NOTES
Infectious Disease Progress Note IMPRESSION:  
· Persistent Pseudomonas aeruginosa bacteremia 2/2 ( 9/6), repeat BC also+ 1/1 (9/7)BC  9/11- 1/4., 9/16 - so far no growth pt remains afebrile · Etiology of Pseudomonas - possibly from  washing needles with water from faucet. Denies sharing needles ·  MITZI s/p increase in Cr 1.91/ 1.87 
· Pain over left lateral rib cage , agree with diagnosis of probable costochondritis , CT chest shows increase in pleural effusion. ·  S/p Rapid Response on 9/16. 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 Repeat ECHO shows freely mobile mass seen on TV relatively unchanged from KHANH · R/basilar airspace disease on CXR, repeat CXR shows worsening of infiltrate, pt feels better ·  Evidence of cervical fusion C5-7 & T4/5 laminectomy on CT scan · MRI shows- fluid / edema L3/4 posterior joint facet suspicious for septic arthritis. · Pulmonary nodules,cavitary ,ground glass opacities, on  CT thorax suggestive of septic emboli ·  Reports from [facility] reviewed. Pt had MSSA bacteremia in May 2018. ( not MRSA ) Treated with Nafcillin & thereafter Cefazolin 5/15 to 6/28. · MRI of spine showed inflammatory changes in T5/6 suggestive of septic arthritis. TTE(5/16)  suggestive of vegetation , KHANH done (5/18), no evidence of vegetation as per D/c summary from [facility] · IVDU on going up to time of admission · UDS + for opiates · Smoker , alcohol consumption + · S/p IR evaluation - fluid in spine not accessible for aspiration per IR, S/p Ortho consult - defer to Neurosurgery. · S/p Cardiothoracic surgery evaluation - no surgery at this time. -  
· HIV, Hep C negative  
  
  
PLAN:  
   
· Plan was to continue Cefepime IV &  Gentamicinx 6 weeks end date Oct 28 , s/p Levaquin for new infiltrate, also for coverage for Pseudomonas. Continued x 14 days end date  Pseudomonas endocarditis is associated with  high morbidity & mortality rates & .high relapse rates upto 30% · Dc Gentamicin · Pt would need ECHO cardiogram &  CT lumbar spine after completion of  Antibiotic therapy · Continue low dose prednisone for costochondritisi · IV fluids , monitor UO, Cr 
·  Walk daily , d/w pt ·  Plan of care d/w pt again. CT chest ( 10/15) IMPRESSION: 
 1. Small left pleural effusion, increased in size since 2018, with mild 
adjacent atelectasis, also increased. 2. Development of a new area of airspace disease in the right lower lobe which 
may represent subsegmental atelectasis or infarction. 3. No significant adenopathy within the limitations of a noncontrast study Subjective:  
 
Pt seen . \" My left chest hurts \"Pt pointing to left lower rib cage Pt seen at request of Dr Jose You as there is rise in  Cr. Pt was transferred to [facility] for completion of antibiotic therapy . He has h/o Pseudomonas TV endocarditis . Bacteremia   Was difficult to clear . Aminoglycoside was added & thereafter cleared. Pt was also noted to have edema / inflammatory changes in  L3/4 suggestive of septic arthritis , also septic emboli in lungs Review of Systems:  None except for that mentioned in H&P. 10 point ROS obtained Objective:  
 
Blood pressure 126/72, pulse 90, temperature 98.1 °F (36.7 °C), resp. rate 16, height 6' (1.829 m), weight 173 lb 12.8 oz (78.8 kg), SpO2 100 %. Temp (24hrs), Av.8 °F (36.6 °C), Min:97.6 °F (36.4 °C), Max:98.1 °F (36.7 °C) Patient Vitals for the past 24 hrs: 
 Temp Pulse Resp BP SpO2  
10/16/18 1600 98.1 °F (36.7 °C) 90 16 126/72 100 % 10/16/18 0720 97.6 °F (36.4 °C) 73 18 118/79 99 % 10/16/18 0405 97.8 °F (36.6 °C) 79 18 130/78 98 % Lines:  Peripheral IV:   
   
 
 
Physical Exam:  
General:  Alert, cooperative, Eyes:  Sclera anicteric. Pupils equally round and reactive to light. Mouth/Throat: Mucous membranes normal, oral pharynx clear Neck: Supple Lungs:   reduced auscultation bases, Chest wall - tender lower rib cage CV:  Regular rate and rhythm, murmur+, Abdomen:   Soft, non-tender. bowel sounds normal. non-distended, Lumbar tender in lower back Extremities: No  Edema, tattoos + Lines/Devices:  Intact, no erythema, drainage or tenderness Data Review: CBC:  
Recent Labs 10/14/18 
 0453 WBC  11.4*  
RBC  2.95* HGB  8.3* HCT  25.5*  
PLT  156 CMP:  
Recent Labs 10/16/18 
 1608  10/16/18 
 1200  10/16/18 
 0417   10/14/18 
 8489 GLU   --   79  81   --   91 NA   --   141  138   --   136 K   --   4.7  4.6   --   4.5  
CL   --   106  105   --   105 CO2   --   20*  22   --   23 BUN   --   31*  30*   --   24* CREA  1.87*  1.87*  1.91*  1.90*   < >  1.48*  1.43* CA   --   9.1  8.9   --   9.2 AGAP   --   15  11   --   8  
BUCR   --   17  16   --   16 ALB   --   2.5*   --    --    --   
 < > = values in this interval not displayed. Studies:     
Lab Results Component Value Date/Time Culture result: NO GROWTH 6 DAYS 09/16/2018 05:10 PM  
 Culture result: NO GROWTH 6 DAYS 09/11/2018 07:03 PM  
 Culture result: (A) 09/11/2018 07:03 PM  
  PSEUDOMONAS AERUGINOSA GROWING IN 1 OF 2 BOTTLES DRAWN (SITE = L UPPER ARM) Culture result: REMAINING BOTTLE(S) HAS/HAVE NO GROWTH IN 5 DAYS 09/11/2018 07:03 PM  
 Culture result: (A) 09/08/2018 04:01 AM  
  PSEUDOMONAS AERUGINOSA GROWING IN THIS SINGLE BOTTLE DRAWN (L MIDLINE SITE) PLEASE REFER TO PREVIOUS BLOOD CULTURE [id number], COLLECTED 9/6/18 FOR SENSITIVITIES Culture result: (A) 09/08/2018 04:01 AM  
  PRELIMINARY REPORT OF GRAM NEGATIVE RODS GROWING IN THIS SINGLE BOTTLE DRAWN CALLED TO AND READ BACK BY Luis Otoole RN ON 9/10/18 AT 1817 [facility], [address]).  MS  
 Culture result: MRSA NOT PRESENT 09/08/2018 04:01 AM  
 Culture result:  09/08/2018 04:01 AM  
 Screening of patient nares for MRSA is for surveillance purposes and, if positive, to facilitate isolation considerations in high risk settings. It is not intended for automatic decolonization interventions per se as regimens are not sufficiently effective to warrant routine use. XR Results (most recent): 
 
Results from Hospital Encounter encounter on 09/25/18 XR CHEST PORT Narrative INDICATION: Left-sided pleuritic chest pain COMPARISON: September 26, 2018 FINDINGS: AP portable imaging of the chest performed at 6:00 PM demonstrates a 
stable cardiomediastinal silhouette. Right arm PICC line is unchanged in 
position. Right basilar airspace disease and small right pleural effusion are 
not significantly changed. Fusion hardware is present in the lower cervical 
spine. Impression IMPRESSION: No significant change in right basilar airspace disease and small 
right pleural effusion. Continued follow-up is recommended. Patient Active Problem List  
Diagnosis Code  Sepsis ([address] A41.9  Endocarditis of tricuspid valve I36.8  Tricuspid valve regurgitation, infectious I07.1  Acute septic pulmonary embolism without acute cor pulmonale (HCC) I26.90  
 Acute right-sided low back pain with sciatica M54.40  Heroin abuse ([address] F11.10  Debility R53.81  
 IV drug abuse ([address] F19.10  Infective endocarditis I33.0 I have discussed the diagnosis with the patient and the intended plan as seen in the above orders. I have discussed medication side effects and warnings with the patient as well. Reviewed test results at length with patient Anti-infectives:  
 
  
 Cefepime iv- 9/7 Gentamicin IV -9/14 Levaquin IV- 9/14 S/p Vancomycin iv- 9/7- 9/11  Modesto Ryder MD FACP

## 2018-10-18 LAB — CREAT SERPL-MCNC: 1.64 MG/DL (ref 0.7–1.3)

## 2018-10-18 PROCEDURE — 65270000032 HC RM SEMIPRIVATE

## 2018-10-18 PROCEDURE — 74011250637 HC RX REV CODE- 250/637: Performed by: INTERNAL MEDICINE

## 2018-10-18 PROCEDURE — 74011250636 HC RX REV CODE- 250/636: Performed by: EMERGENCY MEDICINE

## 2018-10-18 PROCEDURE — 82565 ASSAY OF CREATININE: CPT | Performed by: HOSPITALIST

## 2018-10-18 PROCEDURE — 74011000258 HC RX REV CODE- 258: Performed by: HOSPITALIST

## 2018-10-18 PROCEDURE — 74011250636 HC RX REV CODE- 250/636: Performed by: HOSPITALIST

## 2018-10-18 PROCEDURE — 36415 COLL VENOUS BLD VENIPUNCTURE: CPT | Performed by: HOSPITALIST

## 2018-10-18 PROCEDURE — 74011250636 HC RX REV CODE- 250/636: Performed by: INTERNAL MEDICINE

## 2018-10-18 RX ORDER — HYDROMORPHONE HYDROCHLORIDE 1 MG/ML
1 INJECTION, SOLUTION INTRAMUSCULAR; INTRAVENOUS; SUBCUTANEOUS
Status: DISPENSED | OUTPATIENT
Start: 2018-10-18 | End: 2018-10-23

## 2018-10-18 RX ADMIN — METHYLPREDNISOLONE 4 MG: 4 TABLET ORAL at 21:17

## 2018-10-18 RX ADMIN — Medication 1 CAPSULE: at 10:27

## 2018-10-18 RX ADMIN — Medication 10 ML: at 13:05

## 2018-10-18 RX ADMIN — CEFEPIME HYDROCHLORIDE 2 G: 2 INJECTION, POWDER, FOR SOLUTION INTRAVENOUS at 13:22

## 2018-10-18 RX ADMIN — SODIUM CHLORIDE 150 ML/HR: 900 INJECTION, SOLUTION INTRAVENOUS at 21:24

## 2018-10-18 RX ADMIN — METHYLPREDNISOLONE 4 MG: 4 TABLET ORAL at 08:46

## 2018-10-18 RX ADMIN — Medication 10 ML: at 17:04

## 2018-10-18 RX ADMIN — HYDROMORPHONE HYDROCHLORIDE 1 MG: 1 INJECTION, SOLUTION INTRAMUSCULAR; INTRAVENOUS; SUBCUTANEOUS at 17:03

## 2018-10-18 RX ADMIN — CEFEPIME HYDROCHLORIDE 2 G: 2 INJECTION, POWDER, FOR SOLUTION INTRAVENOUS at 21:17

## 2018-10-18 RX ADMIN — SODIUM CHLORIDE 150 ML/HR: 900 INJECTION, SOLUTION INTRAVENOUS at 13:24

## 2018-10-18 RX ADMIN — Medication 10 ML: at 06:02

## 2018-10-18 RX ADMIN — Medication 10 ML: at 21:16

## 2018-10-18 RX ADMIN — HEPARIN 300 UNITS: 100 SYRINGE at 04:58

## 2018-10-18 RX ADMIN — HYDROMORPHONE HYDROCHLORIDE 1 MG: 1 INJECTION, SOLUTION INTRAMUSCULAR; INTRAVENOUS; SUBCUTANEOUS at 01:02

## 2018-10-18 RX ADMIN — HYDROMORPHONE HYDROCHLORIDE 1 MG: 1 INJECTION, SOLUTION INTRAMUSCULAR; INTRAVENOUS; SUBCUTANEOUS at 13:04

## 2018-10-18 RX ADMIN — ENOXAPARIN SODIUM 40 MG: 40 INJECTION, SOLUTION INTRAVENOUS; SUBCUTANEOUS at 10:27

## 2018-10-18 RX ADMIN — HYDROMORPHONE HYDROCHLORIDE 1 MG: 1 INJECTION, SOLUTION INTRAMUSCULAR; INTRAVENOUS; SUBCUTANEOUS at 08:46

## 2018-10-18 RX ADMIN — CEFEPIME HYDROCHLORIDE 2 G: 2 INJECTION, POWDER, FOR SOLUTION INTRAVENOUS at 05:59

## 2018-10-18 RX ADMIN — METHYLPREDNISOLONE 4 MG: 4 TABLET ORAL at 17:25

## 2018-10-18 RX ADMIN — Medication 10 ML: at 01:02

## 2018-10-18 RX ADMIN — Medication 10 ML: at 04:47

## 2018-10-18 RX ADMIN — ACETAMINOPHEN 650 MG: 325 TABLET, FILM COATED ORAL at 10:27

## 2018-10-18 RX ADMIN — HYDROMORPHONE HYDROCHLORIDE 1 MG: 1 INJECTION, SOLUTION INTRAMUSCULAR; INTRAVENOUS; SUBCUTANEOUS at 21:17

## 2018-10-18 RX ADMIN — Medication 10 ML: at 08:50

## 2018-10-18 RX ADMIN — Medication 10 ML: at 13:22

## 2018-10-18 RX ADMIN — Medication 40 ML: at 04:47

## 2018-10-18 RX ADMIN — METHYLPREDNISOLONE 4 MG: 4 TABLET ORAL at 13:21

## 2018-10-18 RX ADMIN — SODIUM CHLORIDE 150 ML/HR: 900 INJECTION, SOLUTION INTRAVENOUS at 05:02

## 2018-10-18 RX ADMIN — Medication 10 ML: at 21:17

## 2018-10-18 RX ADMIN — HYDROMORPHONE HYDROCHLORIDE 1 MG: 1 INJECTION, SOLUTION INTRAMUSCULAR; INTRAVENOUS; SUBCUTANEOUS at 04:47

## 2018-10-18 NOTE — PROGRESS NOTES
Problem: Falls - Risk of 
Goal: *Absence of Falls Document Deneen Clarke Fall Risk and appropriate interventions in the flowsheet. Outcome: Progressing Towards Goal 
Fall Risk Interventions: 
Mobility Interventions: Utilize walker, cane, or other assistive device Medication Interventions: Teach patient to arise slowly Elimination Interventions: Urinal in reach History of Falls Interventions: Evaluate medications/consider consulting pharmacy

## 2018-10-18 NOTE — PROGRESS NOTES
Hospitalist Progress Note Subjective:  
Daily Progress Note: 10/18/2018 11:38 AM 
 
With persistent left sided chest pain, partially relieved with pain meds. Current Facility-Administered Medications Medication Dose Route Frequency  HYDROmorphone (PF) (DILAUDID) injection 1 mg  1 mg IntraVENous Q4H PRN  
 0.9% sodium chloride infusion  150 mL/hr IntraVENous CONTINUOUS  
 sodium chloride (NS) flush 5-10 mL  5-10 mL IntraVENous Q8H  
 sodium chloride (NS) flush 5-10 mL  5-10 mL IntraVENous PRN  
 methylPREDNISolone (MEDROL) tablet 4 mg  4 mg Oral ACB  methylPREDNISolone (MEDROL) tablet 4 mg  4 mg Oral PCL  
 methylPREDNISolone (MEDROL) tablet 4 mg  4 mg Oral PCD  methylPREDNISolone (MEDROL) tablet 4 mg  4 mg Oral QHS  sodium chloride (NS) flush 5-10 mL  5-10 mL IntraVENous PRN  
 sodium chloride (NS) flush 5-10 mL  5-10 mL IntraVENous Q8H  
 polyethylene glycol (MIRALAX) packet 17 g  17 g Oral DAILY PRN  
 lactobac ac& pc-s.therm-b.anim (ROLDAN Q/RISAQUAD)  1 Cap Oral DAILY  famotidine (PEPCID) tablet 20 mg  20 mg Oral BID PRN  
 acetaminophen (TYLENOL) tablet 650 mg  650 mg Oral Q6H PRN  
 lidocaine 4 % patch 1 Patch  1 Patch TransDERmal DAILY PRN  
 sodium chloride (NS) flush 10-30 mL  10-30 mL InterCATHeter PRN  
 sodium chloride (NS) flush 10-40 mL  10-40 mL InterCATHeter Q8H  
 heparin (porcine) pf 300 Units  300 Units InterCATHeter PRN  
 bacitracin 500 unit/gram packet 1 Packet  1 Packet Topical PRN  
 enoxaparin (LOVENOX) injection 40 mg  40 mg SubCUTAneous Q24H  cefepime (MAXIPIME) 2 g in 0.9% sodium chloride (MBP/ADV) 100 mL  2 g IntraVENous Q8H Review of Systems A comprehensive review of systems was negative except for that written in the HPI. Objective:  
 
Visit Vitals /85 (BP 1 Location: Left arm, BP Patient Position: At rest) Pulse 68 Temp 97.4 °F (36.3 °C) Resp 16 Ht 6' (1.829 m) Wt 78.8 kg (173 lb 12.8 oz) SpO2 100% BMI 23.57 kg/m² O2 Device: Room air Temp (24hrs), Av.8 °F (36.6 °C), Min:97.4 °F (36.3 °C), Max:98.3 °F (36.8 °C) 
 
 
10/18 0701 - 10/18 1900 In: -  
Out: 199 [YNWMF:190] 10/16 1901 - 10/18 07 In: 4962 [P.O.:240; I.V.:3800] Out: 2150 [Urine:2150] Visit Vitals /85 (BP 1 Location: Left arm, BP Patient Position: At rest) Pulse 68 Temp 97.4 °F (36.3 °C) Resp 16 Ht 6' (1.829 m) Wt 78.8 kg (173 lb 12.8 oz) SpO2 100% BMI 23.57 kg/m² General:  Alert, cooperative, no distress, appears stated age. Head:  Normocephalic, without obvious abnormality, atraumatic. Eyes:  Conjunctivae/corneas clear. PERRL, EOMs intact. Fundi benign Ears:  Normal TMs and external ear canals both ears. Nose: Nares normal. Septum midline. Mucosa normal. No drainage or sinus tenderness. Throat: Lips, mucosa, and tongue normal. Teeth and gums normal.  
Neck: Supple, symmetrical, trachea midline, no adenopathy, thyroid: no enlargement/tenderness/nodules, no carotid bruit and no JVD. Back:   Symmetric, no curvature. ROM normal. No CVA tenderness. Lungs:   Clear to auscultation bilaterally. Chest wall:  No tenderness or deformity. Heart:  Regular rate and rhythm, S1, S2 normal, no murmur, click, rub or gallop. Abdomen:   Soft, non-tender. Bowel sounds normal. No masses,  No organomegaly. Genitalia:  Normal male without lesion, discharge or tenderness. Rectal:  Normal tone, normal prostate, no masses or tenderness Guaiac negative stool. Extremities: Extremities normal, atraumatic, no cyanosis or edema. Pulses: 2+ and symmetric all extremities. Skin: Skin color, texture, turgor normal. No rashes or lesions Lymph nodes: Cervical, supraclavicular, and axillary nodes normal.  
Neurologic: CNII-XII intact. Normal strength, sensation and reflexes throughout. Additional comments:I reviewed the patient's new clinical lab test results. crt remained elevated Data Review Recent Results (from the past 24 hour(s)) CREATININE Collection Time: 10/18/18  4:55 AM  
Result Value Ref Range Creatinine 1.64 (H) 0.70 - 1.30 MG/DL  
 GFR est AA 57 (L) >60 ml/min/1.73m2 GFR est non-AA 47 (L) >60 ml/min/1.73m2 Assessment/Plan:  
 
Principal Problem: 
  Infective endocarditis (9/25/2018) on cefepime through 10/28. Tobramycin dc'd 2/2 elevated creatinine. Check bmp, cbc and urine eos 
  
maria c - appears close to baseline. Monitor crt. 
  
Hx of ivdu - per palliative 
  
 
 
 
 
Care Plan discussed with: Nurse Total time spent with patient/care team> 50% spent in coordination of care: 30 minutes. Signed By: Candie Smith DO October 18, 2018

## 2018-10-19 LAB
ANION GAP SERPL CALC-SCNC: 7 MMOL/L (ref 5–15)
BUN SERPL-MCNC: 25 MG/DL (ref 6–20)
BUN/CREAT SERPL: 15 (ref 12–20)
CALCIUM SERPL-MCNC: 8.7 MG/DL (ref 8.5–10.1)
CHLORIDE SERPL-SCNC: 108 MMOL/L (ref 97–108)
CO2 SERPL-SCNC: 24 MMOL/L (ref 21–32)
CREAT SERPL-MCNC: 1.62 MG/DL (ref 0.7–1.3)
ERYTHROCYTE [DISTWIDTH] IN BLOOD BY AUTOMATED COUNT: 17.8 % (ref 11.5–14.5)
GLUCOSE SERPL-MCNC: 105 MG/DL (ref 65–100)
HCT VFR BLD AUTO: 27.3 % (ref 36.6–50.3)
HGB BLD-MCNC: 8.6 G/DL (ref 12.1–17)
MCH RBC QN AUTO: 27.5 PG (ref 26–34)
MCHC RBC AUTO-ENTMCNC: 31.5 G/DL (ref 30–36.5)
MCV RBC AUTO: 87.2 FL (ref 80–99)
NRBC # BLD: 0 K/UL (ref 0–0.01)
NRBC BLD-RTO: 0 PER 100 WBC
PLATELET # BLD AUTO: 200 K/UL (ref 150–400)
PMV BLD AUTO: 9.2 FL (ref 8.9–12.9)
POTASSIUM SERPL-SCNC: 4.1 MMOL/L (ref 3.5–5.1)
POTASSIUM SERPL-SCNC: 5.7 MMOL/L (ref 3.5–5.1)
RBC # BLD AUTO: 3.13 M/UL (ref 4.1–5.7)
SODIUM SERPL-SCNC: 139 MMOL/L (ref 136–145)
WBC # BLD AUTO: 14.2 K/UL (ref 4.1–11.1)

## 2018-10-19 PROCEDURE — 74011636637 HC RX REV CODE- 636/637: Performed by: NEUROMUSCULOSKELETAL MEDICINE & OMM

## 2018-10-19 PROCEDURE — 74011250637 HC RX REV CODE- 250/637: Performed by: INTERNAL MEDICINE

## 2018-10-19 PROCEDURE — 74011250636 HC RX REV CODE- 250/636: Performed by: HOSPITALIST

## 2018-10-19 PROCEDURE — 74011250636 HC RX REV CODE- 250/636: Performed by: INTERNAL MEDICINE

## 2018-10-19 PROCEDURE — 36415 COLL VENOUS BLD VENIPUNCTURE: CPT | Performed by: INTERNAL MEDICINE

## 2018-10-19 PROCEDURE — 74011000250 HC RX REV CODE- 250: Performed by: NEUROMUSCULOSKELETAL MEDICINE & OMM

## 2018-10-19 PROCEDURE — 65270000032 HC RM SEMIPRIVATE

## 2018-10-19 PROCEDURE — 80048 BASIC METABOLIC PNL TOTAL CA: CPT | Performed by: INTERNAL MEDICINE

## 2018-10-19 PROCEDURE — 74011250636 HC RX REV CODE- 250/636: Performed by: EMERGENCY MEDICINE

## 2018-10-19 PROCEDURE — 84132 ASSAY OF SERUM POTASSIUM: CPT

## 2018-10-19 PROCEDURE — 85027 COMPLETE CBC AUTOMATED: CPT | Performed by: INTERNAL MEDICINE

## 2018-10-19 PROCEDURE — 74011000258 HC RX REV CODE- 258: Performed by: HOSPITALIST

## 2018-10-19 PROCEDURE — 74011250637 HC RX REV CODE- 250/637: Performed by: NEUROMUSCULOSKELETAL MEDICINE & OMM

## 2018-10-19 RX ORDER — DEXTROSE 50 % IN WATER (D50W) INTRAVENOUS SYRINGE
25
Status: COMPLETED | OUTPATIENT
Start: 2018-10-19 | End: 2018-10-19

## 2018-10-19 RX ORDER — SODIUM POLYSTYRENE SULFONATE 15 G/60ML
30 SUSPENSION ORAL; RECTAL
Status: COMPLETED | OUTPATIENT
Start: 2018-10-19 | End: 2018-10-19

## 2018-10-19 RX ADMIN — Medication 1 CAPSULE: at 09:27

## 2018-10-19 RX ADMIN — HYDROMORPHONE HYDROCHLORIDE 1 MG: 1 INJECTION, SOLUTION INTRAMUSCULAR; INTRAVENOUS; SUBCUTANEOUS at 13:25

## 2018-10-19 RX ADMIN — HYDROMORPHONE HYDROCHLORIDE 1 MG: 1 INJECTION, SOLUTION INTRAMUSCULAR; INTRAVENOUS; SUBCUTANEOUS at 05:21

## 2018-10-19 RX ADMIN — SODIUM POLYSTYRENE SULFONATE 30 G: 15 SUSPENSION ORAL; RECTAL at 09:41

## 2018-10-19 RX ADMIN — Medication 10 ML: at 12:42

## 2018-10-19 RX ADMIN — Medication 10 ML: at 05:22

## 2018-10-19 RX ADMIN — ENOXAPARIN SODIUM 40 MG: 40 INJECTION, SOLUTION INTRAVENOUS; SUBCUTANEOUS at 09:28

## 2018-10-19 RX ADMIN — HYDROMORPHONE HYDROCHLORIDE 1 MG: 1 INJECTION, SOLUTION INTRAMUSCULAR; INTRAVENOUS; SUBCUTANEOUS at 21:40

## 2018-10-19 RX ADMIN — CEFEPIME HYDROCHLORIDE 2 G: 2 INJECTION, POWDER, FOR SOLUTION INTRAVENOUS at 21:39

## 2018-10-19 RX ADMIN — SODIUM CHLORIDE 150 ML/HR: 900 INJECTION, SOLUTION INTRAVENOUS at 14:32

## 2018-10-19 RX ADMIN — METHYLPREDNISOLONE 4 MG: 4 TABLET ORAL at 09:27

## 2018-10-19 RX ADMIN — Medication 10 ML: at 05:23

## 2018-10-19 RX ADMIN — Medication 10 ML: at 14:36

## 2018-10-19 RX ADMIN — Medication 10 ML: at 21:41

## 2018-10-19 RX ADMIN — CEFEPIME HYDROCHLORIDE 2 G: 2 INJECTION, POWDER, FOR SOLUTION INTRAVENOUS at 13:28

## 2018-10-19 RX ADMIN — HUMAN INSULIN 10 UNITS: 100 INJECTION, SOLUTION SUBCUTANEOUS at 09:37

## 2018-10-19 RX ADMIN — METHYLPREDNISOLONE 4 MG: 4 TABLET ORAL at 12:42

## 2018-10-19 RX ADMIN — CEFEPIME HYDROCHLORIDE 2 G: 2 INJECTION, POWDER, FOR SOLUTION INTRAVENOUS at 05:22

## 2018-10-19 RX ADMIN — Medication 10 ML: at 13:28

## 2018-10-19 RX ADMIN — Medication 10 ML: at 09:25

## 2018-10-19 RX ADMIN — Medication 10 ML: at 13:26

## 2018-10-19 RX ADMIN — SODIUM CHLORIDE 150 ML/HR: 900 INJECTION, SOLUTION INTRAVENOUS at 07:23

## 2018-10-19 RX ADMIN — HYDROMORPHONE HYDROCHLORIDE 1 MG: 1 INJECTION, SOLUTION INTRAMUSCULAR; INTRAVENOUS; SUBCUTANEOUS at 09:23

## 2018-10-19 RX ADMIN — HYDROMORPHONE HYDROCHLORIDE 1 MG: 1 INJECTION, SOLUTION INTRAMUSCULAR; INTRAVENOUS; SUBCUTANEOUS at 01:27

## 2018-10-19 RX ADMIN — Medication 10 ML: at 17:33

## 2018-10-19 RX ADMIN — DEXTROSE MONOHYDRATE 25 G: 25 INJECTION, SOLUTION INTRAVENOUS at 09:29

## 2018-10-19 RX ADMIN — HYDROMORPHONE HYDROCHLORIDE 1 MG: 1 INJECTION, SOLUTION INTRAMUSCULAR; INTRAVENOUS; SUBCUTANEOUS at 17:33

## 2018-10-19 RX ADMIN — METHYLPREDNISOLONE 4 MG: 4 TABLET ORAL at 21:39

## 2018-10-19 NOTE — PROGRESS NOTES
Palliative Medicine Spoke with pt, who states his left-sided chest pain is a little bit better today. Discussed with him using the incentive spirometer and activity to reduce risk of PNA. Spoke with Louise Moreno RN: she reports that they are trying to get pt into [facility] discharge. She is aware that his lack of insurance and drug abuse history will impact his inclusion in other pain management programs.

## 2018-10-19 NOTE — PROGRESS NOTES
1925) Bedside and Verbal shift change report given to Jocy RN (oncoming nurse) by [facility], RN (offgoing nurse). Report included the following information SBAR, Kardex, Intake/Output, MAR, Accordion and Recent Results.

## 2018-10-19 NOTE — PROGRESS NOTES
0710) Bedside shift change report given to Abisai Ponce RN (oncoming nurse) by Dalia Car RN (offgoing nurse). Report included the following information SBAR, Kardex, MAR, Accordion and Recent Results. 1300) pt stated pain worse this afternoon 1910) Bedside shift change report given to GISELLE Sandra (oncoming nurse) by Abisai Ponce RN (offgoing nurse). Report included the following information SBAR, Kardex, MAR, Accordion and Recent Results.

## 2018-10-19 NOTE — PROGRESS NOTES
1935) Bedside and Verbal shift change report given to Jocy RN (oncoming nurse) by [facility] (offgoing nurse). Report included the following information SBAR, Kardex, Intake/Output, MAR, Accordion, Recent Results and Med Rec Status. 2020) WBC up from 11.4 to 14.2. In addition, K+ up from 4.7 to 5.7. Notified the on-call physician.

## 2018-10-19 NOTE — INTERDISCIPLINARY ROUNDS
56) IDR with Balwinder LINDER), Rex Goyal (pharmacist), Oneida Donovan (), Fer Pace (nurse manager), Kylie Barba (diabetes educator), and Mulu Moreland (RN) to discuss plan of care including hyperkalemia treatment and creatine trends

## 2018-10-19 NOTE — PROGRESS NOTES
Hospitalist Progress Note Pt remains on iv fluids for elevated crt. Cont to monitor Subjective:  
Daily Progress Note: 10/19/2018 10:51 AM 
 
Chest pain much improved with steroids. Down to 2/10 from 8/10. Associated breathing discomfort improved as well. Current Facility-Administered Medications Medication Dose Route Frequency  HYDROmorphone (PF) (DILAUDID) injection 1 mg  1 mg IntraVENous Q4H PRN  
 0.9% sodium chloride infusion  150 mL/hr IntraVENous CONTINUOUS  
 sodium chloride (NS) flush 5-10 mL  5-10 mL IntraVENous Q8H  
 sodium chloride (NS) flush 5-10 mL  5-10 mL IntraVENous PRN  
 methylPREDNISolone (MEDROL) tablet 4 mg  4 mg Oral ACB  methylPREDNISolone (MEDROL) tablet 4 mg  4 mg Oral PCL  
 methylPREDNISolone (MEDROL) tablet 4 mg  4 mg Oral QHS  sodium chloride (NS) flush 5-10 mL  5-10 mL IntraVENous PRN  
 sodium chloride (NS) flush 5-10 mL  5-10 mL IntraVENous Q8H  
 polyethylene glycol (MIRALAX) packet 17 g  17 g Oral DAILY PRN  
 lactobac ac& pc-s.therm-b.anim (ROLDAN Q/RISAQUAD)  1 Cap Oral DAILY  famotidine (PEPCID) tablet 20 mg  20 mg Oral BID PRN  
 acetaminophen (TYLENOL) tablet 650 mg  650 mg Oral Q6H PRN  
 lidocaine 4 % patch 1 Patch  1 Patch TransDERmal DAILY PRN  
 sodium chloride (NS) flush 10-30 mL  10-30 mL InterCATHeter PRN  
 sodium chloride (NS) flush 10-40 mL  10-40 mL InterCATHeter Q8H  
 heparin (porcine) pf 300 Units  300 Units InterCATHeter PRN  
 bacitracin 500 unit/gram packet 1 Packet  1 Packet Topical PRN  
 enoxaparin (LOVENOX) injection 40 mg  40 mg SubCUTAneous Q24H  cefepime (MAXIPIME) 2 g in 0.9% sodium chloride (MBP/ADV) 100 mL  2 g IntraVENous Q8H Review of Systems A comprehensive review of systems was negative except for that written in the HPI. Objective:  
 
Visit Vitals /87 (BP 1 Location: Left arm, BP Patient Position: At rest) Pulse 70 Temp 97.7 °F (36.5 °C) Resp 16 Ht 6' (1.829 m) Wt 78.8 kg (173 lb 12.8 oz) SpO2 100% BMI 23.57 kg/m² O2 Device: Room air Temp (24hrs), Av.8 °F (36.6 °C), Min:97.7 °F (36.5 °C), Max:97.9 °F (36.6 °C) No intake/output data recorded. 10/17 1901 - 10/19 0700 In: 3700 [I.V.:3700] Out: 1600 [Urine:1600] Visit Vitals /87 (BP 1 Location: Left arm, BP Patient Position: At rest) Pulse 70 Temp 97.7 °F (36.5 °C) Resp 16 Ht 6' (1.829 m) Wt 78.8 kg (173 lb 12.8 oz) SpO2 100% BMI 23.57 kg/m² General:  Alert, cooperative, no distress, appears stated age. Head:  Normocephalic, without obvious abnormality, atraumatic. Eyes:  Conjunctivae/corneas clear. PERRL, EOMs intact. Fundi benign Ears:  Normal TMs and external ear canals both ears. Nose: Nares normal. Septum midline. Mucosa normal. No drainage or sinus tenderness. Throat: Lips, mucosa, and tongue normal. Teeth and gums normal.  
Neck: Supple, symmetrical, trachea midline, no adenopathy, thyroid: no enlargement/tenderness/nodules, no carotid bruit and no JVD. Back:   Symmetric, no curvature. ROM normal. No CVA tenderness. Lungs:   Clear to auscultation bilaterally. Chest wall:  No tenderness or deformity. Heart:  Regular rate and rhythm, S1, S2 normal, no murmur, click, rub or gallop. Abdomen:   Soft, non-tender. Bowel sounds normal. No masses,  No organomegaly. Genitalia:  Normal male without lesion, discharge or tenderness. Rectal:  Normal tone, normal prostate, no masses or tenderness Guaiac negative stool. Extremities: Extremities normal, atraumatic, no cyanosis or edema. Pulses: 2+ and symmetric all extremities. Skin: Skin color, texture, turgor normal. No rashes or lesions Lymph nodes: Cervical, supraclavicular, and axillary nodes normal.  
Neurologic: CNII-XII intact. Normal strength, sensation and reflexes throughout.   
 
 
Additional comments:I reviewed the patient's new clinical lab test results. potassium elevated and will treat with kayexylate Data Review Recent Results (from the past 24 hour(s)) CBC W/O DIFF Collection Time: 10/19/18  3:34 AM  
Result Value Ref Range WBC 14.2 (H) 4.1 - 11.1 K/uL  
 RBC 3.13 (L) 4.10 - 5.70 M/uL HGB 8.6 (L) 12.1 - 17.0 g/dL HCT 27.3 (L) 36.6 - 50.3 % MCV 87.2 80.0 - 99.0 FL  
 MCH 27.5 26.0 - 34.0 PG  
 MCHC 31.5 30.0 - 36.5 g/dL  
 RDW 17.8 (H) 11.5 - 14.5 % PLATELET 655 028 - 957 K/uL MPV 9.2 8.9 - 12.9 FL  
 NRBC 0.0 0  WBC ABSOLUTE NRBC 0.00 0.00 - 0.01 K/uL METABOLIC PANEL, BASIC Collection Time: 10/19/18  3:34 AM  
Result Value Ref Range Sodium 139 136 - 145 mmol/L Potassium 5.7 (H) 3.5 - 5.1 mmol/L Chloride 108 97 - 108 mmol/L  
 CO2 24 21 - 32 mmol/L Anion gap 7 5 - 15 mmol/L Glucose 105 (H) 65 - 100 mg/dL BUN 25 (H) 6 - 20 MG/DL Creatinine 1.62 (H) 0.70 - 1.30 MG/DL  
 BUN/Creatinine ratio 15 12 - 20 GFR est AA 58 (L) >60 ml/min/1.73m2 GFR est non-AA 48 (L) >60 ml/min/1.73m2 Calcium 8.7 8.5 - 10.1 MG/DL Assessment/Plan:  
 
Principal Problem: 
  Infective endocarditis (9/25/2018) on cefepime through 10/28.  Tobramycin dc'd 2/2 elevated creatinine. urine eos negative. Monitor bmp. 
  
maria c -remains moderately elevated with elevated potassium. Give kayexylate, D50 and inuslin. recheck bmp in am.  Consider iv fluids if remains elevated. Pt eating well. 
  
Hx of ivdu - per palliative Care Plan discussed with: Nurse Total time spent with patient, care team>50% spent in coordination of care.: 30 minutes. Signed By: Fozia Davis DO October 19, 2018

## 2018-10-19 NOTE — PROGRESS NOTES
0710)Bedside shift change report given to Gurwinder Gage (oncoming nurse) by Alexandra Mark RN (offgoing nurse). Report included the following information SBAR, Kardex, MAR, Accordion and Recent Results. 1008) Speak with Camille Melchor, NP, palliative, about pain clinic 
21 ) Discuss with Yessenia, , pt sleeping when attempt to discuss [address] [place] [address]. Plan to discuss application later. 1230) Pt stated he would shower after lunch. 1730) pt stated he would shower after dinner 1855) pt in shower now. 1920) Bedside shift change report given to William Ulloa (oncoming nurse) by America Dhillon, GISELLE (offgoing nurse). Report included the following information SBAR, Kardex, MAR, Accordion and Recent Results.

## 2018-10-20 LAB
ANION GAP SERPL CALC-SCNC: 12 MMOL/L (ref 5–15)
BUN SERPL-MCNC: 19 MG/DL (ref 6–20)
BUN/CREAT SERPL: 15 (ref 12–20)
CALCIUM SERPL-MCNC: 7 MG/DL (ref 8.5–10.1)
CHLORIDE SERPL-SCNC: 113 MMOL/L (ref 97–108)
CO2 SERPL-SCNC: 18 MMOL/L (ref 21–32)
CREAT SERPL-MCNC: 1.3 MG/DL (ref 0.7–1.3)
CREAT SERPL-MCNC: 1.3 MG/DL (ref 0.7–1.3)
GLUCOSE SERPL-MCNC: 103 MG/DL (ref 65–100)
POTASSIUM SERPL-SCNC: 3.9 MMOL/L (ref 3.5–5.1)
SODIUM SERPL-SCNC: 143 MMOL/L (ref 136–145)

## 2018-10-20 PROCEDURE — 74011250637 HC RX REV CODE- 250/637: Performed by: INTERNAL MEDICINE

## 2018-10-20 PROCEDURE — 74011250636 HC RX REV CODE- 250/636: Performed by: HOSPITALIST

## 2018-10-20 PROCEDURE — 74011000258 HC RX REV CODE- 258: Performed by: HOSPITALIST

## 2018-10-20 PROCEDURE — 65270000032 HC RM SEMIPRIVATE

## 2018-10-20 PROCEDURE — 36592 COLLECT BLOOD FROM PICC: CPT

## 2018-10-20 PROCEDURE — 74011250636 HC RX REV CODE- 250/636: Performed by: INTERNAL MEDICINE

## 2018-10-20 PROCEDURE — 74011250636 HC RX REV CODE- 250/636: Performed by: EMERGENCY MEDICINE

## 2018-10-20 PROCEDURE — 36415 COLL VENOUS BLD VENIPUNCTURE: CPT | Performed by: HOSPITALIST

## 2018-10-20 PROCEDURE — 80048 BASIC METABOLIC PNL TOTAL CA: CPT | Performed by: HOSPITALIST

## 2018-10-20 RX ADMIN — Medication 10 ML: at 01:44

## 2018-10-20 RX ADMIN — HYDROMORPHONE HYDROCHLORIDE 1 MG: 1 INJECTION, SOLUTION INTRAMUSCULAR; INTRAVENOUS; SUBCUTANEOUS at 17:20

## 2018-10-20 RX ADMIN — Medication 10 ML: at 13:38

## 2018-10-20 RX ADMIN — HYDROMORPHONE HYDROCHLORIDE 1 MG: 1 INJECTION, SOLUTION INTRAMUSCULAR; INTRAVENOUS; SUBCUTANEOUS at 13:38

## 2018-10-20 RX ADMIN — HYDROMORPHONE HYDROCHLORIDE 1 MG: 1 INJECTION, SOLUTION INTRAMUSCULAR; INTRAVENOUS; SUBCUTANEOUS at 05:57

## 2018-10-20 RX ADMIN — METHYLPREDNISOLONE 4 MG: 4 TABLET ORAL at 21:49

## 2018-10-20 RX ADMIN — Medication 10 ML: at 05:57

## 2018-10-20 RX ADMIN — ENOXAPARIN SODIUM 40 MG: 40 INJECTION, SOLUTION INTRAVENOUS; SUBCUTANEOUS at 09:29

## 2018-10-20 RX ADMIN — HYDROMORPHONE HYDROCHLORIDE 1 MG: 1 INJECTION, SOLUTION INTRAMUSCULAR; INTRAVENOUS; SUBCUTANEOUS at 01:43

## 2018-10-20 RX ADMIN — Medication 10 ML: at 21:49

## 2018-10-20 RX ADMIN — METHYLPREDNISOLONE 4 MG: 4 TABLET ORAL at 09:29

## 2018-10-20 RX ADMIN — CEFEPIME HYDROCHLORIDE 2 G: 2 INJECTION, POWDER, FOR SOLUTION INTRAVENOUS at 05:57

## 2018-10-20 RX ADMIN — Medication 1 CAPSULE: at 09:29

## 2018-10-20 RX ADMIN — Medication 10 ML: at 13:39

## 2018-10-20 RX ADMIN — SODIUM CHLORIDE 150 ML/HR: 900 INJECTION, SOLUTION INTRAVENOUS at 15:55

## 2018-10-20 RX ADMIN — Medication 10 ML: at 21:48

## 2018-10-20 RX ADMIN — Medication 10 ML: at 05:58

## 2018-10-20 RX ADMIN — HYDROMORPHONE HYDROCHLORIDE 1 MG: 1 INJECTION, SOLUTION INTRAMUSCULAR; INTRAVENOUS; SUBCUTANEOUS at 09:29

## 2018-10-20 RX ADMIN — CEFEPIME HYDROCHLORIDE 2 G: 2 INJECTION, POWDER, FOR SOLUTION INTRAVENOUS at 13:38

## 2018-10-20 RX ADMIN — CEFEPIME HYDROCHLORIDE 2 G: 2 INJECTION, POWDER, FOR SOLUTION INTRAVENOUS at 21:49

## 2018-10-20 RX ADMIN — SODIUM CHLORIDE 150 ML/HR: 900 INJECTION, SOLUTION INTRAVENOUS at 06:05

## 2018-10-20 NOTE — PROGRESS NOTES
0710hrs . [facility] Bedside and Verbal shift change report given to Khadra Mcnally (oncoming nurse) by Frank Mcfarland (offgoing nurse). Report included the following information SBAR, Kardex, Intake/Output, MAR, Recent Results and Med Rec Status. 0710hrs . [facility] Bedside and Verbal shift change report given to Camacho (oncoming nurse) by Elyssa nurse).  Report included the following information SBAR, Kardex, Intake/Output, MAR, Recent Results and Med Rec Status.

## 2018-10-20 NOTE — PROGRESS NOTES
Problem: Falls - Risk of 
Goal: *Absence of Falls Document [facility] Heart Fall Risk and appropriate interventions in the flowsheet. Outcome: Progressing Towards Goal 
Fall Risk Interventions: 
Mobility Interventions: Utilize walker, cane, or other assistive device Medication Interventions: Teach patient to arise slowly Elimination Interventions: Urinal in reach History of Falls Interventions: Consult care management for discharge planning

## 2018-10-20 NOTE — PROGRESS NOTES
Hospitalist Progress Note NAME: Memo Moctezuma :  1980 MRN:  [id number] Interim Hospital Summary: 43 y.o. male whom presented on 2018 with Assessment / Plan: 
  
Transferred from [facility], for completion of ABX until 10/28 Update 
-no change in exam or plan of care. Tricuspid infective Endocarditis Pseudomonas bacteremia L3/4 septic arthritis Pulmonary septic embolic All above in setting of h/o IVDU 
-On IV abx per ID recs: tobramycin and cefepime until 10/28 
- levaquin until  
-F/U tobramycin level and adjust doses  
-ID, ortho, IR and Cardiothoracic surgery on board 
-No plans for surgical intervention MRI L spine : L3/4 septic arthritis CT thoracic spine : multiple pulmonary nodules, several of which are cavitary and several groundglass opacities and areas of consolidation. 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 Back pain secondary to above 
-followed by palliative medicine for pain management: FU palliative consult PMH of HTN and Chronic anemia  
-monitor closely H/O IVDU, heroin 
-pain management as per palliative Code Status: DNR, completed DDNR on admission Surrogate Decision Maker: Jonny Chacon ( ex wife's mom) DVT Prophylaxis: lovenox GI Prophylaxis: not indicated Baseline: independent Subjective: Chief Complaint / Reason for Physician Visit \"ok\". Discussed with RN events overnight. Review of Systems: 
Symptom Y/N Comments  Symptom Y/N Comments Fever/Chills    Chest Pain Poor Appetite    Edema Cough    Abdominal Pain Sputum    Joint Pain y Back pain SOB/CUEVA    Pruritis/Rash Nausea/vomit    Tolerating PT/OT y Diarrhea    Tolerating Diet y Constipation    Other Could NOT obtain due to:   
 
Objective: VITALS:  
Last 24hrs VS reviewed since prior progress note. Most recent are: Patient Vitals for the past 24 hrs: 
 Temp Pulse Resp BP SpO2  
10/20/18 0816 98.1 °F (36.7 °C) 66 18 143/87 100 % 10/19/18 2148 98.3 °F (36.8 °C) 78 16 131/90 100 % 10/19/18 1431 98.1 °F (36.7 °C) 84 16 (!) 133/92 100 % Intake/Output Summary (Last 24 hours) at 10/20/2018 1101 Last data filed at 10/20/2018 4325 Gross per 24 hour Intake 2150 ml Output 2950 ml Net -800 ml PHYSICAL EXAM: 
General: Alert, cooperative, no acute distress   
Resp:  CTA bilaterally Neurologic:  Alert and oriented X 3, normal speech Skin:  Tattoos+, dry scaly ,erythmeatous skin in upper leg,perineal,scrotal and buttock+. No jaundice Reviewed most current lab test results and cultures  YES Reviewed most current radiology test results   YES Review and summation of old records today    NO Reviewed patient's current orders and MAR    YES 
PMH/ reviewed - no change compared to H&P 
________________________________________________________________________ Care Plan discussed with: 
  Comments Patient x Family RN x Care Manager Consultant Multidiciplinary team rounds were held today with , nursing, pharmacist and clinical coordinator. Patient's plan of care was discussed; medications were reviewed and discharge planning was addressed. ________________________________________________________________________ Total NON critical care TIME:  15 Minutes Total CRITICAL CARE TIME Spent:   Minutes non procedure based Comments >50% of visit spent in counseling and coordination of care    
________________________________________________________________________ Tj Gonzales MD  
 
Procedures: see electronic medical records for all procedures/Xrays and details which were not copied into this note but were reviewed prior to creation of Plan. LABS: 
I reviewed today's most current labs and imaging studies. Pertinent labs include: 
Recent Labs 10/19/18 
2078 WBC 14.2* HGB 8.6* HCT 27.3*  
 Recent Labs 10/20/18 
0141 10/19/18 
1240 10/19/18 
0334 10/18/18 
3523   --  139  --   
K 3.9 4.1 5.7*  --   
*  --  108  --   
CO2 18*  --  24  --   
*  --  105*  --   
BUN 19  --  25*  --   
CREA 1.30  1.30  --  1.62* 1.64* CA 7.0*  --  8.7  --   
 
 
Signed: Rudi Forte MD

## 2018-10-20 NOTE — PROGRESS NOTES
Problem: Falls - Risk of 
Goal: *Absence of Falls Document Kendra Ambrosio Fall Risk and appropriate interventions in the flowsheet. Outcome: Progressing Towards Goal 
Fall Risk Interventions: 
Mobility Interventions: Assess mobility with egress test, Utilize walker, cane, or other assistive device, Strengthening exercises (ROM-active/passive) Medication Interventions: Teach patient to arise slowly Elimination Interventions: Urinal in reach, Toilet paper/wipes in reach, Toileting schedule/hourly rounds, Call light in reach History of Falls Interventions: Consult care management for discharge planning, Evaluate medications/consider consulting pharmacy

## 2018-10-21 LAB — CREAT SERPL-MCNC: 1.55 MG/DL (ref 0.7–1.3)

## 2018-10-21 PROCEDURE — 82565 ASSAY OF CREATININE: CPT | Performed by: HOSPITALIST

## 2018-10-21 PROCEDURE — 74011250637 HC RX REV CODE- 250/637: Performed by: INTERNAL MEDICINE

## 2018-10-21 PROCEDURE — 65270000032 HC RM SEMIPRIVATE

## 2018-10-21 PROCEDURE — 74011000258 HC RX REV CODE- 258: Performed by: HOSPITALIST

## 2018-10-21 PROCEDURE — 74011250636 HC RX REV CODE- 250/636: Performed by: HOSPITALIST

## 2018-10-21 PROCEDURE — 74011250636 HC RX REV CODE- 250/636: Performed by: EMERGENCY MEDICINE

## 2018-10-21 PROCEDURE — 74011250636 HC RX REV CODE- 250/636: Performed by: INTERNAL MEDICINE

## 2018-10-21 PROCEDURE — 36415 COLL VENOUS BLD VENIPUNCTURE: CPT | Performed by: HOSPITALIST

## 2018-10-21 RX ADMIN — Medication 10 ML: at 13:20

## 2018-10-21 RX ADMIN — Medication 1 CAPSULE: at 09:22

## 2018-10-21 RX ADMIN — SODIUM CHLORIDE 150 ML/HR: 900 INJECTION, SOLUTION INTRAVENOUS at 21:44

## 2018-10-21 RX ADMIN — CEFEPIME HYDROCHLORIDE 2 G: 2 INJECTION, POWDER, FOR SOLUTION INTRAVENOUS at 21:45

## 2018-10-21 RX ADMIN — Medication 10 ML: at 06:07

## 2018-10-21 RX ADMIN — METHYLPREDNISOLONE 4 MG: 4 TABLET ORAL at 09:22

## 2018-10-21 RX ADMIN — HYDROMORPHONE HYDROCHLORIDE 1 MG: 1 INJECTION, SOLUTION INTRAMUSCULAR; INTRAVENOUS; SUBCUTANEOUS at 09:29

## 2018-10-21 RX ADMIN — SODIUM CHLORIDE 150 ML/HR: 900 INJECTION, SOLUTION INTRAVENOUS at 13:30

## 2018-10-21 RX ADMIN — CEFEPIME HYDROCHLORIDE 2 G: 2 INJECTION, POWDER, FOR SOLUTION INTRAVENOUS at 06:07

## 2018-10-21 RX ADMIN — ENOXAPARIN SODIUM 40 MG: 40 INJECTION, SOLUTION INTRAVENOUS; SUBCUTANEOUS at 09:22

## 2018-10-21 RX ADMIN — Medication 10 ML: at 21:45

## 2018-10-21 RX ADMIN — HYDROMORPHONE HYDROCHLORIDE 1 MG: 1 INJECTION, SOLUTION INTRAMUSCULAR; INTRAVENOUS; SUBCUTANEOUS at 06:07

## 2018-10-21 RX ADMIN — HEPARIN 300 UNITS: 100 SYRINGE at 06:59

## 2018-10-21 RX ADMIN — HYDROMORPHONE HYDROCHLORIDE 1 MG: 1 INJECTION, SOLUTION INTRAMUSCULAR; INTRAVENOUS; SUBCUTANEOUS at 13:20

## 2018-10-21 RX ADMIN — CEFEPIME HYDROCHLORIDE 2 G: 2 INJECTION, POWDER, FOR SOLUTION INTRAVENOUS at 13:20

## 2018-10-21 RX ADMIN — SODIUM CHLORIDE 150 ML/HR: 900 INJECTION, SOLUTION INTRAVENOUS at 01:31

## 2018-10-21 RX ADMIN — HYDROMORPHONE HYDROCHLORIDE 1 MG: 1 INJECTION, SOLUTION INTRAMUSCULAR; INTRAVENOUS; SUBCUTANEOUS at 21:45

## 2018-10-21 RX ADMIN — Medication 10 ML: at 21:46

## 2018-10-21 RX ADMIN — HYDROMORPHONE HYDROCHLORIDE 1 MG: 1 INJECTION, SOLUTION INTRAMUSCULAR; INTRAVENOUS; SUBCUTANEOUS at 17:35

## 2018-10-21 NOTE — PROGRESS NOTES
Bedside shift change report given to me (oncoming nurse) by Bertha Gamble (offgoing nurse). Report included the following information SBAR, Kardex, Intake/Output, MAR and Recent Results.

## 2018-10-21 NOTE — PROGRESS NOTES
Problem: Falls - Risk of 
Goal: *Absence of Falls Document Galileo Amoss Fall Risk and appropriate interventions in the flowsheet. Outcome: Progressing Towards Goal 
Fall Risk Interventions: 
Mobility Interventions: Assess mobility with egress test 
 
  
 
Medication Interventions: Teach patient to arise slowly Elimination Interventions: Urinal in reach History of Falls Interventions: Consult care management for discharge planning Problem: Pain Goal: *Control of Pain Outcome: Progressing Towards Goal 
Pt reports 6-9/10 pain. PRN medications given per MAR. Pain medication scheduled to be tapered mon at 0000

## 2018-10-21 NOTE — PROGRESS NOTES
Hospitalist Progress Note NAME: Jake Silveira :  1980 MRN:  [id number] Interim Hospital Summary: 43 y.o. male whom presented on 2018 with Assessment / Plan: 
  
Transferred from [facility], for completion of ABX until 10/28 Update 
-no change in exam or plan of care. Tricuspid infective Endocarditis Pseudomonas bacteremia L3/4 septic arthritis Pulmonary septic embolic All above in setting of h/o IVDU 
-On IV abx per ID recs: tobramycin and cefepime until 10/28 
- levaquin until  
-F/U tobramycin level and adjust doses  
-ID, ortho, IR and Cardiothoracic surgery on board 
-No plans for surgical intervention MRI L spine : L3/4 septic arthritis CT thoracic spine : multiple pulmonary nodules, several of which are cavitary and several groundglass opacities and areas of consolidation. 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 Back pain secondary to above 
-followed by palliative medicine for pain management: FU palliative consult PMH of HTN and Chronic anemia  
-monitor closely H/O IVDU, heroin 
-pain management as per palliative Code Status: DNR, completed DDNR on admission Surrogate Decision Maker: Kamaljit Cornejo ( ex wife's mom) DVT Prophylaxis: lovenox GI Prophylaxis: not indicated Baseline: independent Subjective: Chief Complaint / Reason for Physician Visit \"ok\". Discussed with RN events overnight. Review of Systems: 
Symptom Y/N Comments  Symptom Y/N Comments Fever/Chills    Chest Pain Poor Appetite    Edema Cough    Abdominal Pain Sputum    Joint Pain y Back pain SOB/CUEVA    Pruritis/Rash Nausea/vomit    Tolerating PT/OT y Diarrhea    Tolerating Diet y Constipation    Other Could NOT obtain due to:   
 
Objective: VITALS:  
Last 24hrs VS reviewed since prior progress note. Most recent are: Patient Vitals for the past 24 hrs: 
 Temp Pulse Resp BP SpO2  
10/21/18 0623 97.7 °F (36.5 °C) 63 19 144/89 100 % 10/20/18 2009 98.5 °F (36.9 °C) 84 18 143/85 100 % 10/20/18 1551 98.1 °F (36.7 °C) 89 16 142/83 100 % Intake/Output Summary (Last 24 hours) at 10/21/2018 1134 Last data filed at 10/21/2018 1003 Gross per 24 hour Intake 535 ml Output 2950 ml Net -2415 ml PHYSICAL EXAM: 
General: Alert, cooperative, no acute distress   
Resp:  CTA bilaterally Neurologic:  Alert and oriented X 3, normal speech Skin:  Tattoos+, dry scaly ,erythmeatous skin in upper leg,perineal,scrotal and buttock+. No jaundice Reviewed most current lab test results and cultures  YES Reviewed most current radiology test results   YES Review and summation of old records today    NO Reviewed patient's current orders and MAR    YES 
PMH/ reviewed - no change compared to H&P 
________________________________________________________________________ Care Plan discussed with: 
  Comments Patient x Family RN x Care Manager Consultant Multidiciplinary team rounds were held today with , nursing, pharmacist and clinical coordinator. Patient's plan of care was discussed; medications were reviewed and discharge planning was addressed. ________________________________________________________________________ Total NON critical care TIME:  15 Minutes Total CRITICAL CARE TIME Spent:   Minutes non procedure based Comments >50% of visit spent in counseling and coordination of care    
________________________________________________________________________ Nilsa Armstrong MD  
 
Procedures: see electronic medical records for all procedures/Xrays and details which were not copied into this note but were reviewed prior to creation of Plan. LABS: 
I reviewed today's most current labs and imaging studies. Pertinent labs include: 
Recent Labs 10/19/18 7812 WBC 14.2* HGB 8.6* HCT 27.3*  
 Recent Labs 10/21/18 
7268 10/20/18 
0141 10/19/18 
1240 10/19/18 
8512 NA  --  143  --  139 K  --  3.9 4.1 5.7*  
CL  --  113*  --  108 CO2  --  18*  --  24 GLU  --  103*  --  105* BUN  --  19  --  25* CREA 1.55* 1.30  1.30  --  1.62* CA  --  7.0*  --  8.7 Signed: Michael Chaidez MD

## 2018-10-22 LAB — CREAT SERPL-MCNC: 1.46 MG/DL (ref 0.7–1.3)

## 2018-10-22 PROCEDURE — 74011000258 HC RX REV CODE- 258: Performed by: HOSPITALIST

## 2018-10-22 PROCEDURE — 74011250636 HC RX REV CODE- 250/636: Performed by: INTERNAL MEDICINE

## 2018-10-22 PROCEDURE — 74011250636 HC RX REV CODE- 250/636: Performed by: EMERGENCY MEDICINE

## 2018-10-22 PROCEDURE — 74011250636 HC RX REV CODE- 250/636: Performed by: HOSPITALIST

## 2018-10-22 PROCEDURE — 36415 COLL VENOUS BLD VENIPUNCTURE: CPT | Performed by: HOSPITALIST

## 2018-10-22 PROCEDURE — 65270000032 HC RM SEMIPRIVATE

## 2018-10-22 PROCEDURE — 82565 ASSAY OF CREATININE: CPT | Performed by: HOSPITALIST

## 2018-10-22 PROCEDURE — 74011250637 HC RX REV CODE- 250/637: Performed by: INTERNAL MEDICINE

## 2018-10-22 RX ADMIN — Medication 10 ML: at 05:30

## 2018-10-22 RX ADMIN — Medication 10 ML: at 21:47

## 2018-10-22 RX ADMIN — SODIUM CHLORIDE 150 ML/HR: 900 INJECTION, SOLUTION INTRAVENOUS at 05:35

## 2018-10-22 RX ADMIN — SODIUM CHLORIDE 150 ML/HR: 900 INJECTION, SOLUTION INTRAVENOUS at 13:51

## 2018-10-22 RX ADMIN — Medication 1 CAPSULE: at 09:18

## 2018-10-22 RX ADMIN — HYDROMORPHONE HYDROCHLORIDE 1 MG: 1 INJECTION, SOLUTION INTRAMUSCULAR; INTRAVENOUS; SUBCUTANEOUS at 21:47

## 2018-10-22 RX ADMIN — SODIUM CHLORIDE 150 ML/HR: 900 INJECTION, SOLUTION INTRAVENOUS at 21:47

## 2018-10-22 RX ADMIN — HYDROMORPHONE HYDROCHLORIDE 1 MG: 1 INJECTION, SOLUTION INTRAMUSCULAR; INTRAVENOUS; SUBCUTANEOUS at 09:31

## 2018-10-22 RX ADMIN — HYDROMORPHONE HYDROCHLORIDE 1 MG: 1 INJECTION, SOLUTION INTRAMUSCULAR; INTRAVENOUS; SUBCUTANEOUS at 13:46

## 2018-10-22 RX ADMIN — Medication 10 ML: at 13:46

## 2018-10-22 RX ADMIN — HYDROMORPHONE HYDROCHLORIDE 1 MG: 1 INJECTION, SOLUTION INTRAMUSCULAR; INTRAVENOUS; SUBCUTANEOUS at 17:44

## 2018-10-22 RX ADMIN — HEPARIN 300 UNITS: 100 SYRINGE at 21:58

## 2018-10-22 RX ADMIN — HYDROMORPHONE HYDROCHLORIDE 1 MG: 1 INJECTION, SOLUTION INTRAMUSCULAR; INTRAVENOUS; SUBCUTANEOUS at 01:34

## 2018-10-22 RX ADMIN — Medication 10 ML: at 13:00

## 2018-10-22 RX ADMIN — CEFEPIME HYDROCHLORIDE 2 G: 2 INJECTION, POWDER, FOR SOLUTION INTRAVENOUS at 21:47

## 2018-10-22 RX ADMIN — CEFEPIME HYDROCHLORIDE 2 G: 2 INJECTION, POWDER, FOR SOLUTION INTRAVENOUS at 13:43

## 2018-10-22 RX ADMIN — HYDROMORPHONE HYDROCHLORIDE 1 MG: 1 INJECTION, SOLUTION INTRAMUSCULAR; INTRAVENOUS; SUBCUTANEOUS at 05:30

## 2018-10-22 RX ADMIN — CEFEPIME HYDROCHLORIDE 2 G: 2 INJECTION, POWDER, FOR SOLUTION INTRAVENOUS at 05:30

## 2018-10-22 NOTE — PROGRESS NOTES
Bedside shift change report given to me (oncoming nurse) by Lashell Mittal RN (offgoing nurse). Report included the following information SBAR, Kardex, Intake/Output, MAR and Recent Results. 4380: Pt has turning Iv machine completely off 2x this shift as well as multiple times over the last three nights. Red line has been heparinized 2x in the last 3 nights and will barely flush and will not return blood. Purple cap on PICC will not turn in order to change. Pt still asking for pain meds q4hr and does not sleep at all.

## 2018-10-22 NOTE — PROGRESS NOTES
Hospitalist Progress Note NAME: Antonio Bryant :  1980 MRN:  [id number] Interim Hospital Summary: 43 y.o. male whom presented on 2018 with Assessment / Plan: 
  
Transferred from [facility], for completion of ABX until 10/28 Update 
-no change in exam or plan of care. Tricuspid infective Endocarditis Pseudomonas bacteremia L3/4 septic arthritis Pulmonary septic embolic All above in setting of h/o IVDU 
-On IV abx per ID recs: tobramycin and cefepime until 10/28 
-levaquin until  
-F/U tobramycin level and adjust doses  
-ID, ortho, IR and Cardiothoracic surgery on board 
-No plans for surgical intervention MRI L spine : L3/4 septic arthritis CT thoracic spine : multiple pulmonary nodules, several of which are cavitary and several groundglass opacities and areas of consolidation. 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 Back pain secondary to above 
-followed by palliative medicine for pain management: FU palliative consult PMH of HTN and Chronic anemia  
-monitor closely H/O IVDU, heroin 
-pain management as per palliative Code Status: DNR, completed DDNR on admission Surrogate Decision Maker: Anton Ham ( ex wife's mom) DVT Prophylaxis: lovenox GI Prophylaxis: not indicated Baseline: independent Subjective: Chief Complaint / Reason for Physician Visit \"ok\". Discussed with RN events overnight. Review of Systems: 
Symptom Y/N Comments  Symptom Y/N Comments Fever/Chills    Chest Pain Poor Appetite    Edema Cough    Abdominal Pain Sputum    Joint Pain y Back pain SOB/CUEVA    Pruritis/Rash Nausea/vomit    Tolerating PT/OT y Diarrhea    Tolerating Diet y Constipation    Other Could NOT obtain due to:   
 
Objective: VITALS:  
Last 24hrs VS reviewed since prior progress note. Most recent are: Patient Vitals for the past 24 hrs: 
 Temp Pulse Resp BP SpO2  
10/22/18 0537 97.9 °F (36.6 °C) 71 19 133/90 100 % 10/21/18 2104 97.7 °F (36.5 °C) 60 19 (!) 141/91 100 % 10/21/18 1604 98.2 °F (36.8 °C) 76 18 132/82 99 % Intake/Output Summary (Last 24 hours) at 10/22/2018 5323 Last data filed at 10/22/2018 1769 Gross per 24 hour Intake 6612.5 ml Output 3000 ml Net 3612.5 ml PHYSICAL EXAM: 
General: Alert, cooperative, no acute distress   
Resp:  CTA bilaterally Neurologic:  Alert and oriented X 3, normal speech Skin:  Tattoos+, dry scaly ,erythmeatous skin in upper leg,perineal,scrotal and buttock+. No jaundice Reviewed most current lab test results and cultures  YES Reviewed most current radiology test results   YES Review and summation of old records today    NO Reviewed patient's current orders and MAR    YES 
PMH/ reviewed - no change compared to H&P 
________________________________________________________________________ Care Plan discussed with: 
  Comments Patient x Family RN x Care Manager Consultant Multidiciplinary team rounds were held today with , nursing, pharmacist and clinical coordinator. Patient's plan of care was discussed; medications were reviewed and discharge planning was addressed. ________________________________________________________________________ Total NON critical care TIME:  15 Minutes Total CRITICAL CARE TIME Spent:   Minutes non procedure based Comments >50% of visit spent in counseling and coordination of care    
________________________________________________________________________ Pedro Garcia MD  
 
Procedures: see electronic medical records for all procedures/Xrays and details which were not copied into this note but were reviewed prior to creation of Plan. LABS: 
I reviewed today's most current labs and imaging studies. Pertinent labs include: No results for input(s): WBC, HGB, HCT, PLT, HGBEXT, HCTEXT, PLTEXT, HGBEXT, HCTEXT, PLTEXT in the last 72 hours. Recent Labs 10/22/18 
8212 10/21/18 
6936 10/20/18 
0141 10/19/18 
1240 NA  --   --  143  --   
K  --   --  3.9 4.1 CL  --   --  113*  --   
CO2  --   --  18*  --   
GLU  --   --  103*  --   
BUN  --   --  19  --   
CREA 1.46* 1.55* 1.30  1.30  --   
CA  --   --  7.0*  --   
 
 
Signed: Gregg Crowell MD

## 2018-10-22 NOTE — PROGRESS NOTES
Problem: Falls - Risk of 
Goal: *Absence of Falls Document Hussein Emmanuel Fall Risk and appropriate interventions in the flowsheet. Outcome: Progressing Towards Goal 
Fall Risk Interventions: 
Mobility Interventions: Utilize walker, cane, or other assistive device Medication Interventions: Teach patient to arise slowly Elimination Interventions: Urinal in reach History of Falls Interventions: Evaluate medications/consider consulting pharmacy

## 2018-10-22 NOTE — PROGRESS NOTES
Palliative Medicine S: pt reports chest pain is better, hip pain still bothering him if he does too much. A/P:  Dosing is PRN, discussed with pt that he doesn't need to ask for it q. 4 hours, or he can ask for less than the 1mg. I am signing Palliative Medicine off. Pt is planning to go to [facility] for pain management.

## 2018-10-22 NOTE — PROGRESS NOTES
0710hrs . Lance Crenshaw Bedside and Verbal shift change report given to Jenna) by Camacho (offgoing nurse). Report included the following information SBAR, Kardex, Intake/Output, MAR, Recent Results and Med Rec Status.  
1910hrs . Lance Cernshaw Bedside and Verbal shift change report given to Camacho (oncoming nurse) by Mary Kay(offgoing nurse).  Report included the following information SBAR, Kardex, Intake/Output, MAR, Recent Results and Med Rec Status.

## 2018-10-22 NOTE — PROGRESS NOTES
Problem: Falls - Risk of 
Goal: *Absence of Falls Document Janie Ortiz Fall Risk and appropriate interventions in the flowsheet. Outcome: Progressing Towards Goal 
Fall Risk Interventions: 
Mobility Interventions: Utilize walker, cane, or other assistive device Mentation Interventions: Adequate sleep, hydration, pain control Medication Interventions: Teach patient to arise slowly Elimination Interventions: Urinal in reach History of Falls Interventions: Evaluate medications/consider consulting pharmacy

## 2018-10-22 NOTE — PROGRESS NOTES
Problem: Falls - Risk of 
Goal: *Absence of Falls Document Stef Wallis Fall Risk and appropriate interventions in the flowsheet. Outcome: Progressing Towards Goal 
Fall Risk Interventions: 
Mobility Interventions: Utilize walker, cane, or other assistive device Mentation Interventions: Adequate sleep, hydration, pain control Medication Interventions: Teach patient to arise slowly Elimination Interventions: Urinal in reach History of Falls Interventions: Evaluate medications/consider consulting pharmacy

## 2018-10-23 LAB — CREAT SERPL-MCNC: 1.41 MG/DL (ref 0.7–1.3)

## 2018-10-23 PROCEDURE — 74011250636 HC RX REV CODE- 250/636: Performed by: HOSPITALIST

## 2018-10-23 PROCEDURE — 82565 ASSAY OF CREATININE: CPT | Performed by: HOSPITALIST

## 2018-10-23 PROCEDURE — 74011250636 HC RX REV CODE- 250/636: Performed by: EMERGENCY MEDICINE

## 2018-10-23 PROCEDURE — 74011250636 HC RX REV CODE- 250/636: Performed by: INTERNAL MEDICINE

## 2018-10-23 PROCEDURE — 74011250637 HC RX REV CODE- 250/637: Performed by: INTERNAL MEDICINE

## 2018-10-23 PROCEDURE — 65270000032 HC RM SEMIPRIVATE

## 2018-10-23 PROCEDURE — 36415 COLL VENOUS BLD VENIPUNCTURE: CPT | Performed by: HOSPITALIST

## 2018-10-23 PROCEDURE — 74011000258 HC RX REV CODE- 258: Performed by: HOSPITALIST

## 2018-10-23 RX ADMIN — Medication 10 ML: at 05:53

## 2018-10-23 RX ADMIN — SODIUM CHLORIDE 150 ML/HR: 900 INJECTION, SOLUTION INTRAVENOUS at 21:41

## 2018-10-23 RX ADMIN — CEFEPIME HYDROCHLORIDE 2 G: 2 INJECTION, POWDER, FOR SOLUTION INTRAVENOUS at 21:32

## 2018-10-23 RX ADMIN — ENOXAPARIN SODIUM 40 MG: 40 INJECTION, SOLUTION INTRAVENOUS; SUBCUTANEOUS at 09:15

## 2018-10-23 RX ADMIN — Medication 10 ML: at 21:34

## 2018-10-23 RX ADMIN — CEFEPIME HYDROCHLORIDE 2 G: 2 INJECTION, POWDER, FOR SOLUTION INTRAVENOUS at 05:53

## 2018-10-23 RX ADMIN — HYDROMORPHONE HYDROCHLORIDE 1 MG: 1 INJECTION, SOLUTION INTRAMUSCULAR; INTRAVENOUS; SUBCUTANEOUS at 01:50

## 2018-10-23 RX ADMIN — Medication 1 CAPSULE: at 09:15

## 2018-10-23 RX ADMIN — Medication 10 ML: at 13:17

## 2018-10-23 RX ADMIN — HYDROMORPHONE HYDROCHLORIDE 1 MG: 1 INJECTION, SOLUTION INTRAMUSCULAR; INTRAVENOUS; SUBCUTANEOUS at 09:16

## 2018-10-23 RX ADMIN — Medication 10 ML: at 13:16

## 2018-10-23 RX ADMIN — SODIUM CHLORIDE 150 ML/HR: 900 INJECTION, SOLUTION INTRAVENOUS at 13:22

## 2018-10-23 RX ADMIN — CEFEPIME HYDROCHLORIDE 2 G: 2 INJECTION, POWDER, FOR SOLUTION INTRAVENOUS at 13:15

## 2018-10-23 RX ADMIN — HYDROMORPHONE HYDROCHLORIDE 1 MG: 1 INJECTION, SOLUTION INTRAMUSCULAR; INTRAVENOUS; SUBCUTANEOUS at 05:53

## 2018-10-23 RX ADMIN — HYDROMORPHONE HYDROCHLORIDE 1 MG: 1 INJECTION, SOLUTION INTRAMUSCULAR; INTRAVENOUS; SUBCUTANEOUS at 17:26

## 2018-10-23 RX ADMIN — HYDROMORPHONE HYDROCHLORIDE 1 MG: 1 INJECTION, SOLUTION INTRAMUSCULAR; INTRAVENOUS; SUBCUTANEOUS at 13:16

## 2018-10-23 RX ADMIN — HYDROMORPHONE HYDROCHLORIDE 1 MG: 1 INJECTION, SOLUTION INTRAMUSCULAR; INTRAVENOUS; SUBCUTANEOUS at 21:32

## 2018-10-23 NOTE — PROGRESS NOTES
Hospitalist Progress Note NAME: Gina Cobos :  1980 MRN:  [id number] Interim Hospital Summary: 43 y.o. male whom presented on 2018 with Assessment / Plan: 
  
Transferred from [facility], for completion of ABX until 10/28 Update 
-no change in exam or plan of care. Tricuspid infective Endocarditis Pseudomonas bacteremia L3/4 septic arthritis Pulmonary septic embolic All above in setting of h/o IVDU 
-On IV abx per ID recs: tobramycin and cefepime until 10/28 
-levaquin until  
-F/U tobramycin level and adjust doses  
-ID, ortho, IR and Cardiothoracic surgery on board 
-No plans for surgical intervention MRI L spine : L3/4 septic arthritis CT thoracic spine : multiple pulmonary nodules, several of which are cavitary and several groundglass opacities and areas of consolidation. 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 Back pain secondary to above 
-followed by palliative medicine for pain management: FU palliative consult PMH of HTN and Chronic anemia  
-monitor closely H/O IVDU, heroin 
-pain management as per palliative Code Status: DNR, completed DDNR on admission Surrogate Decision Maker: Trinh Caney ( ex wife's mom) DVT Prophylaxis: lovenox GI Prophylaxis: not indicated Baseline: independent Subjective: Chief Complaint / Reason for Physician Visit \"ok\". Discussed with RN events overnight. Review of Systems: 
Symptom Y/N Comments  Symptom Y/N Comments Fever/Chills    Chest Pain Poor Appetite    Edema Cough    Abdominal Pain Sputum    Joint Pain y Back pain SOB/CUEVA    Pruritis/Rash Nausea/vomit    Tolerating PT/OT y Diarrhea    Tolerating Diet y Constipation    Other Could NOT obtain due to:   
 
Objective: VITALS:  
Last 24hrs VS reviewed since prior progress note. Most recent are: Patient Vitals for the past 24 hrs: 
 Temp Pulse Resp BP SpO2  
10/23/18 0737 97.6 °F (36.4 °C) 70 18 122/80 99 % 10/23/18 0602 97.7 °F (36.5 °C) 73 18 124/81 99 % 10/22/18 2024 98.7 °F (37.1 °C) 81 18 137/84 100 % 10/22/18 1616 98.5 °F (36.9 °C) 66 18 128/82 100 % Intake/Output Summary (Last 24 hours) at 10/23/2018 1047 Last data filed at 10/23/2018 0602 Gross per 24 hour Intake 4455 ml Output 1000 ml Net 3455 ml PHYSICAL EXAM: 
General: Alert, cooperative, no acute distress   
Resp:  CTA bilaterally Neurologic:  Alert and oriented X 3, normal speech Skin:  Tattoos+, dry scaly ,erythmeatous skin in upper leg,perineal,scrotal and buttock+. No jaundice Reviewed most current lab test results and cultures  YES Reviewed most current radiology test results   YES Review and summation of old records today    NO Reviewed patient's current orders and MAR    YES 
PMH/ reviewed - no change compared to H&P 
________________________________________________________________________ Care Plan discussed with: 
  Comments Patient x Family RN x Care Manager Consultant Multidiciplinary team rounds were held today with , nursing, pharmacist and clinical coordinator. Patient's plan of care was discussed; medications were reviewed and discharge planning was addressed. ________________________________________________________________________ Total NON critical care TIME:  15 Minutes Total CRITICAL CARE TIME Spent:   Minutes non procedure based Comments >50% of visit spent in counseling and coordination of care    
________________________________________________________________________ Garland Lomeli MD  
 
Procedures: see electronic medical records for all procedures/Xrays and details which were not copied into this note but were reviewed prior to creation of Plan. LABS: 
I reviewed today's most current labs and imaging studies. Pertinent labs include: No results for input(s): WBC, HGB, HCT, PLT, HGBEXT, HCTEXT, PLTEXT, HGBEXT, HCTEXT, PLTEXT in the last 72 hours. Recent Labs 10/23/18 
3919 10/22/18 
4405 10/21/18 
6392 CREA 1.41* 1.46* 1.55* Signed: Stefany Hameed MD

## 2018-10-23 NOTE — PROGRESS NOTES
Attempted to complete follow-up visit with patient on Med Tele. Patient appeared to be resting and did not respond to  calling his name or knock on the door. Chaplains remain available as needed and desired. Chaplain Connie Whitfield M.Div.  Paging Service [phone])

## 2018-10-23 NOTE — PROGRESS NOTES
Problem: Falls - Risk of 
Goal: *Absence of Falls Document Genevive Camera Fall Risk and appropriate interventions in the flowsheet. Outcome: Progressing Towards Goal 
Fall Risk Interventions: 
Mobility Interventions: Utilize walker, cane, or other assistive device Mentation Interventions: Adequate sleep, hydration, pain control Medication Interventions: Teach patient to arise slowly Elimination Interventions: Urinal in reach History of Falls Interventions: Evaluate medications/consider consulting pharmacy

## 2018-10-23 NOTE — PROGRESS NOTES
1920) Bedside and Verbal shift change report given to GISELLE Sandra (oncoming nurse) by Que Hawley RN (offgoing nurse). Report included the following information SBAR, Kardex, Intake/Output, MAR, Accordion, Recent Results and Med Rec Status.

## 2018-10-23 NOTE — PROGRESS NOTES
Problem: Falls - Risk of 
Goal: *Absence of Falls Document Galileo Amoss Fall Risk and appropriate interventions in the flowsheet. Outcome: Progressing Towards Goal 
Fall Risk Interventions: 
Mobility Interventions: Utilize walker, cane, or other assistive device Mentation Interventions: Adequate sleep, hydration, pain control, Evaluate medications/consider consulting pharmacy Medication Interventions: Teach patient to arise slowly Elimination Interventions: Urinal in reach History of Falls Interventions: Evaluate medications/consider consulting pharmacy

## 2018-10-23 NOTE — PROGRESS NOTES
0710hrs . Ynesa Kay Bedside and Verbal shift change report given to Khadra Mcnally (oncoming nurse) by Kacie Emery (offgoing nurse). Report included the following information SBAR, Kardex, Intake/Output, MAR, Recent Results and Med Rec Status. 1910hrs . Magaly Kay Bedside and Verbal shift change report given to Negrito WALKER 25) by [facility] nurse).  Report included the following information SBAR, Kardex, Intake/Output, MAR, Recent Results and Med Rec Status.

## 2018-10-24 LAB — CREAT SERPL-MCNC: 1.36 MG/DL (ref 0.7–1.3)

## 2018-10-24 PROCEDURE — 65270000032 HC RM SEMIPRIVATE

## 2018-10-24 PROCEDURE — 74011250636 HC RX REV CODE- 250/636: Performed by: EMERGENCY MEDICINE

## 2018-10-24 PROCEDURE — 74011250636 HC RX REV CODE- 250/636: Performed by: INTERNAL MEDICINE

## 2018-10-24 PROCEDURE — 82565 ASSAY OF CREATININE: CPT | Performed by: HOSPITALIST

## 2018-10-24 PROCEDURE — 74011250636 HC RX REV CODE- 250/636: Performed by: HOSPITALIST

## 2018-10-24 PROCEDURE — 36592 COLLECT BLOOD FROM PICC: CPT

## 2018-10-24 PROCEDURE — 36415 COLL VENOUS BLD VENIPUNCTURE: CPT | Performed by: HOSPITALIST

## 2018-10-24 PROCEDURE — 74011000258 HC RX REV CODE- 258: Performed by: HOSPITALIST

## 2018-10-24 RX ORDER — HYDROMORPHONE HYDROCHLORIDE 1 MG/ML
0.5 INJECTION, SOLUTION INTRAMUSCULAR; INTRAVENOUS; SUBCUTANEOUS
Status: DISCONTINUED | OUTPATIENT
Start: 2018-10-24 | End: 2018-10-24

## 2018-10-24 RX ORDER — HYDROMORPHONE HYDROCHLORIDE 1 MG/ML
1 INJECTION, SOLUTION INTRAMUSCULAR; INTRAVENOUS; SUBCUTANEOUS
Status: COMPLETED | OUTPATIENT
Start: 2018-10-24 | End: 2018-10-24

## 2018-10-24 RX ORDER — HYDROMORPHONE HYDROCHLORIDE 1 MG/ML
0.5 INJECTION, SOLUTION INTRAMUSCULAR; INTRAVENOUS; SUBCUTANEOUS ONCE
Status: ACTIVE | OUTPATIENT
Start: 2018-10-24 | End: 2018-10-24

## 2018-10-24 RX ORDER — HYDROMORPHONE HYDROCHLORIDE 1 MG/ML
1 INJECTION, SOLUTION INTRAMUSCULAR; INTRAVENOUS; SUBCUTANEOUS
Status: DISCONTINUED | OUTPATIENT
Start: 2018-10-24 | End: 2018-10-25 | Stop reason: DRUGHIGH

## 2018-10-24 RX ADMIN — SODIUM CHLORIDE 75 ML/HR: 900 INJECTION, SOLUTION INTRAVENOUS at 21:59

## 2018-10-24 RX ADMIN — Medication 10 ML: at 22:01

## 2018-10-24 RX ADMIN — Medication 10 ML: at 01:58

## 2018-10-24 RX ADMIN — HYDROMORPHONE HYDROCHLORIDE 0.5 MG: 1 INJECTION, SOLUTION INTRAMUSCULAR; INTRAVENOUS; SUBCUTANEOUS at 10:39

## 2018-10-24 RX ADMIN — CEFEPIME HYDROCHLORIDE 2 G: 2 INJECTION, POWDER, FOR SOLUTION INTRAVENOUS at 14:00

## 2018-10-24 RX ADMIN — CEFEPIME HYDROCHLORIDE 2 G: 2 INJECTION, POWDER, FOR SOLUTION INTRAVENOUS at 21:58

## 2018-10-24 RX ADMIN — Medication 10 ML: at 06:24

## 2018-10-24 RX ADMIN — HYDROMORPHONE HYDROCHLORIDE 1 MG: 1 INJECTION, SOLUTION INTRAMUSCULAR; INTRAVENOUS; SUBCUTANEOUS at 11:08

## 2018-10-24 RX ADMIN — SODIUM CHLORIDE 150 ML/HR: 900 INJECTION, SOLUTION INTRAVENOUS at 06:25

## 2018-10-24 RX ADMIN — HYDROMORPHONE HYDROCHLORIDE 1 MG: 1 INJECTION, SOLUTION INTRAMUSCULAR; INTRAVENOUS; SUBCUTANEOUS at 20:06

## 2018-10-24 RX ADMIN — HYDROMORPHONE HYDROCHLORIDE 1 MG: 1 INJECTION, SOLUTION INTRAMUSCULAR; INTRAVENOUS; SUBCUTANEOUS at 06:24

## 2018-10-24 RX ADMIN — Medication 10 ML: at 13:00

## 2018-10-24 RX ADMIN — CEFEPIME HYDROCHLORIDE 2 G: 2 INJECTION, POWDER, FOR SOLUTION INTRAVENOUS at 06:25

## 2018-10-24 RX ADMIN — Medication 10 ML: at 15:17

## 2018-10-24 RX ADMIN — HYDROMORPHONE HYDROCHLORIDE 1 MG: 1 INJECTION, SOLUTION INTRAMUSCULAR; INTRAVENOUS; SUBCUTANEOUS at 15:15

## 2018-10-24 RX ADMIN — HYDROMORPHONE HYDROCHLORIDE 1 MG: 1 INJECTION, SOLUTION INTRAMUSCULAR; INTRAVENOUS; SUBCUTANEOUS at 01:56

## 2018-10-24 RX ADMIN — Medication 10 ML: at 01:57

## 2018-10-24 NOTE — PROGRESS NOTES
.Bedside shift change report received from [address] [place] [address]. Report included the following information SBAR, Kardex, MAR and Accordion. 1039--Pt refuses to receive Lovenox injection or take probiotic. Edu complete concerning ordered medication. Pt verbalizes understanding. 1050--Writer at  w/ Dr Leslie Graves to discuss pts ordered 0.5mg Dilaudid IV order. Pt visibly upset and voicing to Dr Leslie Graves. Pt to receive another 0.5mg once pharmacy profiles. Pt verbalizes understanding. 4845--Upon entering pts room, IV pump off at this time. Edu complete concerning leaving IV pump on and calling for nurse if needing help with alarms or what not. Edu complete concerning the need for ordered fluids. Pt does not respond.

## 2018-10-24 NOTE — PROGRESS NOTES
IDR: Patient treatment plan discuss. Patient last day of antibiotic is 10/28/2018. Discussion of meds, ID consult on discharge and patient follow-ups. Yessenia Vuong [facility] CM. 
 
2367: CM re-assess patient for discharge planning. CM ask patient his plan of action on discharge. Patient states hopefully back to his house and back to work. Patient states he lives alone. Has no support system. CM ask patient about [facility] care Card and [facility] care card patient states he had filled out both application and returned it back to  at [address] 1215:Per old CM notes at [address] Patient rents a room from people in one story-5 steps into main entrance. CM note states patient has received Care Card application and needs to complete it prior to d/c. No note stating application done and sent to patient access. 1245: CM clarify with patient his living situation. Patient states he does went a room and lives with other people but not involved in his care. States he filled out Care Card gave back to CM at [address]. States CM at [address] inform him he does not need to do the [facility] care card. Writer explain the benefits of [facility] care card for his needs. Patient states he will worry about it when it comes up. At this time have no interest in the [facility] care card. Patient ask is he leaving on Sunday last day of antibiotic. CM explain ID doctor would like some test after his last dose of antibiotic. On discharge patient states he can call someone to come get him. Patient appointments: PCP Dr. Pascual Parrish 10/31/2018 @ 1130am 
ID Dr. Consuelo Limon 11/12/2018 @ [facility]. Yessenia Vuong [facility] CM.

## 2018-10-24 NOTE — PROGRESS NOTES
Hospitalist Progress Note NAME: Ameya Mccollum :  1980 MRN:  [id number] Room Number:  962/02  @ [facility] Summary: 43 y.o. male whom presented on 2018 with Assessment / Plan: 
  
Transferred from [facility], for completion of ABX until 10/28 Update Patient upset RE: dose of dilaudid,reviwed palliative note with him, however he insists Palliative mentioned to do 1mg  Dilaudid q4h. States he has plan to f/up with pain management at [facility]. Will d/w palliative regarding increased dose. MITZI 10/16 - Tobramycin induced- improving Renal USG-No hydronephrosis. C/w Ivf Follow Cr 
D/w  ID Dr Louie Santamaria, will d/c Tobramycin, renal adjust cefepime Left sided pleuritic pain, likely MSK, costochondritis Lidocaine patch applied. CXR,Labs reviewed -unremarkable,CT Chest reviewed -c/w iv dilaudid,pallaitive on board. Further dose adjustment per them. Since , we cant do Tramadol(MITZI), Will give a trial of Medrol dose pack. Tricuspid infective Endocarditis Pseudomonas bacteremia L3/4 septic arthritis Pulmonary septic embolic All above in setting of h/o IVDU 
-On IV abx per ID recs: tobramycin and cefepime until 10/28 
- levaquin until  
- F/U tobramycin level and adjust doses  
-ID, ortho, IR and Cardiothoracic surgery on board 
-No plans for surgical intervention MRI L spine : L3/4 septic arthritis CT thoracic spine : multiple pulmonary nodules, several of which are cavitary and several groundglass opacities and areas of consolidation. 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 Back pain secondary to above 
-followed by palliative medicine for pain management: FU palliative consult PMH of HTN and Chronic anemia  
-monitor closely H/O IVDU, heroin 
-pain management as per palliative Code Status: DNR, completed DDNR on admission Surrogate Decision Maker: Mercy Gutierrez ( ex wife's mom) DVT Prophylaxis: lovenox GI Prophylaxis: not indicated Baseline: independent Subjective: Chief Complaint / Reason for Physician Visit \"my pain is not improving\". Discussed with RN events overnight. Review of Systems: 
Symptom Y/N Comments  Symptom Y/N Comments Fever/Chills n   Chest Pain n   
Poor Appetite n   Edema Cough n   Abdominal Pain n   
Sputum n   Joint Pain SOB/CUEVA n   Pruritis/Rash y   
Nausea/vomit n   Tolerating PT/OT Diarrhea n   Tolerating Diet y Constipation n   Other Could NOT obtain due to:   
 
Objective: VITALS:  
Last 24hrs VS reviewed since prior progress note. Most recent are: 
Patient Vitals for the past 24 hrs: 
 Temp Pulse Resp BP SpO2  
10/24/18 0906 97.6 °F (36.4 °C) 70 16 (!) 144/91   
10/23/18 2321 97.5 °F (36.4 °C) 85 16 147/90 100 % 10/23/18 1558 97.9 °F (36.6 °C) 85 18 120/68 100 % Intake/Output Summary (Last 24 hours) at 10/24/2018 1118 Last data filed at 10/24/2018 4442 Gross per 24 hour Intake 2322.5 ml Output 2700 ml Net -377.5 ml PHYSICAL EXAM: 
General: WD, WN. Alert, cooperative, no acute distress   
EENT:  EOMI. Anicteric sclerae. MMM,Right IJ+ Resp:  CTA bilaterally, no wheezing or rales. No accessory muscle use CV:  Regular  rhythm,  No edema GI:  Soft, Non distended, Non tender.  +Bowel sounds Neurologic:  Alert and oriented X 3, normal speech, Psych:   Good insight. Not anxious nor agitated Skin:  Tattoos+, dry scaly ,erythmeatous skin in upper leg,perineal,scrotal and buttock+. No jaundice Reviewed most current lab test results and cultures  YES Reviewed most current radiology test results   YES Review and summation of old records today    NO Reviewed patient's current orders and MAR    YES 
PMH/SH reviewed - no change compared to H&P 
________________________________________________________________________ Care Plan discussed with: 
  Comments Patient x Family RN x Care Manager x Consultant Multidiciplinary team rounds were held today with , nursing, pharmacist and clinical coordinator. Patient's plan of care was discussed; medications were reviewed and discharge planning was addressed. ________________________________________________________________________ Total NON critical care TIME:  35   Minutes Total CRITICAL CARE TIME Spent:   Minutes non procedure based Comments >50% of visit spent in counseling and coordination of care    
________________________________________________________________________ Jaswinder Hernandez MD  
 
Procedures: see electronic medical records for all procedures/Xrays and details which were not copied into this note but were reviewed prior to creation of Plan. LABS: 
I reviewed today's most current labs and imaging studies. Pertinent labs include: No results for input(s): WBC, HGB, HCT, PLT, HGBEXT, HCTEXT, PLTEXT, HGBEXT, HCTEXT, PLTEXT in the last 72 hours. Recent Labs 10/24/18 
0149 10/23/18 
8621 10/22/18 
9120 CREA 1.36* 1.41* 1.46* Signed: Jaswinder Hernandez MD

## 2018-10-24 NOTE — PROGRESS NOTES
Problem: Falls - Risk of 
Goal: *Absence of Falls Document Yanique Milligan Fall Risk and appropriate interventions in the flowsheet. Outcome: Progressing Towards Goal 
Fall Risk Interventions: 
Mobility Interventions: Utilize walker, cane, or other assistive device Mentation Interventions: Adequate sleep, hydration, pain control, Increase mobility Medication Interventions: Teach patient to arise slowly Elimination Interventions: Call light in reach, Urinal in reach History of Falls Interventions: Evaluate medications/consider consulting pharmacy

## 2018-10-24 NOTE — PROGRESS NOTES
Infectious Disease Progress Note IMPRESSION:  
· Persistent Pseudomonas aeruginosa bacteremia 2/2 ( 9/6), repeat BC also+ 1/1 (9/7)BC  9/11- 1/4., 9/16 - so far no growth pt remains afebrile · Etiology of Pseudomonas - possibly from  washing needles with water from faucet. Denies sharing needles ·  MITZI improved Cr 1.36 
· Pain over left lateral rib cage resolved ·  Tricuspid valve endocarditis- mobile mass on atrial side of tricuspid valve with possible perforation, concerning for endocarditis . Repeat ECHO shows freely mobile mass seen on TV relatively unchanged from KHANH ·  Evidence of cervical fusion C5-7 & T4/5 laminectomy on CT scan · MRI shows- fluid / edema L3/4 posterior joint facet suspicious for septic arthritis. · Pulmonary nodules,cavitary ,ground glass opacities, on  CT thorax suggestive of septic emboli ·  Reports from [facility] reviewed. Pt had MSSA bacteremia in May 2018. ( not MRSA ) Treated with Nafcillin & thereafter Cefazolin 5/15 to 6/28. · MRI of spine showed inflammatory changes in T5/6 suggestive of septic arthritis. TTE(5/16)  suggestive of vegetation , KHANH done (5/18), no evidence of vegetation as per D/c summary from [facility] · IVDU on going up to time of admission · UDS + for opiates · Smoker , alcohol consumption + · S/p IR evaluation - fluid in spine not accessible for aspiration per IR, S/p Ortho consult - defer to Neurosurgery. · S/p Cardiothoracic surgery evaluation - no surgery at this time. -  
· HIV, Hep C negative  
  
  
PLAN:  
   
· Plan was to continue Cefepime IV &  Gentamicinx 6 weeks end date Oct 28 · S/p Levaquin for new infiltrate, also for coverage for Pseudomonas. Continued x 14 days end date 9/27 Pseudomonas endocarditis is associated with  high morbidity & mortality rates & .high relapse rates upto 30% · S/p Dc Gentamicin 9/24 due to increase in Cr · Pt would need ECHO cardiogram &  CT lumbar spine after completion of  Antibiotic therapy ·  Plan of care d/w pt again. CT chest ( 10/15) IMPRESSION: 
 1. Small left pleural effusion, increased in size since 2018, with mild 
adjacent atelectasis, also increased. 2. Development of a new area of airspace disease in the right lower lobe which 
may represent subsegmental atelectasis or infarction. 3. No significant adenopathy within the limitations of a noncontrast study Subjective:  
 
Pt seen . \" I feel better doc\" Pt was transferred to [facility] for completion of antibiotic therapy . He has h/o Pseudomonas TV endocarditis . Bacteremia   Was difficult to clear . Aminoglycoside was added & thereafter cleared. Pt was also noted to have edema / inflammatory changes in  L3/4 suggestive of septic arthritis , also septic emboli in lungs Review of Systems:  None except for that mentioned in H&P. 10 point ROS obtained Objective:  
 
Blood pressure 137/80, pulse 72, temperature 97.6 °F (36.4 °C), resp. rate 16, height 6' (1.829 m), weight 173 lb 12.8 oz (78.8 kg), SpO2 99 %. Temp (24hrs), Av.6 °F (36.4 °C), Min:97.5 °F (36.4 °C), Max:97.6 °F (36.4 °C) Patient Vitals for the past 24 hrs: 
 Temp Pulse Resp BP SpO2  
10/24/18 1515 97.6 °F (36.4 °C) 72 16 137/80 99 % 10/24/18 0906 97.6 °F (36.4 °C) 70 16 (!) 144/91   
10/23/18 2321 97.5 °F (36.4 °C) 85 16 147/90 100 % Lines:  Peripheral IV:   
   
 
 
Physical Exam:  
General:  Alert, cooperative, Eyes:  Sclera anicteric. Pupils equally round and reactive to light. Mouth/Throat: Mucous membranes normal, oral pharynx clear Neck: Supple Lungs:   reduced auscultation bases, Chest wall - tender lower rib cage CV:  Regular rate and rhythm, murmur+, Abdomen:   Soft, non-tender. bowel sounds normal. non-distended, Lumbar less tender in lower back Extremities: No  Edema, tattoos + Lines/Devices:  Intact, no erythema, drainage or tenderness Data Review: CBC:  
 No results for input(s): WBC, RBC, HGB, HCT, PLT, GRANS, LYMPH, EOS, HGBEXT, HCTEXT, PLTEXT in the last 72 hours. CMP:  
Recent Labs 10/24/18 
0149 10/23/18 
2912 10/22/18 
3319 CREA 1.36* 1.41* 1.46* Studies:     
Lab Results Component Value Date/Time Culture result: NO GROWTH 6 DAYS 09/16/2018 05:10 PM  
 Culture result: NO GROWTH 6 DAYS 09/11/2018 07:03 PM  
 Culture result: (A) 09/11/2018 07:03 PM  
  PSEUDOMONAS AERUGINOSA GROWING IN 1 OF 2 BOTTLES DRAWN (SITE = L UPPER ARM) Culture result: REMAINING BOTTLE(S) HAS/HAVE NO GROWTH IN 5 DAYS 09/11/2018 07:03 PM  
 Culture result: (A) 09/08/2018 04:01 AM  
  PSEUDOMONAS AERUGINOSA GROWING IN THIS SINGLE BOTTLE DRAWN (L MIDLINE SITE) PLEASE REFER TO PREVIOUS BLOOD CULTURE [id number], COLLECTED 9/6/18 FOR SENSITIVITIES Culture result: (A) 09/08/2018 04:01 AM  
  PRELIMINARY REPORT OF GRAM NEGATIVE RODS GROWING IN THIS SINGLE BOTTLE DRAWN CALLED TO AND READ BACK BY Ciro Melvin RN ON 9/10/18 AT [address] [facility], [address]). MS  
 Culture result: MRSA NOT PRESENT 09/08/2018 04:01 AM  
 Culture result:  09/08/2018 04:01 AM  
      Screening of patient nares for MRSA is for surveillance purposes and, if positive, to facilitate isolation considerations in high risk settings. It is not intended for automatic decolonization interventions per se as regimens are not sufficiently effective to warrant routine use. XR Results (most recent): 
Results from Hospital Encounter encounter on 09/25/18 XR CHEST PORT Narrative INDICATION: Left-sided pleuritic chest pain COMPARISON: September 26, 2018 FINDINGS: AP portable imaging of the chest performed at 6:00 PM demonstrates a 
stable cardiomediastinal silhouette. Right arm PICC line is unchanged in 
position. Right basilar airspace disease and small right pleural effusion are 
not significantly changed. Fusion hardware is present in the lower cervical 
spine. Impression IMPRESSION: No significant change in right basilar airspace disease and small 
right pleural effusion. Continued follow-up is recommended. Patient Active Problem List  
Diagnosis Code  Sepsis ([facility] A41.9  Endocarditis of tricuspid valve I36.8  Tricuspid valve regurgitation, infectious I07.1  Acute septic pulmonary embolism without acute cor pulmonale (HCC) I26.90  
 Acute right-sided low back pain with sciatica M54.40  Heroin abuse ([facility] [address] F11.10  Debility R53.81  
 IV drug abuse ([facility] F19.10  Infective endocarditis I33.0 I have discussed the diagnosis with the patient and the intended plan as seen in the above orders. I have discussed medication side effects and warnings with the patient as well. Reviewed test results at length with patient Anti-infectives:  
 
  
 Cefepime iv- 9/7 Gentamicin IV -9/14 Levaquin IV- 9/14 S/p Vancomycin iv- 9/7- 9/11  Modesto Ryder MD FACP

## 2018-10-25 LAB
CREAT SERPL-MCNC: 1.13 MG/DL (ref 0.7–1.3)
CRP SERPL-MCNC: <0.29 MG/DL (ref 0–0.6)
ERYTHROCYTE [SEDIMENTATION RATE] IN BLOOD: 37 MM/HR (ref 0–15)

## 2018-10-25 PROCEDURE — 85652 RBC SED RATE AUTOMATED: CPT | Performed by: INTERNAL MEDICINE

## 2018-10-25 PROCEDURE — 74011000258 HC RX REV CODE- 258: Performed by: HOSPITALIST

## 2018-10-25 PROCEDURE — 74011250636 HC RX REV CODE- 250/636: Performed by: INTERNAL MEDICINE

## 2018-10-25 PROCEDURE — 74011250637 HC RX REV CODE- 250/637: Performed by: INTERNAL MEDICINE

## 2018-10-25 PROCEDURE — 86140 C-REACTIVE PROTEIN: CPT | Performed by: INTERNAL MEDICINE

## 2018-10-25 PROCEDURE — 65270000032 HC RM SEMIPRIVATE

## 2018-10-25 PROCEDURE — 74011250636 HC RX REV CODE- 250/636: Performed by: HOSPITALIST

## 2018-10-25 PROCEDURE — 82565 ASSAY OF CREATININE: CPT | Performed by: HOSPITALIST

## 2018-10-25 PROCEDURE — 36415 COLL VENOUS BLD VENIPUNCTURE: CPT | Performed by: HOSPITALIST

## 2018-10-25 RX ORDER — HYDROMORPHONE HYDROCHLORIDE 1 MG/ML
0.5 INJECTION, SOLUTION INTRAMUSCULAR; INTRAVENOUS; SUBCUTANEOUS
Status: DISPENSED | OUTPATIENT
Start: 2018-10-26 | End: 2018-10-26

## 2018-10-25 RX ORDER — HYDROMORPHONE HYDROCHLORIDE 1 MG/ML
0.5 INJECTION, SOLUTION INTRAMUSCULAR; INTRAVENOUS; SUBCUTANEOUS
Status: DISPENSED | OUTPATIENT
Start: 2018-10-25 | End: 2018-10-25

## 2018-10-25 RX ORDER — HYDROMORPHONE HYDROCHLORIDE 1 MG/ML
0.5 INJECTION, SOLUTION INTRAMUSCULAR; INTRAVENOUS; SUBCUTANEOUS
Status: DISPENSED | OUTPATIENT
Start: 2018-10-27 | End: 2018-10-28

## 2018-10-25 RX ADMIN — Medication 10 ML: at 14:23

## 2018-10-25 RX ADMIN — Medication 10 ML: at 05:19

## 2018-10-25 RX ADMIN — Medication 10 ML: at 05:20

## 2018-10-25 RX ADMIN — HYDROMORPHONE HYDROCHLORIDE 0.5 MG: 1 INJECTION, SOLUTION INTRAMUSCULAR; INTRAVENOUS; SUBCUTANEOUS at 20:54

## 2018-10-25 RX ADMIN — Medication 10 ML: at 20:54

## 2018-10-25 RX ADMIN — HYDROMORPHONE HYDROCHLORIDE 1 MG: 1 INJECTION, SOLUTION INTRAMUSCULAR; INTRAVENOUS; SUBCUTANEOUS at 00:18

## 2018-10-25 RX ADMIN — SODIUM CHLORIDE 75 ML/HR: 900 INJECTION, SOLUTION INTRAVENOUS at 20:55

## 2018-10-25 RX ADMIN — HYDROMORPHONE HYDROCHLORIDE 0.5 MG: 1 INJECTION, SOLUTION INTRAMUSCULAR; INTRAVENOUS; SUBCUTANEOUS at 12:44

## 2018-10-25 RX ADMIN — HYDROMORPHONE HYDROCHLORIDE 1 MG: 1 INJECTION, SOLUTION INTRAMUSCULAR; INTRAVENOUS; SUBCUTANEOUS at 04:33

## 2018-10-25 RX ADMIN — CEFEPIME HYDROCHLORIDE 2 G: 2 INJECTION, POWDER, FOR SOLUTION INTRAVENOUS at 14:22

## 2018-10-25 RX ADMIN — HYDROMORPHONE HYDROCHLORIDE 0.5 MG: 1 INJECTION, SOLUTION INTRAMUSCULAR; INTRAVENOUS; SUBCUTANEOUS at 16:50

## 2018-10-25 RX ADMIN — Medication 10 ML: at 21:00

## 2018-10-25 RX ADMIN — Medication 10 ML: at 12:44

## 2018-10-25 RX ADMIN — CEFEPIME HYDROCHLORIDE 2 G: 2 INJECTION, POWDER, FOR SOLUTION INTRAVENOUS at 05:19

## 2018-10-25 RX ADMIN — CEFEPIME HYDROCHLORIDE 2 G: 2 INJECTION, POWDER, FOR SOLUTION INTRAVENOUS at 21:00

## 2018-10-25 RX ADMIN — HYDROMORPHONE HYDROCHLORIDE 1 MG: 1 INJECTION, SOLUTION INTRAMUSCULAR; INTRAVENOUS; SUBCUTANEOUS at 08:37

## 2018-10-25 RX ADMIN — ENOXAPARIN SODIUM 40 MG: 40 INJECTION, SOLUTION INTRAVENOUS; SUBCUTANEOUS at 08:36

## 2018-10-25 NOTE — PROGRESS NOTES
Problem: Falls - Risk of 
Goal: *Absence of Falls Document Mihai Dey Fall Risk and appropriate interventions in the flowsheet. Outcome: Progressing Towards Goal 
Fall Risk Interventions: 
Mobility Interventions: Utilize walker, cane, or other assistive device Mentation Interventions: Adequate sleep, hydration, pain control Medication Interventions: Teach patient to arise slowly Elimination Interventions: Call light in reach History of Falls Interventions: Evaluate medications/consider consulting pharmacy Problem: Pain Goal: *Control of Pain Outcome: Progressing Towards Goal 
Pt still calling out q4 for pain medication. ... Will cont to monitor

## 2018-10-25 NOTE — PROGRESS NOTES
Problem: Falls - Risk of 
Goal: *Absence of Falls Document Kareem Hammond Fall Risk and appropriate interventions in the flowsheet. Outcome: Progressing Towards Goal 
Fall Risk Interventions: 
Mobility Interventions: Strengthening exercises (ROM-active/passive), Utilize walker, cane, or other assistive device Mentation Interventions: Adequate sleep, hydration, pain control Medication Interventions: Teach patient to arise slowly Elimination Interventions: Call light in reach History of Falls Interventions: Evaluate medications/consider consulting pharmacy

## 2018-10-25 NOTE — PROGRESS NOTES
IDR Rounds this am with MD, team and patient at the bedside. Plans for Echo and CT on Friday. MD talked to Palliative Care again regarding pain medication and taper. Discussion with patient regarding the above. He is in agreement for the taper for discharge on Sunday. Asked about transportation home. He indicates he will be able to get a ride. Arranging PCP and ID appointment. Substance/ Addiction resources Patient need to provide verification for the Care Card process and application for [address]. To provide Medication voucher if needed. And will leave transportation voucher in-case needed. [address] MSW RN  
[phone]

## 2018-10-25 NOTE — PROGRESS NOTES
Spiritual Care Partner Volunteer visited patient in Med Surg at [facility] on 10/25/18. Documented by: 
Rev. Faizan Reyes M.Div, [facility] Lead

## 2018-10-25 NOTE — PROGRESS NOTES
Had a conversation with . Kirit Roman this morning. Discussed the need for Dilaudid taper over the next 3 days since he will not be discharged home on prescription opioids. Also had a discussion with Hetal Jensen(palliative) regarding the same. He understands the risk of opioid withdrawal after his discharge. States he has plans to get into pain management at [facility]. Doubt that that will happen soon. Will taper Dilaudid over the next 3 days. Will get echo and CT lumbar spine tomorrow morning.

## 2018-10-25 NOTE — PROGRESS NOTES
Hospitalist Progress Note NAME: Debra Islas :  1980 MRN:  [id number] Room Number:  415/45  @ [facility] Summary: 43 y.o. male whom presented on 2018 with Assessment / Plan: 
  
Transferred from [address] [place] [address], for completion of ABX until 10/28 Update Had a conversation with Mr. Keven Feliz this morning. Discussed the need for Dilaudid taper over the next 3 days since he will not be discharged home on prescription opioids. Also had a discussion with Hetal Jensen(palliative) regarding the same. He understands the risk of opioid withdrawal after his discharge. States he has plans to get into pain management at [facility]. Doubt that that will happen soon. Will taper Dilaudid over the next 3 days. Will get echo and CT lumbar spine tomorrow morning. MITZI 10/16 - Tobramycin induced-improved Renal USG-No hydronephrosis. Cr back to baseline Left sided pleuritic pain, likely MSK, costochondritis Lidocaine patch applied. CXR,Labs reviewed -unremarkable,CT Chest reviewed -c/w iv dilaudid,pallaitive on board. Further dose adjustment per them. Since , we cant do Tramadol(MITZI), Will give a trial of Medrol dose pack. Tricuspid infective Endocarditis Pseudomonas bacteremia L3/4 septic arthritis Pulmonary septic embolic All above in setting of h/o IVDU 
-On IV abx per ID recs: tobramycin and cefepime until 10/28 
- levaquin until  
- F/U tobramycin level and adjust doses  
-ID, ortho, IR and Cardiothoracic surgery on board 
-No plans for surgical intervention MRI L spine : L3/4 septic arthritis CT thoracic spine : multiple pulmonary nodules, several of which are cavitary and several groundglass opacities and areas of consolidation. 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 Back pain secondary to above -followed by palliative medicine for pain management: FU palliative consult PMH of HTN and Chronic anemia  
-monitor closely H/O IVDU, heroin 
-pain management as per palliative Code Status: DNR, completed DDNR on admission Surrogate Decision Maker: Rajan Parrish ( ex wife's mom) DVT Prophylaxis: lovenox GI Prophylaxis: not indicated Baseline: independent Subjective: Chief Complaint / Reason for Physician Visit \"my pain is not improving\". Discussed with RN events overnight. Review of Systems: 
Symptom Y/N Comments  Symptom Y/N Comments Fever/Chills n   Chest Pain n   
Poor Appetite n   Edema Cough n   Abdominal Pain n   
Sputum n   Joint Pain SOB/CUEVA n   Pruritis/Rash y   
Nausea/vomit n   Tolerating PT/OT Diarrhea n   Tolerating Diet y Constipation n   Other Could NOT obtain due to:   
 
Objective: VITALS:  
Last 24hrs VS reviewed since prior progress note. Most recent are: 
Patient Vitals for the past 24 hrs: 
 Temp Pulse Resp BP SpO2  
10/25/18 0806 97.5 °F (36.4 °C) 71 18 135/85 99 % 10/25/18 0438 97.8 °F (36.6 °C) 64 18 (!) 145/95 99 % 10/24/18 2007 97.9 °F (36.6 °C) 85 18 (!) 152/91 99 % 10/24/18 1515 97.6 °F (36.4 °C) 72 16 137/80 99 % Intake/Output Summary (Last 24 hours) at 10/25/2018 1136 Last data filed at 10/25/2018 3978 Gross per 24 hour Intake 2605 ml Output 3600 ml Net -995 ml PHYSICAL EXAM: 
General: WD, WN. Alert, cooperative, no acute distress   
EENT:  EOMI. Anicteric sclerae. MMM,Right IJ+ Resp:  CTA bilaterally, no wheezing or rales. No accessory muscle use CV:  Regular  rhythm,  No edema GI:  Soft, Non distended, Non tender.  +Bowel sounds Neurologic:  Alert and oriented X 3, normal speech, Psych:   Good insight. Not anxious nor agitated Skin:  Tattoos+, dry scaly ,erythmeatous skin in upper leg,perineal,scrotal and buttock+. No jaundice Reviewed most current lab test results and cultures  YES 
 Reviewed most current radiology test results   YES Review and summation of old records today    NO Reviewed patient's current orders and MAR    YES 
PMH/SH reviewed - no change compared to H&P 
________________________________________________________________________ Care Plan discussed with: 
  Comments Patient x Family RN x Care Manager x Consultant Multidiciplinary team rounds were held today with , nursing, pharmacist and clinical coordinator. Patient's plan of care was discussed; medications were reviewed and discharge planning was addressed. ________________________________________________________________________ Total NON critical care TIME:  35   Minutes Total CRITICAL CARE TIME Spent:   Minutes non procedure based Comments >50% of visit spent in counseling and coordination of care    
________________________________________________________________________ Yanet Macias MD  
 
Procedures: see electronic medical records for all procedures/Xrays and details which were not copied into this note but were reviewed prior to creation of Plan. LABS: 
I reviewed today's most current labs and imaging studies. Pertinent labs include: No results for input(s): WBC, HGB, HCT, PLT, HGBEXT, HCTEXT, PLTEXT, HGBEXT, HCTEXT, PLTEXT in the last 72 hours. Recent Labs 10/25/18 
7037 10/24/18 
0149 10/23/18 
5820 CREA 1.13 1.36* 1.41* Signed: Yanet Macias MD

## 2018-10-26 ENCOUNTER — APPOINTMENT (OUTPATIENT)
Dept: CT IMAGING | Age: 38
DRG: 289 | End: 2018-10-26
Attending: INTERNAL MEDICINE
Payer: SUBSIDIZED

## 2018-10-26 LAB
CREAT SERPL-MCNC: 1.3 MG/DL (ref 0.7–1.3)
ERYTHROCYTE [DISTWIDTH] IN BLOOD BY AUTOMATED COUNT: 18.6 % (ref 11.5–14.5)
HCT VFR BLD AUTO: 29.2 % (ref 36.6–50.3)
HGB BLD-MCNC: 9.3 G/DL (ref 12.1–17)
MCH RBC QN AUTO: 28.1 PG (ref 26–34)
MCHC RBC AUTO-ENTMCNC: 31.8 G/DL (ref 30–36.5)
MCV RBC AUTO: 88.2 FL (ref 80–99)
NRBC # BLD: 0 K/UL (ref 0–0.01)
NRBC BLD-RTO: 0 PER 100 WBC
PLATELET # BLD AUTO: 203 K/UL (ref 150–400)
PMV BLD AUTO: 9.1 FL (ref 8.9–12.9)
RBC # BLD AUTO: 3.31 M/UL (ref 4.1–5.7)
WBC # BLD AUTO: 13.2 K/UL (ref 4.1–11.1)

## 2018-10-26 PROCEDURE — 72132 CT LUMBAR SPINE W/DYE: CPT

## 2018-10-26 PROCEDURE — 65270000032 HC RM SEMIPRIVATE

## 2018-10-26 PROCEDURE — 36415 COLL VENOUS BLD VENIPUNCTURE: CPT | Performed by: HOSPITALIST

## 2018-10-26 PROCEDURE — 74011000258 HC RX REV CODE- 258: Performed by: INTERNAL MEDICINE

## 2018-10-26 PROCEDURE — 82565 ASSAY OF CREATININE: CPT | Performed by: HOSPITALIST

## 2018-10-26 PROCEDURE — 85027 COMPLETE CBC AUTOMATED: CPT | Performed by: INTERNAL MEDICINE

## 2018-10-26 PROCEDURE — 74011250636 HC RX REV CODE- 250/636: Performed by: INTERNAL MEDICINE

## 2018-10-26 PROCEDURE — 93306 TTE W/DOPPLER COMPLETE: CPT

## 2018-10-26 PROCEDURE — 74011250637 HC RX REV CODE- 250/637: Performed by: INTERNAL MEDICINE

## 2018-10-26 PROCEDURE — 36592 COLLECT BLOOD FROM PICC: CPT

## 2018-10-26 PROCEDURE — 74011636320 HC RX REV CODE- 636/320: Performed by: INTERNAL MEDICINE

## 2018-10-26 PROCEDURE — 74011250636 HC RX REV CODE- 250/636: Performed by: HOSPITALIST

## 2018-10-26 PROCEDURE — 74011000258 HC RX REV CODE- 258: Performed by: HOSPITALIST

## 2018-10-26 RX ADMIN — CEFEPIME 2 G: 2 INJECTION, POWDER, FOR SOLUTION INTRAMUSCULAR; INTRAVENOUS at 16:54

## 2018-10-26 RX ADMIN — HYDROMORPHONE HYDROCHLORIDE 0.5 MG: 1 INJECTION, SOLUTION INTRAMUSCULAR; INTRAVENOUS; SUBCUTANEOUS at 20:13

## 2018-10-26 RX ADMIN — HYDROMORPHONE HYDROCHLORIDE 0.5 MG: 1 INJECTION, SOLUTION INTRAMUSCULAR; INTRAVENOUS; SUBCUTANEOUS at 03:00

## 2018-10-26 RX ADMIN — Medication 10 ML: at 05:40

## 2018-10-26 RX ADMIN — CEFEPIME 2 G: 2 INJECTION, POWDER, FOR SOLUTION INTRAMUSCULAR; INTRAVENOUS at 21:18

## 2018-10-26 RX ADMIN — CEFEPIME HYDROCHLORIDE 2 G: 2 INJECTION, POWDER, FOR SOLUTION INTRAVENOUS at 05:40

## 2018-10-26 RX ADMIN — Medication 10 ML: at 14:12

## 2018-10-26 RX ADMIN — SODIUM CHLORIDE 75 ML/HR: 900 INJECTION, SOLUTION INTRAVENOUS at 11:48

## 2018-10-26 RX ADMIN — Medication 10 ML: at 15:41

## 2018-10-26 RX ADMIN — IOPAMIDOL 100 ML: 612 INJECTION, SOLUTION INTRAVENOUS at 14:36

## 2018-10-26 RX ADMIN — Medication 10 ML: at 20:14

## 2018-10-26 RX ADMIN — Medication 1 CAPSULE: at 09:03

## 2018-10-26 RX ADMIN — HYDROMORPHONE HYDROCHLORIDE 0.5 MG: 1 INJECTION, SOLUTION INTRAMUSCULAR; INTRAVENOUS; SUBCUTANEOUS at 08:57

## 2018-10-26 RX ADMIN — Medication 10 ML: at 21:19

## 2018-10-26 RX ADMIN — Medication 10 ML: at 11:50

## 2018-10-26 RX ADMIN — Medication 10 ML: at 05:39

## 2018-10-26 RX ADMIN — ACETAMINOPHEN 650 MG: 325 TABLET, FILM COATED ORAL at 11:48

## 2018-10-26 RX ADMIN — ENOXAPARIN SODIUM 40 MG: 40 INJECTION, SOLUTION INTRAVENOUS; SUBCUTANEOUS at 09:03

## 2018-10-26 RX ADMIN — SODIUM CHLORIDE 75 ML/HR: 900 INJECTION, SOLUTION INTRAVENOUS at 15:35

## 2018-10-26 RX ADMIN — HYDROMORPHONE HYDROCHLORIDE 0.5 MG: 1 INJECTION, SOLUTION INTRAMUSCULAR; INTRAVENOUS; SUBCUTANEOUS at 14:12

## 2018-10-26 NOTE — PROGRESS NOTES
Pt refused to have  continue IV fluid. pt keep disconnect himself from picc line. pt made aware about to call for disconnect, leaving open iv  line  And picc line can lead to infection. pt understand.

## 2018-10-26 NOTE — PROGRESS NOTES
IDR: Patient treatment plan discuss. Patient ECHO and CT scan today. Discharge over the weekend. Yessenia Vuong [facility] CM. CM confirm with Patient Access. [facility] find [id number] Patient states he filled one already at [id number] and turned in to CM. CM provided another application for BS and [facility] are card application and CM explain to patient the importance of both application. Patient states he understood. CM proved one-time medication voucher copy on patient charts. NURSE patient aware that the voucher does not cover pain medication or over the counter medication. Any questions about discharge please call Mrs. Janice Azevedo [facility] [employer] manager [phone]. Patient has set-up his own transportation with a friend. In the event this plan has failed, please arrange round trip for him on Sunday. Yessenia Vuong [facility] LETA.

## 2018-10-26 NOTE — PROGRESS NOTES
1910) Bedside and Verbal shift change report given to GISELLE Sandra (oncoming nurse) by Bertha Garza RN (offgoing nurse). Report included the following information SBAR, Kardex, MAR, Accordion, Recent Results and Med Rec Status. 1912) IV pump off. Educated the patient about the benefits of fluids and will start a new bag.

## 2018-10-26 NOTE — PROGRESS NOTES
Hospitalist Progress Note NAME: Rosa Isela Solomon :  1980 MRN:  [id number] Room Number:  479/73  @ [facility] Summary: 43 y.o. male whom presented on 2018 with Assessment / Plan: 
  
Transferred from [facility], for completion of ABX until 10/28 Update 
-echo and CT lumbar spine today Grover Ashby Discussed the need for Dilaudid taper over the next 2 days since he will not be discharged home on prescription opioids. Also had a discussion with Hetal Jensen(palliative) regarding the same. He understands the risk of opioid withdrawal after his discharge. States he has plans to get into pain management at [facility]. Doubt that that will happen soon. MITZI 10/16 - Tobramycin induced-improved Renal USG-No hydronephrosis. Cr back to baseline Left sided pleuritic pain, likely MSK, costochondritis Lidocaine patch applied. CXR,Labs reviewed -unremarkable,CT Chest reviewed -c/w iv dilaudid,pallaitive on board. Further dose adjustment per them. Since , we cant do Tramadol(MITZI), Will give a trial of Medrol dose pack. Tricuspid infective Endocarditis Pseudomonas bacteremia L3/4 septic arthritis Pulmonary septic embolic All above in setting of h/o IVDU 
-On IV abx per ID recs: tobramycin and cefepime until 10/28 
- levaquin until  
- F/U tobramycin level and adjust doses  
-ID, ortho, IR and Cardiothoracic surgery on board 
-No plans for surgical intervention MRI L spine : L3/4 septic arthritis CT thoracic spine : multiple pulmonary nodules, several of which are cavitary and several groundglass opacities and areas of consolidation. 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 Back pain secondary to above 
-followed by palliative medicine for pain management: FU palliative consult PMH of HTN and Chronic anemia  
-monitor closely H/O IVDU, heroin -pain management as per palliative Code Status: DNR, completed DDNR on admission Surrogate Decision Maker: Tamika Holland ( ex wife's mom) DVT Prophylaxis: lovenox GI Prophylaxis: not indicated Baseline: independent Subjective: Chief Complaint / Reason for Physician Visit \"my pain is not improving\". Discussed with RN events overnight. Review of Systems: 
Symptom Y/N Comments  Symptom Y/N Comments Fever/Chills n   Chest Pain n   
Poor Appetite n   Edema Cough n   Abdominal Pain n   
Sputum n   Joint Pain SOB/CUEVA n   Pruritis/Rash y   
Nausea/vomit n   Tolerating PT/OT Diarrhea n   Tolerating Diet y Constipation n   Other Could NOT obtain due to:   
 
Objective: VITALS:  
Last 24hrs VS reviewed since prior progress note. Most recent are: 
Patient Vitals for the past 24 hrs: 
 Temp Pulse Resp BP SpO2  
10/25/18 2350 98.6 °F (37 °C) 86 18 121/83 98 % 10/25/18 1528 97.7 °F (36.5 °C) 92 18 124/71 99 % Intake/Output Summary (Last 24 hours) at 10/26/2018 9955 Last data filed at 10/26/2018 6876 Gross per 24 hour Intake 200 ml Output  Net 200 ml PHYSICAL EXAM: 
General: WD, WN. Alert, cooperative, no acute distress   
EENT:  EOMI. Anicteric sclerae. MMM,Right IJ+ Resp:  CTA bilaterally, no wheezing or rales. No accessory muscle use CV:  Regular  rhythm,  No edema GI:  Soft, Non distended, Non tender.  +Bowel sounds Neurologic:  Alert and oriented X 3, normal speech, Psych:   Good insight. Not anxious nor agitated Skin:  Tattoos+, dry scaly ,erythmeatous skin in upper leg,perineal,scrotal and buttock+. No jaundice Reviewed most current lab test results and cultures  YES Reviewed most current radiology test results   YES Review and summation of old records today    NO Reviewed patient's current orders and MAR    YES 
PMH/SH reviewed - no change compared to H&P 
________________________________________________________________________ Care Plan discussed with: 
  Comments Patient x Family RN x Care Manager x Consultant Multidiciplinary team rounds were held today with , nursing, pharmacist and clinical coordinator. Patient's plan of care was discussed; medications were reviewed and discharge planning was addressed. ________________________________________________________________________ Total NON critical care TIME:  35   Minutes Total CRITICAL CARE TIME Spent:   Minutes non procedure based Comments >50% of visit spent in counseling and coordination of care    
________________________________________________________________________ Rachana Hill MD  
 
Procedures: see electronic medical records for all procedures/Xrays and details which were not copied into this note but were reviewed prior to creation of Plan. LABS: 
I reviewed today's most current labs and imaging studies. Pertinent labs include: 
Recent Labs 10/26/18 
0258 WBC 13.2* HGB 9.3* HCT 29.2*  
 Recent Labs 10/26/18 
9997 10/25/18 
3749 10/24/18 
0149 CREA 1.30 1.13 1.36* Signed: Rachana Hill MD

## 2018-10-26 NOTE — PROGRESS NOTES
Problem: Falls - Risk of 
Goal: *Absence of Falls Document [place] Fall Risk and appropriate interventions in the flowsheet. Outcome: Progressing Towards Goal 
Fall Risk Interventions: 
Mobility Interventions: Utilize walker, cane, or other assistive device Mentation Interventions: Increase mobility Medication Interventions: Teach patient to arise slowly Elimination Interventions: Call light in reach History of Falls Interventions: Consult care management for discharge planning

## 2018-10-26 NOTE — PROGRESS NOTES
Bedside and Verbal shift change report given to [address] [place] [address] rn (oncoming nurse) by Ana Hair rn (offgoing nurse). Report included the following information SBAR, Kardex, Intake/Output, MAR, Recent Results and Med Rec Status.

## 2018-10-26 NOTE — PROGRESS NOTES
0710) Bedside shift change report given to Lori (student nurse) and Louise Moreno RN (oncoming nurse) by Lexi Martinez RN (offgoing nurse). Report included the following information SBAR, Kardex, MAR, Accordion and Recent Results. 0930) pt finished with breakfast 
1009) Pt refuse lidocaine at this time; pt stated tylenol in a little while. Pt open to walking outside later. Discuss plan for CT and Echo.  
1030) Discuss with radiology, NPO for 4 hours prior to CT 
1153) Discuss CT after 1330 with radiology tech 1417) Pt downstairs to CT 
1548) Discuss with Dr. Abiodun Colon, pt concerns about Echo results. Plan to discuss with pt tomorrow. 1910) Bedside shift change report given to Lexi Martinez RN (oncoming nurse) by Louise Moreno RN (offgoing nurse). Report included the following information SBAR, Kardex, MAR, Accordion and Recent Results.

## 2018-10-26 NOTE — INTERDISCIPLINARY ROUNDS
IDR with Dr. Zach Romeo (MD), Fletcher Man (pharmacist), Kt Carlton (), Michael Lara (diabetes educator), Jennie Bonilla (student nurse) and Hudson Marquez (RN) to discuss plan of care including pain management, pt ambulation,  planning for discharge.

## 2018-10-27 LAB — CREAT SERPL-MCNC: 1.34 MG/DL (ref 0.7–1.3)

## 2018-10-27 PROCEDURE — 74011250636 HC RX REV CODE- 250/636: Performed by: HOSPITALIST

## 2018-10-27 PROCEDURE — 74011250636 HC RX REV CODE- 250/636: Performed by: INTERNAL MEDICINE

## 2018-10-27 PROCEDURE — 82565 ASSAY OF CREATININE: CPT | Performed by: HOSPITALIST

## 2018-10-27 PROCEDURE — 74011250637 HC RX REV CODE- 250/637: Performed by: INTERNAL MEDICINE

## 2018-10-27 PROCEDURE — 65270000032 HC RM SEMIPRIVATE

## 2018-10-27 PROCEDURE — 36592 COLLECT BLOOD FROM PICC: CPT

## 2018-10-27 PROCEDURE — 36415 COLL VENOUS BLD VENIPUNCTURE: CPT | Performed by: HOSPITALIST

## 2018-10-27 RX ADMIN — Medication 10 ML: at 14:53

## 2018-10-27 RX ADMIN — Medication 20 ML: at 22:20

## 2018-10-27 RX ADMIN — Medication 10 ML: at 04:13

## 2018-10-27 RX ADMIN — HYDROMORPHONE HYDROCHLORIDE 0.5 MG: 1 INJECTION, SOLUTION INTRAMUSCULAR; INTRAVENOUS; SUBCUTANEOUS at 04:11

## 2018-10-27 RX ADMIN — SODIUM CHLORIDE 75 ML/HR: 900 INJECTION, SOLUTION INTRAVENOUS at 14:57

## 2018-10-27 RX ADMIN — HYDROMORPHONE HYDROCHLORIDE 0.5 MG: 1 INJECTION, SOLUTION INTRAMUSCULAR; INTRAVENOUS; SUBCUTANEOUS at 19:41

## 2018-10-27 RX ADMIN — ACETAMINOPHEN 650 MG: 325 TABLET, FILM COATED ORAL at 14:51

## 2018-10-27 RX ADMIN — Medication 10 ML: at 05:00

## 2018-10-27 RX ADMIN — Medication 10 ML: at 09:19

## 2018-10-27 RX ADMIN — Medication 10 ML: at 04:12

## 2018-10-27 RX ADMIN — HYDROMORPHONE HYDROCHLORIDE 0.5 MG: 1 INJECTION, SOLUTION INTRAMUSCULAR; INTRAVENOUS; SUBCUTANEOUS at 12:05

## 2018-10-27 RX ADMIN — CEFEPIME 2 G: 2 INJECTION, POWDER, FOR SOLUTION INTRAMUSCULAR; INTRAVENOUS at 05:00

## 2018-10-27 RX ADMIN — CEFEPIME 2 G: 2 INJECTION, POWDER, FOR SOLUTION INTRAMUSCULAR; INTRAVENOUS at 22:21

## 2018-10-27 RX ADMIN — Medication 10 ML: at 12:06

## 2018-10-27 RX ADMIN — CEFEPIME 2 G: 2 INJECTION, POWDER, FOR SOLUTION INTRAMUSCULAR; INTRAVENOUS at 14:53

## 2018-10-27 RX ADMIN — SODIUM CHLORIDE 75 ML/HR: 900 INJECTION, SOLUTION INTRAVENOUS at 04:11

## 2018-10-27 RX ADMIN — Medication 10 ML: at 12:09

## 2018-10-27 NOTE — PROGRESS NOTES
Problem: Falls - Risk of 
Goal: *Absence of Falls Document [place] Fall Risk and appropriate interventions in the flowsheet. Outcome: Progressing Towards Goal 
Fall Risk Interventions: 
Mobility Interventions: Utilize walker, cane, or other assistive device Mentation Interventions: Increase mobility Medication Interventions: Teach patient to arise slowly Elimination Interventions: Call light in reach History of Falls Interventions: Consult care management for discharge planning

## 2018-10-27 NOTE — PROGRESS NOTES
9741) Bedside shift change report given to Lawanda Viramontes RN (oncoming nurse) by Daniel Cintron RN (offgoing nurse). Report included the following information SBAR, Kardex, MAR, Accordion and Recent Results. 56) Rounds with Dr. Jose Luis Sorenson, RN and pt. Discuss planning for discharge tomorrow, CT results, waiting for Echo results. 1730) Discuss pain medication with Dr. Felisa Rojas. Discuss protocol for scheduled narcotics. 1920) Bedside shift change report given GISELLE Shepherd (oncoming nurse) by Lawanda Viramontes RN (offgoing nurse). Report included the following information SBAR, Kardex, MAR, Accordion and Recent Results.

## 2018-10-27 NOTE — PROGRESS NOTES
1905) Bedside and Verbal shift change report given to GISELLE Sandra (oncoming nurse) by Noe Steele RN (offgoing nurse). Report included the following information SBAR, Kardex, Intake/Output, MAR, Accordion, Recent Results and Med Rec Status.

## 2018-10-27 NOTE — PROGRESS NOTES
Hospitalist Progress Note NAME: Sunshine Guzman :  1980 MRN:  [id number] Room Number:  830/91  @ [facility] Summary: 43 y.o. male whom presented on 2018 with Assessment / Plan: 
  
Transferred from [facility], for completion of ABX until 10/28 Update CT lumbar spine- 
1 No findings to suggest discitis/osteomyelitis. 2. Minimal to mild multilevel facet arthropathy. No disc bulge or protrusion 
identified. No neuroforaminal narrowing or spinal canal stenosis. ECHO-Tricuspid valve: There appeared to be moderate diffuse thickening 
suggestive of myxomatous changes and probable moderate-marked 
sclerosis/fibrosis/calicification primarily mid and tip of anterior 
leaflet, less so at its base with what appeared to be little involvement 
of septal and posterior leaflets. Old healed vegetative changes Likely discharge tomorrow after completion of ABX. Patient understands the risk of opoid withdrawal.see previous progress notes . MITZI 10/16 - Tobramycin induced-improved Renal USG-No hydronephrosis. Cr back to baseline Left sided pleuritic pain, likely MSK, costochondritis Lidocaine patch applied. CXR,Labs reviewed -unremarkable,CT Chest reviewed -c/w iv dilaudid,pallaitive on board. Further dose adjustment per them. Since , we cant do Tramadol(MITZI), Will give a trial of Medrol dose pack. Tricuspid infective Endocarditis Pseudomonas bacteremia L3/4 septic arthritis Pulmonary septic embolic All above in setting of h/o IVDU 
-On IV abx per ID recs: tobramycin and cefepime until 10/28 
- levaquin until  
- F/U tobramycin level and adjust doses  
-ID, ortho, IR and Cardiothoracic surgery on board 
-No plans for surgical intervention MRI L spine : L3/4 septic arthritis CT thoracic spine : multiple pulmonary nodules, several of which are cavitary and several groundglass opacities and areas of consolidation. 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 Back pain secondary to above 
-followed by palliative medicine for pain management: FU palliative consult PMH of HTN and Chronic anemia  
-monitor closely H/O IVDU, heroin 
-pain management as per palliative Code Status: DNR, completed DDNR on admission Surrogate Decision Maker: Mika Burris ( ex wife's mom) DVT Prophylaxis: lovenox GI Prophylaxis: not indicated Baseline: independent Subjective: Chief Complaint / Reason for Physician Visit \"my pain is not improving\". Discussed with RN events overnight. Review of Systems: 
Symptom Y/N Comments  Symptom Y/N Comments Fever/Chills n   Chest Pain n   
Poor Appetite n   Edema Cough n   Abdominal Pain n   
Sputum n   Joint Pain SOB/CUEVA n   Pruritis/Rash y   
Nausea/vomit n   Tolerating PT/OT Diarrhea n   Tolerating Diet y Constipation n   Other Could NOT obtain due to:   
 
Objective: VITALS:  
Last 24hrs VS reviewed since prior progress note. Most recent are: 
Patient Vitals for the past 24 hrs: 
 Temp Pulse Resp BP SpO2  
10/27/18 0915 97.4 °F (36.3 °C) 79 16 125/80 99 % 10/26/18 2330 98.4 °F (36.9 °C) 74 16 144/82 100 % 10/26/18 1414 97.5 °F (36.4 °C) 75 16 140/87 100 % Intake/Output Summary (Last 24 hours) at 10/27/2018 1054 Last data filed at 10/27/2018 1022 Gross per 24 hour Intake 1000 ml Output 1250 ml Net -250 ml PHYSICAL EXAM: 
General: WD, WN. Alert, cooperative, no acute distress   
EENT:  EOMI. Anicteric sclerae. MMM,Right IJ+ Resp:  CTA bilaterally, no wheezing or rales. No accessory muscle use CV:  Regular  rhythm,  No edema GI:  Soft, Non distended, Non tender.  +Bowel sounds Neurologic:  Alert and oriented X 3, normal speech, Psych:   Good insight. Not anxious nor agitated Skin:  Tattoos+, dry scaly ,erythmeatous skin in upper leg,perineal,scrotal and buttock+. No jaundice Reviewed most current lab test results and cultures  YES Reviewed most current radiology test results   YES Review and summation of old records today    NO Reviewed patient's current orders and MAR    YES 
PMH/SH reviewed - no change compared to H&P 
________________________________________________________________________ Care Plan discussed with: 
  Comments Patient x Family RN x Care Manager x Consultant Multidiciplinary team rounds were held today with , nursing, pharmacist and clinical coordinator. Patient's plan of care was discussed; medications were reviewed and discharge planning was addressed. ________________________________________________________________________ Total NON critical care TIME:  35   Minutes Total CRITICAL CARE TIME Spent:   Minutes non procedure based Comments >50% of visit spent in counseling and coordination of care    
________________________________________________________________________ Chon Hurtado MD  
 
Procedures: see electronic medical records for all procedures/Xrays and details which were not copied into this note but were reviewed prior to creation of Plan. LABS: 
I reviewed today's most current labs and imaging studies. Pertinent labs include: 
Recent Labs 10/26/18 
0258 WBC 13.2* HGB 9.3* HCT 29.2*  
 Recent Labs 10/27/18 
0408 10/26/18 
2754 10/25/18 
9058 CREA 1.34* 1.30 1.13 Signed: Chon Hurtado MD

## 2018-10-27 NOTE — PROGRESS NOTES
Problem: Falls - Risk of 
Goal: *Absence of Falls Document [place] Fall Risk and appropriate interventions in the flowsheet. Outcome: Progressing Towards Goal 
Fall Risk Interventions: 
Mobility Interventions: PT Consult for mobility concerns Mentation Interventions: Increase mobility Medication Interventions: Teach patient to arise slowly Elimination Interventions: Call light in reach History of Falls Interventions: Consult care management for discharge planning

## 2018-10-28 VITALS
DIASTOLIC BLOOD PRESSURE: 81 MMHG | HEIGHT: 72 IN | WEIGHT: 173.8 LBS | SYSTOLIC BLOOD PRESSURE: 130 MMHG | TEMPERATURE: 97.5 F | HEART RATE: 74 BPM | BODY MASS INDEX: 23.54 KG/M2 | OXYGEN SATURATION: 99 % | RESPIRATION RATE: 16 BRPM

## 2018-10-28 LAB — CREAT SERPL-MCNC: 1.49 MG/DL (ref 0.7–1.3)

## 2018-10-28 PROCEDURE — 74011250636 HC RX REV CODE- 250/636: Performed by: INTERNAL MEDICINE

## 2018-10-28 PROCEDURE — 82565 ASSAY OF CREATININE: CPT | Performed by: HOSPITALIST

## 2018-10-28 PROCEDURE — 36592 COLLECT BLOOD FROM PICC: CPT

## 2018-10-28 PROCEDURE — 74011250636 HC RX REV CODE- 250/636: Performed by: HOSPITALIST

## 2018-10-28 PROCEDURE — 36415 COLL VENOUS BLD VENIPUNCTURE: CPT | Performed by: HOSPITALIST

## 2018-10-28 RX ORDER — HYDROMORPHONE HYDROCHLORIDE 1 MG/ML
0.5 INJECTION, SOLUTION INTRAMUSCULAR; INTRAVENOUS; SUBCUTANEOUS ONCE
Status: DISCONTINUED | OUTPATIENT
Start: 2018-10-28 | End: 2018-10-28

## 2018-10-28 RX ORDER — HYDROMORPHONE HYDROCHLORIDE 1 MG/ML
0.5 INJECTION, SOLUTION INTRAMUSCULAR; INTRAVENOUS; SUBCUTANEOUS ONCE
Status: COMPLETED | OUTPATIENT
Start: 2018-10-28 | End: 2018-10-28

## 2018-10-28 RX ADMIN — HYDROMORPHONE HYDROCHLORIDE 0.5 MG: 1 INJECTION, SOLUTION INTRAMUSCULAR; INTRAVENOUS; SUBCUTANEOUS at 10:49

## 2018-10-28 RX ADMIN — CEFEPIME 2 G: 2 INJECTION, POWDER, FOR SOLUTION INTRAMUSCULAR; INTRAVENOUS at 05:26

## 2018-10-28 RX ADMIN — Medication 10 ML: at 10:53

## 2018-10-28 RX ADMIN — HYDROMORPHONE HYDROCHLORIDE 0.5 MG: 1 INJECTION, SOLUTION INTRAMUSCULAR; INTRAVENOUS; SUBCUTANEOUS at 04:38

## 2018-10-28 RX ADMIN — SODIUM CHLORIDE 75 ML/HR: 900 INJECTION, SOLUTION INTRAVENOUS at 04:43

## 2018-10-28 RX ADMIN — Medication 20 ML: at 05:26

## 2018-10-28 NOTE — DISCHARGE SUMMARY
Hospitalist Discharge Summary     Patient ID:  Claudette Perl  [id number]  66 y.o.  1980    PCP on record: None    Admit date: 9/25/2018  Discharge date and time: 10/28/2018      Admission Diagnoses: Sepsis  'light-for-dates' infant with signs of fetal malnutrition  Infective endocarditis    Discharge Diagnoses:    Principal Problem:    Infective endocarditis (9/25/2018)           Hospital Course:   CT lumbar spine-  1 No findings to suggest discitis/osteomyelitis. 2. Minimal to mild multilevel facet arthropathy. No disc bulge or protrusion  identified. No neuroforaminal narrowing or spinal canal stenosis.     ECHO-Tricuspid valve: There appeared to be moderate diffuse thickening  suggestive of myxomatous changes and probable moderate-marked  sclerosis/fibrosis/calicification primarily mid and tip of anterior  leaflet, less so at its base with what appeared to be little involvement  of septal and posterior leaflets. Old healed vegetative changes      D/W Dr Collette Maillard and patient over phone. Recommended him to follow up with DR Allegra Calvo. CM will call Monday to set up appointment . Patient voiced understanding and agreeable to follow up. Patient has been very upset about the fact that dilaudid is being tapered. ?malingering pain ?secondary gain   Per,discussion with Boby Jensen(palliative) .He understands the risk of opioid withdrawal after his discharge.  States he has plans to get into pain management at [address] [place] [address]. Doubt that that will happen soon. MITZI 10/16 - Tobramycin induced-improved  Renal USG-No hydronephrosis. Cr back to baseline      Left sided pleuritic pain, likely MSK, costochondritis   Lidocaine patch applied. CXR,Labs reviewed -unremarkable,CT Chest reviewed   -c/w iv dilaudid,pallaitive on board. Further dose adjustment per them.   Since , we cant do Tramadol(MITZI), Will give a trial of Medrol dose pack.     Tricuspid infective Endocarditis  Pseudomonas bacteremia  L3/4 septic arthritis  Pulmonary septic embolic  All above in setting of h/o IVDU  -On IV abx per ID recs: tobramycin and cefepime until 10/28  - levaquin until 09/27  - F/U tobramycin level and adjust doses   -ID, ortho, IR and Cardiothoracic surgery on board  -No plans for surgical intervention     MRI L spine 9/7: L3/4 septic arthritis  CT thoracic spine 9/14: multiple pulmonary nodules, several of which are cavitary and several groundglass opacities and areas of consolidation. KHANH on 09/11 revealed mobile mass on the atrial side of the tricuspid valve with possible perforation, mod to severe TR.      Back pain secondary to above  -followed by palliative medicine for pain management: FU palliative consult      PMH of HTN and Chronic anemia   -monitor closely     H/O IVDU, heroin  -pain management as per palliative       CONSULTATIONS:  IP CONSULT TO PALLIATIVE CARE - PROVIDER    Excerpted HPI from H&P of Garland Lomeli MD:  Barrington Ross is a 43 y.o.  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 Symptoms progressively worsens where he became weak with subjective fever, chills.  In ER work up was significant for lumbar septic arthritis. Further work up in hospital has shown that pt has TR infective Endocarditis, Pseudomonas bacteremia and septic arthritis of spine. CT thoracic spine showed multiple pulmonary nodules, several of which are cavitary and several groundglass opacities and areas of consolidation.  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 ID, ortho, IR and Cardiothoracic surgery on board and No plans for surgical intervention. As per ID recs: tobramycin and cefepime until 10/28 and levaquin until 09/27. Pt is transferred to [facility] for completion of abx course    ______________________________________________________________________  DISCHARGE SUMMARY/HOSPITAL COURSE:  for full details see H&P, daily progress notes, labs, consult notes. _______________________________________________________________________  Patient seen and examined by me on discharge day. Pertinent Findings:  Gen:    Not in distress  Chest: Clear lungs  CVS:   Regular rhythm. No edema  Abd:  Soft, not distended, not tender  Neuro:  Alert with good insight. Oriented to person, place, and time   _______________________________________________________________________  DISCHARGE MEDICATIONS:   There are no discharge medications for this patient. My Recommended Diet, Activity, Wound Care, and follow-up labs are listed in the patient's Discharge Insturctions which I have personally completed and reviewed. _______________________________________________________________________  DISPOSITION:     Home with Family: x   Home with HH/PT/OT/RN:    SNF/LTC:    SHASHA:    OTHER:        Condition at Discharge:  Stable  _______________________________________________________________________  Follow up with:   PCP : None  Follow-up Information     Follow up With Specialties Details Why Contact Info    Leandro Fields MD Internal Medicine On 10/31/2018 Your appointment time is 11:30am. Possible co-pay 50.00 dollars. Bring discharge paperwork. Picture ID. [address]  [address]  [address] [address]  [phone]      Dipika Cartwright MD Infectious Diseases, Internal Medicine On 11/12/2018 Your appointment time is 11:45am. [address]. Please Keep this appointment. Possible co-pay 50.00. Bring picture ID and discharge paperwork.   [address] [address]  [phone]      [facility]    [address]    Recovery Support Nenita Tellez     [phone]  Talked to trained individuals with live experience in recovery. Safe and Confidential   8:00AM-12:00Midnight. 7 days/week     [facility] Card Application     Please follow to make sure you have provided the requested Verification - Let PCP know you have applied.      [address] Application     Please fill out applica    Narcotics Anonymous    [phone]   call for the closet meeting to your home   one meeting on Thursdays @ 7:30PM  [facility]   [address] [place], [place] [address]w [address]       None    None ([phone]) Patient stated that they have no PCP                Total time in minutes spent coordinating this discharge (includes going over instructions, follow-up, prescriptions, and preparing report for sign off to her PCP) :  30 minutes    Signed:  Shaina Conner MD

## 2018-10-28 NOTE — PROGRESS NOTES
1146) Discuss discharge with Dr. Zach Romeo and pt request for pain medication. 0) Discuss with Betito Vaz for discharge. Pt request to speak to Dr. Zach Romeo.

## 2018-10-28 NOTE — PROGRESS NOTES
Reviewed discharge instructions with pt including follow-up appointments, Endocarditis education, and MyChart information. Pt expressed understanding and PICC was removed

## 2018-10-28 NOTE — PROGRESS NOTES
Bedside and Verbal shift change report given to Faizan Bauer and Lawanda Viramontes RN (oncoming nurse) by Ingrid Millard RN (offgoing nurse). Report included the following information SBAR, Kardex, MAR and Recent Results.

## 2018-10-28 NOTE — PROGRESS NOTES
Patient is being discharged today. Appointments on his AVS for follow-up in the next two weeks. CM will do follow-up call next week. [address] MSFROILAN RN  
[phone]

## 2018-10-28 NOTE — PROGRESS NOTES
Bedside shift change report given to [facility] LEONOR (oncoming nurse) by Carol Birch (offgoing nurse). Report included the following information SBAR, Kardex, Intake/Output, MAR and Recent Results.

## 2018-10-28 NOTE — DISCHARGE INSTRUCTIONS
Learning About Endocarditis  What is endocarditis? Endocarditis (say \"ml-cio-nag-DY-tus\") is an infection of the heart's valves or inner lining of the heart. It is most often caused by bacteria. It also can be caused by fungi. The bacteria or fungi get into the bloodstream. They settle and grow on the inside of the heart, usually on the heart valves. Bacteria or fungi can enter the bloodstream in many ways, such as through some dental and medical procedures. This infection can damage your heart. You need to treat it as soon as possible. People who have a normal heart are not likely to get endocarditis. But some people are more likely to get it than others. This includes people who have a heart problem that affects normal blood flow, such as a heart valve problem, or people who inject illegal drugs. Endocarditis can be very serious. It may be more dangerous for people who:  · Have an artificial heart valve. · Have had this kind of infection before. · Have had certain heart problems since birth. · Have heart valve problems after a heart transplant. What are the symptoms? At first you may feel like you have the flu. You might have a mild fever and feel tired. Other symptoms may include weight loss, night sweats, and painful joints. You may not think these symptoms are cause for worry. But if you are at risk for endocarditis or the symptoms do not go away, call your doctor. How can you prevent endocarditis? · Practice good oral hygiene by brushing and flossing your teeth daily. See a dentist twice each year. Tell your dentist that you are at risk for this infection. · You may need to take antibiotics before some dental and medical procedures if you:  ? Have had a heart valve replaced or repaired. ? Have had endocarditis before. ? Have had certain heart problems since birth. ? Have heart valve problems after a heart transplant.   Ask your doctor or dentist if you need antibiotics to prevent this infection. Find out when you will need to take them. Your doctor may give you a card that says you may need preventive antibiotics before some procedures. You can keep it in your wallet. How is it treated? Treatment may include:  · Antibiotics or antifungal medicine given through a small tube placed in a vein (IV). You may need several weeks of treatment. You might also take antibiotic pills. · Surgery to repair or replace heart valves. You may have follow-up visits for months or years to check the health of your heart. Follow-up care is a key part of your treatment and safety. Be sure to make and go to all appointments, and call your doctor if you are having problems. It's also a good idea to know your test results and keep a list of the medicines you take. Where can you learn more? Go to http://kristopher-rowena.info/. Enter M307 in the search box to learn more about \"Learning About Endocarditis. \"  Current as of: December 6, 2017  Content Version: 11.8  © 3975-9524 [employer]. Care instructions adapted under license by [employer] (which disclaims liability or warranty for this information). If you have questions about a medical condition or this instruction, always ask your healthcare professional. [address] any warranty or liability for your use of this information.

## 2018-10-28 NOTE — PROGRESS NOTES
ID 
- CT lumbar spine - no evidence of discitis / OM  
_ ECHO -Tricuspid valve: There appeared to be moderate diffuse thickening 
suggestive of myxomatous changes and probable moderate-marked 
sclerosis/fibrosis/calicification primarily mid and tip of anterior 
leaflet, less so at its base with what appeared to be little involvement 
of septal and posterior leaflets. Old healed vegetative changes cannot be 
excluded. Cannot exclude more recent involvement. Clinical correlation 
recommended. There appeared to be contraction of anterior leaflet but 
mobility appeared to be intact. KHANH and/or cMRI suggested if felt 
clinically indicated. There was mild regurgitation. Although there was no 
diagnostic evidence for vegetation, this study is not adequate to 
completely exclude the possibility 
-Recommend KHANH 
- Continue Cefepime until KHANH is done Kendra Velasco MD [address]

## 2018-10-28 NOTE — PROGRESS NOTES
Problem: Falls - Risk of 
Goal: *Absence of Falls Document [facility] Fall Risk and appropriate interventions in the flowsheet. Outcome: Progressing Towards Goal 
Fall Risk Interventions: 
Mobility Interventions: Patient to call before getting OOB Mentation Interventions: Increase mobility Medication Interventions: Teach patient to arise slowly Elimination Interventions: Call light in reach History of Falls Interventions: Consult care management for discharge planning

## 2018-10-29 ENCOUNTER — TELEPHONE (OUTPATIENT)
Dept: CASE MANAGEMENT | Age: 38
End: 2018-10-29

## 2018-10-29 NOTE — TELEPHONE ENCOUNTER
CM schedule appointment follow-up with Dr. Margart Brunner Cardiothoracic Surgeon. 11/7/2018 at 2:45pm. Appointment is at [address] [address] [facility] [address]. CM attempt a follow-up call to inform patient of scheduled appointment with Dr. Tommie Jovel. [phone]. CM left VM for patient to return call. CM to mail appointment to patient address [id number]

## 2021-08-25 NOTE — ROUTINE PROCESS
Bedside shift change report given to Jere (oncoming nurse) by Vaishnavi Melchor (offgoing nurse). Report included the following information SBAR, Kardex, ED Summary, Procedure Summary, Intake/Output, MAR and Recent Results. Pt main issue issue today was pain management. He was originally on 1MG Dialuaid IV and he requested the hospitalist to increase increase doasge or decrease interval. Dilaudid was changed to 4mg PO and pt very upset and states that wouldn't manage his pain. Hospitalist advided twice. Consult to pain management placed; oncoming nurse advised. flank pain

## 2022-01-21 NOTE — PROGRESS NOTES
21  Patient transferred from [facility] for completion of an anticipated 6 week course of antibiotic therapy  Patient has Rt IJ TLC, however on arrival proximal port is difficult to flush and distal port will not flush nor am I able to draw blood from it. Medial port is sluggish initially but after several flushes, I was able to draw blood and to easily flush this port. Maintaining with NS at a KOR. Patient ambulated a few steps from ambulance stretcher, but this caused him obvious pain. Patient requires RTC medication per order to reduce pain from a 7-8 level to a level of 4, at which level he lies quietly in bed. Patient refused Protonix and Miralex, discussed purpose of each medication, and consequences of not taking these meds as scheduled. Patient states \"I am familiar with these medications. \"  Dr. Mary Freitas notified. Site under TLC dressing reddened, as well as bilateral posterior upper leg, perineal, scrotal and buttocks. Dr. Mary Freitas aware and plan is to reevaluate this areas tomorrow. 1900 Able to flush proximal port of catheter and get blood return.  
 
Report given to Azeem Duong 
 Previous Labs: Yes How Did The Hair Loss Occur?: gradual in onset How Severe Is Your Hair Loss?: moderate Additional History: Patient states pain level is a 0/10 When Were The Labs Drawn? (Drawn...): Last year Lab Details: Low Vit D

## 2022-05-20 NOTE — H&P
Hospitalist Admission NoteNAME: Brenda Eller :  1980 MRN:  [id number] Date/Time:  2018 9:15 AM 
 
Patient PCP: None 
______________________________________________________________________ Given the patient's current clinical presentation, I have a high level of concern for decompensation if discharged from the emergency department. Complex decision making was performed, which includes reviewing the patient's available past medical records, laboratory results, and x-ray films. My assessment of this patient's clinical condition and my plan of care is as follows. Assessment / Plan: 
Back spine concerning for septic arthritis/ spine abscess Hx of MSSA Current IVDU Sepsis, POA 
-MRI L spine showed increased fluid in the right L3-4 posterior facet joint with surrounding edema extending into the paraspinous musculature. Findings are suspicious for septic arthritis. No significant spinal stenosis or neural foraminal narrowing. 
-he had a KHANH on  that was negative. Received nafcillin through 18. Completed Cefazolin on  inpt. -ESRD 12, CRP 15.5, lactate 0.9, UA neg 
-obtain Bcx 
-received IVF per sepsis protocol. Will start gentle IVF 
-start empiric abx with cefepime and vancomycin -at risk for heroin withdrawal.  He is currently on IV morphine for now. Will ask pharmacy to help with med rec as per chart review, pt was on methadone and dialudid daily.   
-consulted ortho, IR. He will also need ID's assistance in regards to choice of abx and duration HTN 
-BP stable now. Will need med rec 
-hydralazine prn Chronic anemia -no evidence of bleeding 
-monitor CBC PNA 
-CT showed multiple pulmonary nodules, several of which are cavitary and several groundglass opacities and areas of consolidation 
-discussed with pt findings on CT. He is currently not hypoxic. Has intermittent nonproductive cough 
-start nebs prn. He is already on abx as above -needs f/u with CT outpt after abx therapy Code Status: Full Surrogate Decision Maker: he did not wish to list anyone DVT Prophylaxis: SCDs for now GI Prophylaxis: not indicated Baseline: indedepent Subjective: CHIEF COMPLAINT: back pain HISTORY OF PRESENT ILLNESS:    
Lydia Naidu is a 43 y.o.  male with PMHx significant for hx of prior cervical spine surgery ( ACDF C5-7), R rotator cuff injury, b/l superficial htrombus, HTN, anemia, daily IVD use, hx of MSSA bacteremia, T spine epidural abscess s/p T3-6 laminectomies for abscess and phlegmon evacuation on 5/20. Pt completed IV abx inpt at [facility].  Pt recently moved back to [place] to get away from heroin. He states after the laminectomy, patient loss sensation in his b/l LE L>R. For the past week, he has been having lower back pain which he described as in between his buttocks. He states he thought it was due to recent surgery and continue on with his job. Symptoms progressively worsens where he became weak with subjective fever, chills. He denies chest pain, SOB, palpitations, n/v/d. He denies loss of bladder/bowel control. Pt admits to daily heroin use. He last used it yesterday morning. He was lethargic during my encounter after he received IV narcotics. However he was easily arousable and was able to give appropriate hx. He asked that our ID reach out to the ID physician who took care of him last at [facility]. In the ER, vitals: T100.3, P115, /77, SpO2 98% on RA 
CT T/L spine reviewed. MRI L spine showed Increased fluid in the right L3-4 posterior facet joint with surrounding edema extending into the paraspinous musculature. Findings are suspicious for septic arthritis. No significant spinal stenosis or neural foraminal narrowing. 
  
We were asked to admit for work up and evaluation of the above problems. Past Medical History:  
Diagnosis Date  Back pain, chronic Past Surgical History: Procedure Laterality Date  HX CERVICAL DISKECTOMY  HX FEMUR FRACTURE TX    
 HX ORTHOPAEDIC Back Surgery Social History Substance Use Topics  Smoking status: Current Every Day Smoker Packs/day: 1.00  Smokeless tobacco: Never Used  Alcohol use Yes History reviewed. No pertinent family history. No Known Allergies Prior to Admission medications Not on File REVIEW OF SYSTEMS:    
I am not able to complete the review of systems because: The patient is intubated and sedated The patient has altered mental status due to his acute medical problems The patient has baseline aphasia from prior stroke(s) The patient has baseline dementia and is not reliable historian The patient is in acute medical distress and unable to provide information Total of 12 systems reviewed as follows:   
   POSITIVE= BOLD text  Negative = text not BOLD General:  Subjective fever, chills, sweats, generalized weakness, weight loss/gain,  
   loss of appetite Eyes:    blurred vision, eye pain, loss of vision, double vision ENT:    rhinorrhea, pharyngitis Respiratory:   cough, sputum production, SOB, CUEVA, wheezing, pleuritic pain  
Cardiology:   chest pain, palpitations, orthopnea, PND, edema, syncope Gastrointestinal:  abdominal pain , N/V, diarrhea, dysphagia, constipation, bleeding Genitourinary:  frequency, urgency, dysuria, hematuria, incontinence Muskuloskeletal :  arthralgia, myalgia, back pain Hematology:  easy bruising, nose or gum bleeding, lymphadenopathy Dermatological: rash, ulceration, pruritis, color change / jaundice Endocrine:   hot flashes or polydipsia Neurological:  headache, dizziness, confusion, focal weakness, paresthesia, Speech difficulties, memory loss, gait difficulty Psychological: Feelings of anxiety, depression, agitation Objective: VITALS:   
Visit Vitals  /83 (BP 1 Location: Right arm, BP Patient Position: At rest)  Pulse 98  Temp 100.4 °F (38 °C)  Resp 19  
 Ht 6' (1.829 m)  Wt 86.2 kg (190 lb)  SpO2 93%  BMI 25.77 kg/m2 PHYSICAL EXAM: 
 
General:    Lethargic male in bed, easily arousable HEENT: Atraumatic, anicteric sclerae, pink conjunctivae No oral ulcers, mucosa moist, throat clear Neck:  Supple, symmetrical,  thyroid: non tender Lungs:   CTA b/l. No wheezing or Rhonchi. No rales. Chest wall:  No tenderness. No accessory muscle use. Heart:   Regular  rhythm,  No  Murmur. No edema Abdomen:   Soft, NT. ND  BS+ Extremities: No cyanosis. No clubbing,   
  Skin turgor normal, Radial dial pulse 2+ Skin:     Tattoos seen on R leg, L upper chest 
Psych:  Not depressed. Not anxious or agitated. Neurologic: No facial asymmetry. No aphasia or slurred speech. Symmetrical strength, Sensation grossly intact. AAOx4.  
 
_______________________________________________________________________ Care Plan discussed with: 
  Comments Patient x Family RN x Care Manager Consultant:  neha CALDERON physician  
_______________________________________________________________________ Expected  Disposition:  
Home with Family HH/PT/OT/RN x  
SNF/LTC   
SHASHA   
________________________________________________________________________ TOTAL TIME:  65 Minutes Critical Care Provided     Minutes non procedure based Comments  
 x Reviewed previous records  
>50% of visit spent in counseling and coordination of care x Discussion with patient and/or family and questions answered 
  
 
________________________________________________________________________ Signed: Gilford Lack, MD 
 
Procedures: see electronic medical records for all procedures/Xrays and details which were not copied into this note but were reviewed prior to creation of Plan. LAB DATA REVIEWED:   
Recent Results (from the past 24 hour(s)) CULTURE, BLOOD Collection Time: 09/06/18  8:35 PM  
Result Value Ref Range Special Requests: NO SPECIAL REQUESTS Culture result: NO GROWTH AFTER 11 HOURS    
CULTURE, BLOOD Collection Time: 09/06/18  8:50 PM  
Result Value Ref Range Special Requests: NO SPECIAL REQUESTS Culture result: NO GROWTH AFTER 11 HOURS    
LACTIC ACID Collection Time: 09/06/18  8:50 PM  
Result Value Ref Range Lactic acid 0.9 0.4 - 2.0 MMOL/L  
METABOLIC PANEL, COMPREHENSIVE Collection Time: 09/06/18  8:50 PM  
Result Value Ref Range Sodium 134 (L) 136 - 145 mmol/L Potassium 3.6 3.5 - 5.1 mmol/L Chloride 102 97 - 108 mmol/L  
 CO2 20 (L) 21 - 32 mmol/L Anion gap 12 5 - 15 mmol/L Glucose 102 (H) 65 - 100 mg/dL BUN 12 6 - 20 MG/DL Creatinine 1.02 0.70 - 1.30 MG/DL  
 BUN/Creatinine ratio 12 12 - 20 GFR est AA >60 >60 ml/min/1.73m2 GFR est non-AA >60 >60 ml/min/1.73m2 Calcium 8.8 8.5 - 10.1 MG/DL Bilirubin, total 0.7 0.2 - 1.0 MG/DL  
 ALT (SGPT) 42 12 - 78 U/L  
 AST (SGOT) 34 15 - 37 U/L Alk. phosphatase 149 (H) 45 - 117 U/L Protein, total 9.0 (H) 6.4 - 8.2 g/dL Albumin 2.6 (L) 3.5 - 5.0 g/dL Globulin 6.4 (H) 2.0 - 4.0 g/dL A-G Ratio 0.4 (L) 1.1 - 2.2    
CBC WITH AUTOMATED DIFF Collection Time: 09/06/18  8:50 PM  
Result Value Ref Range WBC 15.8 (H) 4.1 - 11.1 K/uL  
 RBC 3.62 (L) 4.10 - 5.70 M/uL HGB 9.9 (L) 12.1 - 17.0 g/dL HCT 30.0 (L) 36.6 - 50.3 % MCV 82.9 80.0 - 99.0 FL  
 MCH 27.3 26.0 - 34.0 PG  
 MCHC 33.0 30.0 - 36.5 g/dL  
 RDW 14.5 11.5 - 14.5 % PLATELET 683 239 - 986 K/uL MPV 9.6 8.9 - 12.9 FL  
 NRBC 0.0 0  WBC ABSOLUTE NRBC 0.00 0.00 - 0.01 K/uL NEUTROPHILS 85 (H) 32 - 75 % LYMPHOCYTES 11 (L) 12 - 49 % MONOCYTES 4 (L) 5 - 13 % EOSINOPHILS 0 0 - 7 % BASOPHILS 0 0 - 1 % IMMATURE GRANULOCYTES 1 (H) 0.0 - 0.5 % ABS. NEUTROPHILS 13.4 (H) 1.8 - 8.0 K/UL  
 ABS. LYMPHOCYTES 1.7 0.8 - 3.5 K/UL ABS. MONOCYTES 0.6 0.0 - 1.0 K/UL  
 ABS. EOSINOPHILS 0.0 0.0 - 0.4 K/UL  
 ABS. BASOPHILS 0.0 0.0 - 0.1 K/UL  
 ABS. IMM. GRANS. 0.1 (H) 0.00 - 0.04 K/UL  
 DF AUTOMATED    
SED RATE (ESR) Collection Time: 09/06/18  8:50 PM  
Result Value Ref Range Sed rate, automated 129 (H) 0 - 15 mm/hr C REACTIVE PROTEIN, QT Collection Time: 09/06/18  8:50 PM  
Result Value Ref Range C-Reactive protein 15.50 (H) 0.00 - 0.60 mg/dL EKG, 12 LEAD, INITIAL Collection Time: 09/06/18  9:02 PM  
Result Value Ref Range Ventricular Rate 106 BPM  
 Atrial Rate 106 BPM  
 P-R Interval 138 ms QRS Duration 88 ms Q-T Interval 348 ms QTC Calculation (Bezet) 462 ms Calculated P Axis 61 degrees Calculated R Axis 73 degrees Calculated T Axis 44 degrees Diagnosis Sinus tachycardia No previous ECGs available Confirmed by Faviola Dorsey MD, Luther Jeronimo ([id number]) on 9/7/2018 9:11:30 AM 
  
URINALYSIS W/ RFLX MICROSCOPIC Collection Time: 09/06/18 10:00 PM  
Result Value Ref Range Color YELLOW/STRAW Appearance CLEAR CLEAR Specific gravity 1.014 1.003 - 1.030    
 pH (UA) 6.0 5.0 - 8.0 Protein 30 (A) NEG mg/dL Glucose NEGATIVE  NEG mg/dL Ketone NEGATIVE  NEG mg/dL Bilirubin NEGATIVE  NEG Blood LARGE (A) NEG Urobilinogen 1.0 0.2 - 1.0 EU/dL Nitrites NEGATIVE  NEG Leukocyte Esterase NEGATIVE  NEG    
 WBC 0-4 0 - 4 /hpf  
 RBC 10-20 0 - 5 /hpf Epithelial cells FEW FEW /lpf Bacteria NEGATIVE  NEG /hpf Hyaline cast 0-2 0 - 5 /lpf DRUG SCREEN, URINE Collection Time: 09/06/18 10:00 PM  
Result Value Ref Range AMPHETAMINES NEGATIVE  NEG    
 BARBITURATES NEGATIVE  NEG BENZODIAZEPINES NEGATIVE  NEG    
 COCAINE NEGATIVE  NEG METHADONE NEGATIVE  NEG    
 OPIATES POSITIVE (A) NEG    
 PCP(PHENCYCLIDINE) NEGATIVE  NEG    
 THC (TH-CANNABINOL) NEGATIVE  NEG Drug screen comment (NOTE) Walk in